# Patient Record
Sex: FEMALE | Race: WHITE | NOT HISPANIC OR LATINO | Employment: OTHER | URBAN - METROPOLITAN AREA
[De-identification: names, ages, dates, MRNs, and addresses within clinical notes are randomized per-mention and may not be internally consistent; named-entity substitution may affect disease eponyms.]

---

## 2017-05-24 ENCOUNTER — OFFICE VISIT (OUTPATIENT)
Dept: PSYCHIATRY | Facility: CLINIC | Age: 52
End: 2017-05-24
Payer: COMMERCIAL

## 2017-05-24 VITALS
WEIGHT: 126.31 LBS | DIASTOLIC BLOOD PRESSURE: 66 MMHG | HEART RATE: 66 BPM | HEIGHT: 66 IN | SYSTOLIC BLOOD PRESSURE: 100 MMHG | BODY MASS INDEX: 20.3 KG/M2

## 2017-05-24 DIAGNOSIS — F41.1 GAD (GENERALIZED ANXIETY DISORDER): ICD-10-CM

## 2017-05-24 DIAGNOSIS — F90.0 ADHD (ATTENTION DEFICIT HYPERACTIVITY DISORDER), INATTENTIVE TYPE: ICD-10-CM

## 2017-05-24 PROCEDURE — 90792 PSYCH DIAG EVAL W/MED SRVCS: CPT | Mod: S$GLB,,, | Performed by: PSYCHIATRY & NEUROLOGY

## 2017-05-24 PROCEDURE — 99999 PR PBB SHADOW E&M-EST. PATIENT-LVL III: CPT | Mod: PBBFAC,,, | Performed by: PSYCHIATRY & NEUROLOGY

## 2017-05-24 RX ORDER — CITALOPRAM 20 MG/1
20 TABLET, FILM COATED ORAL DAILY
Qty: 30 TABLET | Refills: 0 | Status: SHIPPED | OUTPATIENT
Start: 2017-05-24 | End: 2017-06-07 | Stop reason: SINTOL

## 2017-05-24 NOTE — PROGRESS NOTES
"ID: 51yo WF no prior psych hx w/i the [facility]    CC: adhd    HPI: presents late for appt. Chart reviewed. Opens the appt with reasonable, but detailed questions regarding diagnoses and their potential impact on insurance. Has a previous diagnosis of adhd, "I just really struggle keeping myself on task and have for quite sometime. early adulthood is when I really became more aware of it." has had previous trial of strattera and adderall. "I don't really want to medicate but I do want to maximize who I am and use who I am in a way that's productive. I'm creative and can be spontaneous which I like, but I also need to accomplish things in a reasonable amount of time."     Pt initiated psychiatry in early 20s, was diagnosed with "adhd, depression, compulsive stuff"- at that time was started on adderall and on antidepressant- helpful initially, "but I found that it was a little too much of a stimulant." reports that the ssri txmt affected initimacy which was already an ongoing issue in marriage so she d/c'd- cannot recall what she took.     Pt's oldest son  of an "accidental overdose"  at 25yrs old. Shortly following pt had inc'd anxiety, and a reemergence of binge/purge eating d/o which she struggled with through teen years. Went to a sort of rehab facility in [place] x 2wks. Does not believe any medication was given during that time "just a lot of therapy." during that time tried strattera and "it made me somewhat anxious and gave me some sort of heart palpatation, but I also had other things going on at the time with sleep problems."     Reports that she had "great sleep until my hysterectomy"  - since then she is "restless" through the night and has difficulty maintaining- currently taking ambien 5mg po qhs.     On Psychiatric ROS:    Endorses difficulty maintaining sleep since hysterectomy - taking ambien 5mg po qhs- also reports neck pain which affects sleep, denies anhedonia, denies feeling " "helpless/hopeless, "good" energy, poor concentration, stable appetite- no wgt change, denies dec PMA    Denies thoughts of SI/intent/plan.     Endorses feeling more easily overwhelmed because of her inability to accomplish/distractibility, +ruminative thinking, +feeling tense/"on edge"    Denies h/o panic attack  Denies h/o hypo/manic sxs.   Denies h/o psychosis.     Endorses h/o trauma "when my son ", but denies nightmares, re-experiencing, avoidance or hyperarousal.    H/o binge/purge behavior through teen years, none in years "it is a struggle not to want to but I have not had that issue since " - had a reemergence of sxs following her son's death and attended a "rehab" in [place]- no recurrence since.    Difficulty- sustaining attention in tasks or fun activities- cannot sit and watch television, following through on instructions and fail to finish work, organizing tasks and activities, engaging in leisure activities or doing fun things quietly "i can only get an audio book and listen while I'm walking", waiting your turn/impatient/interrupts or intrude on others  Avoid/dislike/reluctant to engage in work that requires sustained mental effort "that's the biggest issue with my  right now because I won't work at the desk", easily distracted, forgetful in daily activities, fidgets with hands/feet or squirms in seat, feels "on the go" or "driven by a motor", blurt out answers before questions have been completed  **Sxs confirmed by collateral- previous diagnosis and h/o effective txmt,  also confirms    PPHx: Denies h/o self injury  + inpt psych hospitalization- went to a type of rehab facility following her son's death- had a reemergence of eating disorder for which she was treated  Denies h/o suicide attempt     Current Psych Meds: ambien 5mg po qhs PRN insomnia  Past Psych Meds: topomax (for migraines), adderall (overly stimulated), strattera (overly stimulated), "maybe lexapro"    PMHx: " "chronic neck pain, migraines occipital neuralgia    SubstHx:   T- none  E- occ, rare  D- none  Caffeine- 1-2cups of coffee/d    FamPHx: son- addiction/"accidental overdose", mom- depression/ETOH abuse, 2 suicide attempts "when drinking", brother- schizophrenic, sister- bipolar/addiction "very dysfunctional it's a stressor for the family"    Dev/SocHx: born in [facility], moved to [place] at 7yo, moved back to [facility] at 18yo (when parents ) and she got pregnant "which was terrible because it was taboo there", had her son (who has now  of "accidental overdose" at 25yrs old after years of addiction-  in ), yrs later pt  current  and had 4 children. Pt's  adopted the first son "and loved him as his own." pt was raised by m/d- still living but . Pt is oldest of 5 children. Pt is close with one sister and one brother. (another brother is schizophrenic and in an detention and a sister is unstable with bipolar and addxn and lives with pt's father). Pt grad HS, some college. Stayed at home and home schooled the children. Now works with  in a food brokerage business. Close with remaining 4 children. Lives at home with  and 24 and 22yo sons.     Musculoskeletal:  Muscle strength/Tone: no dyskinesia/ no tremor  Gait/Station- non antalgic, no assistance needed    MSE: appears stated age, well groomed, appropriate dress, engages well with examiner. Good e/c. Speech reg rate and vol, nonpressured. Mood is "probably on the frustrated side of things." Affect euthymic with some mild anxiety noted. No physical evidence of emotion. Sensorium fully intact. Oriented to date/day/location, current events. Narrative memory intact. Intellectual function is avg based on vocab and basic fund of knowledge. Thought is c/l/gd. No tangentiality or circumstantiality. No FOI/CARLOS. Denies SI/HI. Denies A/VH. No evidence of delusions. Insight and Judgment intact.     Suicide Risk Assessment: "   Protective- age, gender, no prior attempts, no prior hospitalizations, no family h/o attempts, no ongoing substance abuse, no psychosis, , has children, denies SI/intent/plan, seeking treatment, access to treatment, future oriented, good primary support, no access to firearms    Risk- race, ongoing Axis I sxs    **Pt is at LOW imminent and long term risk of suicide given current risk factors.    Assessment:  51yo WF with genetic loading for anxiety and mood disorders through mother. Lifetime h/o anxiety spectrum disorders to include bulimia with binge/purge behavior through teen yrs which resurfaced following her 24yo son's overdose/death in 2010. Pt has not had continued issues with eating disorder, but continues to endorse sxs c/w with both JAMESON and ADHD-IT. Will try and manage anxiety first prior to starting stimulant txmt. Previous trials of stimulant txmt alone have led to overstimulation. Will start celexa today and have pt rtc in 2wk for eval of response.     Axis I: JAMESON, ADHD- IT, h/o bulimia-binge/purge type  Axis II: none at this time   Axis III: neck pain, migraines, occipital neuralgia  Axis IV: occupational, chronic mental health concerns  Axis V: GAF 60    Plan:   1. Start trial of celexa 10-20mg po qhs  2. Anticipate starting stimulant therapy at next appt  3. Discuss therapy referrals at following appt  4. rtc 2wks    -Spent 60min face to face with the pt; >50% time spent in counseling   -Supportive therapy and psychoeducation provided  -R/B/SE's of medications discussed with the pt who expresses understanding and chooses to take medications as prescribed.   -Pt instructed to call clinic, 911 or go to nearest emergency room if sxs worsen or pt is in   crisis. The pt expresses understanding.

## 2017-06-07 ENCOUNTER — OFFICE VISIT (OUTPATIENT)
Dept: PSYCHIATRY | Facility: CLINIC | Age: 52
End: 2017-06-07
Payer: COMMERCIAL

## 2017-06-07 VITALS
DIASTOLIC BLOOD PRESSURE: 64 MMHG | HEIGHT: 66 IN | HEART RATE: 68 BPM | SYSTOLIC BLOOD PRESSURE: 98 MMHG | WEIGHT: 126 LBS | BODY MASS INDEX: 20.25 KG/M2

## 2017-06-07 DIAGNOSIS — F90.0 ADHD (ATTENTION DEFICIT HYPERACTIVITY DISORDER), INATTENTIVE TYPE: Primary | ICD-10-CM

## 2017-06-07 DIAGNOSIS — Z72.821 POOR SLEEP HYGIENE: ICD-10-CM

## 2017-06-07 DIAGNOSIS — F41.1 GAD (GENERALIZED ANXIETY DISORDER): ICD-10-CM

## 2017-06-07 PROCEDURE — 99214 OFFICE O/P EST MOD 30 MIN: CPT | Mod: S$GLB,,, | Performed by: PSYCHIATRY & NEUROLOGY

## 2017-06-07 PROCEDURE — 99999 PR PBB SHADOW E&M-EST. PATIENT-LVL III: CPT | Mod: PBBFAC,,, | Performed by: PSYCHIATRY & NEUROLOGY

## 2017-06-07 RX ORDER — BACLOFEN 10 MG/1
10 TABLET ORAL
COMMUNITY
Start: 2017-05-05 | End: 2021-01-04

## 2017-06-07 RX ORDER — METHYLPHENIDATE HYDROCHLORIDE 18 MG/1
18 TABLET ORAL EVERY MORNING
Qty: 30 TABLET | Refills: 0 | Status: SHIPPED | OUTPATIENT
Start: 2017-06-07 | End: 2017-07-05 | Stop reason: SDUPTHER

## 2017-06-07 NOTE — PROGRESS NOTES
"ID: 51yo WF no prior psych hx w/i the [facility]    CC: adhd    Interim Hx: presents late for appt. Chart reviewed. Took the celexa x1wk then quit- feels it increased her migraines and led to inc'd daytime lethargy. "all those medicines tend to do that to me. I think the lexapro did that too."     Has done some "online research and most adults with adhd like vyvanse." discussion of med ed, classes of stimulants, previous use of adderall was overly stimulating. Will try methylphenidate class. Pt opposed to "ritalin"- will start trial of concerta 18mg po qam.     On Psychiatric ROS:    Endorses difficulty maintaining sleep since hysterectomy 2014- taking ambien 5mg po qhs- also reports neck pain which affects sleep, denies anhedonia, denies feeling helpless/hopeless, "good" energy, poor concentration, stable appetite- no wgt change, denies dec PMA    Denies thoughts of SI/intent/plan.     Endorses feeling more easily overwhelmed because of her inability to accomplish/distractibility, +ruminative thinking, +feeling tense/"on edge"    PPHx: Denies h/o self injury  + inpt psych hospitalization- went to a type of rehab facility following her son's death- had a reemergence of eating disorder for which she was treated  Denies h/o suicide attempt     Current Psych Meds: ambien 5mg po qhs PRN insomnia  Past Psych Meds: topomax (for migraines), adderall (overly stimulated), strattera (overly stimulated), "maybe lexapro", celexa (worsened h/a's; daytime lethargy)    PMHx: chronic neck pain, migraines occipital neuralgia    SubstHx:   T- none  E- occ, rare  D- none  Caffeine- 1-2cups of coffee/d    Musculoskeletal:  Muscle strength/Tone: no dyskinesia/ no tremor  Gait/Station- non antalgic, no assistance needed    MSE: appears stated age, well groomed, appropriate dress, engages well with examiner. Good e/c. Speech reg rate and vol, nonpressured. Mood is "good" Affect euthymic with some mild anxiety noted in verbosity. No " physical evidence of emotion. Sensorium fully intact. Oriented to date/day/location, current events. Narrative memory intact. Intellectual function is avg based on vocab and basic fund of knowledge. Thought is c/l/gd. No tangentiality or circumstantiality. No FOI/CARLOS. Denies SI/HI. Denies A/VH. No evidence of delusions. Insight and Judgment intact.     Suicide Risk Assessment:   Protective- age, gender, no prior attempts, no prior hospitalizations, no family h/o attempts, no ongoing substance abuse, no psychosis, , has children, denies SI/intent/plan, seeking treatment, access to treatment, future oriented, good primary support, no access to firearms    Risk- race, ongoing Axis I sxs    **Pt is at LOW imminent and long term risk of suicide given current risk factors.    Assessment:  51yo WF with genetic loading for anxiety and mood disorders through mother. Also with genetic loading for addiction. Lifetime h/o anxiety spectrum disorders to include bulimia with binge/purge behavior through teen yrs which resurfaced following her 24yo son's overdose/death in 2010. Pt has not had continued issues with eating disorder, but continues to endorse sxs c/w with both JAMESON and ADHD-IT. Will try and manage anxiety first prior to starting stimulant txmt. Previous trials of stimulant txmt alone have led to overstimulation. Started celexa which the pt only cont'd for a 1wk trial. Reported inc'd h/a's and daytime lethargy. Today only interested in trial of long acting stimulant- will start trial of concerta 18mg po qam. 4wk f/u.     Axis I: JAMESON, ADHD- IT, h/o bulimia-binge/purge type  Axis II: none at this time   Axis III: neck pain, migraines, occipital neuralgia  Axis IV: occupational, chronic mental health concerns  Axis V: GAF 60    Plan:   1. No longer on celexa-s/e's  2. Start trial of concerta 18mg po qam  3. Discussed therapy referrals  4. rtc 1mo    -Spent 30min face to face with the pt; >50% time spent in counseling    -Supportive therapy and psychoeducation provided  -R/B/SE's of medications discussed with the pt who expresses understanding and chooses to take medications as prescribed.   -Pt instructed to call clinic, 911 or go to nearest emergency room if sxs worsen or pt is in   crisis. The pt expresses understanding.

## 2017-07-05 ENCOUNTER — OFFICE VISIT (OUTPATIENT)
Dept: PSYCHIATRY | Facility: CLINIC | Age: 52
End: 2017-07-05
Payer: COMMERCIAL

## 2017-07-05 VITALS
WEIGHT: 124 LBS | HEART RATE: 64 BPM | DIASTOLIC BLOOD PRESSURE: 66 MMHG | BODY MASS INDEX: 19.93 KG/M2 | SYSTOLIC BLOOD PRESSURE: 101 MMHG | HEIGHT: 66 IN

## 2017-07-05 DIAGNOSIS — F90.0 ADHD (ATTENTION DEFICIT HYPERACTIVITY DISORDER), INATTENTIVE TYPE: Primary | ICD-10-CM

## 2017-07-05 DIAGNOSIS — Z72.821 POOR SLEEP HYGIENE: ICD-10-CM

## 2017-07-05 DIAGNOSIS — F41.1 GAD (GENERALIZED ANXIETY DISORDER): ICD-10-CM

## 2017-07-05 PROCEDURE — 99214 OFFICE O/P EST MOD 30 MIN: CPT | Mod: S$GLB,,, | Performed by: PSYCHIATRY & NEUROLOGY

## 2017-07-05 PROCEDURE — 99999 PR PBB SHADOW E&M-EST. PATIENT-LVL III: CPT | Mod: PBBFAC,,, | Performed by: PSYCHIATRY & NEUROLOGY

## 2017-07-05 RX ORDER — METHYLPHENIDATE HYDROCHLORIDE 27 MG/1
27 TABLET ORAL EVERY MORNING
Qty: 30 TABLET | Refills: 0 | Status: SHIPPED | OUTPATIENT
Start: 2017-07-05 | End: 2017-07-14 | Stop reason: SDUPTHER

## 2017-07-05 NOTE — PROGRESS NOTES
"ID: 51yo WF no prior psych hx w/i the [facility]- diag clarified as ADHD and JAMESON. Started celexa which the pt didn't tolerate. Last appt started concerta 18mg po qam.     CC: adhd    Interim Hx: presents late for appt. Chart reviewed. Now on concerta 18mg po qam- "I was very mild, but also very helpful." finds her anxiety is improved, sleep so improved she is now taking 1/2 tab of ambien 10mg (provided by other provider) and has been more productive both at home and in her work role.     Takes the med at approx 5:30am and gets benefit for her full work day. No difficulty initiating sleep.     Pt tried 2 tabs, 36mg, one day "and it made me a little on edge". Will try 27mg dose for optimal results with focus/attention.     On Psychiatric ROS:    Endorses difficulty maintaining sleep since hysterectomy 2014- taking ambien 5mg po qhs- also reports neck pain which affects sleep, denies anhedonia, denies feeling helpless/hopeless, "good" energy, improved concentration, stable appetite- no wgt change, denies dec PMA    Denies thoughts of SI/intent/plan.     LESS overwhelmed on stimulant, LESS ruminative thinking, feeling LESS tense/"on edge"    PPHx: Denies h/o self injury  + inpt psych hospitalization- went to a type of rehab facility following her son's death- had a reemergence of eating disorder for which she was treated  Denies h/o suicide attempt     Current Psych Meds: concerta 18mg po qam, ambien 5mg po qhs PRN insomnia (through alternate provider)   Past Psych Meds: topomax (for migraines), adderall (overly stimulated), strattera (overly stimulated), "maybe lexapro", celexa (worsened h/a's; daytime lethargy)    PMHx: chronic neck pain, migraines occipital neuralgia    SubstHx:   T- none  E- occ, rare  D- none  Caffeine- 1-2cups of coffee/d    Musculoskeletal:  Muscle strength/Tone: no dyskinesia/ no tremor  Gait/Station- non antalgic, no assistance needed    MSE: appears stated age, well groomed, appropriate " "dress, engages well with examiner. Good e/c. Speech reg rate and vol, nonpressured. Mood is "upbeat. i'm just really getting so much more accomplished" Affect euthymic- less anxiety noted. Sensorium fully intact. Oriented to date/day/location, current events. Narrative memory intact. Intellectual function is avg based on vocab and basic fund of knowledge. Thought is c/l/gd. No tangentiality or circumstantiality. No FOI/CARLOS. Denies SI/HI. Denies A/VH. No evidence of delusions. Insight and Judgment intact.     Suicide Risk Assessment:   Protective- age, gender, no prior attempts, no prior hospitalizations, no family h/o attempts, no ongoing substance abuse, no psychosis, , has children, denies SI/intent/plan, seeking treatment, access to treatment, future oriented, good primary support, no access to firearms    Risk- race, ongoing Axis I sxs    **Pt is at LOW imminent and long term risk of suicide given current risk factors.    Assessment:  51yo WF with genetic loading for anxiety and mood disorders through mother. Also with genetic loading for addiction. Lifetime h/o anxiety spectrum disorders to include bulimia with binge/purge behavior through teen yrs which resurfaced following her 26yo son's overdose/death in 2010. Pt has not had continued issues with eating disorder, but continues to endorse sxs c/w with both JAMESON and ADHD-IT. Will try and manage anxiety first prior to starting stimulant txmt. Previous trials of stimulant txmt alone have led to overstimulation. Started celexa which the pt only cont'd for a 1wk trial. Reported inc'd h/a's and daytime lethargy. Today only interested in trial of long acting stimulant- started trial of concerta 18mg po qam. Today on f/u the medicine has been helpful- not fully optimized, but a trial of 36mg led to mildly inc'd anxiety. Sleep, focus/attn and functional life all improved on stimulant. Will try inc to 27mg po qam.    Axis I: JAMESON, ADHD- IT, h/o bulimia-binge/purge " type  Axis II: none at this time   Axis III: neck pain, migraines, occipital neuralgia  Axis IV: occupational, chronic mental health concerns  Axis V: GAF 60    Plan:   1. No longer on celexa-s/e's  2. Inc concerta to 27mg po qam  3. Discussed therapy referrals  4. rtc 3mos    -Spent 30min face to face with the pt; >50% time spent in counseling   -Supportive therapy and psychoeducation provided  -R/B/SE's of medications discussed with the pt who expresses understanding and chooses to take medications as prescribed.   -Pt instructed to call clinic, 911 or go to nearest emergency room if sxs worsen or pt is in   crisis. The pt expresses understanding.

## 2017-07-14 ENCOUNTER — PATIENT MESSAGE (OUTPATIENT)
Dept: PSYCHIATRY | Facility: CLINIC | Age: 52
End: 2017-07-14

## 2017-07-14 DIAGNOSIS — F90.0 ADHD (ATTENTION DEFICIT HYPERACTIVITY DISORDER), INATTENTIVE TYPE: ICD-10-CM

## 2017-07-14 RX ORDER — METHYLPHENIDATE HYDROCHLORIDE 18 MG/1
18 TABLET ORAL EVERY MORNING
Qty: 30 TABLET | Refills: 0 | Status: SHIPPED | OUTPATIENT
Start: 2017-07-14 | End: 2017-08-15 | Stop reason: SDUPTHER

## 2017-07-21 ENCOUNTER — TELEPHONE (OUTPATIENT)
Dept: NEUROLOGY | Facility: CLINIC | Age: 52
End: 2017-07-21

## 2017-07-21 NOTE — TELEPHONE ENCOUNTER
Spoke with patient. Scheduled her to see Dr. Delaney for headaches, neck pain and back pain. Pt verbalized an understanding.

## 2017-07-21 NOTE — TELEPHONE ENCOUNTER
----- Message from Savannah Gonzalez sent at 7/21/2017  9:10 AM CDT -----  Contact: PT  PT wanted to know if Dr. Tate see's patients with lower lumbar dics issues.    PT callback: [phone]

## 2017-07-24 ENCOUNTER — OFFICE VISIT (OUTPATIENT)
Dept: PSYCHIATRY | Facility: CLINIC | Age: 52
End: 2017-07-24
Payer: COMMERCIAL

## 2017-07-24 DIAGNOSIS — F41.1 GAD (GENERALIZED ANXIETY DISORDER): ICD-10-CM

## 2017-07-24 DIAGNOSIS — F90.0 ADHD (ATTENTION DEFICIT HYPERACTIVITY DISORDER), INATTENTIVE TYPE: Primary | ICD-10-CM

## 2017-07-24 PROCEDURE — 99999 PR PBB SHADOW E&M-EST. PATIENT-LVL I: CPT | Mod: PBBFAC,,, | Performed by: SOCIAL WORKER

## 2017-07-24 PROCEDURE — 90791 PSYCH DIAGNOSTIC EVALUATION: CPT | Mod: S$GLB,,, | Performed by: SOCIAL WORKER

## 2017-07-31 ENCOUNTER — TELEPHONE (OUTPATIENT)
Dept: NEUROLOGY | Facility: CLINIC | Age: 52
End: 2017-07-31

## 2017-07-31 NOTE — TELEPHONE ENCOUNTER
----- Message from Kim Garrison sent at 7/31/2017 10:43 AM CDT -----  Patient has appointment next week but would like to be seen this week if possible due to father is having surgery next week/please call back at [phone] to reschedule or advise.

## 2017-07-31 NOTE — TELEPHONE ENCOUNTER
Spoke with patient. Informed her that as of right now Dr. Delaney does not have anything sooner. Patient verbalized an understanding and will keep her appointment.

## 2017-08-08 ENCOUNTER — OFFICE VISIT (OUTPATIENT)
Dept: NEUROLOGY | Facility: CLINIC | Age: 52
End: 2017-08-08
Payer: COMMERCIAL

## 2017-08-08 VITALS
RESPIRATION RATE: 18 BRPM | BODY MASS INDEX: 19.13 KG/M2 | HEIGHT: 66 IN | TEMPERATURE: 98 F | SYSTOLIC BLOOD PRESSURE: 126 MMHG | DIASTOLIC BLOOD PRESSURE: 84 MMHG | HEART RATE: 70 BPM | WEIGHT: 119.06 LBS

## 2017-08-08 DIAGNOSIS — G43.809 MIGRAINE, CERVICOGENIC: Primary | ICD-10-CM

## 2017-08-08 DIAGNOSIS — M79.18 CERVICAL MYOFASCIAL PAIN SYNDROME: ICD-10-CM

## 2017-08-08 DIAGNOSIS — M54.16 LUMBAR RADICULAR PAIN: ICD-10-CM

## 2017-08-08 PROCEDURE — 99204 OFFICE O/P NEW MOD 45 MIN: CPT | Mod: S$GLB,,, | Performed by: PSYCHIATRY & NEUROLOGY

## 2017-08-08 PROCEDURE — 99999 PR PBB SHADOW E&M-EST. PATIENT-LVL V: CPT | Mod: PBBFAC,,, | Performed by: PSYCHIATRY & NEUROLOGY

## 2017-08-08 PROCEDURE — 3008F BODY MASS INDEX DOCD: CPT | Mod: S$GLB,,, | Performed by: PSYCHIATRY & NEUROLOGY

## 2017-08-08 RX ORDER — IBUPROFEN AND FAMOTIDINE 800; 26.6 MG/1; MG/1
TABLET, COATED ORAL DAILY
Refills: 0 | COMMUNITY
Start: 2017-07-11 | End: 2017-11-28

## 2017-08-08 RX ORDER — TIZANIDINE 4 MG/1
4 TABLET ORAL NIGHTLY PRN
Qty: 30 TABLET | Refills: 3 | Status: SHIPPED | OUTPATIENT
Start: 2017-08-08 | End: 2017-08-18

## 2017-08-08 RX ORDER — DULOXETIN HYDROCHLORIDE 20 MG/1
20 CAPSULE, DELAYED RELEASE ORAL DAILY
Qty: 30 CAPSULE | Refills: 11 | Status: SHIPPED | OUTPATIENT
Start: 2017-08-08 | End: 2017-08-30

## 2017-08-08 NOTE — PROGRESS NOTES
Date of service: 8/8/2017  Referring provider: Dr. Ben Cadet    Subjective:      Chief complaint: Migraine and Leg Pain       Patient ID: Brenda C Stargardter is a 52 y.o. lady with hypothyroidism, gastroparesis, anxiety, ADHD, and migraines presenting for the evaluation of migraines and leg pain. She is a new patient to me. Sees Dr. Ben Cadet for pain management.     History of Present Illness    Headache / Pain History:  Age at onset and course over time: around 2013 after cave excursion in which she became ill, was exposed to bats, had severe migraine lasting months. She believes they originate from her neck pain, which is daily. Dr. Cadet has done CMBB injections (5-6x) in the past which have helped. Has not moved on to rhizotomy. Each CMBB lasts months to years. Last cervical imaging was in 2016.   Location: starts in neck, radiates forward to left side of head (mainly), top of head   Quality: pressure, tight band, throbbing   Severity: best 1/10, worst 1010, current 5/10   Duration: hours - days   Frequency: daily neck pain, headache frequency varies but usually >15 days per month   Alleviated by: sleep, darkness, massage, heat, ice, medication   Exacerbated by: looking up or looking down for extended period of time, light, noise, bending over, stress, food   Associated with: scalp sensitivity, photo/phono/osmophobia, loss of appetite, nasal congestion, problems with memory/concentration, neck tightness   Sleep habits: needs pain medication and ambien to get to sleep due to the pain   Caffeine intake: 1-2 per day     Other:  Last 2-3 months left leg/thigh hurting. Lost strength in that leg. Limping. No back pain. Wraps around knee stops mid-calf. Throbbing/ shooting. No lumbar MRI. Norco helps somewhat with that pain.     Current acute treatment:  -- duexis   -- head / ice   -- norco 7.5 -  1/2 in afternoon and 1/2 before bed (daily)  -- 1/2 ambien   -- baclofen   -- imitrex for severe      Current  prevention:  -- none     Previously tried/failed acute treatment:  -- imitrex - less effective then relpax  -- relpax - chest pain   -- prednisone   -- reglan - weakness   -- toradol   -- aleve  -- motrin  -- Bupap   -- Goody's  -- occipital nerve block  -- cervical trigger point  -- cervical epidural steroid injection   -- piriformis injection     Previously tried/failed preventative treatment:  -- gabapentin - blurring vision   -- Gralise     Review of patient's allergies indicates:   Allergen Reactions    Reglan [metoclopramide hcl] Other (See Comments)     Weakness could not hold an object in her hands.    Benadryl [diphenhydramine hcl] Other (See Comments)     Restless leg, aggitation    Strattera [atomoxetine] Palpitations    Phenergan [promethazine] Other (See Comments)     Akathisia      Prednisone Nausea And Vomiting    Relpax [eletriptan hbr] Other (See Comments)     Chest pain    Sulfa (sulfonamide antibiotics) Other (See Comments)     Muscle weakness     Current Outpatient Prescriptions   Medication Sig Dispense Refill    baclofen (LIORESAL) 10 MG tablet       CALCIUM CARBONATE/MAG HYDROX (MYLANTA ORAL) Take by mouth.      clotrimazole-betamethasone 1-0.05% (LOTRISONE) cream Apply topically once daily.       DUEXIS 800-26.6 mg Tab   0    hydrocodone-acetaminophen 7.5-325mg (NORCO) 7.5-325 mg per tablet Take 1 tablet by mouth nightly as needed.       levothyroxine (SYNTHROID) 25 MCG tablet TAKE ONE TABLET BY MOUTH EVERY MORNING WITH WATER 30 tablet 11    methylphenidate (CONCERTA) 18 MG CR tablet Take 1 tablet (18 mg total) by mouth every morning. 30 tablet 0    MG TRISILICATE/ALH/NAHCO3/AA (GAVISCON ORAL) Take 5 mLs by mouth as needed.       omeprazole (PRILOSEC) 40 MG capsule Take 40 mg by mouth once daily.      PROCTOSOL HC 2.5 % rectal cream Place 1 Dose rectally as needed.       zolpidem (AMBIEN) 10 mg Tab Take 1 tablet (10 mg total) by mouth nightly as needed. 30 tablet 5     albuterol 90 mcg/actuation inhaler Inhale 2 puffs into the lungs every 6 (six) hours as needed for Wheezing. (Patient taking differently: Inhale 2 puffs into the lungs every 6 (six) hours as needed for Wheezing. Bring to the hospital day of surgery.) 18 g 2    duloxetine (CYMBALTA) 20 MG capsule Take 1 capsule (20 mg total) by mouth once daily. 30 capsule 11    levocetirizine (XYZAL) 5 MG tablet Take 1 tablet (5 mg total) by mouth every evening. 30 tablet 11    sumatriptan (IMITREX) 100 MG tablet Take 1 tablet (100 mg total) by mouth every 2 (two) hours as needed for Migraine. up to 200 mg/day. Hold for BP > 150 mm systolic (Patient taking differently: Take 100 mg by mouth every 2 (two) hours as needed for Migraine. up to 200 mg/day. Hold for BP > 150 mm systolic) 9 tablet 2    tizanidine (ZANAFLEX) 4 MG tablet Take 1 tablet (4 mg total) by mouth nightly as needed (muscle spasm). 30 tablet 3     Current Facility-Administered Medications   Medication Dose Route Frequency Provider Last Rate Last Dose    lidocaine HCl 2% oral solution 1 mL  1 mL Subcutaneous 1 time in Clinic/HOD Triny Matos MD        triamcinolone acetonide injection 40 mg  40 mg Intramuscular 1 time in Clinic/HOD Triny Matos MD           Past Medical History  Past Medical History:   Diagnosis Date    Abdominal or pelvic swelling, mass, or lump, right lower quadrant     Anxiety     Arthritis     Asthma, mild persistent     Cervicalgia     COPD (chronic obstructive pulmonary disease)     Enlargement of lymph nodes     Eosinophilic esophagitis     Gastroparesis     Headache(784.0)     Helicobacter pylori (H. pylori)     History of positive PPD, untreated     History of psychiatric hospitalization     after son , hospitalized for eating disorder    Hx of psychiatric care     Hypothyroidism     Migraines     Occipital neuralgia     Other selective immunoglobulin deficiencies     Other syndromes affecting cervical  region     Psychiatric problem     Spondylosis of cervical spine     Therapy     Unspecified asthma     Unspecified disorder of thyroid     Unspecified viral meningitis        Past Surgical History  Past Surgical History:   Procedure Laterality Date    AXILLARY ABCESS IRRIGATION AND DEBRIDEMENT      CHOLECYSTECTOMY      EXCISIONAL HEMORRHOIDECTOMY  12/21/15    LYMPH NODE BIOPSY Right 2014    Right inguinal lymph node biopsy    TOTAL ABDOMINAL HYSTERECTOMY W/ BILATERAL SALPINGOOPHORECTOMY      VARICOSE VEIN SURGERY         Family History  Family History   Problem Relation Age of Onset    Stroke Mother     Hypertension Mother     Cerebral aneurysm Mother     Hypertension Father     Heart disease Father     Pneumonia Sister     No Known Problems Brother     No Known Problems Brother     No Known Problems Sister        Social History  Social History     Social History    Marital status:      Spouse name: Ren    Number of children: 5    Years of education: 13     Occupational History    Self employed      Social History Main Topics    Smoking status: Never Smoker    Smokeless tobacco: Never Used    Alcohol use Yes      Comment: Social    Drug use: No    Sexual activity: Not on file     Other Topics Concern    Not on file     Social History Narrative    No narrative on file        Review of Systems  Constitutional: no fever, no chills  Eyes: no change to vision, no redness, no tearing  Ears, nose, mouth, throat: no hearing loss, no tinnitus, no rhinorrhea, no difficulty swallowing   Cardiovascular: no palpitations  Respiratory: no shortness of breath  Gastrointestinal: no diarrhea, no constipation  Musculoskeletal: no joint pain  Skin: no rashes  Neurologic: + numbness, + weakness, change to speech, loss of coordination   Endocrine: no heat intolerance, + cold intolerance     Objective:        Vitals:    08/08/17 1431   BP: 126/84   Pulse: 70   Resp: 18   Temp: 98.3 °F (36.8 °C)      Body mass index is 19.21 kg/m².    Constitutional: appears in no acute distress, well-developed, well-nourished     Eyes: normal conjunctiva, PERRLA, optic discs are flat and sharp bilaterally     Ears, nose, mouth, throat: external appearance of ears and nose normal, hearing intact to finger rub     Cardiovascular: regular rate and rhythm, no murmurs appreciated, no carotid bruits     Respiratory: unlabored respirations, breath sounds normal bilaterally    Gastrointestinal: no abdominal masses, no tenderness, no visible hernia    Musculoskeletal: normal tone in all four extremities. No atrophy. No abnormal movements. Strength in all muscles groups of the upper and lower extremities is 5/5. Normal gait and station. Digits and nails normal.      Spine: cervical spine with limited extension and lateral rotation. Pain reproduced with extension. Multiple trigger points identified in the upper trapezius and upper cervical paraspinals. Facet loading present bilaterally. Lumbar spine ROM is normal. There is mild facet tenderness in the lower lumbar regions. + facet loading bilaterally. Minimal tenderness over the SI joints bilaterally. MUNIRA produces pain in the left SI joint and left outer hip. SLR equivocally positive on left.     Psychiatric: normal judgment and insight. Oriented to person, place, and time.     Neurologic:   Cortical functions: recent and remote memory intact, normal attention span and concentration, speech fluent, adequate fund of knowledge   Cranial nerves: visual fields full, PERRLA, EOMI, facial sensation intact in V1-V3, symmetric facial strength, hearing intact to finger rub, palate elevates symmetrically, shoulder shrug 5/5, tongue protrudes midline   Reflexes: 2+ in the upper and lower extremities, no Babinski, no Falcon  Sensation: intact to light touch and temperature throughout EXCEPT the L5 dermatome on the left   Coordination: normal finger to noise    Data Review:     No results found  for this or any previous visit.    Lab Results   Component Value Date     08/15/2016     09/18/2015    K 4.3 08/15/2016    K 4.1 09/18/2015    CL 98 08/15/2016     09/18/2015    CO2 33 (H) 08/15/2016    BUN 16 08/15/2016    CREATININE 0.92 08/15/2016    CREATININE 0.92 09/18/2015    GLU 81 08/15/2016    AST 37 (H) 08/15/2016    ALT 42 08/15/2016    ALBUMIN 4.0 08/15/2016    ALBUMIN 3.6 09/18/2015    PROT 6.7 08/15/2016    BILITOT 0.5 08/15/2016    CHOL 168 07/19/2016    HDL 68 07/19/2016    LDLCALC 78.8 07/19/2016    LDLCALC 77 09/18/2015    TRIG 106 07/19/2016       Lab Results   Component Value Date    WBC 5.94 08/10/2016    HGB 12.8 08/10/2016    HCT 39.2 08/10/2016    MCV 94 08/10/2016     08/10/2016       Lab Results   Component Value Date    TSH 2.210 09/18/2015       Assessment & Plan:       Problem List Items Addressed This Visit     Migraine, cervicogenic - Primary    Current Assessment & Plan     Agree that based on the pattern of her migraines and reproduction with neck position that headaches are primarily cervicogenic. Also meet criteria for migraine. Has >15 per month. This high frequency necessitates prevention. We discussed the use of antidepressants specifically TCAs, Cymbalta, and Effexor as the best choices due to triple action for migraine, neck pain, and lumbar radiculopathy. She is poorly tolerant of medication side effects so will be super cautious and use only the lowest doses in the beginning. We agreed on Cymbalta 20mg. Also discussed need to use alternative pain remedies as frequent opiods, even low dose, used for other pains, will contribute to the chronification of migraine. CMBBs greatly help her neck pain, but she reports she is still left with muscle spasm.          Lumbar radicular pain    Current Assessment & Plan     Acute on chronic problem, recently flaring. Knows she has DDD in the lumbar spine but has not been imaged in many years. Has L5 dermatomal  numbness, no weakness on exam, equivocal SLR on that side but also some localizing features of SI joint. Suspect its more radicula in nature over SI joint but if no major improvement with lumbar SHORTY ([facility] I encouraged her to do with Dr. Cadet) then may need SI joint injection. Will get lumbar MRI to assess for new disc pathology.          Relevant Orders    MRI Lumbar Spine Without Contrast    Cervical myofascial pain syndrome    Current Assessment & Plan     Start tizanidine low dose at night, hopefully can use this to replace some of the Norco and ambien. Return to clinic for cervical trigger point injections. Have discussed the need for PT to include myofascial release rather then strengthening. She already has order from Dr. Cadet.            Other Visit Diagnoses    None.             WORKUP:  -- MRI lumbar spine     PREVENTION (use daily regardless of headache / pain):  -- start Cymbalta 20mg in Am   -- physical therapy (cervical myofascial release)    ACUTE TREATMENT of headache / pain  -- start tizanidine half tablet to full tablet at night       Return in about 2 weeks (around 8/22/2017) for trigger point injection .     A total of 64 minutes were spent face to face with the patient with more than half involved in coordination of care or counseling.     Tammie Delaney MD

## 2017-08-08 NOTE — ASSESSMENT & PLAN NOTE
Acute on chronic problem, recently flaring. Knows she has DDD in the lumbar spine but has not been imaged in many years. Has L5 dermatomal numbness, no weakness on exam, equivocal SLR on that side but also some localizing features of SI joint. Suspect its more radicula in nature over SI joint but if no major improvement with lumbar SHORTY ([facility] I encouraged her to do with Dr. Cadet) then may need SI joint injection. Will get lumbar MRI to assess for new disc pathology.

## 2017-08-08 NOTE — LETTER
August 8, 2017      Ben Cadet MD  [id number] [facility] [id number]           [facility]  [address] [facility] [address] [place] [address]  Phone: [phone]  Fax: [phone]          Patient: Brenda C Stargardter   MR Number: [id number]   YOB: 1965   Date of Visit: 8/8/2017       Dear Dr. Ben Cadet:    Thank you for referring Brenda Stargardter to me for evaluation. Attached you will find relevant portions of my assessment and plan of care.    If you have questions, please do not hesitate to call me. I look forward to following Brenda Stargardter along with you.    Sincerely,    Tammie Delaney MD    Enclosure  CC:  No Recipients    If you would like to receive this communication electronically, please contact [email or web address] or [phone] to request more information on [employer] access.    For providers and/or their staff who would like to refer a patient to [facility], please contact us through our one-stop-shop provider referral line, [facility], at [phone].    If you feel you have received this communication in error or would no longer like to receive these types of communications, please e-mail [email or web address]

## 2017-08-08 NOTE — ASSESSMENT & PLAN NOTE
Start tizanidine low dose at night, hopefully can use this to replace some of the Norco and ambien. Return to clinic for cervical trigger point injections. Have discussed the need for PT to include myofascial release rather then strengthening. She already has order from Dr. Cadet.

## 2017-08-08 NOTE — ASSESSMENT & PLAN NOTE
Agree that based on the pattern of her migraines and reproduction with neck position that headaches are primarily cervicogenic. Also meet criteria for migraine. Has >15 per month. This high frequency necessitates prevention. We discussed the use of antidepressants specifically TCAs, Cymbalta, and Effexor as the best choices due to triple action for migraine, neck pain, and lumbar radiculopathy. She is poorly tolerant of medication side effects so will be super cautious and use only the lowest doses in the beginning. We agreed on Cymbalta 20mg. Also discussed need to use alternative pain remedies as frequent opiods, even low dose, used for other pains, will contribute to the chronification of migraine. CMBBs greatly help her neck pain, but she reports she is still left with muscle spasm.

## 2017-08-11 ENCOUNTER — TELEPHONE (OUTPATIENT)
Dept: NEUROLOGY | Facility: CLINIC | Age: 52
End: 2017-08-11

## 2017-08-11 ENCOUNTER — PATIENT MESSAGE (OUTPATIENT)
Dept: NEUROLOGY | Facility: CLINIC | Age: 52
End: 2017-08-11

## 2017-08-11 NOTE — TELEPHONE ENCOUNTER
Returned call. Informed her that I faxed the referral for her MRI to [facility] along with referral authorization number. Patient verbalized understanding.

## 2017-08-11 NOTE — TELEPHONE ENCOUNTER
----- Message from Jaime Montalvo sent at 8/11/2017 11:00 AM CDT -----  Contact: Patient  Patient states that she would have to cancel the MRI appointment scheduled for tomorrow, 08/12/2017.  She would like to talk to you about other options.  Please call her back at [phone].  Thank you

## 2017-08-14 ENCOUNTER — PATIENT MESSAGE (OUTPATIENT)
Dept: NEUROLOGY | Facility: CLINIC | Age: 52
End: 2017-08-14

## 2017-08-14 ENCOUNTER — TELEPHONE (OUTPATIENT)
Dept: NEUROLOGY | Facility: CLINIC | Age: 52
End: 2017-08-14

## 2017-08-14 NOTE — TELEPHONE ENCOUNTER
Called and spoke with patient informed her the correct information was sent over to [facility]. Patient verbalized understanding.

## 2017-08-14 NOTE — TELEPHONE ENCOUNTER
Called and spoke with patient. Informed her that I am working to get her MRI approved and that someone from our office would give her call as soon as it is approved. Patient verbalized understanding.

## 2017-08-14 NOTE — TELEPHONE ENCOUNTER
----- Message from Teri Gerardo sent at 8/11/2017  1:22 PM CDT -----  Contact: triston He called to advise that the authorization# that was on fax is not correct.please call back at [phone] to advise .need correct authorization. Thanks,

## 2017-08-14 NOTE — TELEPHONE ENCOUNTER
Returned call and spoke with Ying. Informed her that our Pre Auth department said the authorization code is still the same. Ying stated she needed documentation stating the Facility name had been changed and that it was still approved. Referral faxed to [facility] at [phone].

## 2017-08-15 ENCOUNTER — PATIENT MESSAGE (OUTPATIENT)
Dept: PSYCHIATRY | Facility: CLINIC | Age: 52
End: 2017-08-15

## 2017-08-15 DIAGNOSIS — F90.0 ADHD (ATTENTION DEFICIT HYPERACTIVITY DISORDER), INATTENTIVE TYPE: ICD-10-CM

## 2017-08-15 RX ORDER — METHYLPHENIDATE HYDROCHLORIDE 18 MG/1
18 TABLET ORAL EVERY MORNING
Qty: 30 TABLET | Refills: 0 | Status: SHIPPED | OUTPATIENT
Start: 2017-08-15 | End: 2017-09-14 | Stop reason: SDUPTHER

## 2017-08-16 ENCOUNTER — PATIENT MESSAGE (OUTPATIENT)
Dept: NEUROLOGY | Facility: CLINIC | Age: 52
End: 2017-08-16

## 2017-08-18 ENCOUNTER — OFFICE VISIT (OUTPATIENT)
Dept: PSYCHIATRY | Facility: CLINIC | Age: 52
End: 2017-08-18
Payer: COMMERCIAL

## 2017-08-18 DIAGNOSIS — F41.1 GAD (GENERALIZED ANXIETY DISORDER): ICD-10-CM

## 2017-08-18 DIAGNOSIS — F90.0 ADHD (ATTENTION DEFICIT HYPERACTIVITY DISORDER), INATTENTIVE TYPE: Primary | ICD-10-CM

## 2017-08-18 PROCEDURE — 99999 PR PBB SHADOW E&M-EST. PATIENT-LVL III: CPT | Mod: PBBFAC,,, | Performed by: SOCIAL WORKER

## 2017-08-18 PROCEDURE — 90834 PSYTX W PT 45 MINUTES: CPT | Mod: S$GLB,,, | Performed by: SOCIAL WORKER

## 2017-08-22 ENCOUNTER — PROCEDURE VISIT (OUTPATIENT)
Dept: NEUROLOGY | Facility: CLINIC | Age: 52
End: 2017-08-22
Payer: COMMERCIAL

## 2017-08-22 VITALS
DIASTOLIC BLOOD PRESSURE: 78 MMHG | BODY MASS INDEX: 19.04 KG/M2 | SYSTOLIC BLOOD PRESSURE: 114 MMHG | HEIGHT: 66 IN | WEIGHT: 118.5 LBS | HEART RATE: 69 BPM

## 2017-08-22 DIAGNOSIS — M79.18 CERVICAL MYOFASCIAL PAIN SYNDROME: Primary | ICD-10-CM

## 2017-08-22 DIAGNOSIS — M54.81 BILATERAL OCCIPITAL NEURALGIA: ICD-10-CM

## 2017-08-22 PROCEDURE — 64405 NJX AA&/STRD GR OCPL NRV: CPT | Mod: 50,S$GLB,, | Performed by: PSYCHIATRY & NEUROLOGY

## 2017-08-22 PROCEDURE — 20553 NJX 1/MLT TRIGGER POINTS 3/>: CPT | Mod: 59,S$GLB,, | Performed by: PSYCHIATRY & NEUROLOGY

## 2017-08-22 RX ORDER — BUPIVACAINE HYDROCHLORIDE 2.5 MG/ML
20 INJECTION, SOLUTION EPIDURAL; INFILTRATION; INTRACAUDAL ONCE
Status: COMPLETED | OUTPATIENT
Start: 2017-08-22 | End: 2017-08-22

## 2017-08-22 RX ORDER — ESTRADIOL 0.1 MG/D
FILM, EXTENDED RELEASE TRANSDERMAL
COMMUNITY
Start: 2017-08-21 | End: 2020-12-15

## 2017-08-22 RX ORDER — TRIAMCINOLONE ACETONIDE 40 MG/ML
40 INJECTION, SUSPENSION INTRA-ARTICULAR; INTRAMUSCULAR ONCE
Status: COMPLETED | OUTPATIENT
Start: 2017-08-22 | End: 2017-08-22

## 2017-08-22 RX ADMIN — TRIAMCINOLONE ACETONIDE 40 MG: 40 INJECTION, SUSPENSION INTRA-ARTICULAR; INTRAMUSCULAR at 12:08

## 2017-08-22 RX ADMIN — BUPIVACAINE HYDROCHLORIDE 50 MG: 2.5 INJECTION, SOLUTION EPIDURAL; INFILTRATION; INTRACAUDAL at 12:08

## 2017-08-22 NOTE — PROCEDURES
Procedures     PROCEDURE #1     Greater and Lesser Occipital Nerve Block, Bilateral     The patient was examined today and determined to have tenderness over bilateral DALILA and MAYELIN with pain referral pattern starting occipital and radiating forward. Will proceed with bilateral DALILA and MAYELIN blocks.     After risks and benefits were explained including bleeding, infection, worsening of the pain, damage to the area being injected, weakness, allergic reaction to medications, vascular injection, and nerve damage, signed consent was obtained. All questions were answered.     The area of the greater occipital nerve was identified as a point 1/3rds the distance  between the occipital protuberance and the ipsilateral mastoid process. The area of the lesser occipital nerve was identified as a point 2/3rds the distance between the occipital protuberance and the ipsilateral mastoid process.The identified areas were prepped three times with alcohol and the alcohol allowed to dry. Next, a 27 gauge 1 inch needle was placed in the anatomical area of the DALILA. Once reproduction of the pain was elicited and negative aspiration confirmed, I proceeded with the injection. This was repeated for the MAYELIN. The exact procedure was repeated on the contralateral side.     Adequacy of the block was confirmed by sensory examination demonstrating anesthesia in the territory of the DALILA and the MAYELIN.     Medication used: Marcaine 0.25% (25mg) and Triamcinolone 20mg     Total volume injected:  10cc    Estimated blood loss: none    The patient tolerated the procedure well and there were no complications.     -----------------------------------------------------------------------------------------------------------------    PROCEDURE #2     TRIGGER POINT INJECTION (CERVICAL)     Indication: cervicalgia     Anesthesia: none     After risks and benefits were explained including bleeding, infection, worsening of the pain, damage to the area being injected,  weakness, allergic reaction to medications, vascular injection, and nerve damage, signed consent was obtained. All questions were answered.     Trigger points were identified by palpation of tight muscle fibers with reproduction of patient's pain and referral pattern upon palpation. The identified areas were prepped three times with alcohol and the alcohol allowed to dry. Next, a 27 gauge 1 inch needle was placed in the anatomical area of the trigger point ot be injected. Once negative aspiration of air and heme was confirmed, I proceeded with the injection.     Medications used: bupivicaine 0.25% (25mg) and Triamcinolone 20mg     Total volume injected: 10cc     Trigger points injected:  -- right and left semispinalis capitus (lower)  -- right and left upper trapezius  -- right and left sternocleidomastoid     Estimated blood loss: none     The patient did tolerate the procedure well and there were no complications.    RTC in 2 months

## 2017-09-06 PROBLEM — K20.0 EOSINOPHILIC ESOPHAGITIS: Status: ACTIVE | Noted: 2017-09-06

## 2017-09-14 ENCOUNTER — PATIENT MESSAGE (OUTPATIENT)
Dept: PSYCHIATRY | Facility: CLINIC | Age: 52
End: 2017-09-14

## 2017-09-14 DIAGNOSIS — F90.0 ADHD (ATTENTION DEFICIT HYPERACTIVITY DISORDER), INATTENTIVE TYPE: ICD-10-CM

## 2017-09-14 RX ORDER — METHYLPHENIDATE HYDROCHLORIDE 18 MG/1
18 TABLET ORAL EVERY MORNING
Qty: 30 TABLET | Refills: 0 | Status: SHIPPED | OUTPATIENT
Start: 2017-09-14 | End: 2017-10-05 | Stop reason: SDUPTHER

## 2017-09-25 ENCOUNTER — OFFICE VISIT (OUTPATIENT)
Dept: PSYCHIATRY | Facility: CLINIC | Age: 52
End: 2017-09-25
Payer: COMMERCIAL

## 2017-09-25 DIAGNOSIS — F41.1 GAD (GENERALIZED ANXIETY DISORDER): Primary | ICD-10-CM

## 2017-09-25 DIAGNOSIS — F90.0 ADHD (ATTENTION DEFICIT HYPERACTIVITY DISORDER), INATTENTIVE TYPE: ICD-10-CM

## 2017-09-25 PROCEDURE — 90834 PSYTX W PT 45 MINUTES: CPT | Mod: S$GLB,,, | Performed by: SOCIAL WORKER

## 2017-10-05 ENCOUNTER — OFFICE VISIT (OUTPATIENT)
Dept: PSYCHIATRY | Facility: CLINIC | Age: 52
End: 2017-10-05
Payer: COMMERCIAL

## 2017-10-05 VITALS
SYSTOLIC BLOOD PRESSURE: 100 MMHG | HEART RATE: 68 BPM | HEIGHT: 66 IN | WEIGHT: 116.88 LBS | DIASTOLIC BLOOD PRESSURE: 67 MMHG | BODY MASS INDEX: 18.79 KG/M2

## 2017-10-05 DIAGNOSIS — F41.1 GAD (GENERALIZED ANXIETY DISORDER): Primary | ICD-10-CM

## 2017-10-05 DIAGNOSIS — F90.0 ADHD (ATTENTION DEFICIT HYPERACTIVITY DISORDER), INATTENTIVE TYPE: ICD-10-CM

## 2017-10-05 PROCEDURE — 99214 OFFICE O/P EST MOD 30 MIN: CPT | Mod: S$GLB,,, | Performed by: PSYCHIATRY & NEUROLOGY

## 2017-10-05 PROCEDURE — 99999 PR PBB SHADOW E&M-EST. PATIENT-LVL III: CPT | Mod: PBBFAC,,, | Performed by: PSYCHIATRY & NEUROLOGY

## 2017-10-05 RX ORDER — METHYLPHENIDATE HYDROCHLORIDE 18 MG/1
18 TABLET ORAL EVERY MORNING
Qty: 30 TABLET | Refills: 0 | Status: SHIPPED | OUTPATIENT
Start: 2017-10-12 | End: 2017-11-14 | Stop reason: SDUPTHER

## 2017-10-05 NOTE — PROGRESS NOTES
"ID: 53yo WF no prior psych hx w/i the [facility] system- diag clarified as ADHD and JAMESON. Started celexa which the pt didn't tolerate. Last appt started concerta 18mg po qam.     CC: adhd    Interim Hx: presents late for appt. Chart reviewed. "I've had some stomach issues but I think that's my food allergies kicking up."     Now on concerta 18mg po qam- we tried an inc to the 27mg which led to stomach irritation and over activation. "one of the big things this med has done has helped me to be more focused and organized but also less anxious about all sorts of things. I'm more decisive. I can process my choices quickly and it's eliminated indecision and the stress that comes with that. I've really appreciated that difference."    Takes the med at approx 5:30am and gets benefit for her full work day. No difficulty initiating sleep.     On Psychiatric ROS:    Endorses difficulty maintaining sleep- taking ambien 5mg po qhs- also reports neck pain which affects sleep, denies anhedonia, denies feeling helpless/hopeless, "good" energy, improved concentration, stable appetite- some wgt loss since 7/2017 due to allergies and elimination of various potential causes, denies dec PMA    Denies thoughts of SI/intent/plan.     LESS overwhelmed on stimulant, LESS ruminative thinking, feeling LESS tense/"on edge"    PPHx: Denies h/o self injury  + inpt psych hospitalization- went to a type of rehab facility following her son's death- had a reemergence of eating disorder for which she was treated  Denies h/o suicide attempt     Current Psych Meds: concerta 18mg po qam, ambien 5mg po qhs PRN insomnia (through alternate provider)   Past Psych Meds: topomax (for migraines), adderall (overly stimulated), strattera (overly stimulated), "maybe lexapro", celexa (worsened h/a's; daytime lethargy)    PMHx: chronic neck pain, migraines occipital neuralgia    SubstHx:   T- none  E- occ, rare  D- none  Caffeine- 1-2cups of " "coffee/d    Musculoskeletal:  Muscle strength/Tone: no dyskinesia/ no tremor  Gait/Station- non antalgic, no assistance needed    MSE: appears stated age, well groomed, appropriate dress, engages well with examiner. Good e/c. Speech reg rate and vol, nonpressured. Mood is "a little antsy about the storm" Affect euthymic- less anxiety noted. Sensorium fully intact. Oriented to date/day/location, current events. Narrative memory intact. Intellectual function is avg based on vocab and basic fund of knowledge. Thought is c/l/gd. No tangentiality or circumstantiality. No FOI/CARLOS. Denies SI/HI. Denies A/VH. No evidence of delusions. Insight and Judgment intact.     Suicide Risk Assessment:   Protective- age, gender, no prior attempts, no prior hospitalizations, no family h/o attempts, no ongoing substance abuse, no psychosis, , has children, denies SI/intent/plan, seeking treatment, access to treatment, future oriented, good primary support, no access to firearms    Risk- race, ongoing Axis I sxs    **Pt is at LOW imminent and long term risk of suicide given current risk factors.    Assessment:  53yo WF with genetic loading for anxiety and mood disorders through mother. Also with genetic loading for addiction. Lifetime h/o anxiety spectrum disorders to include bulimia with binge/purge behavior through teen yrs which resurfaced following her 24yo son's overdose/death in 2010. Pt has not had continued issues with eating disorder, but continues to endorse sxs c/w with both JAMESON and ADHD-IT. Will try and manage anxiety first prior to starting stimulant txmt. Previous trials of stimulant txmt alone have led to overstimulation. Started celexa which the pt only cont'd for a 1wk trial. Reported inc'd h/a's and daytime lethargy. Today only interested in trial of long acting stimulant- started trial of concerta 18mg po qam. Today on f/u the medicine has been helpful- not fully optimized, but a trial of 36mg led to mildly " inc'd anxiety. Sleep, focus/attn and functional life all improved on stimulant. Will try inc to 27mg po qam. Inc led to overactivation- now back on 18mg and getting good coverage, sleep intact, reporting dec'd anxiety on stim mgmt. rtc 2mos due to pt request for holiday appt.     Axis I: JAMESON, ADHD- IT, h/o bulimia-binge/purge type  Axis II: none at this time   Axis III: neck pain, migraines, occipital neuralgia  Axis IV: occupational, chronic mental health concerns  Axis V: GAF 60    Plan:   1. No longer on celexa-s/e's  2. Cont dec'd concerta dose of 18mg po qam  3. Discussed therapy referrals  4. rtc 2mos    -Spent 30min face to face with the pt; >50% time spent in counseling   -Supportive therapy and psychoeducation provided  -R/B/SE's of medications discussed with the pt who expresses understanding and chooses to take medications as prescribed.   -Pt instructed to call clinic, 911 or go to nearest emergency room if sxs worsen or pt is in   crisis. The pt expresses understanding.

## 2017-10-16 ENCOUNTER — OFFICE VISIT (OUTPATIENT)
Dept: PSYCHIATRY | Facility: CLINIC | Age: 52
End: 2017-10-16
Payer: COMMERCIAL

## 2017-10-16 DIAGNOSIS — F90.0 ADHD (ATTENTION DEFICIT HYPERACTIVITY DISORDER), INATTENTIVE TYPE: ICD-10-CM

## 2017-10-16 DIAGNOSIS — F41.1 GAD (GENERALIZED ANXIETY DISORDER): Primary | ICD-10-CM

## 2017-10-16 PROCEDURE — 99999 PR PBB SHADOW E&M-EST. PATIENT-LVL III: CPT | Mod: PBBFAC,,, | Performed by: SOCIAL WORKER

## 2017-10-16 PROCEDURE — 90834 PSYTX W PT 45 MINUTES: CPT | Mod: S$GLB,,, | Performed by: SOCIAL WORKER

## 2017-10-24 ENCOUNTER — OFFICE VISIT (OUTPATIENT)
Dept: NEUROLOGY | Facility: CLINIC | Age: 52
End: 2017-10-24
Payer: COMMERCIAL

## 2017-10-24 VITALS
DIASTOLIC BLOOD PRESSURE: 68 MMHG | HEIGHT: 66 IN | WEIGHT: 116 LBS | RESPIRATION RATE: 18 BRPM | HEART RATE: 58 BPM | BODY MASS INDEX: 18.64 KG/M2 | SYSTOLIC BLOOD PRESSURE: 113 MMHG

## 2017-10-24 DIAGNOSIS — F41.1 GAD (GENERALIZED ANXIETY DISORDER): ICD-10-CM

## 2017-10-24 DIAGNOSIS — J30.1 ALLERGIC RHINITIS DUE TO POLLEN, UNSPECIFIED CHRONICITY, UNSPECIFIED SEASONALITY: ICD-10-CM

## 2017-10-24 DIAGNOSIS — M79.18 MYOFASCIAL PAIN SYNDROME, CERVICAL: Primary | ICD-10-CM

## 2017-10-24 DIAGNOSIS — M54.16 LUMBAR RADICULAR PAIN: ICD-10-CM

## 2017-10-24 DIAGNOSIS — G44.86 CERVICOGENIC HEADACHE: ICD-10-CM

## 2017-10-24 DIAGNOSIS — R11.0 NAUSEA: ICD-10-CM

## 2017-10-24 DIAGNOSIS — G43.009 MIGRAINE WITHOUT AURA AND WITHOUT STATUS MIGRAINOSUS, NOT INTRACTABLE: ICD-10-CM

## 2017-10-24 PROCEDURE — 99214 OFFICE O/P EST MOD 30 MIN: CPT | Mod: 25,S$GLB,, | Performed by: PSYCHIATRY & NEUROLOGY

## 2017-10-24 PROCEDURE — 99999 PR PBB SHADOW E&M-EST. PATIENT-LVL III: CPT | Mod: PBBFAC,,, | Performed by: PSYCHIATRY & NEUROLOGY

## 2017-10-24 PROCEDURE — 20553 NJX 1/MLT TRIGGER POINTS 3/>: CPT | Mod: S$GLB,,, | Performed by: PSYCHIATRY & NEUROLOGY

## 2017-10-24 RX ORDER — LIDOCAINE HYDROCHLORIDE 10 MG/ML
5 INJECTION, SOLUTION EPIDURAL; INFILTRATION; INTRACAUDAL; PERINEURAL ONCE
Status: COMPLETED | OUTPATIENT
Start: 2017-10-24 | End: 2017-10-24

## 2017-10-24 RX ORDER — CYPROHEPTADINE HYDROCHLORIDE 4 MG/1
TABLET ORAL
Qty: 60 TABLET | Refills: 3 | Status: SHIPPED | OUTPATIENT
Start: 2017-10-24 | End: 2019-01-11 | Stop reason: ALTCHOICE

## 2017-10-24 RX ORDER — BUPIVACAINE HYDROCHLORIDE 2.5 MG/ML
20 INJECTION, SOLUTION EPIDURAL; INFILTRATION; INTRACAUDAL ONCE
Status: COMPLETED | OUTPATIENT
Start: 2017-10-24 | End: 2017-10-24

## 2017-10-24 RX ORDER — TRIAMCINOLONE ACETONIDE 40 MG/ML
40 INJECTION, SUSPENSION INTRA-ARTICULAR; INTRAMUSCULAR ONCE
Status: COMPLETED | OUTPATIENT
Start: 2017-10-24 | End: 2017-10-24

## 2017-10-24 RX ADMIN — LIDOCAINE HYDROCHLORIDE 50 MG: 10 INJECTION, SOLUTION EPIDURAL; INFILTRATION; INTRACAUDAL; PERINEURAL at 02:10

## 2017-10-24 RX ADMIN — BUPIVACAINE HYDROCHLORIDE 50 MG: 2.5 INJECTION, SOLUTION EPIDURAL; INFILTRATION; INTRACAUDAL at 02:10

## 2017-10-24 RX ADMIN — TRIAMCINOLONE ACETONIDE 40 MG: 40 INJECTION, SUSPENSION INTRA-ARTICULAR; INTRAMUSCULAR at 02:10

## 2017-10-24 NOTE — PROGRESS NOTES
TRIGGER POINT INJECTION (CERVICAL)     Indication: cervical myofascial pain     Anesthesia: none     After risks and benefits were explained including bleeding, infection, worsening of the pain, damage to the area being injected, weakness, allergic reaction to medications, vascular injection, and nerve damage, signed consent was obtained. All questions were answered.     Trigger points were identified by palpation of tight muscle fibers with reproduction of patient's pain and referral pattern upon palpation. The identified areas were prepped three times with alcohol and the alcohol allowed to dry. Next, a 27 gauge 1 inch needle was placed in the anatomical area of the trigger point ot be injected. Once negative aspiration of air and heme was confirmed, I proceeded with the injection.     Medications used: bupivicaine 0.25% (17.5mg), lidocaine 1% (20mg), and Triamcinolone 40mg     Total volume infused: 10cc    Trigger points injected:  -- right and left semispinalis capitus   -- right and left upper trapezius  -- right and left splenius capitus     Estimated blood loss: none     The patient did tolerate the procedure well and there were no complications.    RTC in 2 months

## 2017-10-24 NOTE — PROGRESS NOTES
Date of service: 10/24/2017  Referring provider: No ref. provider found    Subjective:      Chief complaint: Migraine       Patient ID: Brenda C Stargardter is a 52 y.o. lady with hypothyroidism, gastroparesis, anxiety, ADHD, and migraines presenting for the evaluation of migraines and leg pain. She is a new patient to me. Sees Dr. Ben Cadet for pain management.     History of Present Illness    INTERVAL HISTORY     I performed bilateral DALILA/Sofia block and cervical TPI on 8/22/17. Prior to that I had also recommended starting on duloxetine, physical therapy, and tizanidine.     Today she reports she is a little better in terms of her headache and neck pain. The injections did help. A few weeks ago had a terrible vertiginous migraine lasting a whole day, her first one, treated with zofran. Headache characteristics are unchanged frm below, but current severity is 3-4 with range up to 6-7. Her lower back and her left leg have been causing pain lately same as documented below. Her first lumbar SHORTY was done around mid August 2017.     She had some GI issues going on and was feeling better so did not go through the physical therapy. For the same reason did not try the cymbalta because she is not sure how this would affect her stomach.    She is seeing an allergist now.     Original Headache / Pain History - 8/8/17   Age at onset and course over time: around 2013 after cave excursion in which she became ill, was exposed to bats, had severe migraine lasting months. She believes they originate from her neck pain, which is daily. Dr. Cadet has done CMBB injections (5-6x) in the past which have helped. Has not moved on to rhizotomy. Each CMBB lasts months to years. Last cervical imaging was in 2016.   Location: starts in neck, radiates forward to left side of head (mainly), top of head   Quality: pressure, tight band, throbbing   Severity: best 1/10, worst 1010, current 5/10   Duration: hours - days   Frequency: daily neck pain,  headache frequency varies but usually >15 days per month   Alleviated by: sleep, darkness, massage, heat, ice, medication   Exacerbated by: looking up or looking down for extended period of time, light, noise, bending over, stress, food   Associated with: scalp sensitivity, photo/phono/osmophobia, loss of appetite, nasal congestion, problems with memory/concentration, neck tightness   Sleep habits: needs pain medication and ambien to get to sleep due to the pain   Caffeine intake: 1-2 per day     Other:  Last 2-3 months left leg/thigh hurting. Lost strength in that leg. Limping. No back pain. Wraps around knee stops mid-calf. Throbbing/ shooting. No lumbar MRI. Norco helps somewhat with that pain.     Current acute treatment:  -- duexis   -- head / ice   -- norco 7.5 -  1/2 in afternoon and 1/2 before bed (daily)  -- 1/2 ambien   -- baclofen   -- imitrex for severe      Current prevention:  -- tizanidine 2mg at night     Previously tried/failed acute treatment:  -- imitrex - less effective then relpax  -- relpax - chest pain   -- prednisone   -- reglan - weakness   -- toradol   -- aleve  -- motrin  -- Bupap   -- Goody's  -- occipital nerve block  -- cervical trigger point  -- cervical epidural steroid injection   -- piriformis injection     Previously tried/failed preventative treatment:  -- gabapentin - blurring vision   -- Gralise   -- bilateral DALILA/MAYELIN and cervical TPI 8/22/17    Review of patient's allergies indicates:   Allergen Reactions    Reglan [metoclopramide hcl] Other (See Comments)     Weakness could not hold an object in her hands.    Benadryl [diphenhydramine hcl] Other (See Comments)     Restless leg, aggitation    Strattera [atomoxetine] Palpitations    Phenergan [promethazine] Other (See Comments)     Akathisia      Prednisone Nausea And Vomiting    Relpax [eletriptan hbr] Other (See Comments)     Chest pain    Sulfa (sulfonamide antibiotics) Other (See Comments)     Muscle weakness      Current Outpatient Prescriptions   Medication Sig Dispense Refill    baclofen (LIORESAL) 10 MG tablet       CALCIUM CARBONATE/MAG HYDROX (MYLANTA ORAL) Take by mouth.      clotrimazole-betamethasone 1-0.05% (LOTRISONE) cream Apply topically as needed.       DUEXIS 800-26.6 mg Tab once daily.   0    esomeprazole (NEXIUM) 40 MG capsule Take 40 mg by mouth before breakfast.      fluticasone (FLOVENT HFA) 220 mcg/actuation inhaler Inhale 1 puff into the lungs 2 (two) times daily. Controller 12 g 0    hydrocodone-acetaminophen 7.5-325mg (NORCO) 7.5-325 mg per tablet Take 1 tablet by mouth nightly as needed.       levothyroxine (SYNTHROID) 50 MCG tablet Take 1 tablet (50 mcg total) by mouth before breakfast. 30 tablet 11    methylphenidate HCl (CONCERTA) 18 MG CR tablet Take 1 tablet (18 mg total) by mouth every morning. 30 tablet 0    MG TRISILICATE/ALH/NAHCO3/AA (GAVISCON ORAL) Take 5 mLs by mouth as needed.       MINIVELLE 0.1 mg/24 hr PTSW       PROCTOSOL HC 2.5 % rectal cream Place 1 Dose rectally as needed.       zolpidem (AMBIEN) 10 mg Tab Take 1 tablet (10 mg total) by mouth nightly as needed. 30 tablet 5    albuterol 90 mcg/actuation inhaler Inhale 2 puffs into the lungs every 6 (six) hours as needed for Wheezing. (Patient taking differently: Inhale 2 puffs into the lungs every 6 (six) hours as needed for Wheezing. Bring to the hospital day of surgery.) 18 g 2    cyproheptadine (PERIACTIN) 4 mg tablet 2 tablets by mouth nightly for headache 60 tablet 3    levocetirizine (XYZAL) 5 MG tablet Take 1 tablet (5 mg total) by mouth every evening. 30 tablet 11    sumatriptan (IMITREX) 100 MG tablet Take 1 tablet (100 mg total) by mouth every 2 (two) hours as needed for Migraine. up to 200 mg/day. Hold for BP > 150 mm systolic (Patient taking differently: Take 100 mg by mouth every 2 (two) hours as needed for Migraine. up to 200 mg/day. Hold for BP > 150 mm systolic) 9 tablet 2     Current  Facility-Administered Medications   Medication Dose Route Frequency Provider Last Rate Last Dose    bupivacaine (PF) 0.25% (2.5 mg/ml) injection 50 mg  20 mL Intramuscular Once Tammie Delaney MD        lidocaine (PF) 10 mg/ml (1%) injection 50 mg  5 mL Other Once Tammie Delaney MD        lidocaine HCl 2% oral solution 1 mL  1 mL Subcutaneous 1 time in Clinic/HOD Triny Matos MD        triamcinolone acetonide injection 40 mg  40 mg Intramuscular 1 time in Clinic/HOD Triny Matos MD        triamcinolone acetonide injection 40 mg  40 mg Intramuscular Once Tammie Delaney MD           Past Medical History  Past Medical History:   Diagnosis Date    Abdominal or pelvic swelling, mass, or lump, right lower quadrant     Anxiety     Arthritis     Asthma, mild persistent     Cervicalgia     COPD (chronic obstructive pulmonary disease)     Enlargement of lymph nodes     Eosinophilic esophagitis     Gastroparesis     Headache(784.0)     Helicobacter pylori (H. pylori)     History of positive PPD, untreated     History of psychiatric hospitalization     after son , hospitalized for eating disorder    Hx of psychiatric care     Hypothyroidism     Migraines     Occipital neuralgia     Other selective immunoglobulin deficiencies     Other syndromes affecting cervical region     Psychiatric problem     Spondylosis of cervical spine     Therapy     Unspecified asthma(493.90)     Unspecified disorder of thyroid     Unspecified viral meningitis        Past Surgical History  Past Surgical History:   Procedure Laterality Date    AXILLARY ABCESS IRRIGATION AND DEBRIDEMENT      CHOLECYSTECTOMY      EXCISIONAL HEMORRHOIDECTOMY  12/21/15    LYMPH NODE BIOPSY Right     Right inguinal lymph node biopsy    MOUTH SURGERY      TOTAL ABDOMINAL HYSTERECTOMY W/ BILATERAL SALPINGOOPHORECTOMY      VARICOSE VEIN SURGERY         Family History  Family History   Problem Relation Age of Onset    Stroke  Mother     Hypertension Mother     Cerebral aneurysm Mother     Hypertension Father     Heart disease Father     Pneumonia Sister     No Known Problems Brother     No Known Problems Brother     No Known Problems Sister        Social History  Social History     Social History    Marital status:      Spouse name: Ren    Number of children: 5    Years of education: 13     Occupational History    Self employed      Social History Main Topics    Smoking status: Never Smoker    Smokeless tobacco: Never Used    Alcohol use Yes      Comment: Social    Drug use: No    Sexual activity: Not on file     Other Topics Concern    Not on file     Social History Narrative    No narrative on file        Review of Systems  Constitutional: no fever, no chills + unintentional weight loss and change in appetite  Eyes: no change to vision, no redness, no tearing  Ears, nose, mouth, throat: no hearing loss, no tinnitus, no rhinorrhea, no difficulty swallowing   Cardiovascular: no palpitations  Respiratory: no shortness of breath  Gastrointestinal: no diarrhea, no constipation + nausea   Musculoskeletal: no joint pain  Skin: no rashes  Neurologic: + numbness, + weakness, change to speech, loss of coordination   Endocrine: no heat intolerance, + cold intolerance + fatigue     Objective:        Vitals:    10/24/17 1304   BP: 113/68   Pulse: (!) 58   Resp: 18     Body mass index is 18.72 kg/m².    General:  No acute distress. Very thin.   HEENT: Atraumatic, normocephalic.  Neck: Trachea midline  Cardiovascular: Vitals reviewed. Normal peripheral perfusion.   Pulmonary: No increased work of breathing.  Abdomen/GI: No guarding  Musculoskeletal: No obvious joint deformities, moves all extremities symmetrically and well.  Neurological exam:  Mental status: Awake and alert. Oriented to situation.  Speech fluent and appropriate. Recent and remote memory appear to be intact.  Fund of knowledge normal.  Cranial nerves:  Pupils equal round, extraocular movements intact, facial strength intact bilaterally, hearing grossly intact bilaterally.  Sensation: intact to light touch and temperature throughout EXCEPT the L5 dermatome on the left   Gait: Normal     CERVICAL SPINE:  INSPECTION: no scars   ROM: normal   MUSCLE SPASM: + upper cervical paraspinals, splenius capitus, upper trapezius   FACET LOADING: + facet loading on right.   SPURLING: neg    Data Review:     No results found for this or any previous visit.    Lab Results   Component Value Date     09/27/2017     09/18/2015    K 4.1 09/27/2017    K 4.1 09/18/2015     09/27/2017     09/18/2015    CO2 28 09/27/2017    BUN 18 09/27/2017    CREATININE 0.80 09/27/2017    CREATININE 0.92 09/18/2015    GLU 81 09/27/2017    AST 32 09/27/2017    ALT 39 09/27/2017    ALBUMIN 3.9 09/27/2017    ALBUMIN 3.6 09/18/2015    PROT 6.4 09/27/2017    BILITOT 0.6 09/27/2017    CHOL 149 09/27/2017    HDL 61 09/27/2017    LDLCALC 71.0 09/27/2017    LDLCALC 77 09/18/2015    TRIG 85 09/27/2017       Lab Results   Component Value Date    WBC 5.03 09/27/2017    HGB 12.5 09/27/2017    HCT 38.0 09/27/2017    MCV 97 09/27/2017     09/27/2017       Lab Results   Component Value Date    TSH 3.930 09/27/2017       Assessment & Plan:       Problem List Items Addressed This Visit        Neuro    Lumbar radicular pain       Psychiatric    JAMESON (generalized anxiety disorder)       GI    Nausea       Other    Allergic rhinitis due to pollen      Other Visit Diagnoses     Myofascial pain syndrome, cervical    -  Primary    Relevant Medications    bupivacaine (PF) 0.25% (2.5 mg/ml) injection 50 mg (Start on 10/24/2017  2:45 PM)    lidocaine (PF) 10 mg/ml (1%) injection 50 mg    triamcinolone acetonide injection 40 mg    cyproheptadine (PERIACTIN) 4 mg tablet    Cervicogenic headache        Relevant Medications    bupivacaine (PF) 0.25% (2.5 mg/ml) injection 50 mg (Start on 10/24/2017  2:45  PM)    lidocaine (PF) 10 mg/ml (1%) injection 50 mg    triamcinolone acetonide injection 40 mg    cyproheptadine (PERIACTIN) 4 mg tablet    Migraine without aura and without status migrainosus, not intractable        Relevant Medications    cyproheptadine (PERIACTIN) 4 mg tablet              Ms. Stargardter presents for follow up of headaches which phenotypically have migraine characteristics but have a strong cervicogenic / cervical myofacial component to them which is why we are treating with cervical TPI / muscle relaxer / recommended PT and antidepressant with norepinephrine reuptake quality. She continues to have nausea which she fears will be a limiting factor to duloxetine and I agree. She now is dealing with some new allergic issues. I think the ideal medication to start at this time, even though it is more helpful for migraine rather than myofascial/cervicogenic headache is periactin as it will help with migraine portion of headache, stimulate the appetite, and also reduce allergic symptoms. We discussed use of this medication in detail. She responded well to cervical TPI and is now at the tail end of the effect so I repeated these today especially since periactin will take some time to kick in. I think the periactin may be mildly helpful for her general anxiety as well.     For lumbar radicular pain due to lumbar DDD which initially improved but is now returning and is causing poor sleep hence worsening her headaches, I recommended she return to Dr. Cadet for consideration of another lumbar SHORTY as she may need a series to start with.     WORKUP:  -- none     PREVENTION (use daily regardless of headache / pain):  -- start cyproheptadine (peractin) 4mg at night x 1-2 weeks, if tolerating, then raise to 8mg at night   -- recommend to return to Dr. Cadet for consideration of repeat lumbar epidural injection for your left leg pain     ACUTE TREATMENT of headache / pain (limit to 10 days per month)   -- continue  tizanidine 2mg at night for muscle spasm   -- continue imitrex or excedrin - but need to reduce frequency     Return in about 2 months (around 12/24/2017).     Tammie Delaney MD

## 2017-10-27 ENCOUNTER — OFFICE VISIT (OUTPATIENT)
Dept: PSYCHIATRY | Facility: CLINIC | Age: 52
End: 2017-10-27
Payer: COMMERCIAL

## 2017-10-27 DIAGNOSIS — F90.0 ADHD (ATTENTION DEFICIT HYPERACTIVITY DISORDER), INATTENTIVE TYPE: ICD-10-CM

## 2017-10-27 DIAGNOSIS — F41.1 GAD (GENERALIZED ANXIETY DISORDER): Primary | ICD-10-CM

## 2017-10-27 PROCEDURE — 99999 PR PBB SHADOW E&M-EST. PATIENT-LVL III: CPT | Mod: PBBFAC,,, | Performed by: SOCIAL WORKER

## 2017-10-27 PROCEDURE — 90834 PSYTX W PT 45 MINUTES: CPT | Mod: S$GLB,,, | Performed by: SOCIAL WORKER

## 2017-10-30 NOTE — PROGRESS NOTES
"Psychiatry Initial Visit (PhD/LCSW)  Diagnostic Interview - CPT 10447    Date: 2017    Site: [facility]    Referral source: Dr. Yuridia Gerardo    Clinical status of patient: Outpatient    Brenda C Stargardter, a 52 y.o. female, for initial evaluation visit.  Met with patient.    Chief complaint/reason for encounter: attention deficit, anxiety, sleep, interpersonal and grief    History of present illness:   HPI by Dr. Gerardo on 17:  Dev/SocHx: born in [facility], moved to [place] at 5yo, moved back to [facility] at 16yo (when parents ) and she got pregnant "which was terrible because it was taboo there", had her son (who has now  of "accidental overdose" at 25yrs old after years of addiction-  in ), yrs later pt  current  and had 4 children. Pt's  adopted the first son "and loved him as his own." pt was raised by m/d- still living but . Pt is oldest of 5 children. Pt is close with one sister and one brother. (another brother is schizophrenic and in an skilled nursing and a sister is unstable with bipolar and addxn and lives with pt's father). Pt grad HS, some college. Stayed at home and home schooled the children. Now works with  in a food brokerage business. Close with remaining 4 children. Lives at home with  and 24 and 20yo.  Has a previous diagnosis of adhd, "I just really struggle keeping myself on task and have for quite sometime. early adulthood is when I really became more aware of it." has had previous trial of strattera and adderall. "I don't really want to medicate but I do want to maximize who I am and use who I am in a way that's productive. I'm creative and can be spontaneous which I like, but I also need to accomplish things in a reasonable amount of time." Pt initiated psychiatry in early 20s, was diagnosed with "adhd, depression, compulsive stuff"- at that time was started on adderall and on antidepressant- helpful initially, "but I found " "that it was a little too much of a stimulant." reports that the ssri txmt affected initimacy which was already an ongoing issue in marriage so she d/c'd- cannot recall what she took. Pt's oldest son  of an "accidental overdose"  at 25yrs old. Shortly following pt had inc'd anxiety, and a reemergence of binge/purge eating d/o which she struggled with through teen years. Went to a sort of rehab facility in [place] x 2wks. Does not believe any medication was given during that time "just a lot of therapy." during that time tried strattera and "it made me somewhat anxious and gave me some sort of heart palpatation, but I also had other things going on at the time with sleep problems." Reports that she had "great sleep until my hysterectomy"  - since then she is "restless" through the night and has difficulty maintaining- currently taking ambien 5mg po qhs. On Psychiatric ROS: Endorses difficulty maintaining sleep since hysterectomy - taking ambien 5mg po qhs- also reports neck pain which affects sleep, denies anhedonia, denies feeling helpless/hopeless,  "good" energy,poor concentration, stable appetite- no wgt change, denies dec PMA  Denies thoughts of SI/intent/plan. Endorses feeling more easily overwhelmed because of her inability to accomplish/distractibility, +ruminative thinking, +feeling tense/"on edge" Denies h/o panic attack Denies h/o hypo/manic sxs. Denies h/o psychosis. Endorses h/o trauma "when my son ", but denies nightmares, re-experiencing, avoidance or hyperarousal. H/o binge/purge behavior through teen years, none in years "it is a struggle not to want to but I have not had that issue since " - had a reemergence of sxs following her son's death and attended a "rehab" in [place]- no recurrence since.Difficulty- sustaining attention in tasks or fun activities- cannot sit and watch television, following through on instructions and fail to finish work, organizing tasks and activities, " "engaging in leisure activities or doing fun things quietly "i can only get an audio book and listen while I'm walking", waiting your turn/impatient/interrupts or intrude on others. Avoid/dislike/reluctant to engage in work that requires sustained mental effort "that's the biggest issue with my  right now because I won't work at the desk", easily distracted, forgetful in daily activities, fidgets with hands/feet or squirms in seat, feels "on the go" or "driven by a motor", blurt out answers before questions have been completed  **Sxs confirmed by collateral- previous diagnosis and h/o effective txmt,  also confirms.  PPHx: Denies h/o self injury.   inpt psych hospitalization- went to a type of rehab facility following her son's death- had a reemergence of eating disorder for which she was treated. Denies h/o suicide attempt    HPI by Patricia Thao [facility], on 17:  Patient presented for initial session with the  in a stable mood.  She provided social history information and began building rapport. Patient described her family.  She has been  to Santosh for 29 years.  He works from home and is self-employed.  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  Patient home schooled all 5 of her children and she questions whether this was the right thing to do, whether she really prepared her children for adulthood.  Her oldest son, Patrick,  of an accidental overdose seven years ago when he was 25 years old.  Her daughter, Patricia (28) works at [employer], where the patient is a member of the [employer].  Dandy and Subha are 24 year old twins.  They both have substance abuse issues.  Ozzy (22) still lives with his parents. Patient has no grandchildren.   Patient stated that the whole family is grieving the loss of Patrick.  He had significant abandonment issues due to his father's absence from his life.  Patient feels that she and her children are still grieving his death.  " Patient is requesting grief counseling and CBT for ADHD.  She also stated that she tends to be a compulsive  at times, so she would like help with that as well.  She would like to have sessions every two weeks.    Symptoms:   · Mood: insomnia, worthlessness/guilt and poor concentration  · Anxiety: excessive anxiety/worry  · Substance abuse: denied  · Cognitive functioning: denied  · Health behaviors: noncontributory    Psychiatric history: prior inpatient treatment, has participated in counseling/psychotherapy on an outpatient basis in the past and currently under psychiatric care    Medical history:   Past Medical History:   Diagnosis Date    Abdominal or pelvic swelling, mass, or lump, right lower quadrant     Anxiety     Arthritis     Asthma, mild persistent     Cervicalgia     COPD (chronic obstructive pulmonary disease)     Enlargement of lymph nodes     Eosinophilic esophagitis     Gastroparesis     Headache(784.0)     Helicobacter pylori (H. pylori)     History of positive PPD, untreated     History of psychiatric hospitalization     after son , hospitalized for eating disorder    Hx of psychiatric care     Hypothyroidism     Migraines     Occipital neuralgia     Other selective immunoglobulin deficiencies     Other syndromes affecting cervical region     Psychiatric problem     Spondylosis of cervical spine     Therapy     Unspecified asthma(493.90)     Unspecified disorder of thyroid     Unspecified viral meningitis        Family history of psychiatric illness:   Family History   Problem Relation Age of Onset    Stroke Mother     Hypertension Mother     Cerebral aneurysm Mother     Hypertension Father     Heart disease Father     Pneumonia Sister     No Known Problems Brother     No Known Problems Brother     No Known Problems Sister        Social history (marriage, employment, etc.):  Social History     Social History    Marital status:      Spouse name:  Ren    Number of children: 5    Years of education: 13     Occupational History    Self employed      Social History Main Topics    Smoking status: Never Smoker    Smokeless tobacco: Never Used    Alcohol use Yes      Comment: Social    Drug use: No    Sexual activity: Not on file     Other Topics Concern    Not on file     Social History Narrative    No narrative on file       Current medications and drug reactions (include OTC, herbal):   Current Outpatient Prescriptions   Medication Sig    albuterol 90 mcg/actuation inhaler Inhale 2 puffs into the lungs every 6 (six) hours as needed for Wheezing. (Patient taking differently: Inhale 2 puffs into the lungs every 6 (six) hours as needed for Wheezing. Bring to the hospital day of surgery.)    baclofen (LIORESAL) 10 MG tablet     CALCIUM CARBONATE/MAG HYDROX (MYLANTA ORAL) Take by mouth.    clotrimazole-betamethasone 1-0.05% (LOTRISONE) cream Apply topically as needed.     cyproheptadine (PERIACTIN) 4 mg tablet 2 tablets by mouth nightly for headache    DUEXIS 800-26.6 mg Tab once daily.     esomeprazole (NEXIUM) 40 MG capsule Take 40 mg by mouth before breakfast.    fluticasone (FLOVENT HFA) 220 mcg/actuation inhaler Inhale 1 puff into the lungs 2 (two) times daily. Controller    hydrocodone-acetaminophen 7.5-325mg (NORCO) 7.5-325 mg per tablet Take 1 tablet by mouth nightly as needed.     levocetirizine (XYZAL) 5 MG tablet Take 1 tablet (5 mg total) by mouth every evening.    levothyroxine (SYNTHROID) 50 MCG tablet Take 1 tablet (50 mcg total) by mouth before breakfast.    methylphenidate HCl (CONCERTA) 18 MG CR tablet Take 1 tablet (18 mg total) by mouth every morning.    MG TRISILICATE/ALH/NAHCO3/AA (GAVISCON ORAL) Take 5 mLs by mouth as needed.     MINIVELLE 0.1 mg/24 hr PTSW     PROCTOSOL HC 2.5 % rectal cream Place 1 Dose rectally as needed.     sumatriptan (IMITREX) 100 MG tablet Take 1 tablet (100 mg total) by mouth every 2 (two)  hours as needed for Migraine. up to 200 mg/day. Hold for BP > 150 mm systolic (Patient taking differently: Take 100 mg by mouth every 2 (two) hours as needed for Migraine. up to 200 mg/day. Hold for BP > 150 mm systolic)    zolpidem (AMBIEN) 10 mg Tab Take 1 tablet (10 mg total) by mouth nightly as needed.     Current Facility-Administered Medications   Medication    lidocaine HCl 2% oral solution 1 mL    triamcinolone acetonide injection 40 mg       Strengths and liabilities: Strength: Patient accepts guidance/feedback, Strength: Patient is expressive/articulate., Strength: Patient is intelligent., Strength: Patient is motivated for change., Strength: Patient is physically healthy., Strength: Patient has positive support network., Strength: Patient has reasonable judgment., Strength: Patient is stable.    Current Evaluation:     Mental Status Exam:  General Appearance:  age appropriate, normal weight, casually dressed, neatly groomed   Speech: normal tone, normal rate, normal pitch, normal volume      Level of Cooperation: cooperative      Thought Processes: normal and logical   Mood: steady      Thought Content: normal, no suicidality, no homicidality, delusions, or paranoia   Affect: congruent and appropriate   Orientation: Oriented x3   Memory: intact   Attention Span & Concentration: intact   Fund of General Knowledge: intact and appropriate to age and level of education   Judgment & Insight: good     Language  intact     Diagnostic Impression - Plan:       ICD-10-CM ICD-9-CM   1. ADHD (attention deficit hyperactivity disorder), inattentive type F90.0 314.00   2. JAMESON (generalized anxiety disorder) F41.1 300.02       Plan:individual psychotherapy and consult psychiatrist for medication evaluation    Return to Clinic: Return in about 4 weeks (around 8/18/2017).    Total session time: 45 minutes

## 2017-10-30 NOTE — PROGRESS NOTES
"Individual Psychotherapy (PhD/LCSW)    9/25/2017    Site:  [facility]         Therapeutic Intervention: Met with patient.  Outpatient - Insight oriented psychotherapy 45 min - CPT code 67913, Outpatient - Behavior modifying psychotherapy 45 min - CPT code 72654 and Outpatient - Supportive psychotherapy 45 min - CPT Code 99074    Chief complaint/reason for encounter: attention deficit, anxiety, sleep, interpersonal and grief     Interval history and content of current session: Patient presented for follow up session on time.  She appeared to be in a fairly good mood.  Patient described recent drama in her 's family since the last session.  Patient stated that it is traditionally difficult for her to keep boundaries with toxic relatives.  Since her son's death, when conflict arises, she distances herself and "turtles in".  She is learning not to take things personal as she knows that many times it is more about the other person than about her.  Discussed eliminating toxic relationships.  Patient and  will be celebrating their 30th anniversary in November.  She is hoping to travel somewhere for this special occasion.  Patient stated that she has started doing nature photography and she is enjoying this.   encouraged her to continue as this is a self-care activity which gets her exercise outdoors and time alone.  It also has minimal cost.  Patient was receptive.    Treatment plan:  · Target symptoms: distractability, anxiety , grief  · Why chosen therapy is appropriate versus another modality: relevant to diagnosis, patient responds to this modality, evidence based practice  · Outcome monitoring methods: self-report, observation  · Therapeutic intervention type: insight oriented psychotherapy, behavior modifying psychotherapy, supportive psychotherapy    Risk parameters:  Patient reports no suicidal ideation  Patient reports no homicidal ideation  Patient reports no self-injurious " behavior  Patient reports no violent behavior    Verbal deficits: None    Patient's response to intervention:  The patient's response to intervention is accepting.    Progress toward goals and other mental status changes:  The patient's progress toward goals is fair .    Diagnosis:     ICD-10-CM ICD-9-CM   1. JAMESON (generalized anxiety disorder) F41.1 300.02   2. ADHD (attention deficit hyperactivity disorder), inattentive type F90.0 314.00       Plan:  individual psychotherapy and consult psychiatrist for medication evaluation    Return to clinic: Return in about 3 weeks (around 10/16/2017).    Length of Service (minutes): 45

## 2017-10-30 NOTE — PROGRESS NOTES
Individual Psychotherapy (PhD/LCSW)    8/18/2017    Site:  [facility]         Therapeutic Intervention: Met with patient.  Outpatient - Insight oriented psychotherapy 45 min - CPT code 68751, Outpatient - Behavior modifying psychotherapy 45 min - CPT code 33278 and Outpatient - Supportive psychotherapy 45 min - CPT Code 46229    Chief complaint/reason for encounter: attention deficit, anxiety, sleep, interpersonal and grief     Interval history and content of current session: Patient presented for follow up session on time.  She appeared to be in a stressed mood.  Patient has had lots of family drama since the initial session.  Her father had triple bypass surgery, her daughter had an appendectomy, and her son Ozzy had difficulty getting back to this country after backpacking in [place].  We discussed communication and control issues between her and her .  We also discussed her sister's mental health and addiction problems.  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   offered insight and suggestions.  Patient was receptive.    Treatment plan:  · Target symptoms: distractability, anxiety , grief  · Why chosen therapy is appropriate versus another modality: relevant to diagnosis, patient responds to this modality, evidence based practice  · Outcome monitoring methods: self-report, observation  · Therapeutic intervention type: insight oriented psychotherapy, behavior modifying psychotherapy, supportive psychotherapy    Risk parameters:  Patient reports no suicidal ideation  Patient reports no homicidal ideation  Patient reports no self-injurious behavior  Patient reports no violent behavior    Verbal deficits: None    Patient's response to intervention:  The patient's response to intervention is accepting.    Progress toward goals and other mental status changes:  The patient's progress toward goals is fair .    Diagnosis:      ICD-10-CM ICD-9-CM   1. ADHD (attention deficit hyperactivity disorder), inattentive type F90.0 314.00   2. JAMESON (generalized anxiety disorder) F41.1 300.02       Plan:  individual psychotherapy and consult psychiatrist for medication evaluation    Return to clinic: Return in about 3 weeks (around 9/11/2017).    Length of Service (minutes): 45

## 2017-10-30 NOTE — PROGRESS NOTES
Individual Psychotherapy (PhD/LCSW)    10/27/2017    Site:  [facility]         Therapeutic Intervention: Met with patient.  Outpatient - Insight oriented psychotherapy 45 min - CPT code 85258, Outpatient - Behavior modifying psychotherapy 45 min - CPT code 73720 and Outpatient - Supportive psychotherapy 45 min - CPT Code 49596    Chief complaint/reason for encounter: attention deficit, anxiety, sleep, interpersonal and grief     Interval history and content of current session: Patient presented for follow up session on time.  She appeared to be overwhelmed and anxious.  She stated that her sister has been having increasing delusional breakdowns and she is at a loss as to what more they can do for her.  Patient's brother was also recently hospitalized due to schizophrenia.  Prema stated that it's been exhausting keeping up with her siblings and her enabling mother who cannot manage her finances at all.  Patient is struggling with finding balance and maintaining boundaries with her family.  We discussed what she can do to limit her exposure, center her thoughts before interacting with them, and engage other family members to help as well so that she is not shouldering all of the responsibility.  Patient was receptive.    Treatment plan:  · Target symptoms: distractability, anxiety , grief  · Why chosen therapy is appropriate versus another modality: relevant to diagnosis, patient responds to this modality, evidence based practice  · Outcome monitoring methods: self-report, observation  · Therapeutic intervention type: insight oriented psychotherapy, behavior modifying psychotherapy, supportive psychotherapy    Risk parameters:  Patient reports no suicidal ideation  Patient reports no homicidal ideation  Patient reports no self-injurious behavior  Patient reports no violent behavior    Verbal deficits: None    Patient's response to intervention:  The patient's response to intervention is accepting.    Progress  toward goals and other mental status changes:  The patient's progress toward goals is good.    Diagnosis:     ICD-10-CM ICD-9-CM   1. JAMESON (generalized anxiety disorder) F41.1 300.02   2. ADHD (attention deficit hyperactivity disorder), inattentive type F90.0 314.00       Plan:  individual psychotherapy and consult psychiatrist for medication evaluation    Return to clinic: Return in about 2 weeks (around 11/10/2017).    Length of Service (minutes): 45

## 2017-10-30 NOTE — PROGRESS NOTES
Individual Psychotherapy (PhD/LCSW)    10/16/2017    Site:  [facility] [place], operated [facility]         Therapeutic Intervention: Met with patient.  Outpatient - Insight oriented psychotherapy 45 min - CPT code 62031, Outpatient - Behavior modifying psychotherapy 45 min - CPT code 26056 and Outpatient - Supportive psychotherapy 45 min - CPT Code 65061    Chief complaint/reason for encounter: attention deficit, anxiety, sleep, interpersonal and grief     Interval history and content of current session: Patient presented for follow up session on time.  She appeared to be frustrated with herself.  Patient stated that she has been impulsively buying things like clothing, camping gear, and beauty products.  She would like to be more intentional with money, which has been a sore subject with her  for years.  We discussed the ties between materialism and self-esteem in her life.   provided several suggestions to curb her spending and focus her financial goals.  Patient was receptive.    Treatment plan:  · Target symptoms: distractability, anxiety , grief  · Why chosen therapy is appropriate versus another modality: relevant to diagnosis, patient responds to this modality, evidence based practice  · Outcome monitoring methods: self-report, observation  · Therapeutic intervention type: insight oriented psychotherapy, behavior modifying psychotherapy, supportive psychotherapy    Risk parameters:  Patient reports no suicidal ideation  Patient reports no homicidal ideation  Patient reports no self-injurious behavior  Patient reports no violent behavior    Verbal deficits: None    Patient's response to intervention:  The patient's response to intervention is accepting.    Progress toward goals and other mental status changes:  The patient's progress toward goals is good.    Diagnosis:     ICD-10-CM ICD-9-CM   1. JAMESON (generalized anxiety disorder) F41.1 300.02   2. ADHD (attention deficit hyperactivity disorder), inattentive  type F90.0 314.00       Plan:  individual psychotherapy and consult psychiatrist for medication evaluation    Return to clinic: Return in about 11 days (around 10/27/2017).    Length of Service (minutes): 45

## 2017-11-07 RX ORDER — TIZANIDINE 4 MG/1
4 TABLET ORAL EVERY 6 HOURS PRN
Qty: 90 TABLET | Refills: 1 | Status: SHIPPED | OUTPATIENT
Start: 2017-11-07 | End: 2017-12-06 | Stop reason: SDUPTHER

## 2017-11-14 ENCOUNTER — PATIENT MESSAGE (OUTPATIENT)
Dept: PSYCHIATRY | Facility: CLINIC | Age: 52
End: 2017-11-14

## 2017-11-14 DIAGNOSIS — F90.0 ADHD (ATTENTION DEFICIT HYPERACTIVITY DISORDER), INATTENTIVE TYPE: ICD-10-CM

## 2017-11-14 RX ORDER — METHYLPHENIDATE HYDROCHLORIDE 18 MG/1
18 TABLET ORAL EVERY MORNING
Qty: 30 TABLET | Refills: 0 | Status: SHIPPED | OUTPATIENT
Start: 2017-11-14 | End: 2017-11-15 | Stop reason: SDUPTHER

## 2017-11-15 DIAGNOSIS — F90.0 ADHD (ATTENTION DEFICIT HYPERACTIVITY DISORDER), INATTENTIVE TYPE: ICD-10-CM

## 2017-11-15 RX ORDER — METHYLPHENIDATE HYDROCHLORIDE 18 MG/1
18 TABLET ORAL EVERY MORNING
Qty: 30 TABLET | Refills: 0 | Status: SHIPPED | OUTPATIENT
Start: 2017-11-15 | End: 2017-12-15 | Stop reason: SDUPTHER

## 2017-11-15 NOTE — TELEPHONE ENCOUNTER
Please resend prescription Concerta to [facility]. There system is up running again. System was down yesterday state wide per pharmacy personnel.  Shandra

## 2017-11-27 ENCOUNTER — PATIENT MESSAGE (OUTPATIENT)
Dept: PSYCHIATRY | Facility: CLINIC | Age: 52
End: 2017-11-27

## 2017-11-27 ENCOUNTER — DOCUMENTATION ONLY (OUTPATIENT)
Dept: PSYCHIATRY | Facility: CLINIC | Age: 52
End: 2017-11-27

## 2017-11-30 ENCOUNTER — TELEPHONE (OUTPATIENT)
Dept: NEUROLOGY | Facility: CLINIC | Age: 52
End: 2017-11-30

## 2017-11-30 NOTE — TELEPHONE ENCOUNTER
----- Message from Aide Ramos sent at 11/30/2017  9:21 AM CST -----  Contact: self [phone]  She is requesting an appt as soon as possible to follow up on an ER visit for a migraine.  Please call her about fitting her in.  Thank you!

## 2017-11-30 NOTE — TELEPHONE ENCOUNTER
Called patient and let her know that as of right now Dr. Delaney did not have any appointments available for the month of December. I let her know that she had an appointment with her on 1/15/18 but I could reschedule her to 1/3/18 and add her to the wait list incase any patients rescheduled or canceled. Patient said that was fine and expressed understanding.

## 2017-12-06 RX ORDER — TIZANIDINE 4 MG/1
TABLET ORAL
Qty: 30 TABLET | Refills: 2 | Status: SHIPPED | OUTPATIENT
Start: 2017-12-06 | End: 2018-03-07 | Stop reason: SDUPTHER

## 2017-12-08 ENCOUNTER — OFFICE VISIT (OUTPATIENT)
Dept: PSYCHIATRY | Facility: CLINIC | Age: 52
End: 2017-12-08
Payer: COMMERCIAL

## 2017-12-08 DIAGNOSIS — F90.0 ADHD (ATTENTION DEFICIT HYPERACTIVITY DISORDER), INATTENTIVE TYPE: ICD-10-CM

## 2017-12-08 DIAGNOSIS — F41.1 GAD (GENERALIZED ANXIETY DISORDER): Primary | ICD-10-CM

## 2017-12-08 PROCEDURE — 99999 PR PBB SHADOW E&M-EST. PATIENT-LVL III: CPT | Mod: PBBFAC,,, | Performed by: SOCIAL WORKER

## 2017-12-08 PROCEDURE — 90834 PSYTX W PT 45 MINUTES: CPT | Mod: S$GLB,,, | Performed by: SOCIAL WORKER

## 2017-12-11 NOTE — PROGRESS NOTES
"Individual Psychotherapy (PhD/LCSW)    12/8/2017    Site:  [facility]         Therapeutic Intervention: Met with patient.  Outpatient - Insight oriented psychotherapy 45 min - CPT code 24442, Outpatient - Behavior modifying psychotherapy 45 min - CPT code 01782 and Outpatient - Supportive psychotherapy 45 min - CPT Code 40639    Chief complaint/reason for encounter: attention deficit, anxiety, sleep, interpersonal and grief     Interval history and content of current session: Patient presented for follow up session on time and in a stable mood.  She discussed her anniversary trip to [place] which was enjoyable.  She stated that she had been getting along with her  better since their trip, but usually during the holidays, they have problems.  She stated that her  "has a reasonable side and a raymundo side".  We discussed their conflicts and how she can respond to him differently.  Patient was receptive.  Patient discussed her concerns regarding her mother's financial problems and her guilt for not being able to help her mother without angering her .  She also discussed her lack of contact with her sister due to severe untreated mental illness.  Patient worries that the worst will happen and says that "it's like waiting for a timebomb to go off".       Treatment plan:  · Target symptoms: distractability, anxiety , grief  · Why chosen therapy is appropriate versus another modality: relevant to diagnosis, patient responds to this modality, evidence based practice  · Outcome monitoring methods: self-report, observation  · Therapeutic intervention type: insight oriented psychotherapy, behavior modifying psychotherapy, supportive psychotherapy    Risk parameters:  Patient reports no suicidal ideation  Patient reports no homicidal ideation  Patient reports no self-injurious behavior  Patient reports no violent behavior    Verbal deficits: None    Patient's response to intervention:  The patient's " response to intervention is accepting.    Progress toward goals and other mental status changes:  The patient's progress toward goals is good.    Diagnosis:     ICD-10-CM ICD-9-CM   1. JAMESON (generalized anxiety disorder) F41.1 300.02   2. ADHD (attention deficit hyperactivity disorder), inattentive type F90.0 314.00       Plan:  individual psychotherapy and consult psychiatrist for medication evaluation    Return to clinic: Return in about 1 month (around 1/8/2018).    Length of Service (minutes): 45

## 2017-12-13 PROBLEM — M43.02 CERVICAL SPONDYLOLYSIS: Status: ACTIVE | Noted: 2017-12-13

## 2017-12-15 ENCOUNTER — PATIENT MESSAGE (OUTPATIENT)
Dept: PSYCHIATRY | Facility: CLINIC | Age: 52
End: 2017-12-15

## 2017-12-15 DIAGNOSIS — F90.0 ADHD (ATTENTION DEFICIT HYPERACTIVITY DISORDER), INATTENTIVE TYPE: ICD-10-CM

## 2017-12-15 RX ORDER — METHYLPHENIDATE HYDROCHLORIDE 18 MG/1
18 TABLET ORAL EVERY MORNING
Qty: 30 TABLET | Refills: 0 | Status: SHIPPED | OUTPATIENT
Start: 2017-12-15 | End: 2018-01-15 | Stop reason: SDUPTHER

## 2017-12-18 ENCOUNTER — OFFICE VISIT (OUTPATIENT)
Dept: PSYCHIATRY | Facility: CLINIC | Age: 52
End: 2017-12-18
Payer: COMMERCIAL

## 2017-12-18 DIAGNOSIS — F41.1 GAD (GENERALIZED ANXIETY DISORDER): ICD-10-CM

## 2017-12-18 DIAGNOSIS — F90.0 ADHD (ATTENTION DEFICIT HYPERACTIVITY DISORDER), INATTENTIVE TYPE: Primary | ICD-10-CM

## 2017-12-18 PROCEDURE — 90834 PSYTX W PT 45 MINUTES: CPT | Mod: S$GLB,,, | Performed by: SOCIAL WORKER

## 2017-12-18 PROCEDURE — 99999 PR PBB SHADOW E&M-EST. PATIENT-LVL III: CPT | Mod: PBBFAC,,, | Performed by: SOCIAL WORKER

## 2017-12-18 NOTE — PROGRESS NOTES
Individual Psychotherapy (PhD/LCSW)    12/18/2017    Site:  [facility]         Therapeutic Intervention: Met with patient.  Outpatient - Insight oriented psychotherapy 45 min - CPT code 45808, Outpatient - Behavior modifying psychotherapy 45 min - CPT code 19945 and Outpatient - Supportive psychotherapy 45 min - CPT Code 52106    Chief complaint/reason for encounter: attention deficit, anxiety, sleep, interpersonal and grief     Interval history and content of current session: Patient presented for follow up session on time and in a stable mood.  She discussed her anxiety related to being around her sister-in-law, Ruby, during holiday gatherings.  Patient described Ruby's bully type behavior and toddler style screaming tantrums.  Patient described Ruby as passive aggressive and emotionally damaging.  Patient speculated that Ruby may be bipolar.  We discussed how she can best prepare herself and her children for a potential incident and how she can respond most positively.  Conflict resolution and anger management discussed.      Treatment plan:  · Target symptoms: distractability, anxiety , grief  · Why chosen therapy is appropriate versus another modality: relevant to diagnosis, patient responds to this modality, evidence based practice  · Outcome monitoring methods: self-report, observation  · Therapeutic intervention type: insight oriented psychotherapy, behavior modifying psychotherapy, supportive psychotherapy    Risk parameters:  Patient reports no suicidal ideation  Patient reports no homicidal ideation  Patient reports no self-injurious behavior  Patient reports no violent behavior    Verbal deficits: None    Patient's response to intervention:  The patient's response to intervention is accepting.    Progress toward goals and other mental status changes:  The patient's progress toward goals is good.    Diagnosis:     ICD-10-CM ICD-9-CM   1. ADHD (attention deficit hyperactivity disorder),  inattentive type F90.0 314.00   2. JAMESON (generalized anxiety disorder) F41.1 300.02       Plan:  individual psychotherapy and consult psychiatrist for medication evaluation    Return to clinic: Return in about 2 weeks (around 1/1/2018).    Length of Service (minutes): 45

## 2017-12-21 ENCOUNTER — OFFICE VISIT (OUTPATIENT)
Dept: PSYCHIATRY | Facility: CLINIC | Age: 52
End: 2017-12-21
Payer: COMMERCIAL

## 2017-12-21 VITALS
BODY MASS INDEX: 17.65 KG/M2 | WEIGHT: 109.81 LBS | HEART RATE: 65 BPM | DIASTOLIC BLOOD PRESSURE: 66 MMHG | SYSTOLIC BLOOD PRESSURE: 96 MMHG | HEIGHT: 66 IN

## 2017-12-21 DIAGNOSIS — F90.0 ADHD (ATTENTION DEFICIT HYPERACTIVITY DISORDER), INATTENTIVE TYPE: ICD-10-CM

## 2017-12-21 DIAGNOSIS — Z72.821 POOR SLEEP HYGIENE: ICD-10-CM

## 2017-12-21 DIAGNOSIS — F41.1 GAD (GENERALIZED ANXIETY DISORDER): Primary | ICD-10-CM

## 2017-12-21 PROCEDURE — 99999 PR PBB SHADOW E&M-EST. PATIENT-LVL III: CPT | Mod: PBBFAC,,, | Performed by: PSYCHIATRY & NEUROLOGY

## 2017-12-21 PROCEDURE — 99214 OFFICE O/P EST MOD 30 MIN: CPT | Mod: S$GLB,,, | Performed by: PSYCHIATRY & NEUROLOGY

## 2017-12-21 NOTE — PROGRESS NOTES
"ID: 51yo WF no prior psych hx w/i the [facility] system- diag clarified as ADHD and JAMESON. Started celexa which the pt didn't tolerate. Last appt started concerta 18mg po qam. Stable now on current stimulant dose.     CC: adhd    Interim Hx: presents late for appt. Chart reviewed.     "i'm fine. Just doing the daily grind. Just preparing for [place]." recently celebrated 30th anniversary in [place] hiking in a [employer], "it was wonderful, but I came back with a bad cold that triggered a terrible migraine and I went to the ER but I'm feeling better now." does acknowledge that she has lost some weight through that virus, "but I'm trying to get healthy again." - discussion of pt being underweight, BMI 17 today- need for "reserve weight" especially during holiday/cold-flu season- pt expresses understanding.     Anticipating some family drama b/c sister in law is difficult to deal with. Pt reading a book on negotiating written by an ex [employer] agent. "it's really interesting because she sort of hold people hostage with her tantrums. It's been a helpful book."     Pt doing well during this holiday season- had previously disclosed they are a difficult time for her following loss of son. "i think i'm navigating well and I'm really making an effort to be a positive person. It motivates me."     On Psychiatric ROS:    Endorses chronic difficulty maintaining sleep- taking ambien 5mg po qhs- cont'd neck pain and lower back which affects sleep, denies anhedonia, denies feeling helpless/hopeless, "good" energy, improved concentration, stable appetite- some wgt loss which she attributes to a recent "cold" but we discuss BMI "underweight" today- agrees to inc caloric intake, denies dec PMA    Denies thoughts of SI/intent/plan.     LESS overwhelmed on stimulant, LESS ruminative thinking, feeling LESS tense/"on edge"    PPHx: Denies h/o self injury  + inpt psych hospitalization- went to a type of rehab facility following her son's " "death- had a reemergence of eating disorder for which she was treated  Denies h/o suicide attempt     Current Psych Meds: concerta 18mg po qam, ambien 5mg po qhs PRN insomnia (through alternate provider)   Past Psych Meds: topomax (for migraines), adderall (overly stimulated), strattera (overly stimulated), "maybe lexapro", celexa (worsened h/a's; daytime lethargy)    PMHx: chronic neck pain, migraines occipital neuralgia    SubstHx:   T- none  E- occ, rare  D- none  Caffeine- 1-2cups of coffee/d    Musculoskeletal:  Muscle strength/Tone: no dyskinesia/ no tremor  Gait/Station- non antalgic, no assistance needed    MSE: appears stated age, well groomed, appropriate dress, appears to have lost weight, engages well with examiner. Good e/c. Speech reg rate and vol, nonpressured. Mood is "upbeat" Affect euthymic- less anxiety noted. Sensorium fully intact. Oriented to date/day/location, current events. Narrative memory intact. Intellectual function is avg based on vocab and basic fund of knowledge. Thought is c/l/gd. No tangentiality or circumstantiality. No FOI/CARLOS. Denies SI/HI. Denies A/VH. No evidence of delusions. Insight and Judgment intact.     Suicide Risk Assessment:   Protective- age, gender, no prior attempts, no prior hospitalizations, no family h/o attempts, no ongoing substance abuse, no psychosis, , has children, denies SI/intent/plan, seeking treatment, access to treatment, future oriented, good primary support, no access to firearms    Risk- race, ongoing Axis I sxs    **Pt is at LOW imminent and long term risk of suicide given current risk factors.    Assessment:  51yo WF with genetic loading for anxiety and mood disorders through mother. Also with genetic loading for addiction. Lifetime h/o anxiety spectrum disorders to include bulimia with binge/purge behavior through teen yrs which resurfaced following her 26yo son's overdose/death in 2010. Pt has not had continued issues with eating " disorder, but continues to endorse sxs c/w with both JAMESON and ADHD-IT. Will try and manage anxiety first prior to starting stimulant txmt. Previous trials of stimulant txmt alone have led to overstimulation. Started celexa which the pt only cont'd for a 1wk trial. Reported inc'd h/a's and daytime lethargy. Today only interested in trial of long acting stimulant- started trial of concerta 18mg po qam. Today on f/u the medicine has been helpful- not fully optimized, but a trial of 36mg led to mildly inc'd anxiety. Sleep, focus/attn and functional life all improved on stimulant. Tried inc to 27mg po qam. Inc led to overactivation- now back on 18mg and getting good coverage, sleep intact, reporting dec'd anxiety on stim mgmt. Concern today regarding weight loss- she attributes to recent cold but we discuss inc caloric intake and the need for weight gain. Pt expresses understanding.     Axis I: JAMESON, ADHD- IT, h/o bulimia-binge/purge type  Axis II: none at this time   Axis III: neck pain, migraines, occipital neuralgia, underweight  Axis IV: occupational, chronic mental health concerns  Axis V: GAF 60    Plan:   1. Cont Concerta 18mg po qam  3. Discussed therapy referrals  4. rtc 3mos    -Spent 30min face to face with the pt; >50% time spent in counseling   -Supportive therapy and psychoeducation provided  -R/B/SE's of medications discussed with the pt who expresses understanding and chooses to take medications as prescribed.   -Pt instructed to call clinic, 911 or go to nearest emergency room if sxs worsen or pt is in   crisis. The pt expresses understanding.

## 2018-01-03 ENCOUNTER — OFFICE VISIT (OUTPATIENT)
Dept: NEUROLOGY | Facility: CLINIC | Age: 53
End: 2018-01-03
Payer: COMMERCIAL

## 2018-01-03 VITALS
RESPIRATION RATE: 18 BRPM | DIASTOLIC BLOOD PRESSURE: 73 MMHG | HEART RATE: 74 BPM | BODY MASS INDEX: 18.16 KG/M2 | WEIGHT: 113 LBS | SYSTOLIC BLOOD PRESSURE: 120 MMHG | HEIGHT: 66 IN

## 2018-01-03 DIAGNOSIS — M54.81 BILATERAL OCCIPITAL NEURALGIA: ICD-10-CM

## 2018-01-03 DIAGNOSIS — G43.809 MIGRAINE, CERVICOGENIC: ICD-10-CM

## 2018-01-03 DIAGNOSIS — M79.18 CERVICAL MYOFASCIAL PAIN SYNDROME: ICD-10-CM

## 2018-01-03 PROCEDURE — 64405 NJX AA&/STRD GR OCPL NRV: CPT | Mod: 50,S$GLB,, | Performed by: PSYCHIATRY & NEUROLOGY

## 2018-01-03 PROCEDURE — 20553 NJX 1/MLT TRIGGER POINTS 3/>: CPT | Mod: 51,S$GLB,, | Performed by: PSYCHIATRY & NEUROLOGY

## 2018-01-03 PROCEDURE — 99999 PR PBB SHADOW E&M-EST. PATIENT-LVL V: CPT | Mod: PBBFAC,,, | Performed by: PSYCHIATRY & NEUROLOGY

## 2018-01-03 RX ORDER — TRIAMCINOLONE ACETONIDE 40 MG/ML
40 INJECTION, SUSPENSION INTRA-ARTICULAR; INTRAMUSCULAR ONCE
Status: COMPLETED | OUTPATIENT
Start: 2018-01-03 | End: 2018-01-03

## 2018-01-03 RX ORDER — LIDOCAINE HYDROCHLORIDE 10 MG/ML
5 INJECTION, SOLUTION EPIDURAL; INFILTRATION; INTRACAUDAL; PERINEURAL ONCE
Status: COMPLETED | OUTPATIENT
Start: 2018-01-03 | End: 2018-01-03

## 2018-01-03 RX ORDER — BUPIVACAINE HYDROCHLORIDE 2.5 MG/ML
20 INJECTION, SOLUTION EPIDURAL; INFILTRATION; INTRACAUDAL ONCE
Status: COMPLETED | OUTPATIENT
Start: 2018-01-03 | End: 2018-01-03

## 2018-01-03 RX ADMIN — BUPIVACAINE HYDROCHLORIDE 50 MG: 2.5 INJECTION, SOLUTION EPIDURAL; INFILTRATION; INTRACAUDAL at 06:01

## 2018-01-03 RX ADMIN — TRIAMCINOLONE ACETONIDE 40 MG: 40 INJECTION, SUSPENSION INTRA-ARTICULAR; INTRAMUSCULAR at 06:01

## 2018-01-03 RX ADMIN — LIDOCAINE HYDROCHLORIDE 50 MG: 10 INJECTION, SOLUTION EPIDURAL; INFILTRATION; INTRACAUDAL; PERINEURAL at 06:01

## 2018-01-03 NOTE — PATIENT INSTRUCTIONS
WORKUP:  -- none     PREVENTION (use daily regardless of headache / pain):  -- start using cyproheptadine half tablet (2mg) at night and if you can tolerate it, raise to 4mg at night  -- next line to consider is low dose cymbalta     ACUTE TREATMENT of headache / pain (limit to 10 days per month)   -- continue tizanidine 2mg at night for muscle spasm - may break in half too   -- continue imitrex or excedrin     We did cervical trigger point injections and occipital nerve blocks in clinic     Look through trigger point chapter

## 2018-01-03 NOTE — PROGRESS NOTES
Date of service: 1/3/2018  Referring provider: No ref. provider found    Subjective:      Chief complaint: Migraine       Patient ID: Brenda C Stargardter is a 52 y.o. lady with hypothyroidism, gastroparesis, anxiety, ADHD, and migraines presenting for the follow up of migraines and neck pain. Sees Dr. Ben Cadet for pain management.     History of Present Illness    INTERVAL HISTORY - 1/3/18     Seen 2 months ago at which point I initiated periactin and performed blocks/TPI.     Today she reports she is about to same to worse. Her pain is still cervical radiating to the posterior head. Her current pain is 3-4 with a range from 1 to 10.  She had a severe migraine with severe vomiting at the start of December and needed to go to the [facility] with a cold preceding it. She is only taking the periactin intermittently. Not using tizanidine daily. The TPI do work well.     INTERVAL HISTORY - 10/24/17     I performed bilateral DALILA/Sofia block and cervical TPI on 8/22/17. Prior to that I had also recommended starting on duloxetine, physical therapy, and tizanidine.     Today she reports she is a little better in terms of her headache and neck pain. The injections did help. A few weeks ago had a terrible vertiginous migraine lasting a whole day, her first one, treated with zofran. Headache characteristics are unchanged frm below, but current severity is 3-4 with range up to 6-7. Her lower back and her left leg have been causing pain lately same as documented below. Her first lumbar SHORTY was done around mid August 2017.     She had some GI issues going on and was feeling better so did not go through the physical therapy. For the same reason did not try the cymbalta because she is not sure how this would affect her stomach.    She is seeing an allergist now.     Original Headache / Pain History - 8/8/17   Age at onset and course over time: around 2013 after cave excursion in which she became ill, was exposed to bats, had severe migraine  lasting months. She believes they originate from her neck pain, which is daily. Dr. Cadet has done CMBB injections (5-6x) in the past which have helped. Has not moved on to rhizotomy. Each CMBB lasts months to years. Last cervical imaging was in 2016.   Location: starts in neck, radiates forward to left side of head (mainly), top of head   Quality: pressure, tight band, throbbing   Severity: best 1/10, worst 1010, current 5/10   Duration: hours - days   Frequency: daily neck pain, headache frequency varies but usually >15 days per month   Alleviated by: sleep, darkness, massage, heat, ice, medication   Exacerbated by: looking up or looking down for extended period of time, light, noise, bending over, stress, food   Associated with: scalp sensitivity, photo/phono/osmophobia, loss of appetite, nasal congestion, problems with memory/concentration, neck tightness   Sleep habits: needs pain medication and ambien to get to sleep due to the pain   Caffeine intake: 1-2 per day     Other:  Last 2-3 months left leg/thigh hurting. Lost strength in that leg. Limping. No back pain. Wraps around knee stops mid-calf. Throbbing/ shooting. No lumbar MRI. Norco helps somewhat with that pain.     Current acute treatment:  -- excedrin  -- aleve   -- norco 7.5 -  1/2 in afternoon and 1/2 before bed (daily)  -- 1/2 ambien   -- baclofen   -- imitrex for severe      Current prevention:  -- periactin 4mg nightly - intermittent   -- tizanidine 2mg at night -intermittent     Previously tried/failed acute treatment:  -- imitrex - less effective then relpax  -- relpax - chest pain   -- prednisone   -- reglan - weakness   -- toradol   -- aleve  -- motrin  -- Bupap   -- Goody's  -- occipital nerve block  -- cervical trigger point  -- cervical epidural steroid injection   -- piriformis injection     Previously tried/failed preventative treatment:  -- gabapentin - blurring vision   -- Gralise   -- bilateral DALILA/MAYELIN and cervical TPI 8/22/17    Review  of patient's allergies indicates:   Allergen Reactions    Reglan [metoclopramide hcl] Other (See Comments)     Weakness could not hold an object in her hands.    Benadryl [diphenhydramine hcl] Other (See Comments)     Restless leg, aggitation    Strattera [atomoxetine] Palpitations    Phenergan [promethazine] Other (See Comments)     Akathisia      Prednisone Nausea And Vomiting    Relpax [eletriptan hbr] Other (See Comments)     Chest pain    Sulfa (sulfonamide antibiotics) Other (See Comments)     Muscle weakness     Current Outpatient Prescriptions   Medication Sig Dispense Refill    albuterol 90 mcg/actuation inhaler Inhale 2 puffs into the lungs every 6 (six) hours as needed for Wheezing. 18 g 2    atenolol (TENORMIN) 25 MG tablet Take 0.5 tablets (12.5 mg total) by mouth daily as needed. 90 tablet 0    baclofen (LIORESAL) 10 MG tablet Take 10 mg by mouth as needed.       CALCIUM CARBONATE/MAG HYDROX (MYLANTA ORAL) Take by mouth.      clotrimazole-betamethasone 1-0.05% (LOTRISONE) cream Apply topically as needed.       cyproheptadine (PERIACTIN) 4 mg tablet 2 tablets by mouth nightly for headache 60 tablet 3    esomeprazole (NEXIUM) 40 MG capsule Take 40 mg by mouth before breakfast.      fluticasone (FLOVENT HFA) 220 mcg/actuation inhaler Inhale 1 puff into the lungs 2 (two) times daily. Controller 12 g 0    fluticasone-vilanterol (BREO) 100-25 mcg/dose diskus inhaler Inhale 1 puff into the lungs once daily. Controller 1 each 11    hydrocodone-acetaminophen 7.5-325mg (NORCO) 7.5-325 mg per tablet Take 1 tablet by mouth nightly as needed.       levothyroxine (SYNTHROID) 50 MCG tablet Take 1 tablet (50 mcg total) by mouth before breakfast. 30 tablet 11    methylphenidate HCl (CONCERTA) 18 MG CR tablet Take 1 tablet (18 mg total) by mouth every morning. 30 tablet 0    MG TRISILICATE/ALH/NAHCO3/AA (GAVISCON ORAL) Take 5 mLs by mouth as needed.       MINIVELLE 0.1 mg/24 hr PTSW        ondansetron (ZOFRAN-ODT) 8 MG TbDL Take 1 tablet (8 mg total) by mouth 3 (three) times daily as needed (NAUSEA OR VOMITING). 15 tablet 0    PROCTOSOL HC 2.5 % rectal cream Place 1 Dose rectally as needed.       tiZANidine (ZANAFLEX) 4 MG tablet TAKE 1 TABLET BY MOUTH NIGHTLY AS NEEDED FOR MUSCLE SPASMS 30 tablet 2    zolpidem (AMBIEN) 10 mg Tab TAKE 1 TABLET BY MOUTH NIGHTLY AS NEEDED 30 tablet 0    levocetirizine (XYZAL) 5 MG tablet Take 1 tablet (5 mg total) by mouth every evening. 30 tablet 11    sumatriptan (IMITREX) 100 MG tablet Take 1 tablet (100 mg total) by mouth every 2 (two) hours as needed for Migraine. up to 200 mg/day. Hold for BP > 150 mm systolic (Patient taking differently: Take 100 mg by mouth every 2 (two) hours as needed for Migraine. up to 200 mg/day. Hold for BP > 150 mm systolic) 9 tablet 2     Current Facility-Administered Medications   Medication Dose Route Frequency Provider Last Rate Last Dose    lidocaine HCl 2% oral solution 1 mL  1 mL Subcutaneous 1 time in Clinic/HOD Triny aMtos MD        triamcinolone acetonide injection 40 mg  40 mg Intramuscular 1 time in Clinic/HOD Triny Matos MD           Past Medical History  Past Medical History:   Diagnosis Date    Abdominal or pelvic swelling, mass, or lump, right lower quadrant     Anxiety     Arthritis     Asthma, mild persistent     Cervicalgia     COPD (chronic obstructive pulmonary disease)     Enlargement of lymph nodes     Eosinophilic esophagitis     Gastroparesis     Headache(784.0)     Heartburn     Helicobacter pylori (H. pylori)     History of positive PPD, untreated     History of psychiatric hospitalization     after son , hospitalized for eating disorder    Hx of psychiatric care     Hypothyroidism     Migraines     Occipital neuralgia     Other selective immunoglobulin deficiencies     Other syndromes affecting cervical region     Psychiatric problem     Spondylosis of cervical  spine     Therapy     Unspecified asthma(493.90)     Unspecified disorder of thyroid     Unspecified viral meningitis        Past Surgical History  Past Surgical History:   Procedure Laterality Date    AXILLARY ABCESS IRRIGATION AND DEBRIDEMENT      CHOLECYSTECTOMY      EXCISIONAL HEMORRHOIDECTOMY  12/21/15    LYMPH NODE BIOPSY Right 2014    Right inguinal lymph node biopsy    MOUTH SURGERY      TOTAL ABDOMINAL HYSTERECTOMY W/ BILATERAL SALPINGOOPHORECTOMY      VARICOSE VEIN SURGERY         Family History  Family History   Problem Relation Age of Onset    Stroke Mother     Hypertension Mother     Cerebral aneurysm Mother     Hypertension Father     Heart disease Father     Pneumonia Sister     No Known Problems Brother     No Known Problems Brother     No Known Problems Sister        Social History  Social History     Social History    Marital status:      Spouse name: Ren    Number of children: 5    Years of education: 13     Occupational History    Self employed      Social History Main Topics    Smoking status: Never Smoker    Smokeless tobacco: Never Used    Alcohol use Yes      Comment: Social    Drug use: No    Sexual activity: Yes     Partners: Male     Other Topics Concern    Not on file     Social History Narrative    No narrative on file        Review of Systems  Constitutional: no fever, no chills + unintentional weight loss and change in appetite  Eyes: no change to vision, no redness, no tearing + double vsion   Ears, nose, mouth, throat: no hearing loss, no tinnitus, no rhinorrhea, no difficulty swallowing   Cardiovascular: no palpitations  Respiratory: no shortness of breath + cough + wheezing  Gastrointestinal: no diarrhea, no constipation + nausea   Musculoskeletal: + muscle pain adn joint pain  Skin: no rashes  Neurologic: + numbness, + weakness, change to speech, loss of coordination   Endocrine: no heat intolerance, + cold intolerance + fatigue      Objective:        Vitals:    01/03/18 1623   BP: 120/73   Pulse: 74   Resp: 18     Body mass index is 18.24 kg/m².    General:  No acute distress. Very thin.   HEENT: Atraumatic, normocephalic.  Neck: Trachea midline  Cardiovascular: Vitals reviewed. Normal peripheral perfusion.   Pulmonary: No increased work of breathing.  Abdomen/GI: No guarding  Musculoskeletal: No obvious joint deformities, moves all extremities symmetrically and well.  Neurological exam:  Mental status: Awake and alert. Oriented to situation.  Speech fluent and appropriate. Recent and remote memory appear to be intact.  Fund of knowledge normal.  Cranial nerves: Pupils equal round, extraocular movements intact, facial strength intact bilaterally, hearing grossly intact bilaterally.  Sensation: intact to light touch and temperature throughout EXCEPT the L5 dermatome on the left   Gait: Normal     CERVICAL SPINE:  INSPECTION: no scars   ROM: normal   MUSCLE SPASM: + upper cervical paraspinals, splenius capitus, upper trapezius   DALILA tender: bilateral   SPURLING: neg    Data Review:     No results found for this or any previous visit.    Lab Results   Component Value Date     11/28/2017     09/18/2015    K 4.3 11/28/2017    K 4.1 09/18/2015     11/28/2017     09/18/2015    CO2 28 11/28/2017    BUN 15 11/28/2017    CREATININE 0.78 11/28/2017    CREATININE 0.92 09/18/2015     (H) 11/28/2017    AST 60 (H) 11/28/2017    ALT 57 (H) 11/28/2017    ALBUMIN 4.1 11/28/2017    ALBUMIN 3.6 09/18/2015    PROT 7.3 11/28/2017    BILITOT 0.4 11/28/2017    CHOL 149 09/27/2017    HDL 61 09/27/2017    LDLCALC 71.0 09/27/2017    LDLCALC 77 09/18/2015    TRIG 85 09/27/2017       Lab Results   Component Value Date    WBC 7.59 11/28/2017    HGB 13.3 11/28/2017    HCT 40.6 11/28/2017    MCV 96 11/28/2017     11/28/2017       Lab Results   Component Value Date    TSH 3.930 09/27/2017       Assessment & Plan:       Problem List  Items Addressed This Visit        Neuro    Migraine, cervicogenic    Overview     Headaches which phenotypically have migraine characteristics but have a strong cervicogenic / cervical myofacial component to them which is why we are treating with cervical TPI / muscle relaxer / and recommended antidepressant with norepinephrine reuptake quality.     Before had reservations about starting duloxetine due to nausea.     Periactin has not helped much but was not using daily. Advised to use lower dose on nightly basis and if this is failed I really so no options besides starting duloxetine unless we know she has trialed other agents and then can pursue Botox.             Orthopedic    Cervical myofascial pain syndrome    Overview     With good response to cervical TPI, previous injection don 2 months ago has now warn off, will repeat today as treatments kick in.          Bilateral occipital neuralgia    Overview     Secondary to cervical muscle spasm, with previous good response to blocks, repeat nerve blocks today                   WORKUP:  -- none     PREVENTION (use daily regardless of headache / pain):  -- start using cyproheptadine half tablet (2mg) at night and if you can tolerate it, raise to 4mg at night  -- next line to consider is low dose cymbalta     ACUTE TREATMENT of headache / pain (limit to 10 days per month)   -- continue tizanidine 2mg at night for muscle spasm   -- continue imitrex or excedrin     Return in about 10 weeks (around 3/14/2018).     Tammie Delaney MD    -----------------------------------------------------------------------------------    PROCEDURE #1     Greater Occipital Nerve Block, Bilateral     Diagnosis: occipital neuralgia     After risks and benefits were explained including bleeding, infection, worsening of the pain, damage to the area being injected, weakness, allergic reaction to medications, vascular injection, and nerve damage, signed consent was obtained. All questions were  answered.     The area of the greater occipital nerve was identified as a point 1/3rds the distance  between the occipital protuberance and the ipsilateral mastoid process. The identified areas were prepped three times with alcohol and the alcohol allowed to dry. Next, a 27 gauge 1 inch needle was placed in the anatomical area of the DALILA. Once reproduction of the pain was elicited and negative aspiration confirmed, I proceeded with the injection. The exact procedure was repeated on the contralateral side.     Adequacy of the block was confirmed by sensory examination demonstrating anesthesia in the territory of the DALILA and the MAYELIN.     Medication used: Marcaine 0.25% (9mg), lidocaine 1% (10mg) and Triamcinolone 10mg     Total volume injected:  5cc    Estimated blood loss: none    The patient tolerated the procedure well and there were no complications.     -----------------------------------------------------------------------------------------------------------------    PROCEDURE #2     TRIGGER POINT INJECTION (CERVICAL) , 3 muscles, bilateral     Indication: cervical myofascial pain     Anesthesia: none     After risks and benefits were explained including bleeding, infection, worsening of the pain, damage to the area being injected, weakness, allergic reaction to medications, vascular injection, and nerve damage, signed consent was obtained. All questions were answered.     Trigger points were identified by palpation of tight muscle fibers with reproduction of patient's pain and referral pattern upon palpation. The identified areas were prepped three times with alcohol and the alcohol allowed to dry. Next, a 27 gauge 1 inch needle was placed in the anatomical area of the trigger point ot be injected. Once negative aspiration of air and heme was confirmed, I proceeded with the injection.     Medications used: bupivicaine 0.25% (18mg), lidocaine 1% (20mg) and Triamcinolone 20mg     Total volume injected: 10cc      Trigger points injected:  -- right and left semispinalis capitus   -- right and left upper trapezius  -- right and left splenius capitus     Estimated blood loss: none     The patient did tolerate the procedure well and there were no complications.    RTC as scheduled

## 2018-01-15 ENCOUNTER — PATIENT MESSAGE (OUTPATIENT)
Dept: PSYCHIATRY | Facility: CLINIC | Age: 53
End: 2018-01-15

## 2018-01-15 DIAGNOSIS — F90.0 ADHD (ATTENTION DEFICIT HYPERACTIVITY DISORDER), INATTENTIVE TYPE: ICD-10-CM

## 2018-01-15 RX ORDER — METHYLPHENIDATE HYDROCHLORIDE 18 MG/1
18 TABLET ORAL EVERY MORNING
Qty: 30 TABLET | Refills: 0 | Status: SHIPPED | OUTPATIENT
Start: 2018-01-15 | End: 2018-02-19 | Stop reason: SDUPTHER

## 2018-02-19 ENCOUNTER — PATIENT MESSAGE (OUTPATIENT)
Dept: PSYCHIATRY | Facility: CLINIC | Age: 53
End: 2018-02-19

## 2018-02-19 DIAGNOSIS — F90.0 ADHD (ATTENTION DEFICIT HYPERACTIVITY DISORDER), INATTENTIVE TYPE: ICD-10-CM

## 2018-02-19 RX ORDER — METHYLPHENIDATE HYDROCHLORIDE 18 MG/1
18 TABLET ORAL EVERY MORNING
Qty: 30 TABLET | Refills: 0 | Status: SHIPPED | OUTPATIENT
Start: 2018-02-19 | End: 2018-02-20 | Stop reason: SDUPTHER

## 2018-02-20 ENCOUNTER — PATIENT MESSAGE (OUTPATIENT)
Dept: PSYCHIATRY | Facility: CLINIC | Age: 53
End: 2018-02-20

## 2018-02-20 DIAGNOSIS — F90.0 ADHD (ATTENTION DEFICIT HYPERACTIVITY DISORDER), INATTENTIVE TYPE: ICD-10-CM

## 2018-02-20 RX ORDER — METHYLPHENIDATE HYDROCHLORIDE 27 MG/1
27 TABLET ORAL EVERY MORNING
Qty: 30 TABLET | Refills: 0 | Status: SHIPPED | OUTPATIENT
Start: 2018-02-20 | End: 2018-03-21 | Stop reason: ALTCHOICE

## 2018-02-21 ENCOUNTER — TELEPHONE (OUTPATIENT)
Dept: NEUROLOGY | Facility: CLINIC | Age: 53
End: 2018-02-21

## 2018-02-21 ENCOUNTER — TELEPHONE (OUTPATIENT)
Dept: PSYCHIATRY | Facility: CLINIC | Age: 53
End: 2018-02-21

## 2018-02-21 NOTE — TELEPHONE ENCOUNTER
Pt wrote yesterday that she wanted to try 27mg dose. I sent in the new script for 27mg and let her know I did that and that she would need to clarify with the pharmacy that she wanted the 27mg dose NOT the 18mg dose.     Will have psych nurse for Ilana lazaro, call pharmacy and clarify.

## 2018-02-21 NOTE — TELEPHONE ENCOUNTER
Pharmacy [facility] called to confirm prescription for Concerta 27mg. Patient picked up Concerta 18 mg yesterday  Please clarify

## 2018-02-23 ENCOUNTER — OFFICE VISIT (OUTPATIENT)
Dept: NEUROLOGY | Facility: CLINIC | Age: 53
End: 2018-02-23
Payer: COMMERCIAL

## 2018-02-23 ENCOUNTER — TELEPHONE (OUTPATIENT)
Dept: NEUROLOGY | Facility: CLINIC | Age: 53
End: 2018-02-23

## 2018-02-23 VITALS
WEIGHT: 112 LBS | BODY MASS INDEX: 18 KG/M2 | SYSTOLIC BLOOD PRESSURE: 110 MMHG | HEART RATE: 62 BPM | RESPIRATION RATE: 16 BRPM | DIASTOLIC BLOOD PRESSURE: 76 MMHG | HEIGHT: 66 IN

## 2018-02-23 DIAGNOSIS — M79.18 CERVICAL MYOFASCIAL PAIN SYNDROME: Primary | ICD-10-CM

## 2018-02-23 DIAGNOSIS — M54.81 BILATERAL OCCIPITAL NEURALGIA: ICD-10-CM

## 2018-02-23 DIAGNOSIS — G43.809 MIGRAINE, CERVICOGENIC: ICD-10-CM

## 2018-02-23 DIAGNOSIS — T40.2X5A CONSTIPATION DUE TO OPIOID THERAPY: ICD-10-CM

## 2018-02-23 DIAGNOSIS — K59.03 CONSTIPATION DUE TO OPIOID THERAPY: ICD-10-CM

## 2018-02-23 PROCEDURE — 20553 NJX 1/MLT TRIGGER POINTS 3/>: CPT | Mod: S$GLB,,, | Performed by: PSYCHIATRY & NEUROLOGY

## 2018-02-23 PROCEDURE — 3008F BODY MASS INDEX DOCD: CPT | Mod: S$GLB,,, | Performed by: PSYCHIATRY & NEUROLOGY

## 2018-02-23 PROCEDURE — 99999 PR PBB SHADOW E&M-EST. PATIENT-LVL IV: CPT | Mod: PBBFAC,,, | Performed by: PSYCHIATRY & NEUROLOGY

## 2018-02-23 PROCEDURE — 64405 NJX AA&/STRD GR OCPL NRV: CPT | Mod: 50,59,S$GLB, | Performed by: PSYCHIATRY & NEUROLOGY

## 2018-02-23 RX ORDER — LIDOCAINE HYDROCHLORIDE 10 MG/ML
5 INJECTION, SOLUTION EPIDURAL; INFILTRATION; INTRACAUDAL; PERINEURAL ONCE
Status: COMPLETED | OUTPATIENT
Start: 2018-02-23 | End: 2018-02-23

## 2018-02-23 RX ORDER — BUPIVACAINE HYDROCHLORIDE 2.5 MG/ML
20 INJECTION, SOLUTION EPIDURAL; INFILTRATION; INTRACAUDAL ONCE
Status: COMPLETED | OUTPATIENT
Start: 2018-02-23 | End: 2018-02-23

## 2018-02-23 RX ORDER — TRIAMCINOLONE ACETONIDE 40 MG/ML
40 INJECTION, SUSPENSION INTRA-ARTICULAR; INTRAMUSCULAR ONCE
Status: COMPLETED | OUTPATIENT
Start: 2018-02-23 | End: 2018-02-23

## 2018-02-23 RX ADMIN — LIDOCAINE HYDROCHLORIDE 50 MG: 10 INJECTION, SOLUTION EPIDURAL; INFILTRATION; INTRACAUDAL; PERINEURAL at 04:02

## 2018-02-23 RX ADMIN — BUPIVACAINE HYDROCHLORIDE 50 MG: 2.5 INJECTION, SOLUTION EPIDURAL; INFILTRATION; INTRACAUDAL at 04:02

## 2018-02-23 RX ADMIN — TRIAMCINOLONE ACETONIDE 40 MG: 40 INJECTION, SUSPENSION INTRA-ARTICULAR; INTRAMUSCULAR at 04:02

## 2018-02-23 NOTE — TELEPHONE ENCOUNTER
Called pt to inform her that it was possible for her to come in earlier than 4pm. Pt verbalized understanding and stated she will come early.

## 2018-02-23 NOTE — PROGRESS NOTES
Date of service: 2/23/2018  Referring provider: No ref. provider found    Subjective:      Chief complaint: Migraine       Patient ID: Brenda C Stargardter is a 52 y.o. lady with hypothyroidism, gastroparesis, anxiety, ADHD, and migraines presenting for the follow up of migraines and neck pain. Sees Dr. Ben Cadet for pain management.     History of Present Illness    INTERVAL HISTORY - 2/23/18     Today she reports she was doing better on the periactin and tizanidine until one week ago when she did a new weight while doing lunges and things went downhill after that. Today she reports he is much worse. She has sharp and pain in the back of her head making her scalp sensitive. Her current pain score is 7 with a range of 0 to 10.     Dr. Cadet with do her cervical RFA in the near future.     She reports on the regimen of norco, periactin, and tizanidine she has become constipated refractory to dulcolax and miralax.     INTERVAL HISTORY - 1/3/18     Seen 2 months ago at which point I initiated periactin and performed blocks/TPI.     Today she reports she is about to same to worse. Her pain is still cervical radiating to the posterior head. Her current pain is 3-4 with a range from 1 to 10.  She had a severe migraine with severe vomiting at the start of December and needed to go to the [facility] with a cold preceding it. She is only taking the periactin intermittently. Not using tizanidine daily. The TPI do work well.     INTERVAL HISTORY - 10/24/17     I performed bilateral DALILA/Sofia block and cervical TPI on 8/22/17. Prior to that I had also recommended starting on duloxetine, physical therapy, and tizanidine.     Today she reports she is a little better in terms of her headache and neck pain. The injections did help. A few weeks ago had a terrible vertiginous migraine lasting a whole day, her first one, treated with zofran. Headache characteristics are unchanged frm below, but current severity is 3-4 with range up to 6-7. Her  lower back and her left leg have been causing pain lately same as documented below. Her first lumbar SHORTY was done around mid August 2017.     She had some GI issues going on and was feeling better so did not go through the physical therapy. For the same reason did not try the cymbalta because she is not sure how this would affect her stomach.    She is seeing an allergist now.     Original Headache / Pain History - 8/8/17   Age at onset and course over time: around 2013 after cave excursion in which she became ill, was exposed to bats, had severe migraine lasting months. She believes they originate from her neck pain, which is daily. Dr. Cadet has done CMBB injections (5-6x) in the past which have helped. Has not moved on to rhizotomy. Each CMBB lasts months to years. Last cervical imaging was in 2016.   Location: starts in neck, radiates forward to left side of head (mainly), top of head   Quality: pressure, tight band, throbbing   Severity: best 1/10, worst 1010, current 5/10   Duration: hours - days   Frequency: daily neck pain, headache frequency varies but usually >15 days per month   Alleviated by: sleep, darkness, massage, heat, ice, medication   Exacerbated by: looking up or looking down for extended period of time, light, noise, bending over, stress, food   Associated with: scalp sensitivity, photo/phono/osmophobia, loss of appetite, nasal congestion, problems with memory/concentration, neck tightness   Sleep habits: needs pain medication and ambien to get to sleep due to the pain   Caffeine intake: 1-2 per day     Other:  Last 2-3 months left leg/thigh hurting. Lost strength in that leg. Limping. No back pain. Wraps around knee stops mid-calf. Throbbing/ shooting. No lumbar MRI. Norco helps somewhat with that pain.     Current acute treatment:  -- excedrin  -- aleve   -- norco 7.5 -  1/2 in afternoon and 1/2 before bed (daily)  -- 1/2 ambien   -- baclofen   -- imitrex for severe      Current prevention:  --  periactin 4mg nightly - intermittent   -- tizanidine 2mg at night -intermittent     Previously tried/failed acute treatment:  -- imitrex - less effective then relpax  -- relpax - chest pain   -- prednisone   -- reglan - weakness   -- toradol   -- aleve  -- motrin  -- Bupap   -- Goody's  -- occipital nerve block  -- cervical trigger point  -- cervical epidural steroid injection   -- piriformis injection     Previously tried/failed preventative treatment:  -- gabapentin - blurring vision   -- Gralise   -- bilateral DALILA/MAYELIN and cervical TPI 8/22/17    Review of patient's allergies indicates:   Allergen Reactions    Reglan [metoclopramide hcl] Other (See Comments)     Weakness could not hold an object in her hands.    Benadryl [diphenhydramine hcl] Other (See Comments)     Restless leg, aggitation    Strattera [atomoxetine] Palpitations    Phenergan [promethazine] Other (See Comments)     Akathisia      Prednisone Nausea And Vomiting    Relpax [eletriptan hbr] Other (See Comments)     Chest pain    Sulfa (sulfonamide antibiotics) Other (See Comments)     Muscle weakness     Current Outpatient Prescriptions   Medication Sig Dispense Refill    atenolol (TENORMIN) 25 MG tablet Take 0.5 tablets (12.5 mg total) by mouth daily as needed. 90 tablet 0    baclofen (LIORESAL) 10 MG tablet Take 10 mg by mouth as needed.       CALCIUM CARBONATE/MAG HYDROX (MYLANTA ORAL) Take by mouth.      clotrimazole-betamethasone 1-0.05% (LOTRISONE) cream Apply topically as needed.       cyproheptadine (PERIACTIN) 4 mg tablet 2 tablets by mouth nightly for headache 60 tablet 3    esomeprazole (NEXIUM) 40 MG capsule Take 40 mg by mouth before breakfast.      fluticasone (FLOVENT HFA) 220 mcg/actuation inhaler Inhale 1 puff into the lungs 2 (two) times daily. Controller 12 g 0    fluticasone-vilanterol (BREO) 100-25 mcg/dose diskus inhaler Inhale 1 puff into the lungs once daily. Controller 1 each 11    hydrocodone-acetaminophen  7.5-325mg (NORCO) 7.5-325 mg per tablet Take 1 tablet by mouth nightly as needed.       levothyroxine (SYNTHROID) 50 MCG tablet Take 1 tablet (50 mcg total) by mouth before breakfast. 30 tablet 11    methylphenidate HCl (CONCERTA) 27 MG CR tablet Take 1 tablet (27 mg total) by mouth every morning. 30 tablet 0    MG TRISILICATE/ALH/NAHCO3/AA (GAVISCON ORAL) Take 5 mLs by mouth as needed.       MINIVELLE 0.1 mg/24 hr PTSW       ondansetron (ZOFRAN-ODT) 8 MG TbDL Take 1 tablet (8 mg total) by mouth 3 (three) times daily as needed (NAUSEA OR VOMITING). 15 tablet 0    PROCTOSOL HC 2.5 % rectal cream Place 1 Dose rectally as needed.       tiZANidine (ZANAFLEX) 4 MG tablet TAKE 1 TABLET BY MOUTH NIGHTLY AS NEEDED FOR MUSCLE SPASMS 30 tablet 2    VENTOLIN HFA 90 mcg/actuation inhaler INHALE 2 PUFFS INTO THE LUNGS EVERY 6 (SIX) HOURS AS NEEDED FOR WHEEZING. 18 g 11    zolpidem (AMBIEN) 10 mg Tab TAKE 1 TABLET BY MOUTH NIGHTLY AS NEEDED 30 tablet 5    levocetirizine (XYZAL) 5 MG tablet Take 1 tablet (5 mg total) by mouth every evening. 30 tablet 11    linaclotide (LINZESS) 145 mcg Cap capsule Take 1 capsule (145 mcg total) by mouth once daily. 30 capsule 11    sumatriptan (IMITREX) 100 MG tablet Take 1 tablet (100 mg total) by mouth every 2 (two) hours as needed for Migraine. up to 200 mg/day. Hold for BP > 150 mm systolic (Patient taking differently: Take 100 mg by mouth every 2 (two) hours as needed for Migraine. up to 200 mg/day. Hold for BP > 150 mm systolic) 9 tablet 2     Current Facility-Administered Medications   Medication Dose Route Frequency Provider Last Rate Last Dose    lidocaine HCl 2% oral solution 1 mL  1 mL Subcutaneous 1 time in Clinic/HOD Triny Matos MD        triamcinolone acetonide injection 40 mg  40 mg Intramuscular 1 time in Clinic/HOD Triny Matos MD           Past Medical History  Past Medical History:   Diagnosis Date    Abdominal or pelvic swelling, mass, or lump, right  lower quadrant     Anxiety     Arthritis     Asthma, mild persistent     Cervicalgia     COPD (chronic obstructive pulmonary disease)     Enlargement of lymph nodes     Eosinophilic esophagitis     Gastroparesis     Headache(784.0)     Heartburn     Helicobacter pylori (H. pylori)     History of positive PPD, untreated     History of psychiatric hospitalization     after son , hospitalized for eating disorder    Hx of psychiatric care     Hypothyroidism     Migraines     Occipital neuralgia     Other selective immunoglobulin deficiencies     Other syndromes affecting cervical region     Psychiatric problem     Spondylosis of cervical spine     Therapy     Unspecified asthma(493.90)     Unspecified disorder of thyroid     Unspecified viral meningitis        Past Surgical History  Past Surgical History:   Procedure Laterality Date    AXILLARY ABCESS IRRIGATION AND DEBRIDEMENT      CHOLECYSTECTOMY      EXCISIONAL HEMORRHOIDECTOMY  12/21/15    LYMPH NODE BIOPSY Right 2014    Right inguinal lymph node biopsy    MOUTH SURGERY      TOTAL ABDOMINAL HYSTERECTOMY W/ BILATERAL SALPINGOOPHORECTOMY      VARICOSE VEIN SURGERY         Family History  Family History   Problem Relation Age of Onset    Stroke Mother     Hypertension Mother     Cerebral aneurysm Mother     Hypertension Father     Heart disease Father     Pneumonia Sister     No Known Problems Brother     No Known Problems Brother     No Known Problems Sister        Social History  Social History     Social History    Marital status:      Spouse name: Ren    Number of children: 5    Years of education: 13     Occupational History    Self employed      Social History Main Topics    Smoking status: Never Smoker    Smokeless tobacco: Never Used    Alcohol use Yes      Comment: Social    Drug use: No    Sexual activity: Yes     Partners: Male     Other Topics Concern    Not on file     Social History Narrative     No narrative on file        Review of Systems  Constitutional: no fever, no chills   Eyes: no change to vision, no redness, no tearing + double vsion   Ears, nose, mouth, throat: no hearing loss, no tinnitus, no rhinorrhea, no difficulty swallowing   Cardiovascular: no palpitations  Respiratory: no shortness of breath, cough   Gastrointestinal: no diarrhea, no constipation + nausea   Musculoskeletal: + muscle pain adn joint pain  Skin: no rashes  Neurologic: no numbness, no weakness, change to speech, loss of coordination   Endocrine: no heat intolerance, no cold intolerance no fatigue     Objective:        Vitals:    02/23/18 1551   BP: 110/76   Pulse: 62   Resp: 16     Body mass index is 18.08 kg/m².    CERVICAL SPINE:  INSPECTION: no scars   ROM: normal   MUSCLE SPASM: + upper cervical paraspinals, splenius capitus, upper trapezius   DALILA tender: bilateral   SPURLING: neg    Data Review:     No results found for this or any previous visit.    Lab Results   Component Value Date     11/28/2017     09/18/2015    K 4.3 11/28/2017    K 4.1 09/18/2015     11/28/2017     09/18/2015    CO2 28 11/28/2017    BUN 15 11/28/2017    CREATININE 0.78 11/28/2017    CREATININE 0.92 09/18/2015     (H) 11/28/2017    AST 60 (H) 11/28/2017    ALT 57 (H) 11/28/2017    ALBUMIN 4.1 11/28/2017    ALBUMIN 3.6 09/18/2015    PROT 7.3 11/28/2017    BILITOT 0.4 11/28/2017    CHOL 149 09/27/2017    HDL 61 09/27/2017    LDLCALC 71.0 09/27/2017    LDLCALC 77 09/18/2015    TRIG 85 09/27/2017       Lab Results   Component Value Date    WBC 7.59 11/28/2017    HGB 13.3 11/28/2017    HCT 40.6 11/28/2017    MCV 96 11/28/2017     11/28/2017       Lab Results   Component Value Date    TSH 3.930 09/27/2017       Assessment & Plan:       Problem List Items Addressed This Visit        Neuro    Migraine, cervicogenic    Overview     Headaches which phenotypically have migraine characteristics but have a strong  cervicogenic / cervical myofacial component to them which is why we are treating with cervical TPI / muscle relaxer / and recommended antidepressant with norepinephrine reuptake quality.     Before had reservations about starting duloxetine due to nausea.     Periactin has seemed to help a great deal until this week when injections wore off and she injured herself while exercising. Continue current regimen.            GI    Constipation due to opioid therapy    Overview     Failed over the counter measures including dulcolax and miralax. Start linzess.          Relevant Medications    linaclotide (LINZESS) 145 mcg Cap capsule       Orthopedic    Cervical myofascial pain syndrome - Primary    Overview     With good response to cervical TPI, previous injection  6 weeks ago has now warn off, will repeat today due to intractable pain          Bilateral occipital neuralgia    Overview     Secondary to cervical muscle spasm, with previous good response to blocks, repeat nerve blocks today                   WORKUP:  -- none     PREVENTION (use daily regardless of headache / pain):  -- continue cyproheptadine 4mg at night  -- next line to consider is low dose cymbalta     ACUTE TREATMENT of headache / pain (limit to 10 days per month)   -- continue tizanidine 2mg at night for muscle spasm   -- continue imitrex or excedrin     Follow-up in about 6 weeks (around 4/6/2018) for cervical TPI .     Tammie Delaney MD    -----------------------------------------------------------------------------------    PROCEDURE #1     Greater and Lesser Occipital Nerve Block, Bilateral     After risks and benefits were explained including bleeding, infection, worsening of the pain, damage to the area being injected, weakness, allergic reaction to medications, vascular injection, and nerve damage, signed consent was obtained. All questions were answered.     The area of the greater occipital nerve was identified as a point 1/3rds the distance   between the occipital protuberance and the ipsilateral mastoid process. The area of the lesser occipital nerve was identified as a point 2/3rds the distance between the occipital protuberance and the ipsilateral mastoid process.The identified areas were prepped three times with alcohol and the alcohol allowed to dry. Next, a 27 gauge 1 inch needle was placed in the anatomical area of the DALILA. Once reproduction of the pain was elicited and negative aspiration confirmed, I proceeded with the injection. This was repeated for the MAYELIN. The exact procedure was repeated on the contralateral side.     Adequacy of the block was confirmed by sensory examination demonstrating anesthesia in the territory of the DALILA and the MAYELIN.     Medication used: Marcaine 0.25% (17.5 mg), Lidocaine 1% (20mg) and Triamcinolone 40mg     Total volume infused:  10cc    The patient did tolerate the procedure well and there were no complications.      -----------------------------------------------------------------------------------------------------------------    PROCEDURE #2     TRIGGER POINT INJECTION (CERVICAL) , 3 muscles, bilateral     Indication: cervical myofascial pain     Anesthesia: none     After risks and benefits were explained including bleeding, infection, worsening of the pain, damage to the area being injected, weakness, allergic reaction to medications, vascular injection, and nerve damage, signed consent was obtained. All questions were answered.     Trigger points were identified by palpation of tight muscle fibers with reproduction of patient's pain and referral pattern upon palpation. The identified areas were prepped three times with alcohol and the alcohol allowed to dry. Next, a 27 gauge 1 inch needle was placed in the anatomical area of the trigger point ot be injected. Once negative aspiration of air and heme was confirmed, I proceeded with the injection.     Medications used: bupivicaine 0.25% (18mg), lidocaine 1%  (20mg) and Triamcinolone 20mg     Total volume injected: 10cc     Trigger points injected:  -- right and left semispinalis capitus   -- right and left upper trapezius  -- right and left splenius capitus     Estimated blood loss: none     The patient did tolerate the procedure well and there were no complications.    RTC in 6 weeks

## 2018-03-07 RX ORDER — TIZANIDINE 4 MG/1
TABLET ORAL
Qty: 30 TABLET | Refills: 2 | Status: SHIPPED | OUTPATIENT
Start: 2018-03-07 | End: 2019-01-21 | Stop reason: SDUPTHER

## 2018-03-21 ENCOUNTER — OFFICE VISIT (OUTPATIENT)
Dept: PSYCHIATRY | Facility: CLINIC | Age: 53
End: 2018-03-21
Payer: COMMERCIAL

## 2018-03-21 VITALS
BODY MASS INDEX: 17.78 KG/M2 | DIASTOLIC BLOOD PRESSURE: 74 MMHG | WEIGHT: 110.63 LBS | HEART RATE: 75 BPM | SYSTOLIC BLOOD PRESSURE: 106 MMHG | HEIGHT: 66 IN

## 2018-03-21 DIAGNOSIS — F41.1 GAD (GENERALIZED ANXIETY DISORDER): ICD-10-CM

## 2018-03-21 DIAGNOSIS — F90.0 ADHD (ATTENTION DEFICIT HYPERACTIVITY DISORDER), INATTENTIVE TYPE: Primary | ICD-10-CM

## 2018-03-21 PROCEDURE — 90833 PSYTX W PT W E/M 30 MIN: CPT | Mod: S$GLB,,, | Performed by: PSYCHIATRY & NEUROLOGY

## 2018-03-21 PROCEDURE — 99214 OFFICE O/P EST MOD 30 MIN: CPT | Mod: S$GLB,,, | Performed by: PSYCHIATRY & NEUROLOGY

## 2018-03-21 PROCEDURE — 99999 PR PBB SHADOW E&M-EST. PATIENT-LVL III: CPT | Mod: PBBFAC,,, | Performed by: PSYCHIATRY & NEUROLOGY

## 2018-03-21 RX ORDER — METHYLPHENIDATE HYDROCHLORIDE 10 MG/1
10 TABLET ORAL 2 TIMES DAILY
Qty: 60 TABLET | Refills: 0 | Status: SHIPPED | OUTPATIENT
Start: 2018-03-21 | End: 2018-04-23 | Stop reason: SDUPTHER

## 2018-03-21 NOTE — PROGRESS NOTES
"ID: 51yo WF no prior psych hx w/i the [facility] system- diag clarified as ADHD and JAMESON. Started celexa which the pt didn't tolerate. Last appt started concerta 18mg po qam. Stable now on current stimulant dose.     CC: adhd    Interim Hx: presents late for appt. Chart reviewed.     Reports that there have been several recent stressors and of main concern is her mother who is in poor health "but is a very difficult person which makes helping her difficult."     Most of appt spent in therapy. We do discuss that concerta is now $250/mo through meeting of deductible but she cannot afford $250/mo in the interim. Will need to transition to ritalin 10mg bid- pt currently on concerta 27mg which is a mid dose- in between ritalin 10 bid and 15 bid. Will try 10mg po BID to avoid overstimulation.     On Psychiatric ROS:    Endorses chronic difficulty maintaining sleep- taking ambien 5mg po qhs- cont'd neck pain and lower back which affects sleep, denies anhedonia, denies feeling helpless/hopeless, "good" energy, stable concentration, stable appetite- some wgt loss which she attributes to a illness in December and then with cont'd stress since then has not had wgt gain, denies dec PMA    Denies thoughts of SI/intent/plan.     LESS overwhelmed on stimulant, LESS ruminative thinking, feeling LESS tense/"on edge"- "and I do think treating the adhd allows me to see the bigger picture of issues somehow."     PSYCHOTHERAPY ADD-ON   30 (16-37*) minutes    Time: 20 minutes  Participants: Met with patient    Therapeutic Intervention Type: insight oriented psychotherapy, behavior modifying psychotherapy, supportive psychotherapy  Why chosen therapy is appropriate versus another modality: relevant to diagnosis, patient responds to this modality, evidence based practice    Target symptoms: adjustment  Primary focus: boundary setting with family, adjusting to caregiver role of mother  Psychotherapeutic techniques: support, validation, " "cbt    Outcome monitoring methods: observation    Patient's response to intervention:  The patient's response to intervention is accepting.    Progress toward goals:  The patient's progress toward goals is good.    PPHx: Denies h/o self injury  + inpt psych hospitalization- went to a type of rehab facility following her son's death- had a reemergence of eating disorder for which she was treated  Denies h/o suicide attempt     Current Psych Meds: concerta 18mg po qam, ambien 5mg po qhs PRN insomnia (through alternate provider)   Past Psych Meds: topomax (for migraines), adderall (overly stimulated), strattera (overly stimulated), "maybe lexapro", celexa (worsened h/a's; daytime lethargy)    PMHx: chronic neck pain, migraines occipital neuralgia    SubstHx:   T- none  E- occ, rare  D- none  Caffeine- 1-2cups of coffee/d    Musculoskeletal:  Muscle strength/Tone: no dyskinesia/ no tremor  Gait/Station- non antalgic, no assistance needed    MSE: appears stated age, well groomed, appropriate dress, appears to have lost weight, engages well with examiner. Good e/c. Speech reg rate and vol, nonpressured. Mood is "i'm ok. There's just been a lot of stress lately" Affect congruent. Appears concerned. Sensorium fully intact. Oriented to date/day/location, current events. Narrative memory intact. Intellectual function is avg based on vocab and basic fund of knowledge. Thought is c/l/gd. No tangentiality or circumstantiality. No FOI/CARLOS. Denies SI/HI. Denies A/VH. No evidence of delusions. Insight and Judgment intact.     Suicide Risk Assessment:   Protective- age, gender, no prior attempts, no prior hospitalizations, no family h/o attempts, no ongoing substance abuse, no psychosis, , has children, denies SI/intent/plan, seeking treatment, access to treatment, future oriented, good primary support, no access to firearms    Risk- race, ongoing Axis I sxs    **Pt is at LOW imminent and long term risk of suicide given " current risk factors.    Assessment:  51yo WF with genetic loading for anxiety and mood disorders through mother. Also with genetic loading for addiction. Lifetime h/o anxiety spectrum disorders to include bulimia with binge/purge behavior through teen yrs which resurfaced following her 24yo son's overdose/death in 2010. Pt has not had continued issues with eating disorder, but continues to endorse sxs c/w with both JAMESON and ADHD-IT. Will try and manage anxiety first prior to starting stimulant txmt. Previous trials of stimulant txmt alone have led to overstimulation. Started celexa which the pt only cont'd for a 1wk trial. Reported inc'd h/a's and daytime lethargy. Today only interested in trial of long acting stimulant- started trial of concerta 18mg po qam. Today on f/u the medicine has been helpful- not fully optimized, but a trial of 36mg led to mildly inc'd anxiety. Sleep, focus/attn and functional life all improved on stimulant. Tried inc to 27mg po qam. Inc led to overactivation- back to 18mg and had good coverage, sleep intact, reporting dec'd anxiety on stim mgmt. Then inc'd to 27mg and she tolerated well on the 2nd trial. Concern today regarding weight loss- she attributes to illness in 12/2017 with cont'd stress following, but we discuss inc caloric intake and the need for weight gain. Pt expresses understanding.     Axis I: JAMESON, ADHD- IT, h/o bulimia-binge/purge type  Axis II: none at this time   Axis III: neck pain, migraines, occipital neuralgia, underweight  Axis IV: occupational, chronic mental health concerns  Axis V: GAF 60    Plan:   1. Change to Ritalin 10mg po BID (concerta cost prohibitive)  3. Cont therapy with Patricia in the office  4. rtc 3mos    -R/B/SE's of medications discussed with the pt who expresses understanding and chooses to take medications as prescribed.   -Pt instructed to call clinic, 911 or go to nearest emergency room if sxs worsen or pt is in   crisis. The pt expresses  understanding.

## 2018-03-28 ENCOUNTER — OFFICE VISIT (OUTPATIENT)
Dept: PSYCHIATRY | Facility: CLINIC | Age: 53
End: 2018-03-28
Payer: COMMERCIAL

## 2018-03-28 DIAGNOSIS — F90.0 ADHD (ATTENTION DEFICIT HYPERACTIVITY DISORDER), INATTENTIVE TYPE: ICD-10-CM

## 2018-03-28 DIAGNOSIS — F41.1 GAD (GENERALIZED ANXIETY DISORDER): Primary | ICD-10-CM

## 2018-03-28 PROCEDURE — 99999 PR PBB SHADOW E&M-EST. PATIENT-LVL III: CPT | Mod: PBBFAC,,, | Performed by: SOCIAL WORKER

## 2018-03-28 PROCEDURE — 90834 PSYTX W PT 45 MINUTES: CPT | Mod: S$GLB,,, | Performed by: SOCIAL WORKER

## 2018-03-30 NOTE — PROGRESS NOTES
"Individual Psychotherapy (PhD/LCSW)    3/28/2018    Site:  [facility]         Therapeutic Intervention: Met with patient.  Outpatient - Insight oriented psychotherapy 45 min - CPT code 53736, Outpatient - Behavior modifying psychotherapy 45 min - CPT code 82937 and Outpatient - Supportive psychotherapy 45 min - CPT Code 70617    Chief complaint/reason for encounter: attention deficit, anxiety, sleep, interpersonal and grief     Interval history and content of current session: Patient presented for follow up session on time and in a stressed mood.  Her last session was in December.  Patient stated that since then "things have spiraled downward with my mom".  Her mother was in the ER three times thus far in the year and her mother is in a "co-dependent triad" with her sister and brother.  Patient stated that her  discovered that his PSA was high and further tests are being run.  His father  of prostate cancer, so they are both stressed about this.  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  She stated that she is trying to draw more boundaries with her loved ones for self-preservation.  Patient advised to contact her mother's physician to inform him of her concerns and also request that an outpatient  referral be made so that her mother's needs can be assessed and she can be referred to additional services if needed.  Patient doubts that her mother would cooperate, but this is the most that she can do for her mother at this point.  Stress management discussed.      Treatment plan:  · Target symptoms: distractability, anxiety , grief  · Why chosen therapy is appropriate versus another modality: relevant to diagnosis, patient responds to this modality, evidence based practice  · Outcome monitoring methods: self-report, observation  · Therapeutic intervention type: insight oriented psychotherapy, behavior modifying psychotherapy, " supportive psychotherapy    Risk parameters:  Patient reports no suicidal ideation  Patient reports no homicidal ideation  Patient reports no self-injurious behavior  Patient reports no violent behavior    Verbal deficits: None    Patient's response to intervention:  The patient's response to intervention is accepting.    Progress toward goals and other mental status changes:  The patient's progress toward goals is fair.    Diagnosis:     ICD-10-CM ICD-9-CM   1. JAMESON (generalized anxiety disorder) F41.1 300.02   2. ADHD (attention deficit hyperactivity disorder), inattentive type F90.0 314.00       Plan:  individual psychotherapy and consult psychiatrist for medication evaluation    Return to clinic: Follow-up in about 2 weeks (around 4/11/2018).    Length of Service (minutes): 45

## 2018-04-16 ENCOUNTER — PROCEDURE VISIT (OUTPATIENT)
Dept: NEUROLOGY | Facility: CLINIC | Age: 53
End: 2018-04-16
Payer: COMMERCIAL

## 2018-04-16 VITALS
BODY MASS INDEX: 17.96 KG/M2 | SYSTOLIC BLOOD PRESSURE: 124 MMHG | DIASTOLIC BLOOD PRESSURE: 80 MMHG | RESPIRATION RATE: 18 BRPM | HEIGHT: 66 IN | WEIGHT: 111.75 LBS | HEART RATE: 58 BPM

## 2018-04-16 DIAGNOSIS — M79.18 CERVICAL MYOFASCIAL PAIN SYNDROME: ICD-10-CM

## 2018-04-16 DIAGNOSIS — M54.81 BILATERAL OCCIPITAL NEURALGIA: Primary | ICD-10-CM

## 2018-04-16 PROCEDURE — 64405 NJX AA&/STRD GR OCPL NRV: CPT | Mod: 50,59,S$GLB, | Performed by: PSYCHIATRY & NEUROLOGY

## 2018-04-16 PROCEDURE — 20553 NJX 1/MLT TRIGGER POINTS 3/>: CPT | Mod: S$GLB,,, | Performed by: PSYCHIATRY & NEUROLOGY

## 2018-04-16 RX ORDER — LIDOCAINE HYDROCHLORIDE 10 MG/ML
5 INJECTION, SOLUTION EPIDURAL; INFILTRATION; INTRACAUDAL; PERINEURAL ONCE
Status: COMPLETED | OUTPATIENT
Start: 2018-04-16 | End: 2018-04-16

## 2018-04-16 RX ORDER — BUPIVACAINE HYDROCHLORIDE 2.5 MG/ML
20 INJECTION, SOLUTION EPIDURAL; INFILTRATION; INTRACAUDAL ONCE
Status: COMPLETED | OUTPATIENT
Start: 2018-04-16 | End: 2018-04-16

## 2018-04-16 RX ADMIN — BUPIVACAINE HYDROCHLORIDE 50 MG: 2.5 INJECTION, SOLUTION EPIDURAL; INFILTRATION; INTRACAUDAL at 02:04

## 2018-04-16 RX ADMIN — LIDOCAINE HYDROCHLORIDE 50 MG: 10 INJECTION, SOLUTION EPIDURAL; INFILTRATION; INTRACAUDAL; PERINEURAL at 02:04

## 2018-04-16 NOTE — PROCEDURES
Procedures     PROCEDURE #1     Greater Occipital Nerve Block, Bilateral     Diagnosis: occipital neuralgia     After risks and benefits were explained including bleeding, infection, worsening of the pain, damage to the area being injected, weakness, allergic reaction to medications, vascular injection, and nerve damage, signed consent was obtained. All questions were answered.     The area of the greater occipital nerve was identified as a point 1/3rds the distance  between the occipital protuberance and the ipsilateral mastoid process. The identified areas were prepped three times with alcohol and the alcohol allowed to dry. Next, a 27 gauge 1 inch needle was placed in the anatomical area of the DALILA. Once reproduction of the pain was elicited and negative aspiration confirmed, I proceeded with the injection. The exact procedure was repeated on the contralateral side.     Adequacy of the block was confirmed by sensory examination demonstrating anesthesia in the territory of the DALILA and the MAYELIN.     Medication used: Marcaine 0.25%, lidocaine 1%     Total volume injected:  6cc    Estimated blood loss: none    The patient tolerated the procedure well and there were no complications.     -----------------------------------------------------------------------------------------------------------------    PROCEDURE #2     TRIGGER POINT INJECTION (CERVICAL), 2 muscles bilateral     Indication: cervical myofascial pain     Anesthesia: none     After risks and benefits were explained including bleeding, infection, worsening of the pain, damage to the area being injected, weakness, allergic reaction to medications, vascular injection, and nerve damage, signed consent was obtained. All questions were answered.     Trigger points were identified by palpation of tight muscle fibers with reproduction of patient's pain and referral pattern upon palpation. The identified areas were prepped three times with alcohol and the alcohol  allowed to dry. Next, a 27 gauge 1 inch needle was placed in the anatomical area of the trigger point ot be injected. Once negative aspiration of air and heme was confirmed, I proceeded with the injection.     Medications used: bupivicaine 0.25%, lidocaine 1%     Total volume injected: 10cc     Trigger points injected:  -- right and left splenius capitus - 2 points each side   -- right and left upper trapezius    Estimated blood loss: none     The patient did tolerate the procedure well and there were no complications.    RTC as scheduled

## 2018-04-23 ENCOUNTER — PATIENT MESSAGE (OUTPATIENT)
Dept: PSYCHIATRY | Facility: CLINIC | Age: 53
End: 2018-04-23

## 2018-04-23 DIAGNOSIS — F90.0 ADHD (ATTENTION DEFICIT HYPERACTIVITY DISORDER), INATTENTIVE TYPE: ICD-10-CM

## 2018-04-23 RX ORDER — METHYLPHENIDATE HYDROCHLORIDE 10 MG/1
10 TABLET ORAL 2 TIMES DAILY
Qty: 60 TABLET | Refills: 0 | Status: SHIPPED | OUTPATIENT
Start: 2018-04-23 | End: 2018-05-31 | Stop reason: SINTOL

## 2018-05-29 ENCOUNTER — TELEPHONE (OUTPATIENT)
Dept: PSYCHIATRY | Facility: CLINIC | Age: 53
End: 2018-05-29

## 2018-05-29 ENCOUNTER — PATIENT MESSAGE (OUTPATIENT)
Dept: PSYCHIATRY | Facility: CLINIC | Age: 53
End: 2018-05-29

## 2018-05-31 ENCOUNTER — PATIENT MESSAGE (OUTPATIENT)
Dept: PSYCHIATRY | Facility: CLINIC | Age: 53
End: 2018-05-31

## 2018-05-31 RX ORDER — METHYLPHENIDATE HYDROCHLORIDE 18 MG/1
18 TABLET ORAL EVERY MORNING
Qty: 30 TABLET | Refills: 0 | Status: SHIPPED | OUTPATIENT
Start: 2018-05-31 | End: 2018-07-09

## 2018-06-05 ENCOUNTER — TELEPHONE (OUTPATIENT)
Dept: NEUROLOGY | Facility: CLINIC | Age: 53
End: 2018-06-05

## 2018-06-05 ENCOUNTER — PROCEDURE VISIT (OUTPATIENT)
Dept: NEUROLOGY | Facility: CLINIC | Age: 53
End: 2018-06-05
Payer: COMMERCIAL

## 2018-06-05 VITALS
DIASTOLIC BLOOD PRESSURE: 76 MMHG | SYSTOLIC BLOOD PRESSURE: 110 MMHG | RESPIRATION RATE: 18 BRPM | HEIGHT: 66 IN | HEART RATE: 63 BPM | WEIGHT: 114.5 LBS | BODY MASS INDEX: 18.4 KG/M2

## 2018-06-05 DIAGNOSIS — M54.81 BILATERAL OCCIPITAL NEURALGIA: Primary | ICD-10-CM

## 2018-06-05 DIAGNOSIS — M79.18 CERVICAL MYOFASCIAL PAIN SYNDROME: ICD-10-CM

## 2018-06-05 PROCEDURE — 20553 NJX 1/MLT TRIGGER POINTS 3/>: CPT | Mod: S$GLB,,, | Performed by: PSYCHIATRY & NEUROLOGY

## 2018-06-05 PROCEDURE — 64405 NJX AA&/STRD GR OCPL NRV: CPT | Mod: 50,59,S$GLB, | Performed by: PSYCHIATRY & NEUROLOGY

## 2018-06-05 RX ORDER — LIDOCAINE HYDROCHLORIDE 10 MG/ML
5 INJECTION, SOLUTION EPIDURAL; INFILTRATION; INTRACAUDAL; PERINEURAL ONCE
Status: COMPLETED | OUTPATIENT
Start: 2018-06-05 | End: 2018-06-05

## 2018-06-05 RX ORDER — TRIAMCINOLONE ACETONIDE 40 MG/ML
40 INJECTION, SUSPENSION INTRA-ARTICULAR; INTRAMUSCULAR ONCE
Status: COMPLETED | OUTPATIENT
Start: 2018-06-05 | End: 2018-06-05

## 2018-06-05 RX ORDER — BUPIVACAINE HYDROCHLORIDE 2.5 MG/ML
20 INJECTION, SOLUTION EPIDURAL; INFILTRATION; INTRACAUDAL ONCE
Status: COMPLETED | OUTPATIENT
Start: 2018-06-05 | End: 2018-06-05

## 2018-06-05 RX ADMIN — BUPIVACAINE HYDROCHLORIDE 50 MG: 2.5 INJECTION, SOLUTION EPIDURAL; INFILTRATION; INTRACAUDAL at 08:06

## 2018-06-05 RX ADMIN — LIDOCAINE HYDROCHLORIDE 50 MG: 10 INJECTION, SOLUTION EPIDURAL; INFILTRATION; INTRACAUDAL; PERINEURAL at 08:06

## 2018-06-05 RX ADMIN — TRIAMCINOLONE ACETONIDE 40 MG: 40 INJECTION, SUSPENSION INTRA-ARTICULAR; INTRAMUSCULAR at 08:06

## 2018-06-05 NOTE — TELEPHONE ENCOUNTER
----- Message from Simone Cole sent at 6/5/2018  4:13 PM CDT -----  Contact: self   Placed call to pod, patient miss call from your office please call back at [phone] ([phone])

## 2018-06-05 NOTE — TELEPHONE ENCOUNTER
Called and spoke with patient. Patient said she is not feeling dizzy anymore however she is more sore and tense this time. Advise patient to put ice on the area tonight and call back tomorrow. Please advise.

## 2018-06-05 NOTE — PROCEDURES
Procedures    Last steroid block 2/23/18  Last non-steroid block 4/16/18     PROCEDURE #1     Greater Occipital Nerve Block, Bilateral     Diagnosis: occipital neuralgia     After risks and benefits were explained including bleeding, infection, worsening of the pain, damage to the area being injected, weakness, allergic reaction to medications, vascular injection, and nerve damage, signed consent was obtained. All questions were answered.     The area of the greater occipital nerve was identified as a point 1/3rds the distance  between the occipital protuberance and the ipsilateral mastoid process. The identified areas were prepped three times with alcohol and the alcohol allowed to dry. Next, a 27 gauge 1 inch needle was placed in the anatomical area of the DALILA. Once reproduction of the pain was elicited and negative aspiration confirmed, I proceeded with the injection. The exact procedure was repeated on the contralateral side.     Adequacy of the block was confirmed by sensory examination demonstrating anesthesia in the territory of the DALILA and the MAYELIN.     Medication used: Marcaine 0.25%, lidocaine 1% and Triamcinolone 20mg     Total volume injected:  6cc    Estimated blood loss: none    The patient tolerated the procedure well and there were no complications.     -----------------------------------------------------------------------------------------------------------------    PROCEDURE #2     TRIGGER POINT INJECTION (CERVICAL), 3 muscles bilateral     Indication: cervical myofascial pain     Anesthesia: none     After risks and benefits were explained including bleeding, infection, worsening of the pain, damage to the area being injected, weakness, allergic reaction to medications, vascular injection, and nerve damage, signed consent was obtained. All questions were answered.     Trigger points were identified by palpation of tight muscle fibers with reproduction of patient's pain and referral pattern upon  palpation. The identified areas were prepped three times with alcohol and the alcohol allowed to dry. Next, a 27 gauge 1 inch needle was placed in the anatomical area of the trigger point ot be injected. Once negative aspiration of air and heme was confirmed, I proceeded with the injection.     Medications used: bupivicaine 0.25% (18mg), lidocaine 1% (20mg) and Triamcinolone 20mg     Total volume injected: 12cc     Trigger points injected:  -- right and left semispinalis capitus   -- right and left upper trapezius  -- right and left splenius capitus     Estimated blood loss: none     The patient did tolerate the procedure well and there were no complications.    RTC in 2 months

## 2018-08-07 ENCOUNTER — PROCEDURE VISIT (OUTPATIENT)
Dept: NEUROLOGY | Facility: CLINIC | Age: 53
End: 2018-08-07
Payer: COMMERCIAL

## 2018-08-07 VITALS
HEIGHT: 66 IN | BODY MASS INDEX: 18.91 KG/M2 | DIASTOLIC BLOOD PRESSURE: 75 MMHG | SYSTOLIC BLOOD PRESSURE: 111 MMHG | HEART RATE: 72 BPM | WEIGHT: 117.63 LBS | RESPIRATION RATE: 17 BRPM

## 2018-08-07 DIAGNOSIS — M54.81 BILATERAL OCCIPITAL NEURALGIA: ICD-10-CM

## 2018-08-07 DIAGNOSIS — M79.18 CERVICAL MYOFASCIAL PAIN SYNDROME: Primary | ICD-10-CM

## 2018-08-07 PROCEDURE — 64405 NJX AA&/STRD GR OCPL NRV: CPT | Mod: 50,59,S$GLB, | Performed by: PSYCHIATRY & NEUROLOGY

## 2018-08-07 PROCEDURE — 20553 NJX 1/MLT TRIGGER POINTS 3/>: CPT | Mod: S$GLB,,, | Performed by: PSYCHIATRY & NEUROLOGY

## 2018-08-07 RX ORDER — TRIAMCINOLONE ACETONIDE 40 MG/ML
40 INJECTION, SUSPENSION INTRA-ARTICULAR; INTRAMUSCULAR ONCE
Status: COMPLETED | OUTPATIENT
Start: 2018-08-07 | End: 2018-08-07

## 2018-08-07 RX ORDER — BUPIVACAINE HYDROCHLORIDE 2.5 MG/ML
20 INJECTION, SOLUTION EPIDURAL; INFILTRATION; INTRACAUDAL ONCE
Status: COMPLETED | OUTPATIENT
Start: 2018-08-07 | End: 2018-08-07

## 2018-08-07 RX ORDER — LIDOCAINE HYDROCHLORIDE 10 MG/ML
5 INJECTION, SOLUTION EPIDURAL; INFILTRATION; INTRACAUDAL; PERINEURAL ONCE
Status: COMPLETED | OUTPATIENT
Start: 2018-08-07 | End: 2018-08-07

## 2018-08-07 RX ADMIN — BUPIVACAINE HYDROCHLORIDE 50 MG: 2.5 INJECTION, SOLUTION EPIDURAL; INFILTRATION; INTRACAUDAL at 02:08

## 2018-08-07 RX ADMIN — TRIAMCINOLONE ACETONIDE 40 MG: 40 INJECTION, SUSPENSION INTRA-ARTICULAR; INTRAMUSCULAR at 02:08

## 2018-08-07 RX ADMIN — LIDOCAINE HYDROCHLORIDE 50 MG: 10 INJECTION, SOLUTION EPIDURAL; INFILTRATION; INTRACAUDAL; PERINEURAL at 02:08

## 2018-08-07 NOTE — PROCEDURES
Procedures    Last steroid block 2/23/18  Last non-steroid block 6/5/18     PROCEDURE #1     Greater Occipital Nerve Block, Bilateral     Diagnosis: occipital neuralgia     After risks and benefits were explained including bleeding, infection, worsening of the pain, damage to the area being injected, weakness, allergic reaction to medications, vascular injection, and nerve damage, signed consent was obtained. All questions were answered.     The area of the greater occipital nerve was identified as a point 1/3rds the distance  between the occipital protuberance and the ipsilateral mastoid process. The identified areas were prepped three times with alcohol and the alcohol allowed to dry. Next, a 27 gauge 1 inch needle was placed in the anatomical area of the DALILA. Once reproduction of the pain was elicited and negative aspiration confirmed, I proceeded with the injection. The exact procedure was repeated on the contralateral side.     Adequacy of the block was confirmed by sensory examination demonstrating anesthesia in the territory of the DALILA .     Medication used: Marcaine 0.25%, lidocaine 1% and Triamcinolone 20mg     Total volume injected:  6cc    Estimated blood loss: none    The patient tolerated the procedure well and there were no complications.     -----------------------------------------------------------------------------------------------------------------    PROCEDURE #2     TRIGGER POINT INJECTION (CERVICAL), 3 muscles bilateral     Indication: cervical myofascial pain     Anesthesia: none     After risks and benefits were explained including bleeding, infection, worsening of the pain, damage to the area being injected, weakness, allergic reaction to medications, vascular injection, and nerve damage, signed consent was obtained. All questions were answered.     Trigger points were identified by palpation of tight muscle fibers with reproduction of patient's pain and referral pattern upon palpation. The  identified areas were prepped three times with alcohol and the alcohol allowed to dry. Next, a 27 gauge 1 inch needle was placed in the anatomical area of the trigger point ot be injected. Once negative aspiration of air and heme was confirmed, I proceeded with the injection.     Medications used: bupivicaine 0.25% (18mg), lidocaine 1% (20mg) and Triamcinolone 20mg     Total volume injected: 12cc     Trigger points injected:  -- right and left semispinalis capitus   -- right and left upper trapezius  -- right and left splenius capitus     Estimated blood loss: none     The patient did tolerate the procedure well and there were no complications.    RTC as needed

## 2018-10-27 PROBLEM — M25.50 ARTHRALGIA OF MULTIPLE JOINTS: Status: ACTIVE | Noted: 2018-10-27

## 2018-11-15 ENCOUNTER — TELEPHONE (OUTPATIENT)
Dept: PHARMACY | Facility: CLINIC | Age: 53
End: 2018-11-15

## 2019-01-04 ENCOUNTER — PATIENT MESSAGE (OUTPATIENT)
Dept: NEUROLOGY | Facility: CLINIC | Age: 54
End: 2019-01-04

## 2019-01-04 ENCOUNTER — TELEPHONE (OUTPATIENT)
Dept: NEUROLOGY | Facility: CLINIC | Age: 54
End: 2019-01-04

## 2019-01-10 ENCOUNTER — PATIENT MESSAGE (OUTPATIENT)
Dept: NEUROLOGY | Facility: CLINIC | Age: 54
End: 2019-01-10

## 2019-01-11 ENCOUNTER — OFFICE VISIT (OUTPATIENT)
Dept: NEUROLOGY | Facility: CLINIC | Age: 54
End: 2019-01-11
Payer: COMMERCIAL

## 2019-01-11 VITALS
HEART RATE: 64 BPM | WEIGHT: 117.94 LBS | HEIGHT: 66 IN | SYSTOLIC BLOOD PRESSURE: 120 MMHG | DIASTOLIC BLOOD PRESSURE: 85 MMHG | BODY MASS INDEX: 18.96 KG/M2 | RESPIRATION RATE: 16 BRPM

## 2019-01-11 DIAGNOSIS — M79.18 CERVICAL MYOFASCIAL PAIN SYNDROME: ICD-10-CM

## 2019-01-11 DIAGNOSIS — G43.809 MIGRAINE, CERVICOGENIC: ICD-10-CM

## 2019-01-11 DIAGNOSIS — M54.81 BILATERAL OCCIPITAL NEURALGIA: ICD-10-CM

## 2019-01-11 DIAGNOSIS — M79.644 THUMB PAIN, RIGHT: Primary | ICD-10-CM

## 2019-01-11 PROCEDURE — 20553 NJX 1/MLT TRIGGER POINTS 3/>: CPT | Mod: S$GLB,,, | Performed by: PSYCHIATRY & NEUROLOGY

## 2019-01-11 PROCEDURE — 20553 PR INJECT TRIGGER POINTS, > 3: ICD-10-PCS | Mod: S$GLB,,, | Performed by: PSYCHIATRY & NEUROLOGY

## 2019-01-11 PROCEDURE — 99214 PR OFFICE/OUTPT VISIT, EST, LEVL IV, 30-39 MIN: ICD-10-PCS | Mod: 25,S$GLB,, | Performed by: PSYCHIATRY & NEUROLOGY

## 2019-01-11 PROCEDURE — 3008F BODY MASS INDEX DOCD: CPT | Mod: CPTII,S$GLB,, | Performed by: PSYCHIATRY & NEUROLOGY

## 2019-01-11 PROCEDURE — 64405 NJX AA&/STRD GR OCPL NRV: CPT | Mod: 50,59,S$GLB, | Performed by: PSYCHIATRY & NEUROLOGY

## 2019-01-11 PROCEDURE — 64405 PR NERVE BLOCK INJ, ANES/STEROID, OCCIPITAL: ICD-10-PCS | Mod: 50,59,S$GLB, | Performed by: PSYCHIATRY & NEUROLOGY

## 2019-01-11 PROCEDURE — 99214 OFFICE O/P EST MOD 30 MIN: CPT | Mod: 25,S$GLB,, | Performed by: PSYCHIATRY & NEUROLOGY

## 2019-01-11 PROCEDURE — 3008F PR BODY MASS INDEX (BMI) DOCUMENTED: ICD-10-PCS | Mod: CPTII,S$GLB,, | Performed by: PSYCHIATRY & NEUROLOGY

## 2019-01-11 PROCEDURE — 99999 PR PBB SHADOW E&M-EST. PATIENT-LVL III: CPT | Mod: PBBFAC,,, | Performed by: PSYCHIATRY & NEUROLOGY

## 2019-01-11 PROCEDURE — 99999 PR PBB SHADOW E&M-EST. PATIENT-LVL III: ICD-10-PCS | Mod: PBBFAC,,, | Performed by: PSYCHIATRY & NEUROLOGY

## 2019-01-11 RX ORDER — LIDOCAINE HYDROCHLORIDE 10 MG/ML
8 INJECTION, SOLUTION EPIDURAL; INFILTRATION; INTRACAUDAL; PERINEURAL ONCE
Status: COMPLETED | OUTPATIENT
Start: 2019-01-11 | End: 2019-01-11

## 2019-01-11 RX ORDER — BUPIVACAINE HYDROCHLORIDE 2.5 MG/ML
12 INJECTION, SOLUTION EPIDURAL; INFILTRATION; INTRACAUDAL ONCE
Status: COMPLETED | OUTPATIENT
Start: 2019-01-11 | End: 2019-01-11

## 2019-01-11 RX ORDER — TRIAMCINOLONE ACETONIDE 40 MG/ML
40 INJECTION, SUSPENSION INTRA-ARTICULAR; INTRAMUSCULAR ONCE
Status: COMPLETED | OUTPATIENT
Start: 2019-01-11 | End: 2019-01-11

## 2019-01-11 RX ADMIN — LIDOCAINE HYDROCHLORIDE 80 MG: 10 INJECTION, SOLUTION EPIDURAL; INFILTRATION; INTRACAUDAL; PERINEURAL at 02:01

## 2019-01-11 RX ADMIN — TRIAMCINOLONE ACETONIDE 40 MG: 40 INJECTION, SUSPENSION INTRA-ARTICULAR; INTRAMUSCULAR at 02:01

## 2019-01-11 RX ADMIN — BUPIVACAINE HYDROCHLORIDE 30 MG: 2.5 INJECTION, SOLUTION EPIDURAL; INFILTRATION; INTRACAUDAL at 02:01

## 2019-01-11 NOTE — PATIENT INSTRUCTIONS
WORKUP:  -- none     PREVENTION (use daily regardless of headache / pain):  -- agree with botox for your headaches     ACUTE TREATMENT of headache / pain (limit to 10 days per month)   -- continue tizanidine 2mg at night for muscle spasm   -- continue imitrex or excedrin     Try wrist braces at night for several weeks; if not helping then start using during the day too; if that doesn't help we can inject the wrist

## 2019-01-11 NOTE — PROCEDURES
Procedures     Last steroid block 8/7/18  Last non-steroid block 6/5/18     PROCEDURE #1     Greater Occipital Nerve Block, Bilateral     Diagnosis: occipital neuralgia     After risks and benefits were explained including bleeding, infection, worsening of the pain, damage to the area being injected, weakness, allergic reaction to medications, vascular injection, and nerve damage, signed consent was obtained. All questions were answered.     The area of the greater occipital nerve was identified as a point 1/3rds the distance  between the occipital protuberance and the ipsilateral mastoid process. The identified areas were prepped three times with alcohol and the alcohol allowed to dry. Next, a 27 gauge 1 inch needle was placed in the anatomical area of the DALILA. Once reproduction of the pain was elicited and negative aspiration confirmed, I proceeded with the injection. The exact procedure was repeated on the contralateral side.     Adequacy of the block was confirmed by sensory examination demonstrating anesthesia in the territory of the DALILA .     Medication used: Marcaine 0.25% (7.5mg), lidocaine 1% (20mg) and Triamcinolone 20mg     Total volume injected:  5cc    Estimated blood loss: none    The patient tolerated the procedure well and there were no complications.     -----------------------------------------------------------------------------------------------------------------    PROCEDURE #2     TRIGGER POINT INJECTION (CERVICAL), 3 muscles bilateral     Indication: cervical myofascial pain     Anesthesia: none     After risks and benefits were explained including bleeding, infection, worsening of the pain, damage to the area being injected, weakness, allergic reaction to medications, vascular injection, and nerve damage, signed consent was obtained. All questions were answered.     Trigger points were identified by palpation of tight muscle fibers with reproduction of patient's pain and referral pattern upon  palpation. The identified areas were prepped three times with alcohol and the alcohol allowed to dry. Next, a 27 gauge 1 inch needle was placed in the anatomical area of the trigger point ot be injected. Once negative aspiration of air and heme was confirmed, I proceeded with the injection.     Medications used: bupivicaine 0.25% (22.5mg), lidocaine 1% (60mg) and Triamcinolone 20mg     Total volume injected: 15cc     Trigger points injected:  -- right and left semispinalis capitus   -- right and left upper SCM  -- right and left upper trapezius  -- right and left splenius capitus     Estimated blood loss: none     The patient did tolerate the procedure well and there were no complications.    RTC as needed

## 2019-01-11 NOTE — PROGRESS NOTES
Date of service: 1/11/2019  Referring provider: No ref. provider found    Subjective:      Chief complaint: Migraine and Neck Pain       Patient ID: Brenda C Stargardter is a 53 y.o. lady with hypothyroidism, gastroparesis, anxiety, ADHD, and migraines presenting for the follow up of migraines and neck pain. Sees Dr. Ben Cadet for pain management.     History of Present Illness    INTERVAL HISTORY - 1/11/19     Her last TPI was 8/7/18.  She reports this wore off in approximately November.  In the interim Dr. Lopez has started Botox for chronic migraine management and she has found this helpful.  Her neck pain has returned to its baseline and she would like her trigger point injections repeated.    Pain is in the neck, occipital region, and the sides of head.  She is having more difficulty turning her head.  Her current pain score is four with a range of 1-9.    She remains on nightly tizanidine which is helpful.  She takes hydrocodone p.r.n. as well as Zofran.    She is having a new problem which is pain in the right thumb with burning traveling from the thumb upper forearm on the medial aspect.  She reports this started when she was massaging her daughter and vigorously using her wrist.  She says that now every time she uses her wrist she has the same symptoms.  It has been several weeks.    INTERVAL HISTORY - 2/23/18     Today she reports she was doing better on the periactin and tizanidine until one week ago when she did a new weight while doing lunges and things went downhill after that. Today she reports he is much worse. She has sharp and pain in the back of her head making her scalp sensitive. Her current pain score is 7 with a range of 0 to 10.     Dr. Cadet with do her cervical RFA in the near future.     She reports on the regimen of norco, periactin, and tizanidine she has become constipated refractory to dulcolax and miralax.     INTERVAL HISTORY - 1/3/18     Seen 2 months ago at which point I initiated  periactin and performed blocks/TPI.     Today she reports she is about to same to worse. Her pain is still cervical radiating to the posterior head. Her current pain is 3-4 with a range from 1 to 10.  She had a severe migraine with severe vomiting at the start of December and needed to go to the [facility] with a cold preceding it. She is only taking the periactin intermittently. Not using tizanidine daily. The TPI do work well.     INTERVAL HISTORY - 10/24/17     I performed bilateral DALILA/Sofia block and cervical TPI on 8/22/17. Prior to that I had also recommended starting on duloxetine, physical therapy, and tizanidine.     Today she reports she is a little better in terms of her headache and neck pain. The injections did help. A few weeks ago had a terrible vertiginous migraine lasting a whole day, her first one, treated with zofran. Headache characteristics are unchanged frm below, but current severity is 3-4 with range up to 6-7. Her lower back and her left leg have been causing pain lately same as documented below. Her first lumbar SHORTY was done around mid August 2017.     She had some GI issues going on and was feeling better so did not go through the physical therapy. For the same reason did not try the cymbalta because she is not sure how this would affect her stomach.    She is seeing an allergist now.     Original Headache / Pain History - 8/8/17   Age at onset and course over time: around 2013 after cave excursion in which she became ill, was exposed to bats, had severe migraine lasting months. She believes they originate from her neck pain, which is daily. Dr. Cadet has done CMBB injections (5-6x) in the past which have helped. Has not moved on to rhizotomy. Each CMBB lasts months to years. Last cervical imaging was in 2016.   Location: starts in neck, radiates forward to left side of head (mainly), top of head   Quality: pressure, tight band, throbbing   Severity: best 1/10, worst 1010, current 5/10   Duration:  hours - days   Frequency: daily neck pain, headache frequency varies but usually >15 days per month   Alleviated by: sleep, darkness, massage, heat, ice, medication   Exacerbated by: looking up or looking down for extended period of time, light, noise, bending over, stress, food   Associated with: scalp sensitivity, photo/phono/osmophobia, loss of appetite, nasal congestion, problems with memory/concentration, neck tightness   Sleep habits: needs pain medication and ambien to get to sleep due to the pain   Caffeine intake: 1-2 per day     Other:  Last 2-3 months left leg/thigh hurting. Lost strength in that leg. Limping. No back pain. Wraps around knee stops mid-calf. Throbbing/ shooting. No lumbar MRI. Norco helps somewhat with that pain.     Current acute treatment:  -- excedrin  -- aleve   -- norco 7.5 -  1/2 in afternoon and 1/2 before bed (daily)  -- 1/2 ambien   -- baclofen   -- imitrex for severe      Current prevention:  -- tizanidine 2mg at night  -- botox for chronic migraine - Dr Cadet    Previously tried/failed acute treatment:  -- imitrex - less effective then relpax  -- relpax - chest pain   -- prednisone   -- reglan - weakness   -- toradol   -- aleve  -- motrin  -- Bupap   -- Goody's  -- occipital nerve block  -- cervical trigger point  -- cervical epidural steroid injection   -- piriformis injection     Previously tried/failed preventative treatment:  -- periactin 4mg nightly - intermittent   -- gabapentin - blurring vision   -- Gralise   -- bilateral DALILA/MAYELIN and cervical TPI 8/22/17    Review of patient's allergies indicates:   Allergen Reactions    Reglan [metoclopramide hcl] Other (See Comments)     Weakness could not hold an object in her hands.    Benadryl [diphenhydramine hcl] Other (See Comments)     Restless leg, aggitation    Strattera [atomoxetine] Palpitations    Phenergan [promethazine] Other (See Comments)     Akathisia      Prednisone Nausea And Vomiting    Relpax [eletriptan hbr]  Other (See Comments)     Chest pain    Sulfa (sulfonamide antibiotics) Other (See Comments)     Muscle weakness     Current Outpatient Medications   Medication Sig Dispense Refill    CALCIUM CARBONATE/MAG HYDROX (MYLANTA ORAL) Take by mouth.      clotrimazole-betamethasone 1-0.05% (LOTRISONE) cream Apply topically as needed.       esomeprazole (NEXIUM) 40 MG capsule Take 40 mg by mouth before breakfast.      FEXOFENADINE HCL (ALLEGRA ORAL) Take by mouth.      fluticasone-vilanterol (BREO) 100-25 mcg/dose diskus inhaler Inhale 1 puff into the lungs once daily. Controller 1 each 11    hydrocodone-acetaminophen 7.5-325mg (NORCO) 7.5-325 mg per tablet Take 1 tablet by mouth nightly as needed.       levothyroxine (SYNTHROID) 50 MCG tablet Take 1 tablet (50 mcg total) by mouth every morning. 30 tablet 14    MG TRISILICATE/ALH/NAHCO3/AA (GAVISCON ORAL) Take 5 mLs by mouth as needed.       MINIVELLE 0.1 mg/24 hr PTSW       onabotulinumtoxina (BOTOX) 200 unit SolR Inject 155 Units into the muscle into the head/neck area every 90 days 1 each 3    ondansetron (ZOFRAN-ODT) 8 MG TbDL TAKE 1 TABLET BY MOUTH THREE TIMES DAILY AS NEEDED FOR NAUSEA OR VOMITING 15 tablet 0    sumatriptan (IMITREX) 100 MG tablet Take 1 tablet (100 mg total) by mouth every 2 (two) hours as needed for Migraine. up to 200 mg/day. Hold for BP > 150 mm systolic (Patient taking differently: Take 100 mg by mouth every 2 (two) hours as needed for Migraine. up to 200 mg/day. Hold for BP > 150 mm systolic) 9 tablet 2    tiZANidine (ZANAFLEX) 4 MG tablet TAKE 1 TABLET BY MOUTH NIGHTLY AS NEEDED FOR MUSCLE SPASMS 30 tablet 2    zolpidem (AMBIEN) 5 MG Tab Take 1 tablet (5 mg total) by mouth nightly as needed. 30 tablet 5    atenolol (TENORMIN) 25 MG tablet Take 0.5 tablets (12.5 mg total) by mouth daily as needed. 90 tablet 0    baclofen (LIORESAL) 10 MG tablet Take 10 mg by mouth as needed.       doxycycline (VIBRA-TABS) 100 MG tablet Take 1  tablet (100 mg total) by mouth 2 (two) times daily. 20 tablet 0    fluticasone (FLOVENT HFA) 220 mcg/actuation inhaler Inhale 1 puff into the lungs 2 (two) times daily. Controller 12 g 0    linaclotide (LINZESS) 145 mcg Cap capsule Take 1 capsule (145 mcg total) by mouth once daily. 30 capsule 11    mupirocin (BACTROBAN) 2 % ointment Apply to affected area 3 times daily 22 g 0    PROCTOSOL HC 2.5 % rectal cream Place 1 Dose rectally as needed.       VENTOLIN HFA 90 mcg/actuation inhaler INHALE 2 PUFFS INTO THE LUNGS EVERY 6 (SIX) HOURS AS NEEDED FOR WHEEZING. 18 g 11     Current Facility-Administered Medications   Medication Dose Route Frequency Provider Last Rate Last Dose    lidocaine HCl 2% oral solution 1 mL  1 mL Subcutaneous 1 time in Clinic/HOD Triny Matos MD        triamcinolone acetonide injection 40 mg  40 mg Intramuscular 1 time in Clinic/HOD Triny Matos MD           Past Medical History  Past Medical History:   Diagnosis Date    Abdominal or pelvic swelling, mass, or lump, right lower quadrant     Anxiety     Arthritis     Asthma, mild persistent     Cervicalgia     COPD (chronic obstructive pulmonary disease)     Enlargement of lymph nodes     Eosinophilic esophagitis     Gastroparesis     Headache(784.0)     Heartburn     Helicobacter pylori (H. pylori)     History of positive PPD, untreated     History of psychiatric hospitalization     after son , hospitalized for eating disorder    Hx of psychiatric care     Hypothyroidism     Migraines     Occipital neuralgia     Other selective immunoglobulin deficiencies     Other syndromes affecting cervical region     Psychiatric problem     Spondylosis of cervical spine     Therapy     Unspecified asthma(493.90)     Unspecified disorder of thyroid     Unspecified viral meningitis        Past Surgical History  Past Surgical History:   Procedure Laterality Date    AXILLARY ABCESS IRRIGATION AND DEBRIDEMENT       CHOLECYSTECTOMY      DILATION-ESOPHAGUS N/A 9/6/2017    [facility]    ESOPHAGOGASTRODUODENOSCOPY (EGD) N/A 9/6/2017    [facility]    EXCISIONAL HEMORRHOIDECTOMY  12/21/15    HEMORRHOIDECTOMY N/A 12/21/2015    [facility]    HYSTERECTOMY      INJECTION-FACET / Cervical Bilateral 12/13/2017    [facility]    LYMPH NODE BIOPSY Right 2014    Right inguinal lymph node biopsy    MOUTH SURGERY      TOTAL ABDOMINAL HYSTERECTOMY W/ BILATERAL SALPINGOOPHORECTOMY      VARICOSE VEIN SURGERY         Family History  Family History   Problem Relation Age of Onset    Stroke Mother     Hypertension Mother     Cerebral aneurysm Mother     Hypertension Father     Heart disease Father     Leukemia Father     Pneumonia Sister     No Known Problems Brother     No Known Problems Brother     No Known Problems Sister     Breast cancer Paternal Grandmother         70's       Social History  Social History     Socioeconomic History    Marital status:      Spouse name: Ren    Number of children: 5    Years of education: 13    Highest education level: Not on file   Social Needs    Financial resource strain: Not on file    Food insecurity - worry: Not on file    Food insecurity - inability: Not on file    Transportation needs - medical: Not on file    Transportation needs - non-medical: Not on file   Occupational History    Occupation: Self employed   Tobacco Use    Smoking status: Never Smoker    Smokeless tobacco: Never Used   Substance and Sexual Activity    Alcohol use: Yes     Comment: Social    Drug use: No    Sexual activity: Yes     Partners: Male   Other Topics Concern    Patient feels they ought to cut down on drinking/drug use Not Asked    Patient annoyed by others criticizing their drinking/drug use Not Asked    Patient has felt bad or guilty  about drinking/drug use Not Asked    Patient has had a drink/used drugs as an eye opener in the AM Not Asked   Social History Narrative    Not on file        Review of Systems    14-point review of systems as follows:   No check kelli indicates NEGATIVE response   Constitutional: [] weight loss, [] change to appetite   Eyes: [] change in vision, [] double vision   Ears, nose, mouth, throat: [] frequent nose bleeds, [] ringing in the ears   Respiratory: [] cough, [] wheezing   Cardiovascular: [] chest pain, [] palpitations   Gastrointestinal: [] jaundice, [] nausea/vomiting   Genitourinary: [x] incontinence, [] burning with urination   Hematologic/lymphatic: [] easy bruising/bleeding, [] night sweats   Neurological: [] numbness, [] weakness   Endocrine: [] fatigue, [x] heat/cold intolerance   Allergy/Immunologic: [] fevers, [] chills   Musculoskeletal: [] muscle pain, [] joint pain   Psychiatric: [] thoughts of harming self/others, [] depression   Integumentary: [] rashes, [] sores that do not heal       Objective:        Vitals:    01/11/19 1309   BP: 120/85   Pulse: 64   Resp: 16     Body mass index is 19.04 kg/m².    CERVICAL SPINE:  INSPECTION: no scars   ROM: normal   MUSCLE SPASM: + upper cervical paraspinals, splenius capitus, upper trapezius   DALILA tender: bilateral   SPURLING: neg    Data Review:     No results found for this or any previous visit.    Lab Results   Component Value Date     10/30/2018     09/18/2015    K 4.0 10/30/2018    K 4.1 09/18/2015    CL 96 (L) 10/30/2018     09/18/2015    CO2 32 10/30/2018    BUN 16 10/30/2018    CREATININE 0.96 10/30/2018    CREATININE 0.92 09/18/2015    GLU 82 10/30/2018    AST 21 10/30/2018    ALT 13 10/30/2018    ALBUMIN 4.4 10/30/2018    ALBUMIN 3.6 09/18/2015    PROT 6.8 10/30/2018    BILITOT 0.7 10/30/2018    CHOL 209 (H) 10/30/2018    HDL 90 10/30/2018    LDLCALC 97 10/30/2018    LDLCALC 77 09/18/2015    TRIG 128 10/30/2018       Lab Results    Component Value Date    WBC 5.3 10/30/2018    HGB 13.8 10/30/2018    HCT 41.1 10/30/2018    MCV 92.6 10/30/2018     10/30/2018       Lab Results   Component Value Date    TSH 1.37 10/30/2018       Assessment & Plan:       Problem List Items Addressed This Visit        Neuro    Migraine, cervicogenic    Overview     Headaches which phenotypically have migraine characteristics but have a strong cervicogenic / cervical myofacial component to them which is why we are treating with cervical TPI / muscle relaxer / and recommended antidepressant with norepinephrine reuptake quality.     Failed duloxetine and Periactin.  Now receiving Botox with another provider.            Orthopedic    Cervical myofascial pain syndrome    Overview     With good response to intermittent cervical TPI, poor tolerance of neuromodulators.     Previous injection over 12 weeks ago has now warn off, will repeat today due to intractable pain          Relevant Medications    bupivacaine (PF) 0.25% (2.5 mg/ml) injection 30 mg (Completed) (Start on 1/11/2019  3:15 PM)    triamcinolone acetonide injection 40 mg (Completed) (Start on 1/11/2019  2:15 PM)    lidocaine (PF) 10 mg/ml (1%) injection 80 mg (Completed) (Start on 1/11/2019  2:15 PM)    Bilateral occipital neuralgia    Overview     Secondary to cervical muscle spasm, with previous good response to blocks    Pain back to baseline, repeat nerve blocks today         Relevant Medications    bupivacaine (PF) 0.25% (2.5 mg/ml) injection 30 mg (Completed) (Start on 1/11/2019  3:15 PM)    triamcinolone acetonide injection 40 mg (Completed) (Start on 1/11/2019  2:15 PM)    lidocaine (PF) 10 mg/ml (1%) injection 80 mg (Completed) (Start on 1/11/2019  2:15 PM)    Thumb pain, right - Primary    Overview     Right thumb pain when using the hand with burning paresthesias traveling up the forearm.  Base of the thumb is painful to touch.  Phalen's maneuver reproduces symptoms.  Probably a combination  of both tenosynovitis and carpal tunnel to a mild degree.  Try conservative measures with wrist brace failed try injections.                   WORKUP:  -- none     PREVENTION (use daily regardless of headache / pain):  -- agree with botox for your headaches     ACUTE TREATMENT of headache / pain (limit to 10 days per month)   -- continue tizanidine 2mg at night for muscle spasm   -- continue imitrex or excedrin     Try wrist braces at night for several weeks; if not helping then start using during the day too; if that doesn't help we can inject the wrist     Follow-up in about 3 months (around 4/11/2019).     Tammie Delaney MD

## 2019-01-21 DIAGNOSIS — M79.18 MYOFASCIAL PAIN SYNDROME: Primary | ICD-10-CM

## 2019-01-21 DIAGNOSIS — M79.18 CERVICAL MYOFASCIAL PAIN SYNDROME: ICD-10-CM

## 2019-01-21 RX ORDER — TIZANIDINE 4 MG/1
4 TABLET ORAL NIGHTLY PRN
Qty: 30 TABLET | Refills: 11 | Status: SHIPPED | OUTPATIENT
Start: 2019-01-21 | End: 2020-01-13

## 2019-03-06 ENCOUNTER — PATIENT MESSAGE (OUTPATIENT)
Dept: NEUROLOGY | Facility: CLINIC | Age: 54
End: 2019-03-06

## 2019-03-11 ENCOUNTER — PATIENT MESSAGE (OUTPATIENT)
Dept: NEUROLOGY | Facility: CLINIC | Age: 54
End: 2019-03-11

## 2019-03-12 ENCOUNTER — PROCEDURE VISIT (OUTPATIENT)
Dept: NEUROLOGY | Facility: CLINIC | Age: 54
End: 2019-03-12
Payer: COMMERCIAL

## 2019-03-12 VITALS
RESPIRATION RATE: 16 BRPM | HEART RATE: 73 BPM | DIASTOLIC BLOOD PRESSURE: 74 MMHG | SYSTOLIC BLOOD PRESSURE: 108 MMHG | WEIGHT: 117.94 LBS | HEIGHT: 66 IN | BODY MASS INDEX: 18.96 KG/M2

## 2019-03-12 DIAGNOSIS — M79.18 CERVICAL MYOFASCIAL PAIN SYNDROME: Primary | ICD-10-CM

## 2019-03-12 DIAGNOSIS — M54.81 BILATERAL OCCIPITAL NEURALGIA: ICD-10-CM

## 2019-03-12 PROCEDURE — 20553 NJX 1/MLT TRIGGER POINTS 3/>: CPT | Mod: S$GLB,,, | Performed by: PSYCHIATRY & NEUROLOGY

## 2019-03-12 PROCEDURE — 64405 NJX AA&/STRD GR OCPL NRV: CPT | Mod: 50,59,S$GLB, | Performed by: PSYCHIATRY & NEUROLOGY

## 2019-03-12 PROCEDURE — 20553 PR INJECT TRIGGER POINTS, > 3: ICD-10-PCS | Mod: S$GLB,,, | Performed by: PSYCHIATRY & NEUROLOGY

## 2019-03-12 PROCEDURE — 64405 PR NERVE BLOCK INJ, ANES/STEROID, OCCIPITAL: ICD-10-PCS | Mod: 50,59,S$GLB, | Performed by: PSYCHIATRY & NEUROLOGY

## 2019-03-12 RX ORDER — LIDOCAINE HYDROCHLORIDE 10 MG/ML
8 INJECTION, SOLUTION EPIDURAL; INFILTRATION; INTRACAUDAL; PERINEURAL ONCE
Status: COMPLETED | OUTPATIENT
Start: 2019-03-12 | End: 2019-03-12

## 2019-03-12 RX ORDER — BUPIVACAINE HYDROCHLORIDE 2.5 MG/ML
12 INJECTION, SOLUTION EPIDURAL; INFILTRATION; INTRACAUDAL ONCE
Status: COMPLETED | OUTPATIENT
Start: 2019-03-12 | End: 2019-03-12

## 2019-03-12 RX ADMIN — LIDOCAINE HYDROCHLORIDE 80 MG: 10 INJECTION, SOLUTION EPIDURAL; INFILTRATION; INTRACAUDAL; PERINEURAL at 09:03

## 2019-03-12 RX ADMIN — BUPIVACAINE HYDROCHLORIDE 30 MG: 2.5 INJECTION, SOLUTION EPIDURAL; INFILTRATION; INTRACAUDAL at 09:03

## 2019-03-12 NOTE — PROCEDURES
PROCEDURE #1     Greater Occipital Nerve Block, Bilateral     Diagnosis: occipital neuralgia     After risks and benefits were explained including bleeding, infection, worsening of the pain, damage to the area being injected, weakness, allergic reaction to medications, vascular injection, and nerve damage, signed consent was obtained. All questions were answered.     The area of the greater occipital nerve was identified as a point 1/3rds the distance  between the occipital protuberance and the ipsilateral mastoid process. The identified areas were prepped three times with alcohol and the alcohol allowed to dry. Next, a 27 gauge 1 inch needle was placed in the anatomical area of the DALILA. Once reproduction of the pain was elicited and negative aspiration confirmed, I proceeded with the injection. The exact procedure was repeated on the contralateral side.     Adequacy of the block was confirmed by sensory examination demonstrating anesthesia in the territory of the DALILA     Medication used: Marcaine 0.25% (60%), lidocaine 1% (40%)     Total volume injected:  10cc    Estimated blood loss: none    The patient tolerated the procedure well and there were no complications.     -----------------------------------------------------------------------------------------------------------------    PROCEDURE #2     TRIGGER POINT INJECTION (CERVICAL), 3 muscles bilateral     Indication: cervical myofascial pain     Anesthesia: none     After risks and benefits were explained including bleeding, infection, worsening of the pain, damage to the area being injected, weakness, allergic reaction to medications, vascular injection, and nerve damage, signed consent was obtained. All questions were answered.     Trigger points were identified by palpation of tight muscle fibers with reproduction of patient's pain and referral pattern upon palpation. The identified areas were prepped three times with alcohol and the alcohol allowed to dry.  Next, a 27 gauge 1 inch needle was placed in the anatomical area of the trigger point ot be injected. Once negative aspiration of air and heme was confirmed, I proceeded with the injection.     Medications used: bupivicaine 0.25% (60%), lidocaine 1% (40%)     Total volume injected: 10cc     Trigger points injected:  -- right and left semispinalis capitus   -- right and left upper trapezius - 2 points   -- right and left splenius capitus - 2 points     Estimated blood loss: none     The patient did tolerate the procedure well and there were no complications.    -------------------------------------    Total doses:  Bupivicaine: 30mg  Lidocaine: 80mg    RTC as scheduled

## 2019-03-15 ENCOUNTER — CLINICAL SUPPORT (OUTPATIENT)
Dept: REHABILITATION | Facility: HOSPITAL | Age: 54
End: 2019-03-15
Attending: PSYCHIATRY & NEUROLOGY
Payer: COMMERCIAL

## 2019-03-15 ENCOUNTER — PATIENT MESSAGE (OUTPATIENT)
Dept: NEUROLOGY | Facility: CLINIC | Age: 54
End: 2019-03-15

## 2019-03-15 DIAGNOSIS — R29.898 DECREASED RANGE OF MOTION OF NECK: ICD-10-CM

## 2019-03-15 DIAGNOSIS — R29.3 POSTURE IMBALANCE: ICD-10-CM

## 2019-03-15 DIAGNOSIS — M79.18 CERVICAL MYOFASCIAL PAIN SYNDROME: ICD-10-CM

## 2019-03-15 DIAGNOSIS — G43.009 MIGRAINE WITHOUT AURA: ICD-10-CM

## 2019-03-15 DIAGNOSIS — R53.1 DECREASED STRENGTH: ICD-10-CM

## 2019-03-15 PROCEDURE — 97162 PT EVAL MOD COMPLEX 30 MIN: CPT | Mod: PN

## 2019-03-15 PROCEDURE — 97110 THERAPEUTIC EXERCISES: CPT | Mod: PN

## 2019-03-15 RX ORDER — SUMATRIPTAN SUCCINATE 100 MG/1
100 TABLET ORAL
Qty: 9 TABLET | Refills: 11 | Status: SHIPPED | OUTPATIENT
Start: 2019-03-15 | End: 2019-12-15 | Stop reason: SDUPTHER

## 2019-03-15 NOTE — PATIENT INSTRUCTIONS
Pectoralis Stretch: Supine (Towel Roll)        Lie with rolled towel under spine, letting shoulders relax toward floor. PALMS UP  Lie __3_ minutes. Do _3__ times per day.     https://[employer].us/355     Copyright © [employer]. All rights reserved.   Pectoralis Stretch With Scapula Adduction: Supine (Towel Roll)        Lie with rolled towel under spine vertically. Gently squeeze shoulder blades together.  Do _10__ times, 2 sets. __3_ times per day.     https://[employer].us/357     Copyright © [employer]. All rights reserved.

## 2019-03-29 ENCOUNTER — CLINICAL SUPPORT (OUTPATIENT)
Dept: REHABILITATION | Facility: HOSPITAL | Age: 54
End: 2019-03-29
Attending: PSYCHIATRY & NEUROLOGY
Payer: COMMERCIAL

## 2019-03-29 DIAGNOSIS — R53.1 DECREASED STRENGTH: ICD-10-CM

## 2019-03-29 DIAGNOSIS — R29.898 DECREASED RANGE OF MOTION OF NECK: ICD-10-CM

## 2019-03-29 DIAGNOSIS — R29.3 POSTURE IMBALANCE: ICD-10-CM

## 2019-03-29 PROCEDURE — 97140 MANUAL THERAPY 1/> REGIONS: CPT | Mod: PN

## 2019-03-29 NOTE — PLAN OF CARE
[facility] Out[facility] Physical Therapy Initial Evaluation    [facility]  [facility] Number: [id number]    Prema is a 53 y.o. female evaluated on 3/15/2019.     Diagnosis: decreased strength of neck, decreased range of motion neck, posture imbalance  Physician: Tammie Delaney MD  Treatment Orders: PT Eval and Treat    Past Medical History:   Diagnosis Date    Abdominal or pelvic swelling, mass, or lump, right lower quadrant     Anxiety     Arthritis     Asthma, mild persistent     Cervicalgia     COPD (chronic obstructive pulmonary disease)     Enlargement of lymph nodes     Eosinophilic esophagitis     Gastroparesis     Headache(784.0)     Heartburn     Helicobacter pylori (H. pylori)     History of positive PPD, untreated     History of psychiatric hospitalization     after son , hospitalized for eating disorder    Hx of psychiatric care     Hypothyroidism     Migraines     Occipital neuralgia     Other selective immunoglobulin deficiencies     Other syndromes affecting cervical region     Psychiatric problem     Spondylosis of cervical spine     Therapy     Unspecified asthma(493.90)     Unspecified disorder of thyroid     Unspecified viral meningitis      Review of patient's allergies indicates:   Allergen Reactions    Reglan [metoclopramide hcl] Other (See Comments)     Weakness could not hold an object in her hands.   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 Lives with , single story home.   Cultural,  Spiritual, Developmental and Educational concerns:none  Abuse/Neglect, Nutritional concerns: none    Subjective  Pt reports to clinic with chronic neck pain (began in 2012). She reports pain gradually worsened. She does not feel there was any specific incident but does report a trip in 2010 in which she was in a cave and bats flew out all around them. After this she was hospitalized and had neck pain and stiffness along with stomach issues. She reports recently getting botox and nerve blocks with Dr. Delaney which helped until approx 3-4 weeks ago in which she had flare up of neck spasms, migrains and tenderness to back of head. Sleep is an issue and she has more sensitivity to her head. Blocks helped but still having pain with sleeping. Feels there is a vice on her head and pain to occipital region. Regularly walks, every now and then lunges. Before symptoms increased was doing more exercises including yoga and stretching. Pt reports PT in the past with relief at [facility].     Increases symptoms: sleeping, looking up, turning head during conversations, UE strengthening, prolonged looking down, cleaning overhead, out door cleaning including using blower.   Decreases Symptoms: biofreeze, ice/heat PRN, stretching in limited range    Diagnostic Tests: MRI - in media section of chart from 2017    Pain Scale: Prema rates pain to be 6 on a scale of 1-10. 10/10 at worst, 2/10 at best.     Patient Goals: relaxing muscle spasms, learning to strengthening neck without increased symptoms.     Objective  Observation: Pt is 53 year old WD, WN female who is alert and oriented x 3.     Posture: FHRH, decreased muscle mass along scapula in supraspinatus and infraspinatus. Scapular winging B. R scapula lower than L    Cervical  ROM Increased pain?   Flexion 35 No shooting pain   Extension 37 No shooting pain   Side Bending Right 15 Very tight with shooting up to head   Side Bending Left 20 Very tight with shooting up to head    Rotation Right 47 Very tight shooting up to head   Rotation Left 29 Very tight shooting up to head     Cervical Strength Increased Pain?   Flexion 4/5 M. Spasms after testing.    Extension 4/5 M. Spasms after testing   Side Bending Left 4/5 M. Spasms after testing   Side Bending Right 4+/5 M. Spasms after testing     Cervical Special Tests +/-   Cervical Compression Pain with touch, no radicular symptoms   1st rib spring test + bilaterally     MMT Rhomboid: R 3/5; L 4/5     UE screen  AROM WNL into flex, abd, IR, ER    MMT shoulders  Flexion R 4+/5; L 4/5  abd R 4+/5; L 4/5  ER R 5/5; L 4+/5  IR R 5/5; L 4/5    Joint Mobility: hypomobile R down glides at C3-5  Palpation: TTP along B upper trap, scalenes, mid traps, levators, pec minor and major, suboccipitals, cervical paraspinals and extensors  Sensation: intact to light touch    Treatment:  Time In: 8:50  Time Out: 9:00    PT Evaluation Completed? Yes  Discussed Plan of Care with patient: Yes    Prema received instruction in and demonstrated therapeutic exercises for 10 minutes of stretching and strengthening including:  Supine pec stretch over towel roll x 3 minutes  Supine scapular retraction over towel roll 2 x 10     Written Home Exercises Provided: see above  Prema demo good understanding of the education provided. Patient demo good return demo of skill of exercises.    History  Co-morbidities and personal factors that may impact the plan of care Examination  Body Structures and Functions, activity limitations and participation restrictions that may impact the plan of care    Clinical Presentation   Co-morbidities:   COPD/asthma, depression and difficulty sleeping, anxiety        Personal Factors:   no deficits Body Regions:   head  neck  trunk    Body Systems:    ROM  strength            Participation Restrictions:   none     Activity limitations:   Learning and applying knowledge  no deficits    General Tasks and Commands  no  deficits    Communication  no deficits    Mobility  lifting and carrying objects  turning head    Self care  no deficits    Domestic Life  shopping  cooking  doing house work (cleaning house, washing dishes, laundry)    Interactions/Relationships  no deficits    Life Areas  no deficits    Community and Social Life  community life  recreation and leisure         evolving clinical presentation with changing clinical characteristics                      moderate   high  high Decision Making/ Complexity Score:  moderate     CMS Impairment/Limitation/Restriction for FOTO Neck Survey  Status Limitation G-Code CMS Severity Modifier  Intake 39% 61% Current Status CL - At least 60 percent but less than 80 percent  Predicted 51% 49% Goal Status+ CK - At least 40 percent but less than 60 percent    Assessment  This is a 53 y.o. female referred to outpatient physical therapy and presents with a medical diagnosis of No diagnosis found. and demonstrates limitations as described in the problem list. Pt will benefit from physical therapy services in order to maximize pain free and/or functional use of cervical spine and reduce headaches. The following goals were discussed with the patient and patient is in agreement with them as to be addressed in the treatment plan.     Problem List: pain, decreased ROM, decreased flexibility and decreased strength.    Short Term Goals:  3 weeks  1. Pt will present with increased rhomboid strength by one half grade for increased stability to cervical spine for decreased pain.   2. Pt will participate in cervical strength tests without report of m. Spasms after testing.   3. Pt will present with increased cervical AROM into rotation to the left by 5 degrees for increased ease with turning head during a conversation.   4. Pt will report decreased pain to cervical spine for paint at worst of 8/10 at end of busy day.    Long Term Goals: 6 weeks  1. Pt will present with increased rhomboid strength by  one full grade for increased stability to cervical spine for decreased pain.   2. Pt will present with in cervical strength one half grade for increased stability and decreased pain to cervical spine and head.   3. Pt will present with increased cervical AROM into rotation to the left by 10 degrees for increased ease with turning head during a conversation.   4. Pt will report decreased pain to cervical spine for paint at worst of 6/10 at end of busy day.  5. Pt will be independent with HEP and self management of symptoms.     Plan  Pt will be treated by physical therapy 2 times a week for 6 weeks for designated treatment time frame to address therapeutic exercises in order to achieve established goals. Prema may at times be seen by a PTA as part of the Rehab Team.     Margarita Deleon, MARIOT      I certify the need for these services furnished under this plan of treatment and while under my care.         ___________________________________  Physician/Referring Practitioner        _________________  Date of Signature

## 2019-04-02 ENCOUNTER — CLINICAL SUPPORT (OUTPATIENT)
Dept: REHABILITATION | Facility: HOSPITAL | Age: 54
End: 2019-04-02
Attending: PSYCHIATRY & NEUROLOGY
Payer: COMMERCIAL

## 2019-04-02 DIAGNOSIS — R29.898 DECREASED RANGE OF MOTION OF NECK: ICD-10-CM

## 2019-04-02 DIAGNOSIS — R53.1 DECREASED STRENGTH: ICD-10-CM

## 2019-04-02 DIAGNOSIS — R29.3 POSTURE IMBALANCE: ICD-10-CM

## 2019-04-02 PROCEDURE — 97140 MANUAL THERAPY 1/> REGIONS: CPT | Mod: PN

## 2019-04-02 PROCEDURE — 97014 ELECTRIC STIMULATION THERAPY: CPT | Mod: PN

## 2019-04-02 NOTE — PROGRESS NOTES
[facility] Out[facility] Physical Therapy Daily Note    Name: Brenda C Stargardter  Clinic Number: [id number]  Date of Treatment: 4/2/2019   Diagnosis:   Encounter Diagnoses   Name Primary?    Decreased range of motion of neck     Decreased strength     Posture imbalance        Visit number: 3 (30 authorized)  Start date: 3/15/19  POC End date: 4/26/19  Authorization end date: 3/11/2020    Time in: 11:00  Time Out: 12:10  Total Treatment Time: 50    Precautions: standard    Subjective:    Prema reports soreness after dry needling previous visit which resolved in a day and a half. She continues to perform HEP and notices continued improved awareness of posture. Patient reports their pain to be 4/10 on a 0-10 scale with 0 being no pain and 10 being the worst pain imaginable.    Objective    Prema received the following manual therapy techniques: Myofacial release and IASTM were applied to the: B levator scapula, mid trap attachment on medial border of scapular, infraspinatus muscle belly and B upper trap for 15 minutes.     Pt received the following manual therapy techniques x 25 min. applied to B levator scap to include: dry needling with trigger point/manual therapy techniques to the B levator scapu with needles at two points along levator scap insertion point on superior angle of scapula. Patient gave written consent to undergo dry needling and this is in the media section in pts chart. All needles were removed and changes in signs and symptoms were assessed. Dry needling was performed to decrease inflammation, increase circulation, decrease pain and restore homeostasis.      The patient received the following supervised modalities after being cleared for contradictions: IFC Electrical Stimulation:  Prema received IFC Electrical Stimulation for pain control applied to the cervical spine and upper back. Pt received stimulation at 100 % scan at a frequency of 80-150Mhz for 10 minutes with MH to  cervical spine and upper back. Prema tolderated treatment well without any adverse effects.      Written Home Exercises Provided: none this visit. Continue with current HEP in pain free range   Pt demo good understanding of the education provided. Prema demonstrated good return demonstration of activities.     Assessment:   Pt presents with improving soft tissue mobility to levator scap on R with noted decrease in visual tightness at insertion on superior angle of scapula. She responded well to minimal insertion points with dry needling. Decreased time with IASTM to approx 7.5 minutes each periscapular region due to pt noted increased pain after last visit and increased tightness after manual techniques.     Pt will continue to benefit from skilled PT intervention. Medical Necessity is demonstrated by:  Pain limits function of effected part for some activities, Unable to participate fully in daily activities, Requires skilled supervision to complete and progress HEP and Weakness.    Patient is making good progress towards established goals.    New/Revised goals: none  Short Term Goals:  3 weeks  1. Pt will present with increased rhomboid strength by one half grade for increased stability to cervical spine for decreased pain.   2. Pt will participate in cervical strength tests without report of m. Spasms after testing.   3. Pt will present with increased cervical AROM into rotation to the left by 5 degrees for increased ease with turning head during a conversation.   4. Pt will report decreased pain to cervical spine for paint at worst of 8/10 at end of busy day.    Long Term Goals: 6 weeks  1. Pt will present with increased rhomboid strength by one full grade for increased stability to cervical spine for decreased pain.   2. Pt will present with in cervical strength one half grade for increased stability and decreased pain to cervical spine and head.   3. Pt will present with increased cervical AROM into rotation  to the left by 10 degrees for increased ease with turning head during a conversation.   4. Pt will report decreased pain to cervical spine for paint at worst of 6/10 at end of busy day.  5. Pt will be independent with HEP and self management of symptoms.    Plan:  Continue with established Plan of Care towards PT goals.

## 2019-04-05 ENCOUNTER — CLINICAL SUPPORT (OUTPATIENT)
Dept: REHABILITATION | Facility: HOSPITAL | Age: 54
End: 2019-04-05
Attending: PSYCHIATRY & NEUROLOGY
Payer: COMMERCIAL

## 2019-04-05 DIAGNOSIS — R29.3 POSTURE IMBALANCE: ICD-10-CM

## 2019-04-05 DIAGNOSIS — R53.1 DECREASED STRENGTH: ICD-10-CM

## 2019-04-05 DIAGNOSIS — R29.898 DECREASED RANGE OF MOTION OF NECK: ICD-10-CM

## 2019-04-05 PROCEDURE — 97014 ELECTRIC STIMULATION THERAPY: CPT | Mod: PN

## 2019-04-05 PROCEDURE — 97140 MANUAL THERAPY 1/> REGIONS: CPT | Mod: PN

## 2019-04-05 NOTE — PROGRESS NOTES
[facility] Out[facility] Physical Therapy Daily Note    Name: Brenda C Stargardter  Clinic Number: [id number]  Date of Treatment: 4/5/2019   Diagnosis:   Encounter Diagnoses   Name Primary?    Decreased range of motion of neck     Decreased strength     Posture imbalance        Visit number: 4 (30 authorized)  Start date: 3/15/19  POC End date: 4/26/19  Authorization end date: 3/11/2020    Time in: 1:05  Time Out: 2:10  Total Treatment Time: 60    Precautions: standard    Subjective:    Prema reports she was soreness and sharp pain to L levator insertion which subsided after a day or two. She feels better today. She is performing exercises without reports of increased symptoms. Patient reports their pain to be 4/10 on a 0-10 scale with 0 being no pain and 10 being the worst pain imaginable.    Objective    Prema received the following manual therapy techniques: Myofacial release and IASTM were applied to the: B levator scapula, mid trap attachment on medial border of scapular, infraspinatus muscle belly and B upper trap for 15 minutes.     Pt received the following manual therapy techniques x 25 min. applied to B levator scap to include: dry needling with trigger point/manual therapy techniques to the B levator scapu with needles at two points along levator scap insertion point on superior angle of scapula. Patient gave written consent to undergo dry needling and this is in the media section in pts chart. All needles were removed and changes in signs and symptoms were assessed. Dry needling was performed to decrease inflammation, increase circulation, decrease pain and restore homeostasis.      The patient received the following supervised modalities after being cleared for contradictions: IFC Electrical Stimulation:  Prema received IFC Electrical Stimulation for pain control applied to the cervical spine and upper back. Pt received stimulation at 100 % scan at a frequency of 80-150Mhz for 10  minutes with MH to cervical spine and upper back. Prema tolderated treatment well without any adverse effects.      Written Home Exercises Provided: none this visit. Continue with current HEP.   Pt demo good understanding of the education provided. Prema demonstrated good return demonstration of activities.     Assessment:   Pt presents with improving soft tissue mobility along medial border of scapula. She responded well to dry needling without increased tightness to cervical spine.     Pt will continue to benefit from skilled PT intervention. Medical Necessity is demonstrated by:  Pain limits function of effected part for some activities, Unable to participate fully in daily activities, Requires skilled supervision to complete and progress HEP and Weakness.    Patient is making good progress towards established goals.    New/Revised goals: none  Short Term Goals:  3 weeks  1. Pt will present with increased rhomboid strength by one half grade for increased stability to cervical spine for decreased pain.   2. Pt will participate in cervical strength tests without report of m. Spasms after testing.   3. Pt will present with increased cervical AROM into rotation to the left by 5 degrees for increased ease with turning head during a conversation.   4. Pt will report decreased pain to cervical spine for paint at worst of 8/10 at end of busy day.    Long Term Goals: 6 weeks  1. Pt will present with increased rhomboid strength by one full grade for increased stability to cervical spine for decreased pain.   2. Pt will present with in cervical strength one half grade for increased stability and decreased pain to cervical spine and head.   3. Pt will present with increased cervical AROM into rotation to the left by 10 degrees for increased ease with turning head during a conversation.   4. Pt will report decreased pain to cervical spine for paint at worst of 6/10 at end of busy day.  5. Pt will be independent with HEP and  self management of symptoms.    Plan:  Continue with established Plan of Care towards PT goals.

## 2019-04-09 ENCOUNTER — CLINICAL SUPPORT (OUTPATIENT)
Dept: REHABILITATION | Facility: HOSPITAL | Age: 54
End: 2019-04-09
Attending: PSYCHIATRY & NEUROLOGY
Payer: COMMERCIAL

## 2019-04-09 DIAGNOSIS — R29.898 DECREASED RANGE OF MOTION OF NECK: ICD-10-CM

## 2019-04-09 DIAGNOSIS — R53.1 DECREASED STRENGTH: ICD-10-CM

## 2019-04-09 DIAGNOSIS — R29.3 POSTURE IMBALANCE: ICD-10-CM

## 2019-04-09 PROCEDURE — 97110 THERAPEUTIC EXERCISES: CPT | Mod: PN

## 2019-04-09 PROCEDURE — 97140 MANUAL THERAPY 1/> REGIONS: CPT | Mod: PN

## 2019-04-09 PROCEDURE — 97014 ELECTRIC STIMULATION THERAPY: CPT | Mod: PN

## 2019-04-09 NOTE — PROGRESS NOTES
[facility] Physical Therapy Daily Note    Name: Brenda C Stargardter  Clinic Number: [id number]  Date of Treatment: 4/9/2019   Diagnosis:   Encounter Diagnoses   Name Primary?    Decreased range of motion of neck     Decreased strength     Posture imbalance      Visit number: 4 (30 authorized)  Start date: 3/15/19  POC End date: 4/26/19  Authorization end date: 3/11/2020    Time in: 8:03  Time Out: 9:10  Total Treatment Time: 55    Precautions: standard    Subjective:    Prema reports she is feeling pretty good today. She is more aware of her posture. She reports her lower back is hurting more today, possibly due to increased walking on property at her home which is unlevel. Patient reports their pain to be 3/10 on a 0-10 scale with 0 being no pain and 10 being the worst pain imaginable.    Objective  Pt presents with L AR of innominate    Pt received therapeutic exercises for stretching and strengthening x 10 minutes to include:  push pull x 3   manual hip flexor MET     Next visit:   Doorway pec stretch  B UE ER  SCM stretch    Prema received the following manual therapy techniques: Myofacial release and IASTM were applied to the: B levator scapula, mid trap attachment on medial border of scapula, infraspinatus muscle belly and B upper trap for 10 minutes.     Pt received the following manual therapy techniques x 25 min. applied to B levator scap to include: dry needling with trigger point/manual therapy techniques to the B levator scap with needles at two points along levator scap insertion point on superior angle of scapula and upper trap B. Patient gave written consent to undergo dry needling and this is in the media section in pts chart. All needles were removed and changes in signs and symptoms were assessed. Dry needling was performed to increase inflammation to promote natural healing, increase circulation, decrease pain and restore homeostasis.      The patient received the  following supervised modalities after being cleared for contradictions: IFC Electrical Stimulation:  Prema received IFC Electrical Stimulation for pain control applied to the cervical spine and upper back. Pt received stimulation at 100 % scan at a frequency of 80-150Mhz for 10 minutes with MH to cervical spine and upper back. Prema tolderated treatment well without any adverse effects.      Written Home Exercises Provided: none this visit. Continue with current HEP.   Pt demo good understanding of the education provided. Prema demonstrated good return demonstration of activities.     Assessment:   Pt presents with L AR of innominate which improved with MET. She presents with decreasing tightness to B levator insertions with manual techniques. Anticipate progressing exercises next visit per pt tolerance.     Pt will continue to benefit from skilled PT intervention. Medical Necessity is demonstrated by:  Pain limits function of effected part for some activities, Unable to participate fully in daily activities, Requires skilled supervision to complete and progress HEP and Weakness.    Patient is making good progress towards established goals.    New/Revised goals: none  Short Term Goals:  3 weeks  1. Pt will present with increased rhomboid strength by one half grade for increased stability to cervical spine for decreased pain.   2. Pt will participate in cervical strength tests without report of m. Spasms after testing.   3. Pt will present with increased cervical AROM into rotation to the left by 5 degrees for increased ease with turning head during a conversation.   4. Pt will report decreased pain to cervical spine for paint at worst of 8/10 at end of busy day.    Long Term Goals: 6 weeks  1. Pt will present with increased rhomboid strength by one full grade for increased stability to cervical spine for decreased pain.   2. Pt will present with in cervical strength one half grade for increased stability and  decreased pain to cervical spine and head.   3. Pt will present with increased cervical AROM into rotation to the left by 10 degrees for increased ease with turning head during a conversation.   4. Pt will report decreased pain to cervical spine for paint at worst of 6/10 at end of busy day.  5. Pt will be independent with HEP and self management of symptoms.    Plan:  Continue with established Plan of Care towards PT goals.

## 2019-04-16 ENCOUNTER — CLINICAL SUPPORT (OUTPATIENT)
Dept: REHABILITATION | Facility: HOSPITAL | Age: 54
End: 2019-04-16
Attending: PSYCHIATRY & NEUROLOGY
Payer: COMMERCIAL

## 2019-04-16 DIAGNOSIS — R29.898 DECREASED RANGE OF MOTION OF NECK: ICD-10-CM

## 2019-04-16 DIAGNOSIS — R53.1 DECREASED STRENGTH: ICD-10-CM

## 2019-04-16 DIAGNOSIS — R29.3 POSTURE IMBALANCE: ICD-10-CM

## 2019-04-16 PROCEDURE — 97140 MANUAL THERAPY 1/> REGIONS: CPT | Mod: PN

## 2019-04-16 PROCEDURE — 97110 THERAPEUTIC EXERCISES: CPT | Mod: PN

## 2019-04-16 PROCEDURE — 97014 ELECTRIC STIMULATION THERAPY: CPT | Mod: PN

## 2019-04-16 NOTE — PROGRESS NOTES
[facility] Outpatient Physical Therapy Daily Note    Name: Brenda C Stargardter  Clinic Number: [id number]  Date of Treatment: 4/16/2019   Diagnosis:   Encounter Diagnoses   Name Primary?    Decreased range of motion of neck     Decreased strength     Posture imbalance      Visit number: 6 (30 authorized)  Start date: 3/15/19  POC End date: 4/26/19  Authorization end date: 3/11/2020    Time in: 10:10  Time Out:11:35  Total Treatment Time: 60    Precautions: standard    Subjective:    Prema reports she had a headache twice last week but able to stretch some and it felt ok. She continues with increased awareness of posture but she feels she is hunching a little more. Patient reports their pain to be 3/10 on a 0-10 scale with 0 being no pain and 10 being the worst pain imaginable.    Objective  Pt presents with L AR of innominate which corrected with self push pull and MET by PT.     Pt received therapeutic exercises for stretching and strengthening x 20 minutes to include:  push pull x 3   manual hip flexion MET   Doorway pec stretch 3 x 30 seconds  B UE ER 2 x 10  SCM stretch 3 x 30 seconds  Levator sctretch 3 x 20 seconds    Prema received the following manual therapy techniques: Myofacial release and IASTM were applied to the: B levator scapula, mid trap attachment on medial border of scapula, infraspinatus muscle belly and B upper trap for 10 minutes.     Pt received the following manual therapy techniques x 25 min. applied to B levator scap to include: dry needling with trigger point/manual therapy techniques to the B levator scap with needles at two points along levator scap insertion point on superior angle of scapula and upper trap B. Patient gave written consent to undergo dry needling and this is in the media section in pts chart. All needles were removed and changes in signs and symptoms were assessed. Dry needling was performed to increase inflammation to promote natural healing,  increase circulation, decrease pain and restore homeostasis.      The patient received the following supervised modalities after being cleared for contradictions: IFC Electrical Stimulation:  Prema received IFC Electrical Stimulation for pain control applied to the cervical spine and upper back. Pt received stimulation at 100 % scan at a frequency of 80-150Mhz for 10 minutes with MH to cervical spine and upper back. Prema tolderated treatment well without any adverse effects.      Written Home Exercises Provided: none this visit. Continue with current HEP.   Pt demo good understanding of the education provided. Prema demonstrated good return demonstration of activities.     Assessment:   Pt continues with decreasing levator tightness. She presents with significant L AR of innominate which corrected with self push pull and MET by PT.      Pt will continue to benefit from skilled PT intervention. Medical Necessity is demonstrated by:  Pain limits function of effected part for some activities, Unable to participate fully in daily activities, Requires skilled supervision to complete and progress HEP and Weakness.    Patient is making good progress towards established goals.    New/Revised goals: none  Short Term Goals:  3 weeks  1. Pt will present with increased rhomboid strength by one half grade for increased stability to cervical spine for decreased pain.   2. Pt will participate in cervical strength tests without report of m. Spasms after testing.   3. Pt will present with increased cervical AROM into rotation to the left by 5 degrees for increased ease with turning head during a conversation.   4. Pt will report decreased pain to cervical spine for paint at worst of 8/10 at end of busy day.    Long Term Goals: 6 weeks  1. Pt will present with increased rhomboid strength by one full grade for increased stability to cervical spine for decreased pain.   2. Pt will present with in cervical strength one half grade  for increased stability and decreased pain to cervical spine and head.   3. Pt will present with increased cervical AROM into rotation to the left by 10 degrees for increased ease with turning head during a conversation.   4. Pt will report decreased pain to cervical spine for paint at worst of 6/10 at end of busy day.  5. Pt will be independent with HEP and self management of symptoms.    Plan:  Continue with established Plan of Care towards PT goals.

## 2019-04-23 ENCOUNTER — CLINICAL SUPPORT (OUTPATIENT)
Dept: REHABILITATION | Facility: HOSPITAL | Age: 54
End: 2019-04-23
Attending: PSYCHIATRY & NEUROLOGY
Payer: COMMERCIAL

## 2019-04-23 DIAGNOSIS — R29.898 DECREASED RANGE OF MOTION OF NECK: ICD-10-CM

## 2019-04-23 DIAGNOSIS — R53.1 DECREASED STRENGTH: ICD-10-CM

## 2019-04-23 DIAGNOSIS — R29.3 POSTURE IMBALANCE: ICD-10-CM

## 2019-04-23 PROCEDURE — 97140 MANUAL THERAPY 1/> REGIONS: CPT | Mod: PN

## 2019-04-23 PROCEDURE — 97110 THERAPEUTIC EXERCISES: CPT | Mod: PN

## 2019-04-23 NOTE — PROGRESS NOTES
[facility] Outpatient Physical Therapy Daily Note    Name: Brenda C Stargardter  Clinic Number: [id number]  Date of Treatment: 4/23/2019   Diagnosis:   Encounter Diagnoses   Name Primary?    Decreased range of motion of neck     Decreased strength     Posture imbalance      Visit number: 7 (30 authorized)  Start date: 3/15/19  POC End date: 4/26/19  Authorization end date: 3/11/2020    Time in: 10:10  Time Out:11:10  Total Treatment Time: 45    Precautions: standard    Subjective:    Prema reports 60-70% improvement in symptoms since she began therapy. She notices decreased stiffness to cervical spine on a daily basis. Tightness here and there on L side but overall she has more ROM. Pt continued to report lumbar pain which she feels is worse when her neck is bothering her more. Possibly increased headache after last two visits due to upper trap dry needling. Pt requested to avoid upper trap dry needling. Patient reports their pain to be 2/10 on a 0-10 scale with 0 being no pain and 10 being the worst pain imaginable.    Objective  Pt presents with L AR of innominate which corrected with self push pull and MET by PT.     Pt received therapeutic exercises for stretching and strengthening x 15 minutes to include:  push pull x 3   manual hip flexion MET x 3  Prone quad stretch  Push pull x 3 seconds  Bridging x 30  PPT x 30    Prema received the following manual therapy techniques: Myofacial release and IASTM were applied to the: B levator scapula, mid trap attachment on medial border of scapula, infraspinatus muscle belly and B upper trap for 10 minutes.     Pt received the following manual therapy techniques x 20 min. applied to B levator scap to include: dry needling with trigger point/manual therapy techniques to the B levator scap with needles at two points along levator scap insertion point on superior angle of scapula and upper trap B. Patient gave written consent to undergo dry needling and  this is in the media section in pts chart. All needles were removed and changes in signs and symptoms were assessed. Dry needling was performed to increase inflammation to promote natural healing, increase circulation, decrease pain and restore homeostasis.      MH x 10 minutes post treatment to cervical spine.     Written Home Exercises Provided: none this visit. Continue with current HEP.   Pt demo good understanding of the education provided. Prema demonstrated good return demonstration of activities.     Assessment:   Pt presents with decreased tightness to B upper traps after correction of innominate. She presents with overall decrease in tightness of B levator.     Pt will continue to benefit from skilled PT intervention. Medical Necessity is demonstrated by:  Pain limits function of effected part for some activities, Unable to participate fully in daily activities, Requires skilled supervision to complete and progress HEP and Weakness.    Patient is making good progress towards established goals.    New/Revised goals: none  Short Term Goals:  3 weeks  1. Pt will present with increased rhomboid strength by one half grade for increased stability to cervical spine for decreased pain.   2. Pt will participate in cervical strength tests without report of m. Spasms after testing.   3. Pt will present with increased cervical AROM into rotation to the left by 5 degrees for increased ease with turning head during a conversation.   4. Pt will report decreased pain to cervical spine for paint at worst of 8/10 at end of busy day.    Long Term Goals: 6 weeks  1. Pt will present with increased rhomboid strength by one full grade for increased stability to cervical spine for decreased pain.   2. Pt will present with in cervical strength one half grade for increased stability and decreased pain to cervical spine and head.   3. Pt will present with increased cervical AROM into rotation to the left by 10 degrees for increased  ease with turning head during a conversation.   4. Pt will report decreased pain to cervical spine for paint at worst of 6/10 at end of busy day.  5. Pt will be independent with HEP and self management of symptoms.    Plan:  Continue with established Plan of Care towards PT goals.

## 2019-04-30 ENCOUNTER — CLINICAL SUPPORT (OUTPATIENT)
Dept: REHABILITATION | Facility: HOSPITAL | Age: 54
End: 2019-04-30
Attending: PSYCHIATRY & NEUROLOGY
Payer: COMMERCIAL

## 2019-04-30 DIAGNOSIS — R29.3 POSTURE IMBALANCE: ICD-10-CM

## 2019-04-30 DIAGNOSIS — R29.898 DECREASED RANGE OF MOTION OF NECK: ICD-10-CM

## 2019-04-30 DIAGNOSIS — R53.1 DECREASED STRENGTH: ICD-10-CM

## 2019-04-30 PROCEDURE — 97140 MANUAL THERAPY 1/> REGIONS: CPT | Mod: PN

## 2019-04-30 PROCEDURE — 97014 ELECTRIC STIMULATION THERAPY: CPT | Mod: PN

## 2019-04-30 PROCEDURE — 97110 THERAPEUTIC EXERCISES: CPT | Mod: PN

## 2019-04-30 NOTE — PROGRESS NOTES
[facility] Outpatient Physical Therapy Daily Note    Name: Brenda C Stargardter  Clinic Number: [id number]  Date of Treatment: 4/30/2019   Diagnosis:   Encounter Diagnoses   Name Primary?    Decreased range of motion of neck     Decreased strength     Posture imbalance      Visit number: 8 (30 authorized)  Start date: 3/15/19  POC End date: 4/26/19  Authorization end date: 3/11/2020    Time in: 4:03  Time Out:5:10  Total Treatment Time: 45    Precautions: standard    Subjective:    Prema  Continues to report improvement in symptoms since she began therapy. She reports kayaking twice over the weekend without reports of residual increase in spasm or tightness to cervical spine. Patient reports their pain to be 2/10 on a 0-10 scale with 0 being no pain and 10 being the worst pain imaginable. Pain at end of busy day 3-4/10.    Objective    Cervical  ROM Increased pain?   Flexion 55 pulling   Extension 44 guarded   Side Bending Right 19 tight   Side Bending Left 22 tight   Rotation Right 54 tight   Rotation Left 50 Tight and pain      Cervical Strength Increased Pain?   Flexion 4+/5 M. Spasms after testing.    Extension 5/5 M. Spasms after testing   Side Bending Left 4+/5 M. Spasms after testing   Side Bending Right 5/5 M. Spasms after testing      Cervical Special Tests +/-   Cervical Compression -   1st rib spring test -      MMT Rhomboid: R 4/5; L 4+/5      UE screen  AROM WNL into flex, abd, IR, ER     MMT shoulders  Flexion R 4+/5; L 4+/5  abd R 4+/5; L 4+/5  ER R 5/5; L 5/5  IR R 5/5; L 5/5     Joint Mobility: normalized joint mobility to cervical spine  Palpation: Decreased TTP along B upper trap and cervical extensors  Sensation: intact to light touch    Prema received the following manual therapy techniques: Myofacial release and IASTM were applied to the: B levator scapula, mid trap attachment on medial border of scapula, infraspinatus muscle belly and B upper trap for 5 minutes.      Pt received the following manual therapy techniques x 20 min. applied to B levator scap to include: dry needling with trigger point/manual therapy techniques to the B levator scap with needles at two points along levator scap insertion point on superior angle of scapula and upper trap B. Patient gave written consent to undergo dry needling and this is in the media section in pts chart. All needles were removed and changes in signs and symptoms were assessed. Dry needling was performed to increase inflammation to promote natural healing, increase circulation, decrease pain and restore homeostasis.      The patient received the following supervised modalities after being cleared for contradictions: IFC Electrical Stimulation:  Prema received IFC Electrical Stimulation for pain control applied to the cervical spine and upper back. Pt received stimulation at 100 % scan at a frequency of 80-150Mhz for 10 minutes with MH to cervical spine and upper back. Prema tolderated treatment well without any adverse effects.     Written Home Exercises Provided: none this visit. Continue with current HEP.   Pt demo good understanding of the education provided. Prema demonstrated good return demonstration of activities.     Assessment:   Pt presents with significant improvement of cervical rotation to L by approx 20 degrees. She presents with more normalized cervical joint mobility. Decreased tightness to R upper trap and levator insertion onto superior angle of scapula. She is appropriate for DC to HEP today and agreeable. Pt understands to monitor amount of activity and to use heat and stretch if knowing she will be doing more activity.     Patient has made good progress towards established goals.    New/Revised goals: none  Short Term Goals:  3 weeks  1. Pt will present with increased rhomboid strength by one half grade for increased stability to cervical spine for decreased pain. - met  2. Pt will participate in cervical  strength tests without report of m. Spasms after testing. - met   3. Pt will present with increased cervical AROM into rotation to the left by 5 degrees for increased ease with turning head during a conversation. - met  4. Pt will report decreased pain to cervical spine for paint at worst of 8/10 at end of busy day. - met    Long Term Goals: 6 weeks  1. Pt will present with increased rhomboid strength by one full grade for increased stability to cervical spine for decreased pain. - continue with strengthening at home.   2. Pt will present with in cervical strength one half grade for increased stability and decreased pain to cervical spine and head. - met  3. Pt will present with increased cervical AROM into rotation to the left by 10 degrees for increased ease with turning head during a conversation. - met  4. Pt will report decreased pain to cervical spine for paint at worst of 6/10 at end of busy day. - met  5. Pt will be independent with HEP and self management of symptoms. - met    Plan:  DC to HEP.

## 2019-05-03 ENCOUNTER — PATIENT MESSAGE (OUTPATIENT)
Dept: NEUROLOGY | Facility: CLINIC | Age: 54
End: 2019-05-03

## 2019-05-06 ENCOUNTER — PATIENT MESSAGE (OUTPATIENT)
Dept: NEUROLOGY | Facility: CLINIC | Age: 54
End: 2019-05-06

## 2019-05-06 ENCOUNTER — OFFICE VISIT (OUTPATIENT)
Dept: NEUROLOGY | Facility: CLINIC | Age: 54
End: 2019-05-06
Payer: COMMERCIAL

## 2019-05-06 VITALS
HEIGHT: 66 IN | SYSTOLIC BLOOD PRESSURE: 113 MMHG | DIASTOLIC BLOOD PRESSURE: 79 MMHG | HEART RATE: 67 BPM | WEIGHT: 119.5 LBS | RESPIRATION RATE: 16 BRPM | BODY MASS INDEX: 19.2 KG/M2

## 2019-05-06 DIAGNOSIS — M54.81 BILATERAL OCCIPITAL NEURALGIA: Primary | ICD-10-CM

## 2019-05-06 DIAGNOSIS — M79.18 CERVICAL MYOFASCIAL PAIN SYNDROME: ICD-10-CM

## 2019-05-06 PROCEDURE — 64405 PR NERVE BLOCK INJ, ANES/STEROID, OCCIPITAL: ICD-10-PCS | Mod: 50,59,S$GLB, | Performed by: PSYCHIATRY & NEUROLOGY

## 2019-05-06 PROCEDURE — 20553 PR INJECT TRIGGER POINTS, > 3: ICD-10-PCS | Mod: S$GLB,,, | Performed by: PSYCHIATRY & NEUROLOGY

## 2019-05-06 PROCEDURE — 64405 NJX AA&/STRD GR OCPL NRV: CPT | Mod: 50,59,S$GLB, | Performed by: PSYCHIATRY & NEUROLOGY

## 2019-05-06 PROCEDURE — 3008F BODY MASS INDEX DOCD: CPT | Mod: CPTII,S$GLB,, | Performed by: PSYCHIATRY & NEUROLOGY

## 2019-05-06 PROCEDURE — 3008F PR BODY MASS INDEX (BMI) DOCUMENTED: ICD-10-PCS | Mod: CPTII,S$GLB,, | Performed by: PSYCHIATRY & NEUROLOGY

## 2019-05-06 PROCEDURE — 99999 PR PBB SHADOW E&M-EST. PATIENT-LVL III: CPT | Mod: PBBFAC,,, | Performed by: PSYCHIATRY & NEUROLOGY

## 2019-05-06 PROCEDURE — 20553 NJX 1/MLT TRIGGER POINTS 3/>: CPT | Mod: S$GLB,,, | Performed by: PSYCHIATRY & NEUROLOGY

## 2019-05-06 PROCEDURE — 99999 PR PBB SHADOW E&M-EST. PATIENT-LVL III: ICD-10-PCS | Mod: PBBFAC,,, | Performed by: PSYCHIATRY & NEUROLOGY

## 2019-05-06 RX ORDER — LIDOCAINE HYDROCHLORIDE 10 MG/ML
8 INJECTION, SOLUTION EPIDURAL; INFILTRATION; INTRACAUDAL; PERINEURAL ONCE
Status: COMPLETED | OUTPATIENT
Start: 2019-05-06 | End: 2019-05-06

## 2019-05-06 RX ORDER — TRIAMCINOLONE ACETONIDE 40 MG/ML
40 INJECTION, SUSPENSION INTRA-ARTICULAR; INTRAMUSCULAR ONCE
Status: COMPLETED | OUTPATIENT
Start: 2019-05-06 | End: 2019-05-06

## 2019-05-06 RX ORDER — BUPIVACAINE HYDROCHLORIDE 2.5 MG/ML
12 INJECTION, SOLUTION EPIDURAL; INFILTRATION; INTRACAUDAL ONCE
Status: COMPLETED | OUTPATIENT
Start: 2019-05-06 | End: 2019-05-06

## 2019-05-06 RX ADMIN — BUPIVACAINE HYDROCHLORIDE 30 MG: 2.5 INJECTION, SOLUTION EPIDURAL; INFILTRATION; INTRACAUDAL at 01:05

## 2019-05-06 RX ADMIN — LIDOCAINE HYDROCHLORIDE 80 MG: 10 INJECTION, SOLUTION EPIDURAL; INFILTRATION; INTRACAUDAL; PERINEURAL at 01:05

## 2019-05-06 RX ADMIN — TRIAMCINOLONE ACETONIDE 40 MG: 40 INJECTION, SUSPENSION INTRA-ARTICULAR; INTRAMUSCULAR at 01:05

## 2019-05-06 NOTE — PROCEDURES
PROCEDURE #1     Greater Occipital Nerve Block, Bilateral     Diagnosis: occipital neuralgia     After risks and benefits were explained including bleeding, infection, worsening of the pain, damage to the area being injected, weakness, allergic reaction to medications, vascular injection, and nerve damage, signed consent was obtained. All questions were answered.     The area of the greater occipital nerve was identified as a point 1/3rds the distance  between the occipital protuberance and the ipsilateral mastoid process. The identified areas were prepped three times with alcohol and the alcohol allowed to dry. Next, a 27 gauge 1 inch needle was placed in the anatomical area of the DALILA. Once reproduction of the pain was elicited and negative aspiration confirmed, I proceeded with the injection. The exact procedure was repeated on the contralateral side.     Adequacy of the block was confirmed by sensory examination demonstrating anesthesia in the territory of the DALILA     Medication used: Marcaine 0.25% (60%), lidocaine 1% (40%), kenalog 20mg     Total volume injected:  10cc    Estimated blood loss: none    The patient tolerated the procedure well and there were no complications.     -----------------------------------------------------------------------------------------------------------------    PROCEDURE #2     TRIGGER POINT INJECTION (CERVICAL), 3 muscles bilateral     Indication: cervical myofascial pain     Anesthesia: none     After risks and benefits were explained including bleeding, infection, worsening of the pain, damage to the area being injected, weakness, allergic reaction to medications, vascular injection, and nerve damage, signed consent was obtained. All questions were answered.     Trigger points were identified by palpation of tight muscle fibers with reproduction of patient's pain and referral pattern upon palpation. The identified areas were prepped three times with alcohol and the alcohol  allowed to dry. Next, a 27 gauge 1 inch needle was placed in the anatomical area of the trigger point ot be injected. Once negative aspiration of air and heme was confirmed, I proceeded with the injection.     Medications used: bupivicaine 0.25% (60%), lidocaine 1% (40%), kenalog 20mg     Total volume injected: 10cc     Trigger points injected:  -- right and left semispinalis capitus   -- right and left upper trapezius - 2 points   -- right and left splenius capitus - 2 points     Estimated blood loss: none     The patient did tolerate the procedure well and there were no complications.    -------------------------------------    Total doses:  Bupivicaine: 30mg  Lidocaine: 80mg  Kenalog; 40mg     RTC as scheduled        Yes

## 2019-05-06 NOTE — PROGRESS NOTES
Date of service: 5/6/2019  Referring provider: No ref. provider found    Subjective:      Chief complaint: Migraine and Neck Pain       Patient ID: Brenda C Stargardter is a 53 y.o. lady with hypothyroidism, gastroparesis, anxiety, ADHD, and migraines presenting for the follow up of migraines and neck pain. Sees Dr. Ben Cadet for pain management.     History of Present Illness    INTERVAL HISTORY - 5/6/19     Patient had zoster vaccine done 5 days ago in the left arm which triggered her neck go into spasm especially on the left side. Previous to this she had been doing very well especially with the cervical myofascial release that she had completed in [place].  She has been unable to manage with oral therapies at home.  Her current pain score is eight.      INTERVAL HISTORY - 1/11/19     Her last TPI was 8/7/18.  She reports this wore off in approximately November.  In the interim Dr. Lopez has started Botox for chronic migraine management and she has found this helpful.  Her neck pain has returned to its baseline and she would like her trigger point injections repeated.    Pain is in the neck, occipital region, and the sides of head.  She is having more difficulty turning her head.  Her current pain score is four with a range of 1-9.    She remains on nightly tizanidine which is helpful.  She takes hydrocodone p.r.n. as well as Zofran.    She is having a new problem which is pain in the right thumb with burning traveling from the thumb upper forearm on the medial aspect.  She reports this started when she was massaging her daughter and vigorously using her wrist.  She says that now every time she uses her wrist she has the same symptoms.  It has been several weeks.    INTERVAL HISTORY - 2/23/18     Today she reports she was doing better on the periactin and tizanidine until one week ago when she did a new weight while doing lunges and things went downhill after that. Today she reports he is much worse. She has  sharp and pain in the back of her head making her scalp sensitive. Her current pain score is 7 with a range of 0 to 10.     Dr. Cadet with do her cervical RFA in the near future.     She reports on the regimen of norco, periactin, and tizanidine she has become constipated refractory to dulcolax and miralax.     INTERVAL HISTORY - 1/3/18     Seen 2 months ago at which point I initiated periactin and performed blocks/TPI.     Today she reports she is about to same to worse. Her pain is still cervical radiating to the posterior head. Her current pain is 3-4 with a range from 1 to 10.  She had a severe migraine with severe vomiting at the start of December and needed to go to the [facility] with a cold preceding it. She is only taking the periactin intermittently. Not using tizanidine daily. The TPI do work well.     INTERVAL HISTORY - 10/24/17     I performed bilateral DALILA/Sofia block and cervical TPI on 8/22/17. Prior to that I had also recommended starting on duloxetine, physical therapy, and tizanidine.     Today she reports she is a little better in terms of her headache and neck pain. The injections did help. A few weeks ago had a terrible vertiginous migraine lasting a whole day, her first one, treated with zofran. Headache characteristics are unchanged frm below, but current severity is 3-4 with range up to 6-7. Her lower back and her left leg have been causing pain lately same as documented below. Her first lumbar SHORTY was done around mid August 2017.     She had some GI issues going on and was feeling better so did not go through the physical therapy. For the same reason did not try the cymbalta because she is not sure how this would affect her stomach.    She is seeing an allergist now.     Original Headache / Pain History - 8/8/17   Age at onset and course over time: around 2013 after cave excursion in which she became ill, was exposed to bats, had severe migraine lasting months. She believes they originate from her neck  pain, which is daily. Dr. Cadet has done CMBB injections (5-6x) in the past which have helped. Has not moved on to rhizotomy. Each CMBB lasts months to years. Last cervical imaging was in 2016.   Location: starts in neck, radiates forward to left side of head (mainly), top of head   Quality: pressure, tight band, throbbing   Severity: best 1/10, worst 1010, current 5/10   Duration: hours - days   Frequency: daily neck pain, headache frequency varies but usually >15 days per month   Alleviated by: sleep, darkness, massage, heat, ice, medication   Exacerbated by: looking up or looking down for extended period of time, light, noise, bending over, stress, food   Associated with: scalp sensitivity, photo/phono/osmophobia, loss of appetite, nasal congestion, problems with memory/concentration, neck tightness   Sleep habits: needs pain medication and ambien to get to sleep due to the pain   Caffeine intake: 1-2 per day     Other:  Last 2-3 months left leg/thigh hurting. Lost strength in that leg. Limping. No back pain. Wraps around knee stops mid-calf. Throbbing/ shooting. No lumbar MRI. Norco helps somewhat with that pain.     Current acute treatment:  -- excedrin  -- aleve   -- norco 7.5 -  1/2 in afternoon and 1/2 before bed (daily)  -- 1/2 ambien   -- baclofen   -- imitrex for severe      Current prevention:  -- tizanidine 2mg at night  -- botox for chronic migraine - Dr Cadet    Previously tried/failed acute treatment:  -- imitrex - less effective then relpax  -- relpax - chest pain   -- prednisone   -- reglan - weakness   -- toradol   -- aleve  -- motrin  -- Bupap   -- Goody's  -- occipital nerve block  -- cervical trigger point  -- cervical epidural steroid injection   -- piriformis injection     Previously tried/failed preventative treatment:  -- periactin 4mg nightly - intermittent   -- gabapentin - blurring vision   -- Gralise   -- bilateral DALILA/MAYELIN and cervical TPI 8/22/17    Review of patient's allergies indicates:    Allergen Reactions    Reglan [metoclopramide hcl] Other (See Comments)     Weakness could not hold an object in her hands.    Benadryl [diphenhydramine hcl] Other (See Comments)     Restless leg, aggitation    Strattera [atomoxetine] Palpitations    Phenergan [promethazine] Other (See Comments)     Akathisia      Prednisone Nausea And Vomiting    Relpax [eletriptan hbr] Other (See Comments)     Chest pain    Sulfa (sulfonamide antibiotics) Other (See Comments)     Muscle weakness     Current Outpatient Medications   Medication Sig Dispense Refill    baclofen (LIORESAL) 10 MG tablet Take 10 mg by mouth as needed.       CALCIUM CARBONATE/MAG HYDROX (MYLANTA ORAL) Take by mouth.      clotrimazole-betamethasone 1-0.05% (LOTRISONE) cream Apply topically as needed.       esomeprazole (NEXIUM) 40 MG capsule Take 40 mg by mouth before breakfast.      FEXOFENADINE HCL (ALLEGRA ORAL) Take by mouth.      fluticasone (FLOVENT HFA) 220 mcg/actuation inhaler Inhale 1 puff into the lungs 2 (two) times daily. Controller 12 g 0    fluticasone-vilanterol (BREO) 100-25 mcg/dose diskus inhaler Inhale 1 puff into the lungs once daily. Controller 1 each 11    hydrocodone-acetaminophen 7.5-325mg (NORCO) 7.5-325 mg per tablet Take 1 tablet by mouth nightly as needed.       levothyroxine (SYNTHROID) 50 MCG tablet Take 1 tablet (50 mcg total) by mouth every morning. 30 tablet 14    linaclotide (LINZESS) 145 mcg Cap capsule Take 1 capsule (145 mcg total) by mouth once daily. 30 capsule 11    MG TRISILICATE/ALH/NAHCO3/AA (GAVISCON ORAL) Take 5 mLs by mouth as needed.       MINIVELLE 0.1 mg/24 hr PTSW       mupirocin (BACTROBAN) 2 % ointment Apply to affected area 3 times daily 22 g 0    nystatin (MYCOSTATIN) ointment Apply topically 2 (two) times daily. 15 g 0    onabotulinumtoxina (BOTOX) 200 unit SolR Inject 155 Units into the muscle into the head/neck area every 90 days 1 each 3    ondansetron (ZOFRAN-ODT) 8 MG TbDL  TAKE 1 TABLET BY MOUTH THREE TIMES DAILY AS NEEDED FOR NAUSEA OR VOMITING 15 tablet 0    PROCTOSOL HC 2.5 % rectal cream Place 1 Dose rectally as needed.       sumatriptan (IMITREX) 100 MG tablet Take 1 tablet (100 mg total) by mouth every 2 (two) hours as needed for Migraine. up to 200 mg/day. Hold for BP > 150 mm systolic 9 tablet 11    tiZANidine (ZANAFLEX) 4 MG tablet Take 1 tablet (4 mg total) by mouth nightly as needed. 30 tablet 11    VENTOLIN HFA 90 mcg/actuation inhaler INHALE 2 PUFFS INTO THE LUNGS EVERY 6 (SIX) HOURS AS NEEDED FOR WHEEZING. 18 g 11    zolpidem (AMBIEN) 5 MG Tab TAKE 1 TABLET (5 MG TOTAL) BY MOUTH NIGHTLY AS NEEDED. 30 tablet 5     Current Facility-Administered Medications   Medication Dose Route Frequency Provider Last Rate Last Dose    lidocaine HCl 2% oral solution 1 mL  1 mL Subcutaneous 1 time in Clinic/HOD Triny Matos MD        triamcinolone acetonide injection 40 mg  40 mg Intramuscular 1 time in Clinic/HOD Triny Matos MD           Past Medical History  Past Medical History:   Diagnosis Date    Abdominal or pelvic swelling, mass, or lump, right lower quadrant     Anxiety     Arthritis     Asthma, mild persistent     Cervicalgia     COPD (chronic obstructive pulmonary disease)     Enlargement of lymph nodes     Eosinophilic esophagitis     Gastroparesis     Headache(784.0)     Heartburn     Helicobacter pylori (H. pylori)     History of positive PPD, untreated     History of psychiatric hospitalization     after son , hospitalized for eating disorder    Hx of psychiatric care     Hypothyroidism     Migraines     Occipital neuralgia     Other selective immunoglobulin deficiencies     Other syndromes affecting cervical region     Psychiatric problem     Spondylosis of cervical spine     Therapy     Unspecified asthma(493.90)     Unspecified disorder of thyroid     Unspecified viral meningitis        Past Surgical History  Past Surgical  History:   Procedure Laterality Date    AXILLARY ABCESS IRRIGATION AND DEBRIDEMENT      CHOLECYSTECTOMY      DILATION-ESOPHAGUS N/A 9/6/2017    [facility]formed [facility]    ESOPHAGOGASTRODUODENOSCOPY (EGD) N/A 9/6/2017    [facility]    EXCISIONAL HEMORRHOIDECTOMY  12/21/15    HEMORRHOIDECTOMY N/A 12/21/2015    [facility]formed by [facility] at [facility] OR    HYSTERECTOMY  2015    INJECTION-FACET / Cervical Bilateral 12/13/2017    Performed [facility] Ben [facility]et [facility] at [facility]    LYMPH NODE BIOPSY Right 2014    Right inguinal lymph node biopsy    MOUTH SURGERY      TOTAL ABDOMINAL HYSTERECTOMY W/ BILATERAL SALPINGOOPHORECTOMY  2015    VARICOSE VEIN SURGERY         Family History  Family History   Problem Relation Age of Onset    Stroke Mother     Hypertension Mother     Cerebral aneurysm Mother     Hypertension Father     Heart disease Father     Leukemia Father     Pneumonia Sister     No Known Problems Brother     No Known Problems Brother     No Known Problems Sister     Breast cancer Paternal Grandmother         70's       Social History  Social History     Socioeconomic History    Marital status:      Spouse name: Ren    Number of children: 5    Years of education: 13    Highest education level: Not on file   Occupational History    Occupation: Self employed   Social Needs    Financial resource strain: Not on file    Food insecurity:     Worry: Not on file     Inability: Not on file    Transportation needs:     Medical: Not on file     Non-medical: Not on file   Tobacco Use    Smoking status: Never Smoker    Smokeless tobacco: Never Used   Substance and Sexual Activity    Alcohol use: Yes     Comment: Social    Drug use: No    Sexual activity: Yes     Partners: Male   Lifestyle    Physical activity:     Days per week: Not on file     Minutes per session: Not on file    Stress: Not on file   Relationships     Social connections:     Talks on phone: Not on file     Gets together: Not on file     Attends [employer] service: Not on file     Active member of club or organization: Not on file     Attends meetings of clubs or organizations: Not on file     Relationship status: Not on file   Other Topics Concern    Patient feels they ought to cut down on drinking/drug use Not Asked    Patient annoyed by others criticizing their drinking/drug use Not Asked    Patient has felt bad or guilty about drinking/drug use Not Asked    Patient has had a drink/used drugs as an eye opener in the AM Not Asked   Social History Narrative    Not on file        Review of Systems    14-point review of systems as follows:   No check kelli indicates NEGATIVE response   Constitutional: [] weight loss, [] change to appetite   Eyes: [] change in vision, [] double vision   Ears, nose, mouth, throat: [] frequent nose bleeds, [] ringing in the ears   Respiratory: [] cough, [] wheezing   Cardiovascular: [] chest pain, [] palpitations   Gastrointestinal: [] jaundice, [] nausea/vomiting   Genitourinary: [] incontinence, [] burning with urination   Hematologic/lymphatic: [] easy bruising/bleeding, [] night sweats   Neurological: [] numbness, [] weakness   Endocrine: [] fatigue, [] heat/cold intolerance   Allergy/Immunologic: [] fevers, [] chills   Musculoskeletal: [x] muscle pain, [] joint pain   Psychiatric: [] thoughts of harming self/others, [] depression   Integumentary: [] rashes, [] sores that do not heal       Objective:        Vitals:    05/06/19 1203   BP: 113/79   Pulse: 67   Resp: 16     Body mass index is 19.29 kg/m².    CERVICAL SPINE:  INSPECTION: no scars   ROM: normal   MUSCLE SPASM: + upper cervical paraspinals, splenius capitus, upper trapezius left worse than right  DALILA tender: bilateral   SPURLING: neg    Data Review:     No results found for this or any previous visit.    Lab Results   Component Value Date     10/30/2018    NA  138 09/18/2015    K 4.0 10/30/2018    K 4.1 09/18/2015    CL 96 (L) 10/30/2018     09/18/2015    CO2 32 10/30/2018    BUN 16 10/30/2018    CREATININE 0.96 10/30/2018    CREATININE 0.92 09/18/2015    GLU 82 10/30/2018    AST 21 10/30/2018    ALT 13 10/30/2018    ALBUMIN 4.4 10/30/2018    ALBUMIN 3.6 09/18/2015    PROT 6.8 10/30/2018    BILITOT 0.7 10/30/2018    CHOL 209 (H) 10/30/2018    HDL 90 10/30/2018    LDLCALC 97 10/30/2018    LDLCALC 77 09/18/2015    TRIG 128 10/30/2018       Lab Results   Component Value Date    WBC 5.3 10/30/2018    HGB 13.8 10/30/2018    HCT 41.1 10/30/2018    MCV 92.6 10/30/2018     10/30/2018       Lab Results   Component Value Date    TSH 1.37 10/30/2018       Assessment & Plan:       Problem List Items Addressed This Visit        Orthopedic    Cervical myofascial pain syndrome    Overview     With good response to intermittent cervical TPI, poor tolerance of neuromodulators.     Previous injection 8 weeks ago without steroid, did well until she received the zoster vaccine which triggered muscle spasm in her arm and then that radiated into her neck, will repeat today due to intractable pain but she cannot manage at home         Relevant Medications    bupivacaine (PF) 0.25% (2.5 mg/ml) injection 30 mg (Completed) (Start on 5/6/2019  2:15 PM)    triamcinolone acetonide injection 40 mg (Completed) (Start on 5/6/2019  2:15 PM)    lidocaine (PF) 10 mg/ml (1%) injection 80 mg (Completed) (Start on 5/6/2019  2:15 PM)    Bilateral occipital neuralgia - Primary    Overview     Secondary to cervical muscle spasm, with previous good response to blocks    Pain back to baseline, repeat nerve blocks today         Relevant Medications    bupivacaine (PF) 0.25% (2.5 mg/ml) injection 30 mg (Completed) (Start on 5/6/2019  2:15 PM)    triamcinolone acetonide injection 40 mg (Completed) (Start on 5/6/2019  2:15 PM)    lidocaine (PF) 10 mg/ml (1%) injection 80 mg (Completed) (Start on 5/6/2019   2:15 PM)                No follow-ups on file.     Tammie Delaney MD

## 2019-08-12 ENCOUNTER — PATIENT MESSAGE (OUTPATIENT)
Dept: NEUROLOGY | Facility: CLINIC | Age: 54
End: 2019-08-12

## 2019-08-14 ENCOUNTER — PATIENT MESSAGE (OUTPATIENT)
Dept: NEUROLOGY | Facility: CLINIC | Age: 54
End: 2019-08-14

## 2019-08-19 ENCOUNTER — PROCEDURE VISIT (OUTPATIENT)
Dept: NEUROLOGY | Facility: CLINIC | Age: 54
End: 2019-08-19
Payer: COMMERCIAL

## 2019-08-19 DIAGNOSIS — M54.81 BILATERAL OCCIPITAL NEURALGIA: ICD-10-CM

## 2019-08-19 DIAGNOSIS — M79.18 CERVICAL MYOFASCIAL PAIN SYNDROME: Primary | ICD-10-CM

## 2019-08-19 PROCEDURE — 64405 NJX AA&/STRD GR OCPL NRV: CPT | Mod: 50,59,S$GLB, | Performed by: PSYCHIATRY & NEUROLOGY

## 2019-08-19 PROCEDURE — 64450 NJX AA&/STRD OTHER PN/BRANCH: CPT | Mod: 50,59,S$GLB, | Performed by: PSYCHIATRY & NEUROLOGY

## 2019-08-19 PROCEDURE — 20553 NJX 1/MLT TRIGGER POINTS 3/>: CPT | Mod: S$GLB,,, | Performed by: PSYCHIATRY & NEUROLOGY

## 2019-08-19 PROCEDURE — 20553 PR INJECT TRIGGER POINTS, > 3: ICD-10-PCS | Mod: S$GLB,,, | Performed by: PSYCHIATRY & NEUROLOGY

## 2019-08-19 PROCEDURE — 64405 PR NERVE BLOCK INJ, ANES/STEROID, OCCIPITAL: ICD-10-PCS | Mod: 50,59,S$GLB, | Performed by: PSYCHIATRY & NEUROLOGY

## 2019-08-19 PROCEDURE — 64450 PR NERVE BLOCK INJ, ANES/STEROID, OTHER PERIPHERAL: ICD-10-PCS | Mod: 50,59,S$GLB, | Performed by: PSYCHIATRY & NEUROLOGY

## 2019-08-19 RX ORDER — LIDOCAINE HYDROCHLORIDE 10 MG/ML
8 INJECTION, SOLUTION EPIDURAL; INFILTRATION; INTRACAUDAL; PERINEURAL ONCE
Status: COMPLETED | OUTPATIENT
Start: 2019-08-19 | End: 2019-08-19

## 2019-08-19 RX ORDER — TRIAMCINOLONE ACETONIDE 40 MG/ML
40 INJECTION, SUSPENSION INTRA-ARTICULAR; INTRAMUSCULAR ONCE
Status: COMPLETED | OUTPATIENT
Start: 2019-08-19 | End: 2019-08-19

## 2019-08-19 RX ORDER — BUPIVACAINE HYDROCHLORIDE 2.5 MG/ML
12 INJECTION, SOLUTION EPIDURAL; INFILTRATION; INTRACAUDAL ONCE
Status: COMPLETED | OUTPATIENT
Start: 2019-08-19 | End: 2019-08-19

## 2019-08-19 RX ADMIN — BUPIVACAINE HYDROCHLORIDE 30 MG: 2.5 INJECTION, SOLUTION EPIDURAL; INFILTRATION; INTRACAUDAL at 03:08

## 2019-08-19 RX ADMIN — TRIAMCINOLONE ACETONIDE 40 MG: 40 INJECTION, SUSPENSION INTRA-ARTICULAR; INTRAMUSCULAR at 03:08

## 2019-08-19 RX ADMIN — LIDOCAINE HYDROCHLORIDE 80 MG: 10 INJECTION, SOLUTION EPIDURAL; INFILTRATION; INTRACAUDAL; PERINEURAL at 03:08

## 2019-08-19 NOTE — PROCEDURES
ProceduresPROCEDURE #1     Greater and Lesser Occipital Nerve Block, Bilateral     After risks and benefits were explained including bleeding, infection, worsening of the pain, damage to the area being injected, weakness, allergic reaction to medications, vascular injection, and nerve damage, signed consent was obtained. All questions were answered.     The area of the greater occipital nerve was identified as a point 1/3rds the distance  between the occipital protuberance and the ipsilateral mastoid process. The area of the lesser occipital nerve (if applicable) was identified as a point 2/3rds the distance between the occipital protuberance and the ipsilateral mastoid process.The identified areas were prepped three times with alcohol and the alcohol allowed to dry. Next, a 27 gauge 1 inch needle was placed in the anatomical area of the DALILA. Once reproduction of the pain was elicited and negative aspiration confirmed, I proceeded with the injection.     This was repeated for the MAYELIN.     The exact procedure was repeated on the contralateral side.     Adequacy of the block was confirmed by sensory examination demonstrating anesthesia in the territory of the respective nerve(s).     Medication used: Marcaine 0.25% (60%), Lidocaine 1% (40%) and Triamcinolone 20mg     Total volume infused:  10cc      -----------------------------------------------------------------------------------------------------------------    PROCEDURE #2     TRIGGER POINT INJECTION (CERVICAL), 3 muscles bilateral     Indication: cervical myofascial pain     Anesthesia: none     After risks and benefits were explained including bleeding, infection, worsening of the pain, damage to the area being injected, weakness, allergic reaction to medications, vascular injection, and nerve damage, signed consent was obtained. All questions were answered.     Trigger points were identified by palpation of tight muscle fibers with reproduction of patient's  pain and referral pattern upon palpation. The identified areas were prepped three times with alcohol and the alcohol allowed to dry. Next, a 27 gauge 1 inch needle was placed in the anatomical area of the trigger point ot be injected. Once negative aspiration of air and heme was confirmed, I proceeded with the injection.     Medications used: bupivicaine 0.25% (60%), lidocaine 1% (40%), kenalog 20mg     Total volume injected: 10cc     Trigger points injected:  -- right and left semispinalis capitus   -- right and left upper trapezius - 2 points   -- right and left splenius capitus - 2 points     Estimated blood loss: none     The patient did tolerate the procedure well and there were no complications.    -------------------------------------    Total doses:  Bupivicaine: 30mg  Lidocaine: 80mg  Kenalog; 40mg     RTC as scheduled

## 2019-09-16 ENCOUNTER — OFFICE VISIT (OUTPATIENT)
Dept: PSYCHIATRY | Facility: CLINIC | Age: 54
End: 2019-09-16
Payer: COMMERCIAL

## 2019-09-16 DIAGNOSIS — F41.1 GAD (GENERALIZED ANXIETY DISORDER): Primary | ICD-10-CM

## 2019-09-16 PROCEDURE — 90791 PR PSYCHIATRIC DIAGNOSTIC EVALUATION: ICD-10-PCS | Mod: S$GLB,,, | Performed by: SOCIAL WORKER

## 2019-09-16 PROCEDURE — 90791 PSYCH DIAGNOSTIC EVALUATION: CPT | Mod: S$GLB,,, | Performed by: SOCIAL WORKER

## 2019-09-16 PROCEDURE — 99999 PR PBB SHADOW E&M-EST. PATIENT-LVL II: CPT | Mod: PBBFAC,,, | Performed by: SOCIAL WORKER

## 2019-09-16 PROCEDURE — 99999 PR PBB SHADOW E&M-EST. PATIENT-LVL II: ICD-10-PCS | Mod: PBBFAC,,, | Performed by: SOCIAL WORKER

## 2019-09-26 ENCOUNTER — OFFICE VISIT (OUTPATIENT)
Dept: PSYCHIATRY | Facility: CLINIC | Age: 54
End: 2019-09-26
Payer: COMMERCIAL

## 2019-09-26 DIAGNOSIS — F41.1 GAD (GENERALIZED ANXIETY DISORDER): Primary | ICD-10-CM

## 2019-09-26 PROCEDURE — 99999 PR PBB SHADOW E&M-EST. PATIENT-LVL III: CPT | Mod: PBBFAC,,, | Performed by: SOCIAL WORKER

## 2019-09-26 PROCEDURE — 90834 PR PSYCHOTHERAPY W/PATIENT, 45 MIN: ICD-10-PCS | Mod: S$GLB,,, | Performed by: SOCIAL WORKER

## 2019-09-26 PROCEDURE — 99999 PR PBB SHADOW E&M-EST. PATIENT-LVL III: ICD-10-PCS | Mod: PBBFAC,,, | Performed by: SOCIAL WORKER

## 2019-09-26 PROCEDURE — 90834 PSYTX W PT 45 MINUTES: CPT | Mod: S$GLB,,, | Performed by: SOCIAL WORKER

## 2019-09-26 NOTE — PROGRESS NOTES
"Individual Psychotherapy (PhD/LCSW)    2019    Site:  [facility]         Therapeutic Intervention: Met with patient.  Outpatient - Insight oriented psychotherapy 45 min - CPT code 92767, Outpatient - Behavior modifying psychotherapy 45 min - CPT code 32390 and Outpatient - Supportive psychotherapy 45 min - CPT Code 81792    Chief complaint/reason for encounter:  anxiety, interpersonal and eating disorder     Interval history and content of current session: Patient presented for follow up session on time and in a fair mood.  Patient stated that she has started figuring out mother's pattern of dysfunctional behavior.  Patient described her teenage years when "both parents went unhinged" and her eating disorder began, in addition to other family losses and stressors that began before the patient was born.  She discussed the stress that she was under while pregnant with her oldest son and how this pre-zen exposure may have emotionally effected him.  She had a dream last night that was very impactful and helped her to reframe her thoughts about his life and tragic death.   assisted in helping her to process her emotions about the dream.  Patient was given chapter 1 in the DBT workbook to do as homework and she was accepting of this.        Treatment plan:  · Target symptoms: distractability, anxiety , grief  · Why chosen therapy is appropriate versus another modality: relevant to diagnosis, patient responds to this modality, evidence based practice  · Outcome monitoring methods: self-report, observation  · Therapeutic intervention type: insight oriented psychotherapy, behavior modifying psychotherapy, supportive psychotherapy    Risk parameters:  Patient reports no suicidal ideation  Patient reports no homicidal ideation  Patient reports no self-injurious behavior  Patient reports no violent behavior    Verbal deficits: None    Patient's response to intervention:  The patient's response to " intervention is accepting.    Progress toward goals and other mental status changes:  The patient's progress toward goals is fair.    Diagnosis:     ICD-10-CM ICD-9-CM   1. JAMESON (generalized anxiety disorder) F41.1 300.02       Plan:  individual psychotherapy and consult psychiatrist for medication evaluation    Return to clinic: Follow up in about 1 week (around 10/3/2019).    Length of Service (minutes): 45

## 2019-09-30 NOTE — PROGRESS NOTES
"Psychiatry Initial Visit (PhD/LCSW)  Diagnostic Interview - CPT 12160    Date: 9/16/2019    Site: [facility]    Referral source: self    Clinical status of patient: Outpatient    Brenda C Stargardter, a 54 y.o. female, for initial evaluation visit.  Met with patient.    Chief complaint/reason for encounter: anxiety, behavior and interpersonal    HPI:  Patient presented for today's session on time and in a fair mood.  Her last session was on 3/28/18.  Patient stated that she recently noticed having an increased problem with her eating disorder again due to family stressors.  Patient stated that within the last month she is beginning to gain control of her eating behavior again.  Patient described various stressors in regards to her mother and siblings, efforts that she has made to minimize stress to herself.  Patient reported that her relationship with her  and his family has improved.  At this time patient is requesting weekly sessions to help her "sort through my family stress and cope more effectively with my eating disorder".    Review of Systems   Constitutional: Positive for appetite change and unexpected weight change.   Gastrointestinal:        Eating disorder (binging & purging)   Psychiatric/Behavioral: The patient is nervous/anxious.        Symptoms:   · Mood: weight loss, worthlessness/guilt and poor concentration  · Anxiety: excessive anxiety/worry, irritability and eating disorder  · Substance abuse: denied  · Cognitive functioning: denied  · Health behaviors: noncontributory    Psychiatric history: prior inpatient treatment and has participated in counseling/psychotherapy on an outpatient basis in the past    Medical history:   Past Medical History:   Diagnosis Date    Abdominal or pelvic swelling, mass, or lump, right lower quadrant     Anxiety     Arthritis     Asthma, mild persistent     Cervicalgia     COPD (chronic obstructive pulmonary disease)     Enlargement of lymph nodes  "    Eosinophilic esophagitis     Gastroparesis     Headache(784.0)     Heartburn     Helicobacter pylori (H. pylori)     History of positive PPD, untreated     History of psychiatric hospitalization     after son , hospitalized for eating disorder    Hx of psychiatric care     Hypothyroidism     Migraines     Occipital neuralgia     Other selective immunoglobulin deficiencies     Other syndromes affecting cervical region     Psychiatric problem     Spondylosis of cervical spine     Therapy     Unspecified asthma(493.90)     Unspecified disorder of thyroid     Unspecified viral meningitis        Family history of psychiatric illness:   Family History   Problem Relation Age of Onset    Stroke Mother     Hypertension Mother     Cerebral aneurysm Mother     Hypertension Father     Heart disease Father     Leukemia Father     Pneumonia Sister     No Known Problems Brother     No Known Problems Brother     No Known Problems Sister     Breast cancer Paternal Grandmother         70's       Social history (marriage, employment, etc.):  Social History     Socioeconomic History    Marital status:      Spouse name: Ren    Number of children: 5    Years of education: 13    Highest education level: Not on file   Occupational History    Occupation: Self employed   Social Needs    Financial resource strain: Not on file    Food insecurity:     Worry: Not on file     Inability: Not on file    Transportation needs:     Medical: Not on file     Non-medical: Not on file   Tobacco Use    Smoking status: Never Smoker    Smokeless tobacco: Never Used   Substance and Sexual Activity    Alcohol use: Yes     Comment: Social    Drug use: No    Sexual activity: Yes     Partners: Male     Birth control/protection: See Surgical Hx, None   Lifestyle    Physical activity:     Days per week: Not on file     Minutes per session: Not on file    Stress: Not on file   Relationships    Social  connections:     Talks on phone: Not on file     Gets together: Not on file     Attends [employer] service: Not on file     Active member of club or organization: Not on file     Attends meetings of clubs or organizations: Not on file     Relationship status: Not on file   Other Topics Concern    Patient feels they ought to cut down on drinking/drug use Not Asked    Patient annoyed by others criticizing their drinking/drug use Not Asked    Patient has felt bad or guilty about drinking/drug use Not Asked    Patient has had a drink/used drugs as an eye opener in the AM Not Asked   Social History Narrative    Not on file       Current medications and drug reactions (include OTC, herbal):   Current Outpatient Medications   Medication Sig    amoxicillin-clavulanate 875-125mg (AUGMENTIN) 875-125 mg per tablet Take 1 tablet by mouth 2 (two) times daily.    baclofen (LIORESAL) 10 MG tablet Take 10 mg by mouth as needed.     CALCIUM CARBONATE/MAG HYDROX (MYLANTA ORAL) Take by mouth.    ciprofloxacin HCl (CIPRO) 500 MG tablet Take 1 tablet (500 mg total) by mouth 2 (two) times daily. To not take muscle relaxants while on this med.    clotrimazole-betamethasone 1-0.05% (LOTRISONE) cream Apply topically as needed.     esomeprazole (NEXIUM) 40 MG capsule Take 40 mg by mouth before breakfast.    FEXOFENADINE HCL (ALLEGRA ORAL) Take by mouth.    fluticasone (FLOVENT HFA) 220 mcg/actuation inhaler Inhale 1 puff into the lungs 2 (two) times daily. Controller    fluticasone-vilanterol (BREO) 100-25 mcg/dose diskus inhaler Inhale 1 puff into the lungs once daily. Controller    hydrocodone-acetaminophen 7.5-325mg (NORCO) 7.5-325 mg per tablet Take 1 tablet by mouth nightly as needed.     levothyroxine (SYNTHROID) 50 MCG tablet Take 1 tablet (50 mcg total) by mouth every morning.    linaclotide (LINZESS) 145 mcg Cap capsule Take 1 capsule (145 mcg total) by mouth once daily.    MG TRISILICATE/ALH/NAHCO3/AA (GAVISCON  ORAL) Take 5 mLs by mouth as needed.     MINIVELLE 0.1 mg/24 hr PTSW     mupirocin (BACTROBAN) 2 % ointment Apply to affected area 3 times daily    nystatin (MYCOSTATIN) 100,000 unit/mL suspension Take 6 mLs (600,000 Units total) by mouth 3 (three) times daily. Swish and swallow.    onabotulinumtoxina (BOTOX) 200 unit SolR Inject 155 Units into the muscle into the head/neck area every 90 days    ondansetron (ZOFRAN-ODT) 8 MG TbDL TAKE 1 TABLET BY MOUTH THREE TIMES DAILY AS NEEDED FOR NAUSEA OR VOMITING    PROCTOSOL HC 2.5 % rectal cream Place 1 Dose rectally as needed.     sumatriptan (IMITREX) 100 MG tablet Take 1 tablet (100 mg total) by mouth every 2 (two) hours as needed for Migraine. up to 200 mg/day. Hold for BP > 150 mm systolic    tiZANidine (ZANAFLEX) 4 MG tablet Take 1 tablet (4 mg total) by mouth nightly as needed.    VENTOLIN HFA 90 mcg/actuation inhaler INHALE 2 PUFFS INTO THE LUNGS EVERY 6 (SIX) HOURS AS NEEDED FOR WHEEZING.    zolpidem (AMBIEN) 5 MG Tab TAKE 1 TABLET (5 MG TOTAL) BY MOUTH NIGHTLY AS NEEDED.     Current Facility-Administered Medications   Medication    lidocaine HCl 2% oral solution 1 mL    triamcinolone acetonide injection 40 mg       Strengths and liabilities: Strength: Patient accepts guidance/feedback, Strength: Patient is expressive/articulate., Strength: Patient is intelligent., Strength: Patient is motivated for change., Strength: Patient is physically healthy., Strength: Patient has positive support network., Strength: Patient has reasonable judgment., Strength: Patient is stable.    Current Evaluation:     Mental Status Exam:  General Appearance:  age appropriate, casually dressed, thin   Speech: normal tone, normal rate, normal pitch, normal volume      Level of Cooperation: cooperative      Thought Processes: normal and logical   Mood: anxious      Thought Content: normal, no suicidality, no homicidality, delusions, or paranoia   Affect: congruent and appropriate    Orientation: Oriented x3   Memory: intact   Attention Span & Concentration: intact   Fund of General Knowledge: intact and appropriate to age and level of education   Judgment & Insight: good     Language  intact     Diagnostic Impression - Plan:       ICD-10-CM ICD-9-CM   1. JAMESON (generalized anxiety disorder) F41.1 300.02       Plan:individual psychotherapy, Pt to go to ED or call 911 if symptoms worsen or if she has thoughts of harming self and/or others. Pt verbalized understanding.     Return to Clinic: Follow up in about 1 week (around 9/23/2019).    Total session time: 60 minutes

## 2019-10-11 ENCOUNTER — OFFICE VISIT (OUTPATIENT)
Dept: PSYCHIATRY | Facility: CLINIC | Age: 54
End: 2019-10-11
Payer: COMMERCIAL

## 2019-10-11 DIAGNOSIS — F41.1 GAD (GENERALIZED ANXIETY DISORDER): Primary | ICD-10-CM

## 2019-10-11 PROCEDURE — 90834 PR PSYCHOTHERAPY W/PATIENT, 45 MIN: ICD-10-PCS | Mod: S$GLB,,, | Performed by: SOCIAL WORKER

## 2019-10-11 PROCEDURE — 99999 PR PBB SHADOW E&M-EST. PATIENT-LVL III: CPT | Mod: PBBFAC,,, | Performed by: SOCIAL WORKER

## 2019-10-11 PROCEDURE — 90834 PSYTX W PT 45 MINUTES: CPT | Mod: S$GLB,,, | Performed by: SOCIAL WORKER

## 2019-10-11 PROCEDURE — 99999 PR PBB SHADOW E&M-EST. PATIENT-LVL III: ICD-10-PCS | Mod: PBBFAC,,, | Performed by: SOCIAL WORKER

## 2019-10-11 NOTE — PROGRESS NOTES
Individual Psychotherapy (PhD/LCSW)    10/11/2019    Site:  [facility]         Therapeutic Intervention: Met with patient.  Outpatient - Insight oriented psychotherapy 45 min - CPT code 71037, Outpatient - Behavior modifying psychotherapy 45 min - CPT code 70824 and Outpatient - Supportive psychotherapy 45 min - CPT Code 58927    Chief complaint/reason for encounter:  anxiety, interpersonal and eating disorder     Interval history and content of current session: Patient presented for follow up session on time and in a fair mood.  Patient stated that she and her  have been out of town on vacation to the beach.  It was nice, but her eating disorder was triggered by being out of her routine.  They cooked and tried to avoid eating out.  She has been feeling more settled since they returned home.  Her sister called her while she was away to let her know that her mother was out of money.  We discussed her mother's naive generosity to others which has cost her the entire profit from the sale of her home.  Patient spoke to her mother who sounds as if she is making some positive changes with her diet and exercise.  Patient is anxious about her mother meeting her own needs.   suggested that she contact the [place] [employer] to see what is available for her mother and encourage her mother to use this resource as well.  Patient was given chapter 2 in the DBT workbook to do as homework and she was accepting of this.  She stated that she is almost finished with Chapter 1.  Supportive counseling provided.        Treatment plan:  · Target symptoms: distractability, anxiety , grief  · Why chosen therapy is appropriate versus another modality: relevant to diagnosis, patient responds to this modality, evidence based practice  · Outcome monitoring methods: self-report, observation  · Therapeutic intervention type: insight oriented psychotherapy, behavior modifying psychotherapy, supportive  psychotherapy    Risk parameters:  Patient reports no suicidal ideation  Patient reports no homicidal ideation  Patient reports no self-injurious behavior  Patient reports no violent behavior    Verbal deficits: None    Patient's response to intervention:  The patient's response to intervention is accepting.    Progress toward goals and other mental status changes:  The patient's progress toward goals is fair.    Diagnosis:     ICD-10-CM ICD-9-CM   1. JAMESON (generalized anxiety disorder) F41.1 300.02       Plan:  individual psychotherapy and consult psychiatrist for medication evaluation    Return to clinic: Follow up in about 1 week (around 10/18/2019).    Length of Service (minutes): 45

## 2019-10-17 ENCOUNTER — OFFICE VISIT (OUTPATIENT)
Dept: PSYCHIATRY | Facility: CLINIC | Age: 54
End: 2019-10-17
Payer: COMMERCIAL

## 2019-10-17 DIAGNOSIS — F41.1 GAD (GENERALIZED ANXIETY DISORDER): Primary | ICD-10-CM

## 2019-10-17 PROCEDURE — 90834 PSYTX W PT 45 MINUTES: CPT | Mod: S$GLB,,, | Performed by: SOCIAL WORKER

## 2019-10-17 PROCEDURE — 90834 PR PSYCHOTHERAPY W/PATIENT, 45 MIN: ICD-10-PCS | Mod: S$GLB,,, | Performed by: SOCIAL WORKER

## 2019-10-17 NOTE — PROGRESS NOTES
Individual Psychotherapy (PhD/LCSW)    10/17/2019    Site:  [facility], [place], operated by [facility]         Therapeutic Intervention: Met with patient.  Outpatient - Insight oriented psychotherapy 45 min - CPT code 42218, Outpatient - Behavior modifying psychotherapy 45 min - CPT code 72425 and Outpatient - Supportive psychotherapy 45 min - CPT Code 46227    Chief complaint/reason for encounter:  anxiety, interpersonal and eating disorder     Interval history and content of current session: Patient presented for follow up session on time and in a fair mood.  Patient stated that she and her  were out of town several days last week for business.  She began struggling with binging again but the last few days have been better.  We discussed her triggers while away.  Her 's anxiety contributes greatly to her own anxiety and eating disorder.  His travel anxiety causes her to feel trapped in the car.  She discussed several vacations that they took to [facility] which were disasters due to his anxiety and attitude.  She and her  spend most of the day together because they work out of their home.  When they travel, they are even closer together and finding time to herself is difficult.  Patient appears to be an empath who has great difficulty distinguishing her own feelings from the feelings of other people.  Patient greatly identified with this and provided several examples of how she was effected by total strangers.  We discussed how this contributes to her avoidance and feelings of overwhelm during family gatherings.   encouraged her to read about establishing emotional boundaries for spiritually empathic people.  Patient was open to this.        Treatment plan:  · Target symptoms: distractability, anxiety , grief  · Why chosen therapy is appropriate versus another modality: relevant to diagnosis, patient responds to this modality, evidence based practice  · Outcome monitoring methods: self-report,  observation  · Therapeutic intervention type: insight oriented psychotherapy, behavior modifying psychotherapy, supportive psychotherapy    Risk parameters:  Patient reports no suicidal ideation  Patient reports no homicidal ideation  Patient reports no self-injurious behavior  Patient reports no violent behavior    Verbal deficits: None    Patient's response to intervention:  The patient's response to intervention is accepting.    Progress toward goals and other mental status changes:  The patient's progress toward goals is fair.    Diagnosis:     ICD-10-CM ICD-9-CM   1. JAMESON (generalized anxiety disorder) F41.1 300.02       Plan:  individual psychotherapy and consult psychiatrist for medication evaluation    Return to clinic: Follow up in about 1 week (around 10/24/2019).    Length of Service (minutes): 45

## 2019-10-24 ENCOUNTER — OFFICE VISIT (OUTPATIENT)
Dept: PSYCHIATRY | Facility: CLINIC | Age: 54
End: 2019-10-24
Payer: COMMERCIAL

## 2019-10-24 DIAGNOSIS — F90.0 ADHD (ATTENTION DEFICIT HYPERACTIVITY DISORDER), INATTENTIVE TYPE: ICD-10-CM

## 2019-10-24 DIAGNOSIS — F41.1 GAD (GENERALIZED ANXIETY DISORDER): Primary | ICD-10-CM

## 2019-10-24 PROCEDURE — 99999 PR PBB SHADOW E&M-EST. PATIENT-LVL III: CPT | Mod: PBBFAC,,, | Performed by: SOCIAL WORKER

## 2019-10-24 PROCEDURE — 90834 PR PSYCHOTHERAPY W/PATIENT, 45 MIN: ICD-10-PCS | Mod: S$GLB,,, | Performed by: SOCIAL WORKER

## 2019-10-24 PROCEDURE — 99999 PR PBB SHADOW E&M-EST. PATIENT-LVL III: ICD-10-PCS | Mod: PBBFAC,,, | Performed by: SOCIAL WORKER

## 2019-10-24 PROCEDURE — 90834 PSYTX W PT 45 MINUTES: CPT | Mod: S$GLB,,, | Performed by: SOCIAL WORKER

## 2019-10-24 NOTE — PROGRESS NOTES
"Individual Psychotherapy (PhD/LCSW)    10/24/2019    Site:  [facility]         Therapeutic Intervention: Met with patient.  Outpatient - Insight oriented psychotherapy 45 min - CPT code 17238, Outpatient - Behavior modifying psychotherapy 45 min - CPT code 46592 and Outpatient - Supportive psychotherapy 45 min - CPT Code 22853    Chief complaint/reason for encounter:  anxiety, interpersonal and eating disorder     Interval history and content of current session: Patient presented for follow up session on time and in a fair mood.  Patient stated that she had a difficult time with her  and her mother this weekend.  They were both emotionally needy and left her drained.  Patient discussed how her 's critical blaming attitude toward her and his "performance improvement plans" to improve her behavior cause her binging on carbs to get worse rather than getting better.  He thinks that he is being supportive and encouraging, but that is not at all how it is received.  We discussed her observations of his moodiness and his rigid attitudes about how other people are supposed to behave around him.  Usually the "rules" that he sets for others are completely opposite of what he does towards others.  She suspects that their youngest son has high functioning autism and her  displays similar behavior as well.  Her  has also dropped his belief in [employer] so this is also a divide in their relationship.  We discussed the upcoming birthday party for her daughter this weekend and how she anticipates her 's pouty self-absorbed reaction that he typically displays during a family gathering.  Supportive counseling provided.     Treatment plan:  · Target symptoms: distractability, anxiety , grief  · Why chosen therapy is appropriate versus another modality: relevant to diagnosis, patient responds to this modality, evidence based practice  · Outcome monitoring methods: self-report, " observation  · Therapeutic intervention type: insight oriented psychotherapy, behavior modifying psychotherapy, supportive psychotherapy    Risk parameters:  Patient reports no suicidal ideation  Patient reports no homicidal ideation  Patient reports no self-injurious behavior  Patient reports no violent behavior    Verbal deficits: None    Patient's response to intervention:  The patient's response to intervention is accepting.    Progress toward goals and other mental status changes:  The patient's progress toward goals is fair.    Diagnosis:     ICD-10-CM ICD-9-CM   1. JAMESON (generalized anxiety disorder) F41.1 300.02   2. ADHD (attention deficit hyperactivity disorder), inattentive type F90.0 314.00       Plan:  individual psychotherapy and consult psychiatrist for medication evaluation    Return to clinic: Follow up in about 1 week (around 10/31/2019).    Length of Service (minutes): 45

## 2019-10-31 ENCOUNTER — OFFICE VISIT (OUTPATIENT)
Dept: PSYCHIATRY | Facility: CLINIC | Age: 54
End: 2019-10-31
Payer: COMMERCIAL

## 2019-10-31 DIAGNOSIS — F41.1 GAD (GENERALIZED ANXIETY DISORDER): Primary | ICD-10-CM

## 2019-10-31 DIAGNOSIS — F90.0 ADHD (ATTENTION DEFICIT HYPERACTIVITY DISORDER), INATTENTIVE TYPE: ICD-10-CM

## 2019-10-31 PROCEDURE — 99999 PR PBB SHADOW E&M-EST. PATIENT-LVL III: CPT | Mod: PBBFAC,,, | Performed by: SOCIAL WORKER

## 2019-10-31 PROCEDURE — 99999 PR PBB SHADOW E&M-EST. PATIENT-LVL III: ICD-10-PCS | Mod: PBBFAC,,, | Performed by: SOCIAL WORKER

## 2019-10-31 PROCEDURE — 90834 PR PSYCHOTHERAPY W/PATIENT, 45 MIN: ICD-10-PCS | Mod: S$GLB,,, | Performed by: SOCIAL WORKER

## 2019-10-31 PROCEDURE — 90834 PSYTX W PT 45 MINUTES: CPT | Mod: S$GLB,,, | Performed by: SOCIAL WORKER

## 2019-10-31 NOTE — PROGRESS NOTES
"Individual Psychotherapy (PhD/LCSW)    10/31/2019    Site:  [facility]         Therapeutic Intervention: Met with patient.  Outpatient - Insight oriented psychotherapy 45 min - CPT code 05030, Outpatient - Behavior modifying psychotherapy 45 min - CPT code 93911 and Outpatient - Supportive psychotherapy 45 min - CPT Code 55099    Chief complaint/reason for encounter:  anxiety, interpersonal and eating disorder     Interval history and content of current session: Patient presented for follow up session on time and in a fair mood.  Patient stated that she had a meaningful discussion with her  and he began to consider how his behavior effects her.  He was initially defensive, but then opened up and engaged.  She stated that from his viewpoint their marriage was much like the movie "Mrs. Doubtfire" with her being the character played by Rai Whitten.  She also tried to consider how her ADHD and non-structured attitude was difficult for her .  We discussed her need to spend time apart from him pursuing her own interests and friendships to give herself time to work on her own goals and "recharge".  Her  is conflicted about this.  He supports her doing things on her own and encourages it, but then acts jealous and pouty when she does.  It is tough to balance his opposite reactions.  Boundaries and self-care discussed.       Treatment plan:  · Target symptoms: distractability, anxiety , grief  · Why chosen therapy is appropriate versus another modality: relevant to diagnosis, patient responds to this modality, evidence based practice  · Outcome monitoring methods: self-report, observation  · Therapeutic intervention type: insight oriented psychotherapy, behavior modifying psychotherapy, supportive psychotherapy    Risk parameters:  Patient reports no suicidal ideation  Patient reports no homicidal ideation  Patient reports no self-injurious behavior  Patient reports no violent " behavior    Verbal deficits: None    Patient's response to intervention:  The patient's response to intervention is accepting.    Progress toward goals and other mental status changes:  The patient's progress toward goals is fair.    Diagnosis:     ICD-10-CM ICD-9-CM   1. JAMESON (generalized anxiety disorder) F41.1 300.02   2. ADHD (attention deficit hyperactivity disorder), inattentive type F90.0 314.00       Plan:  individual psychotherapy and consult psychiatrist for medication evaluation    Return to clinic: Follow up in about 1 week (around 11/7/2019).    Length of Service (minutes): 45

## 2019-11-14 ENCOUNTER — OFFICE VISIT (OUTPATIENT)
Dept: PSYCHIATRY | Facility: CLINIC | Age: 54
End: 2019-11-14
Payer: COMMERCIAL

## 2019-11-14 DIAGNOSIS — F90.0 ADHD (ATTENTION DEFICIT HYPERACTIVITY DISORDER), INATTENTIVE TYPE: ICD-10-CM

## 2019-11-14 DIAGNOSIS — F41.1 GAD (GENERALIZED ANXIETY DISORDER): Primary | ICD-10-CM

## 2019-11-14 PROCEDURE — 99999 PR PBB SHADOW E&M-EST. PATIENT-LVL III: CPT | Mod: PBBFAC,,, | Performed by: SOCIAL WORKER

## 2019-11-14 PROCEDURE — 90834 PR PSYCHOTHERAPY W/PATIENT, 45 MIN: ICD-10-PCS | Mod: S$GLB,,, | Performed by: SOCIAL WORKER

## 2019-11-14 PROCEDURE — 90834 PSYTX W PT 45 MINUTES: CPT | Mod: S$GLB,,, | Performed by: SOCIAL WORKER

## 2019-11-14 PROCEDURE — 99999 PR PBB SHADOW E&M-EST. PATIENT-LVL III: ICD-10-PCS | Mod: PBBFAC,,, | Performed by: SOCIAL WORKER

## 2019-11-14 NOTE — PROGRESS NOTES
Individual Psychotherapy (PhD/LCSW)    11/14/2019    Site:  [facility]         Therapeutic Intervention: Met with patient.  Outpatient - Insight oriented psychotherapy 45 min - CPT code 71067, Outpatient - Behavior modifying psychotherapy 45 min - CPT code 16800 and Outpatient - Supportive psychotherapy 45 min - CPT Code 55094    Chief complaint/reason for encounter:  anxiety, interpersonal and eating disorder     Interval history and content of current session: Patient presented for follow up session on time and in a fair mood.  Patient stated that she had a rough two weeks with her , but the last couple days have been better.  Patient discussed communication difficulties with her  and completely opposite personalities, which create conflict.  She discussed disagreements that they have had about clutter, holiday decorating, and family gatherings.  Discussed strategy to move forward with less friction and perhaps compromise.  Patient was open to suggestions.  She discussed holiday gathering options and the guilt that she feels about not having as much contact with her parents recently in order to avoid toxic emotional exposure, especially with her sister. Boundaries discussed.       Treatment plan:  · Target symptoms: distractability, anxiety , grief  · Why chosen therapy is appropriate versus another modality: relevant to diagnosis, patient responds to this modality, evidence based practice  · Outcome monitoring methods: self-report, observation  · Therapeutic intervention type: insight oriented psychotherapy, behavior modifying psychotherapy, supportive psychotherapy    Risk parameters:  Patient reports no suicidal ideation  Patient reports no homicidal ideation  Patient reports no self-injurious behavior  Patient reports no violent behavior    Verbal deficits: None    Patient's response to intervention:  The patient's response to intervention is accepting.    Progress toward goals and other  mental status changes:  The patient's progress toward goals is fair.    Diagnosis:     ICD-10-CM ICD-9-CM   1. JAMESON (generalized anxiety disorder) F41.1 300.02   2. ADHD (attention deficit hyperactivity disorder), inattentive type F90.0 314.00       Plan:  individual psychotherapy and consult psychiatrist for medication evaluation    Return to clinic: Follow up in about 6 days (around 11/20/2019).    Length of Service (minutes): 45

## 2019-11-19 ENCOUNTER — PROCEDURE VISIT (OUTPATIENT)
Dept: NEUROLOGY | Facility: CLINIC | Age: 54
End: 2019-11-19
Payer: COMMERCIAL

## 2019-11-19 VITALS
RESPIRATION RATE: 17 BRPM | BODY MASS INDEX: 19.91 KG/M2 | SYSTOLIC BLOOD PRESSURE: 110 MMHG | HEART RATE: 55 BPM | WEIGHT: 123.88 LBS | DIASTOLIC BLOOD PRESSURE: 74 MMHG | HEIGHT: 66 IN

## 2019-11-19 DIAGNOSIS — M54.81 BILATERAL OCCIPITAL NEURALGIA: ICD-10-CM

## 2019-11-19 DIAGNOSIS — M79.18 CERVICAL MYOFASCIAL PAIN SYNDROME: Primary | ICD-10-CM

## 2019-11-19 PROCEDURE — 64405 PR NERVE BLOCK INJ, ANES/STEROID, OCCIPITAL: ICD-10-PCS | Mod: 50,S$GLB,, | Performed by: PSYCHIATRY & NEUROLOGY

## 2019-11-19 PROCEDURE — 20552 NJX 1/MLT TRIGGER POINT 1/2: CPT | Mod: 51,S$GLB,, | Performed by: PSYCHIATRY & NEUROLOGY

## 2019-11-19 PROCEDURE — 64405 NJX AA&/STRD GR OCPL NRV: CPT | Mod: 50,S$GLB,, | Performed by: PSYCHIATRY & NEUROLOGY

## 2019-11-19 PROCEDURE — 20552 PR INJECT TRIGGER POINT, 1 OR 2: ICD-10-PCS | Mod: 51,S$GLB,, | Performed by: PSYCHIATRY & NEUROLOGY

## 2019-11-19 RX ORDER — BUPIVACAINE HYDROCHLORIDE 2.5 MG/ML
12 INJECTION, SOLUTION EPIDURAL; INFILTRATION; INTRACAUDAL ONCE
Status: COMPLETED | OUTPATIENT
Start: 2019-11-19 | End: 2019-11-19

## 2019-11-19 RX ORDER — TRIAMCINOLONE ACETONIDE 40 MG/ML
40 INJECTION, SUSPENSION INTRA-ARTICULAR; INTRAMUSCULAR ONCE
Status: COMPLETED | OUTPATIENT
Start: 2019-11-19 | End: 2019-11-19

## 2019-11-19 RX ORDER — LIDOCAINE HYDROCHLORIDE 10 MG/ML
8 INJECTION, SOLUTION EPIDURAL; INFILTRATION; INTRACAUDAL; PERINEURAL ONCE
Status: COMPLETED | OUTPATIENT
Start: 2019-11-19 | End: 2019-11-19

## 2019-11-19 RX ADMIN — BUPIVACAINE HYDROCHLORIDE 30 MG: 2.5 INJECTION, SOLUTION EPIDURAL; INFILTRATION; INTRACAUDAL at 02:11

## 2019-11-19 RX ADMIN — LIDOCAINE HYDROCHLORIDE 80 MG: 10 INJECTION, SOLUTION EPIDURAL; INFILTRATION; INTRACAUDAL; PERINEURAL at 02:11

## 2019-11-19 RX ADMIN — TRIAMCINOLONE ACETONIDE 40 MG: 40 INJECTION, SUSPENSION INTRA-ARTICULAR; INTRAMUSCULAR at 02:11

## 2019-11-19 NOTE — PROCEDURES
Procedures    NEXT injection no steroids     PROCEDURE #1     Greater and Lesser Occipital Nerve Block, Bilateral     After risks and benefits were explained including bleeding, infection, worsening of the pain, damage to the area being injected, weakness, allergic reaction to medications, vascular injection, and nerve damage, signed consent was obtained. All questions were answered.     The area of the greater occipital nerve was identified as a point 1/3rds the distance  between the occipital protuberance and the ipsilateral mastoid process. The area of the lesser occipital nerve (if applicable) was identified as a point 2/3rds the distance between the occipital protuberance and the ipsilateral mastoid process.The identified areas were prepped three times with alcohol and the alcohol allowed to dry. Next, a 27 gauge 1 inch needle was placed in the anatomical area of the DALILA. Once reproduction of the pain was elicited and negative aspiration confirmed, I proceeded with the injection.     This was repeated for the MAYELIN.     The exact procedure was repeated on the contralateral side.     Adequacy of the block was confirmed by sensory examination demonstrating anesthesia in the territory of the respective nerve(s).     Medication used: Marcaine 0.25% (60%), Lidocaine 1% (40%) and Triamcinolone 20mg     Total volume infused:  10cc      -----------------------------------------------------------------------------------------------------------------    PROCEDURE #2     TRIGGER POINT INJECTION (CERVICAL), 2 muscles bilateral     Indication: cervical myofascial pain     Anesthesia: none     After risks and benefits were explained including bleeding, infection, worsening of the pain, damage to the area being injected, weakness, allergic reaction to medications, vascular injection, and nerve damage, signed consent was obtained. All questions were answered.     Trigger points were identified by palpation of tight muscle fibers  with reproduction of patient's pain and referral pattern upon palpation. The identified areas were prepped three times with alcohol and the alcohol allowed to dry. Next, a 27 gauge 1 inch needle was placed in the anatomical area of the trigger point ot be injected. Once negative aspiration of air and heme was confirmed, I proceeded with the injection.     Medications used: bupivicaine 0.25% (60%), lidocaine 1% (40%), kenalog 20mg     Total volume injected: 10cc     Trigger points injected:  -- right and left semispinalis capitus   -- right and left upper trapezius  -- left splenius capitus - 2 points     Estimated blood loss: none     The patient did tolerate the procedure well and there were no complications.    -------------------------------------    Total doses:  Bupivicaine: 30mg  Lidocaine: 80mg  Kenalog; 40mg     RTC as scheduled

## 2019-11-20 ENCOUNTER — OFFICE VISIT (OUTPATIENT)
Dept: PSYCHIATRY | Facility: CLINIC | Age: 54
End: 2019-11-20
Payer: COMMERCIAL

## 2019-11-20 DIAGNOSIS — F90.0 ADHD (ATTENTION DEFICIT HYPERACTIVITY DISORDER), INATTENTIVE TYPE: ICD-10-CM

## 2019-11-20 DIAGNOSIS — F41.1 GAD (GENERALIZED ANXIETY DISORDER): Primary | ICD-10-CM

## 2019-11-20 PROCEDURE — 99999 PR PBB SHADOW E&M-EST. PATIENT-LVL III: ICD-10-PCS | Mod: PBBFAC,,, | Performed by: SOCIAL WORKER

## 2019-11-20 PROCEDURE — 90834 PR PSYCHOTHERAPY W/PATIENT, 45 MIN: ICD-10-PCS | Mod: S$GLB,,, | Performed by: SOCIAL WORKER

## 2019-11-20 PROCEDURE — 99999 PR PBB SHADOW E&M-EST. PATIENT-LVL III: CPT | Mod: PBBFAC,,, | Performed by: SOCIAL WORKER

## 2019-11-20 PROCEDURE — 90834 PSYTX W PT 45 MINUTES: CPT | Mod: S$GLB,,, | Performed by: SOCIAL WORKER

## 2019-11-20 NOTE — PROGRESS NOTES
Individual Psychotherapy (PhD/LCSW)    11/20/2019    Site:  [facility]         Therapeutic Intervention: Met with patient.  Outpatient - Insight oriented psychotherapy 45 min - CPT code 51149, Outpatient - Behavior modifying psychotherapy 45 min - CPT code 59379 and Outpatient - Supportive psychotherapy 45 min - CPT Code 54633    Chief complaint/reason for encounter:  anxiety, interpersonal and eating disorder     Interval history and content of current session: Patient presented for follow up session on time and in a fair mood.  Patient stated that she had a rough week due to migraine headaches with visual aura.  Her  is supportive and protective when she is ill.  Patient discussed feelings of overwhelm in several areas of her life.  We discussed her 's recent insight regarding his behavior, her efforts to organize her leftover paperwork from [employer], and her mother's progress.  Patient stated that she feels like she goes from one crisis to another.  We discussed organization goals and ways to cope with her 's shifting moods.      Treatment plan:  · Target symptoms: distractability, anxiety , grief  · Why chosen therapy is appropriate versus another modality: relevant to diagnosis, patient responds to this modality, evidence based practice  · Outcome monitoring methods: self-report, observation  · Therapeutic intervention type: insight oriented psychotherapy, behavior modifying psychotherapy, supportive psychotherapy    Risk parameters:  Patient reports no suicidal ideation  Patient reports no homicidal ideation  Patient reports no self-injurious behavior  Patient reports no violent behavior    Verbal deficits: None    Patient's response to intervention:  The patient's response to intervention is accepting.    Progress toward goals and other mental status changes:  The patient's progress toward goals is fair.    Diagnosis:     ICD-10-CM ICD-9-CM   1. JAMESON (generalized anxiety  disorder) F41.1 300.02   2. ADHD (attention deficit hyperactivity disorder), inattentive type F90.0 314.00       Plan:  individual psychotherapy and consult psychiatrist for medication evaluation    Return to clinic: Follow up in about 15 days (around 12/5/2019).    Length of Service (minutes): 45

## 2019-12-05 ENCOUNTER — OFFICE VISIT (OUTPATIENT)
Dept: PSYCHIATRY | Facility: CLINIC | Age: 54
End: 2019-12-05
Payer: COMMERCIAL

## 2019-12-05 DIAGNOSIS — F41.1 GAD (GENERALIZED ANXIETY DISORDER): Primary | ICD-10-CM

## 2019-12-05 DIAGNOSIS — F90.0 ADHD (ATTENTION DEFICIT HYPERACTIVITY DISORDER), INATTENTIVE TYPE: ICD-10-CM

## 2019-12-05 PROCEDURE — 90834 PR PSYCHOTHERAPY W/PATIENT, 45 MIN: ICD-10-PCS | Mod: S$GLB,,, | Performed by: SOCIAL WORKER

## 2019-12-05 PROCEDURE — 90834 PSYTX W PT 45 MINUTES: CPT | Mod: S$GLB,,, | Performed by: SOCIAL WORKER

## 2019-12-05 PROCEDURE — 99999 PR PBB SHADOW E&M-EST. PATIENT-LVL III: CPT | Mod: PBBFAC,,, | Performed by: SOCIAL WORKER

## 2019-12-05 PROCEDURE — 99999 PR PBB SHADOW E&M-EST. PATIENT-LVL III: ICD-10-PCS | Mod: PBBFAC,,, | Performed by: SOCIAL WORKER

## 2019-12-05 NOTE — PROGRESS NOTES
"Individual Psychotherapy (PhD/LCSW)    12/5/2019    Site:  [facility]         Therapeutic Intervention: Met with patient.  Outpatient - Insight oriented psychotherapy 45 min - CPT code 02211, Outpatient - Behavior modifying psychotherapy 45 min - CPT code 60198 and Outpatient - Supportive psychotherapy 45 min - CPT Code 13025    Chief complaint/reason for encounter:  anxiety, interpersonal and eating disorder     Interval history and content of current session: Patient presented for follow up session on time and in a fair mood.  Patient stated that she had a nice Thanksgiving at her home and her family members all came over.  Predictably her  began to pout and slouch in self pity due to all the "chaos" of having their adult children at home.  She discussed his feelings with him after several days and it seems that he may be gaining some insight into his behavior and unrealistic expectations.  Patient has made progress with her decluttering having sorted through 3 boxes.  We discussed her upcoming [employer] event and anxiety regarding her father's angiogram on Friday.  Patient is catastrophizing and worrying about taking care of her relatives if her father dies.  Anxiety management discussed, as well as other health initiatives that she has begun.    Treatment plan:  · Target symptoms: distractability, anxiety , grief  · Why chosen therapy is appropriate versus another modality: relevant to diagnosis, patient responds to this modality, evidence based practice  · Outcome monitoring methods: self-report, observation  · Therapeutic intervention type: insight oriented psychotherapy, behavior modifying psychotherapy, supportive psychotherapy    Risk parameters:  Patient reports no suicidal ideation  Patient reports no homicidal ideation  Patient reports no self-injurious behavior  Patient reports no violent behavior    Verbal deficits: None    Patient's response to intervention:  The patient's response to " intervention is accepting.    Progress toward goals and other mental status changes:  The patient's progress toward goals is fair.    Diagnosis:     ICD-10-CM ICD-9-CM   1. JAMESON (generalized anxiety disorder) F41.1 300.02   2. ADHD (attention deficit hyperactivity disorder), inattentive type F90.0 314.00       Plan:  individual psychotherapy and consult psychiatrist for medication evaluation    Return to clinic: Follow up in about 2 weeks (around 12/19/2019).    Length of Service (minutes): 45

## 2019-12-11 PROBLEM — K21.00 GASTROESOPHAGEAL REFLUX DISEASE WITH ESOPHAGITIS: Status: ACTIVE | Noted: 2019-12-11

## 2019-12-15 DIAGNOSIS — G43.009 MIGRAINE WITHOUT AURA: ICD-10-CM

## 2019-12-16 RX ORDER — SUMATRIPTAN SUCCINATE 100 MG/1
TABLET ORAL
Qty: 9 TABLET | Refills: 11 | Status: SHIPPED | OUTPATIENT
Start: 2019-12-16 | End: 2020-08-11 | Stop reason: SDUPTHER

## 2020-01-10 ENCOUNTER — TELEPHONE (OUTPATIENT)
Dept: NEUROLOGY | Facility: CLINIC | Age: 55
End: 2020-01-10

## 2020-01-10 NOTE — TELEPHONE ENCOUNTER
Returned call and spoke with patient. Procedure scheduled with Dr. Delaney on 01/13/2020 at 2 pm. Patient verbalized understanding.

## 2020-01-10 NOTE — TELEPHONE ENCOUNTER
----- Message from Bella Cruz sent at 1/10/2020 10:43 AM CST -----  Contact: self [phone]  Patient is requesting a call back from the nurse in regards to getting injections due to migraines and inquiring about botox.    Please call the patient upon request at phone number [phone].

## 2020-01-11 DIAGNOSIS — M79.18 CERVICAL MYOFASCIAL PAIN SYNDROME: ICD-10-CM

## 2020-01-13 ENCOUNTER — PROCEDURE VISIT (OUTPATIENT)
Dept: NEUROLOGY | Facility: CLINIC | Age: 55
End: 2020-01-13
Payer: COMMERCIAL

## 2020-01-13 VITALS
HEART RATE: 75 BPM | BODY MASS INDEX: 19.54 KG/M2 | DIASTOLIC BLOOD PRESSURE: 73 MMHG | SYSTOLIC BLOOD PRESSURE: 107 MMHG | WEIGHT: 121.56 LBS | HEIGHT: 66 IN | RESPIRATION RATE: 18 BRPM

## 2020-01-13 DIAGNOSIS — M54.81 BILATERAL OCCIPITAL NEURALGIA: ICD-10-CM

## 2020-01-13 DIAGNOSIS — G43.719 INTRACTABLE CHRONIC MIGRAINE WITHOUT AURA AND WITHOUT STATUS MIGRAINOSUS: Primary | ICD-10-CM

## 2020-01-13 DIAGNOSIS — M79.18 CERVICAL MYOFASCIAL PAIN SYNDROME: ICD-10-CM

## 2020-01-13 PROCEDURE — 20553 PR INJECT TRIGGER POINTS, > 3: ICD-10-PCS | Mod: S$GLB,,, | Performed by: PSYCHIATRY & NEUROLOGY

## 2020-01-13 PROCEDURE — 99213 OFFICE O/P EST LOW 20 MIN: CPT | Mod: 25,S$GLB,, | Performed by: PSYCHIATRY & NEUROLOGY

## 2020-01-13 PROCEDURE — 64405 PR NERVE BLOCK INJ, ANES/STEROID, OCCIPITAL: ICD-10-PCS | Mod: 50,59,S$GLB, | Performed by: PSYCHIATRY & NEUROLOGY

## 2020-01-13 PROCEDURE — 64405 NJX AA&/STRD GR OCPL NRV: CPT | Mod: 50,59,S$GLB, | Performed by: PSYCHIATRY & NEUROLOGY

## 2020-01-13 PROCEDURE — 99213 PR OFFICE/OUTPT VISIT, EST, LEVL III, 20-29 MIN: ICD-10-PCS | Mod: 25,S$GLB,, | Performed by: PSYCHIATRY & NEUROLOGY

## 2020-01-13 PROCEDURE — 20553 NJX 1/MLT TRIGGER POINTS 3/>: CPT | Mod: S$GLB,,, | Performed by: PSYCHIATRY & NEUROLOGY

## 2020-01-13 RX ORDER — BUPIVACAINE HYDROCHLORIDE 2.5 MG/ML
12 INJECTION, SOLUTION EPIDURAL; INFILTRATION; INTRACAUDAL ONCE
Status: COMPLETED | OUTPATIENT
Start: 2020-01-13 | End: 2020-01-13

## 2020-01-13 RX ORDER — LIDOCAINE HYDROCHLORIDE 10 MG/ML
8 INJECTION, SOLUTION EPIDURAL; INFILTRATION; INTRACAUDAL; PERINEURAL ONCE
Status: COMPLETED | OUTPATIENT
Start: 2020-01-13 | End: 2020-01-13

## 2020-01-13 RX ORDER — TIZANIDINE 4 MG/1
4 TABLET ORAL NIGHTLY PRN
Qty: 90 TABLET | Refills: 3 | Status: SHIPPED | OUTPATIENT
Start: 2020-01-13 | End: 2021-01-11 | Stop reason: SDUPTHER

## 2020-01-13 RX ADMIN — BUPIVACAINE HYDROCHLORIDE 30 MG: 2.5 INJECTION, SOLUTION EPIDURAL; INFILTRATION; INTRACAUDAL at 05:01

## 2020-01-13 RX ADMIN — LIDOCAINE HYDROCHLORIDE 80 MG: 10 INJECTION, SOLUTION EPIDURAL; INFILTRATION; INTRACAUDAL; PERINEURAL at 05:01

## 2020-01-13 NOTE — PROGRESS NOTES
Date of service: 1/13/2020  Referring provider: No ref. provider found    Subjective:      Chief complaint: No chief complaint on file.       Patient ID: Brenda C Stargardter is a 54 y.o. lady with hypothyroidism, gastroparesis, anxiety, ADHD, and migraines presenting for the follow up of migraines and neck pain. Sees Dr. Ben Cadet for pain management.     History of Present Illness    INTERVAL HISTORY - 01/13/2020    I have been seeing patient regularly for occipital nerve blocks and trigger points with or without steroids.  The most recent nerve block and cervical TPI was done on 11/19/2019 with steroids and she overall responds well to these.  She has been receiving Botox with Dr. Cadet's clinic for chronic migraine.  The recent ones have been 8 weeks apart and recently her neck pain has been worse than normal. We had discussed transitioning Botox to my clinic several times to keep with the same provider each time.  We had also discussed several times doing RFA of C2 to have more permanent occipital coverage.        INTERVAL HISTORY - 5/6/19     Patient had zoster vaccine done 5 days ago in the left arm which triggered her neck go into spasm especially on the left side. Previous to this she had been doing very well especially with the cervical myofascial release that she had completed in [place].  She has been unable to manage with oral therapies at home.  Her current pain score is eight.      INTERVAL HISTORY - 1/11/19     Her last TPI was 8/7/18.  She reports this wore off in approximately November.  In the interim Dr. Lopez has started Botox for chronic migraine management and she has found this helpful.  Her neck pain has returned to its baseline and she would like her trigger point injections repeated.    Pain is in the neck, occipital region, and the sides of head.  She is having more difficulty turning her head.  Her current pain score is four with a range of 1-9.    She remains on nightly tizanidine  which is helpful.  She takes hydrocodone p.r.n. as well as Zofran.    She is having a new problem which is pain in the right thumb with burning traveling from the thumb upper forearm on the medial aspect.  She reports this started when she was massaging her daughter and vigorously using her wrist.  She says that now every time she uses her wrist she has the same symptoms.  It has been several weeks.    INTERVAL HISTORY - 2/23/18     Today she reports she was doing better on the periactin and tizanidine until one week ago when she did a new weight while doing lunges and things went downhill after that. Today she reports he is much worse. She has sharp and pain in the back of her head making her scalp sensitive. Her current pain score is 7 with a range of 0 to 10.     Dr. Cadet with do her cervical RFA in the near future.     She reports on the regimen of norco, periactin, and tizanidine she has become constipated refractory to dulcolax and miralax.     INTERVAL HISTORY - 1/3/18     Seen 2 months ago at which point I initiated periactin and performed blocks/TPI.     Today she reports she is about to same to worse. Her pain is still cervical radiating to the posterior head. Her current pain is 3-4 with a range from 1 to 10.  She had a severe migraine with severe vomiting at the start of December and needed to go to the [facility] with a cold preceding it. She is only taking the periactin intermittently. Not using tizanidine daily. The TPI do work well.     INTERVAL HISTORY - 10/24/17     I performed bilateral DALILA/Sofia block and cervical TPI on 8/22/17. Prior to that I had also recommended starting on duloxetine, physical therapy, and tizanidine.     Today she reports she is a little better in terms of her headache and neck pain. The injections did help. A few weeks ago had a terrible vertiginous migraine lasting a whole day, her first one, treated with zofran. Headache characteristics are unchanged frm below, but current severity  is 3-4 with range up to 6-7. Her lower back and her left leg have been causing pain lately same as documented below. Her first lumbar SHORTY was done around mid August 2017.     She had some GI issues going on and was feeling better so did not go through the physical therapy. For the same reason did not try the cymbalta because she is not sure how this would affect her stomach.    She is seeing an allergist now.     Original Headache / Pain History - 8/8/17   Age at onset and course over time: around 2013 after cave excursion in which she became ill, was exposed to bats, had severe migraine lasting months. She believes they originate from her neck pain, which is daily. Dr. Cadet has done CMBB injections (5-6x) in the past which have helped. Has not moved on to rhizotomy. Each CMBB lasts months to years. Last cervical imaging was in 2016.   Location: starts in neck, radiates forward to left side of head (mainly), top of head   Quality: pressure, tight band, throbbing   Severity: best 1/10, worst 1010, current 5/10   Duration: hours - days   Frequency: daily neck pain, headache frequency varies but usually >15 days per month   Alleviated by: sleep, darkness, massage, heat, ice, medication   Exacerbated by: looking up or looking down for extended period of time, light, noise, bending over, stress, food   Associated with: scalp sensitivity, photo/phono/osmophobia, loss of appetite, nasal congestion, problems with memory/concentration, neck tightness   Sleep habits: needs pain medication and ambien to get to sleep due to the pain   Caffeine intake: 1-2 per day     Other:  Last 2-3 months left leg/thigh hurting. Lost strength in that leg. Limping. No back pain. Wraps around knee stops mid-calf. Throbbing/ shooting. No lumbar MRI. Norco helps somewhat with that pain.     Current acute treatment:  -- excedrin  -- aleve   -- norco 7.5 -  1/2 in afternoon and 1/2 before bed (daily)  -- 1/2 ambien   -- baclofen   -- imitrex for  severe      Current prevention:  -- tizanidine 2mg at night  -- botox for chronic migraine - Dr Cadet    Previously tried/failed acute treatment:  -- imitrex - less effective then relpax  -- relpax - chest pain   -- prednisone   -- reglan - weakness   -- toradol   -- aleve  -- motrin  -- Bupap   -- Goody's  -- occipital nerve block  -- cervical trigger point  -- cervical epidural steroid injection   -- piriformis injection     Previously tried/failed preventative treatment:  -- periactin 4mg nightly - intermittent   -- gabapentin - blurring vision   -- Gralise   -- bilateral DALILA/MAYELIN and cervical TPI 8/22/17    Review of patient's allergies indicates:   Allergen Reactions    Reglan [metoclopramide hcl] Other (See Comments)     Weakness could not hold an object in her hands.    Benadryl [diphenhydramine hcl] Other (See Comments)     Restless leg, aggitation    Strattera [atomoxetine] Palpitations    Phenergan [promethazine] Other (See Comments)     Akathisia      Prednisone Nausea And Vomiting    Relpax [eletriptan hbr] Other (See Comments)     Chest pain    Sulfa (sulfonamide antibiotics) Other (See Comments)     Muscle weakness     Current Outpatient Medications   Medication Sig Dispense Refill    CALCIUM CARBONATE/MAG HYDROX (MYLANTA ORAL) Take by mouth.      COLLAGEN MISC by Misc.(Non-Drug; Combo Route) route.      FEXOFENADINE HCL (ALLEGRA ORAL) Take by mouth.      hydrocodone-acetaminophen 7.5-325mg (NORCO) 7.5-325 mg per tablet Take 1 tablet by mouth nightly as needed.       levothyroxine (SYNTHROID) 50 MCG tablet Take 1 tablet (50 mcg total) by mouth every morning. 30 tablet 14    MG TRISILICATE/ALH/NAHCO3/AA (GAVISCON ORAL) Take 5 mLs by mouth as needed.       MINIVELLE 0.1 mg/24 hr PTSW       multivitamin capsule Take 1 capsule by mouth once daily.      mupirocin (BACTROBAN) 2 % ointment Apply to affected area 3 times daily 22 g 0    ondansetron (ZOFRAN-ODT) 8 MG TbDL TAKE 1 TABLET BY MOUTH  THREE TIMES DAILY AS NEEDED FOR NAUSEA OR VOMITING 15 tablet 0    potassium 99 mg Tab Take 250 mg by mouth once.       PROCTOSOL HC 2.5 % rectal cream Place 1 Dose rectally as needed.       sumatriptan (IMITREX) 100 MG tablet TAKE 1 BY MOUTH EVERY 2 HRS. AS NEEDED FOR MIGRAINE. UP  MG/DAY. HOLD FOR BP > 150 MM SYSTOLIC 9 tablet 11    tiZANidine (ZANAFLEX) 4 MG tablet TAKE 1 TABLET (4 MG TOTAL) BY MOUTH NIGHTLY AS NEEDED. 90 tablet 3    TURMERIC ORAL Take by mouth.      zolpidem (AMBIEN) 5 MG Tab TAKE 1 TABLET (5 MG TOTAL) BY MOUTH NIGHTLY AS NEEDED. 30 tablet 5    baclofen (LIORESAL) 10 MG tablet Take 10 mg by mouth as needed.       clotrimazole-betamethasone 1-0.05% (LOTRISONE) cream Apply topically as needed.       esomeprazole (NEXIUM) 40 MG capsule Take 40 mg by mouth before breakfast.      fluticasone (FLOVENT HFA) 220 mcg/actuation inhaler Inhale 1 puff into the lungs 2 (two) times daily. Controller (Patient not taking: Reported on 1/13/2020) 12 g 0    fluticasone-vilanterol (BREO) 100-25 mcg/dose diskus inhaler Inhale 1 puff into the lungs once daily. Controller (Patient not taking: Reported on 1/13/2020) 1 each 11    linaclotide (LINZESS) 145 mcg Cap capsule Take 1 capsule (145 mcg total) by mouth once daily. (Patient not taking: Reported on 1/13/2020) 30 capsule 11    nystatin (MYCOSTATIN) 100,000 unit/mL suspension Take 6 mLs (600,000 Units total) by mouth 3 (three) times daily. Swish and swallow. (Patient not taking: Reported on 1/13/2020) 120 mL 0    onabotulinumtoxina (BOTOX) 200 unit SolR Inject 155 Units into the muscle into the head/neck area every 90 days 1 each 3    VENTOLIN HFA 90 mcg/actuation inhaler INHALE 2 PUFFS INTO THE LUNGS EVERY 6 (SIX) HOURS AS NEEDED FOR WHEEZING. (Patient not taking: Reported on 1/13/2020) 18 g 11     Current Facility-Administered Medications   Medication Dose Route Frequency Provider Last Rate Last Dose    triamcinolone acetonide injection 40 mg   40 mg Intramuscular 1 time in Clinic/HOD Triny Matos MD           Past Medical History  Past Medical History:   Diagnosis Date    Abdominal or pelvic swelling, mass, or lump, right lower quadrant     Anxiety     Arthritis     Asthma, mild persistent     Cervicalgia     COPD (chronic obstructive pulmonary disease)     Enlargement of lymph nodes     Eosinophilic esophagitis     Gastroparesis     Headache(784.0)     Heartburn     Helicobacter pylori (H. pylori)     History of positive PPD, untreated     History of psychiatric hospitalization     after son , hospitalized for eating disorder    Hx of psychiatric care     Hypothyroidism     Migraines     Occipital neuralgia     Other selective immunoglobulin deficiencies     Other syndromes affecting cervical region     Psychiatric problem     Spondylosis of cervical spine     Therapy     Unspecified asthma(493.90)     Unspecified disorder of thyroid     Unspecified viral meningitis        Past Surgical History  Past Surgical History:   Procedure Laterality Date    AXILLARY ABCESS IRRIGATION AND DEBRIDEMENT      CHOLECYSTECTOMY      EXCISIONAL HEMORRHOIDECTOMY  12/21/15    HYSTERECTOMY  2015    LYMPH NODE BIOPSY Right 2014    Right inguinal lymph node biopsy    MOUTH SURGERY      TOTAL ABDOMINAL HYSTERECTOMY W/ BILATERAL SALPINGOOPHORECTOMY  2015    VARICOSE VEIN SURGERY         Family History  Family History   Problem Relation Age of Onset    Stroke Mother     Hypertension Mother     Cerebral aneurysm Mother     Hypertension Father     Heart disease Father     Leukemia Father     Pneumonia Sister     No Known Problems Brother     No Known Problems Brother     No Known Problems Sister     Breast cancer Paternal Grandmother         70's       Social History  Social History     Socioeconomic History    Marital status:      Spouse name: Ren    Number of children: 5    Years of education: 13    Highest  education level: Not on file   Occupational History    Occupation: Self employed   Social Needs    Financial resource strain: Not on file    Food insecurity:     Worry: Not on file     Inability: Not on file    Transportation needs:     Medical: Not on file     Non-medical: Not on file   Tobacco Use    Smoking status: Never Smoker    Smokeless tobacco: Never Used   Substance and Sexual Activity    Alcohol use: Yes     Comment: Social    Drug use: Never    Sexual activity: Yes     Partners: Male     Birth control/protection: See Surgical Hx, None   Lifestyle    Physical activity:     Days per week: Not on file     Minutes per session: Not on file    Stress: Not at all   Relationships    Social connections:     Talks on phone: Not on file     Gets together: Not on file     Attends [employer] service: Not on file     Active member of club or organization: Not on file     Attends meetings of clubs or organizations: Not on file     Relationship status: Not on file   Other Topics Concern    Patient feels they ought to cut down on drinking/drug use Not Asked    Patient annoyed by others criticizing their drinking/drug use Not Asked    Patient has felt bad or guilty about drinking/drug use Not Asked    Patient has had a drink/used drugs as an eye opener in the AM Not Asked   Social History Narrative    Not on file        Review of Systems    14-point review of systems as follows:   No check kelli indicates NEGATIVE response   Constitutional: [] weight loss, [] change to appetite   Eyes: [] change in vision, [] double vision   Ears, nose, mouth, throat: [] frequent nose bleeds, [] ringing in the ears   Respiratory: [] cough, [] wheezing   Cardiovascular: [] chest pain, [] palpitations   Gastrointestinal: [] jaundice, [] nausea/vomiting   Genitourinary: [] incontinence, [] burning with urination   Hematologic/lymphatic: [] easy bruising/bleeding, [] night sweats   Neurological: [] numbness, [] weakness    Endocrine: [] fatigue, [] heat/cold intolerance   Allergy/Immunologic: [] fevers, [] chills   Musculoskeletal: [x] muscle pain, [] joint pain   Psychiatric: [] thoughts of harming self/others, [] depression   Integumentary: [] rashes, [] sores that do not heal       Objective:        Vitals:    01/13/20 1408   BP: 107/73   Pulse: 75   Resp: 18     Body mass index is 19.62 kg/m².    CERVICAL SPINE:  INSPECTION: no scars   ROM: normal   MUSCLE SPASM: + upper cervical paraspinals, splenius capitus, upper trapezius   DALILA and MAYELIN tender: bilateral   SPURLING: neg    Data Review:     No results found for this or any previous visit.    Lab Results   Component Value Date     10/25/2019     09/18/2015    K 4.6 10/25/2019    K 4.1 09/18/2015     10/25/2019     09/18/2015    CO2 32 10/25/2019    BUN 24 10/25/2019    CREATININE 0.89 10/25/2019    CREATININE 0.92 09/18/2015    GLU 74 10/25/2019    AST 27 10/25/2019    ALT 25 10/25/2019    ALBUMIN 4.5 10/25/2019    ALBUMIN 3.6 09/18/2015    PROT 7.0 10/25/2019    BILITOT 0.6 10/25/2019    CHOL 190 10/25/2019    HDL 77 10/25/2019    LDLCALC 92 10/25/2019    LDLCALC 77 09/18/2015    TRIG 116 10/25/2019       Lab Results   Component Value Date    WBC 5.9 10/25/2019    HGB 14.0 10/25/2019    HCT 43.9 10/25/2019    MCV 96.1 10/25/2019     10/25/2019       Lab Results   Component Value Date    TSH 2.46 10/25/2019       Assessment & Plan:       Problem List Items Addressed This Visit        Neuro    Intractable chronic migraine without aura and without status migrainosus - Primary    Overview     Headaches which phenotypically have migraine characteristics but have a strong cervicogenic / cervical myofacial component to them which is why we are treating with cervical TPI / muscle relaxer / and recommended antidepressant with norepinephrine reuptake quality.     Failed duloxetine and Periactin.  Now receiving Botox with another provider - appropriate  choice however would refrain from procedures <10 weeks apart due to the possibility of antibody formation as well as increasing weakness to the neck which would manifest as worsening neck pain    The patient has chronic migraines (G43.719) and suffers from headaches more than 15 days a month lasting more than 4 hours a day with no relief of symptoms despite trying multiple medications including but not limited to periactin,  anti-epileptics (gabapentin), beta blocker (atenelol), and antidepressants (duloxetine). Botox treatment was approved for chronic migraines in October 2010. The patient will be an ideal candidate for Botox. We are planning for 3 treatments 3 months apart and aiming for at least 50% improvement in the symptoms. If we see no improvement after 3 treatments, we will discontinue the injections.           Relevant Orders    Prior Authorization Order       Orthopedic    Cervical myofascial pain syndrome    Overview     With good response to intermittent cervical TPI, poor tolerance of neuromodulators.            Relevant Medications    lidocaine (PF) 10 mg/ml (1%) injection 80 mg (Completed) (Start on 1/13/2020  5:45 PM)    bupivacaine (PF) 0.25% (2.5 mg/ml) injection 30 mg (Completed) (Start on 1/13/2020  6:45 PM)    Bilateral occipital neuralgia    Overview     Secondary to cervical muscle spasm, with previous good response to blocks    Pain back to baseline, repeat nerve blocks today         Relevant Medications    lidocaine (PF) 10 mg/ml (1%) injection 80 mg (Completed) (Start on 1/13/2020  5:45 PM)    bupivacaine (PF) 0.25% (2.5 mg/ml) injection 30 mg (Completed) (Start on 1/13/2020  6:45 PM)              TESTING:  -- none    REFERRALS:  -- none    PREVENTION OF HEADACHE:  -- seek authorization to transition botox for chronic migraine to my clinic (Feb 10th 2020 will be approx 12 weeks from last injection)  -- lesser and greater occipital nerve blocks and cervical trigger point injections without  steroids done today to manage cervical myofascial pain occipital neuralgia    AS NEEDED TREATMENT OF HEADACHE (use total no more than 10 days per month):  -- no changes      Follow up in about 4 weeks (around 2/10/2020) for next botox.     Tammie Delaney MD

## 2020-01-13 NOTE — PATIENT INSTRUCTIONS
TESTING:  -- none    REFERRALS:  -- none    PREVENTION OF HEADACHE:  -- seek authorization to transition botox for chronic migraine to my clinic (Feb 10th 2020 will be approx 12 weeks from last injection)  -- lesser and greater occipital nerve blocks and cervical trigger point injections without steroids done today to manage cervical myofascial pain occipital neuralgia    AS NEEDED TREATMENT OF HEADACHE (use total no more than 10 days per month):  -- no changes

## 2020-01-13 NOTE — PROCEDURES
Procedures      PROCEDURE #1     Greater and Lesser Occipital Nerve Block, Bilateral     After risks and benefits were explained including bleeding, infection, worsening of the pain, damage to the area being injected, weakness, allergic reaction to medications, vascular injection, and nerve damage, signed consent was obtained. All questions were answered.     The area of the greater occipital nerve was identified as a point 1/3rds the distance  between the occipital protuberance and the ipsilateral mastoid process. The area of the lesser occipital nerve (if applicable) was identified as a point 2/3rds the distance between the occipital protuberance and the ipsilateral mastoid process.The identified areas were prepped three times with alcohol and the alcohol allowed to dry. Next, a 27 gauge 1 inch needle was placed in the anatomical area of the DALILA. Once reproduction of the pain was elicited and negative aspiration confirmed, I proceeded with the injection.     This was repeated for the MAYELIN.     The exact procedure was repeated on the contralateral side.     Adequacy of the block was confirmed by sensory examination demonstrating anesthesia in the territory of the respective nerve(s).     Medication used: Marcaine 0.25% (60%), Lidocaine 1% (40%)     Total volume infused:  10cc      -----------------------------------------------------------------------------------------------------------------    PROCEDURE #2     TRIGGER POINT INJECTION (CERVICAL), 2 muscles bilateral     Indication: cervical myofascial pain     Anesthesia: none     After risks and benefits were explained including bleeding, infection, worsening of the pain, damage to the area being injected, weakness, allergic reaction to medications, vascular injection, and nerve damage, signed consent was obtained. All questions were answered.     Trigger points were identified by palpation of tight muscle fibers with reproduction of patient's pain and referral  pattern upon palpation. The identified areas were prepped three times with alcohol and the alcohol allowed to dry. Next, a 27 gauge 1 inch needle was placed in the anatomical area of the trigger point ot be injected. Once negative aspiration of air and heme was confirmed, I proceeded with the injection.     Medications used: bupivicaine 0.25% (60%), lidocaine 1% (40%)    Total volume injected: 10cc     Trigger points injected:  -- right and left semispinalis capitus   -- right and left upper trapezius  -- left splenius capitus - 2 points     Estimated blood loss: none     The patient did tolerate the procedure well and there were no complications.    -------------------------------------    Total doses:  Bupivicaine: 30mg  Lidocaine: 80mg  Kenalog; 40mg     RTC as scheduled

## 2020-01-17 ENCOUNTER — PATIENT MESSAGE (OUTPATIENT)
Dept: NEUROLOGY | Facility: CLINIC | Age: 55
End: 2020-01-17

## 2020-01-24 ENCOUNTER — PATIENT MESSAGE (OUTPATIENT)
Dept: NEUROLOGY | Facility: CLINIC | Age: 55
End: 2020-01-24

## 2020-02-05 ENCOUNTER — PATIENT MESSAGE (OUTPATIENT)
Dept: NEUROLOGY | Facility: CLINIC | Age: 55
End: 2020-02-05

## 2020-02-07 ENCOUNTER — TELEPHONE (OUTPATIENT)
Dept: NEUROLOGY | Facility: CLINIC | Age: 55
End: 2020-02-07

## 2020-02-07 NOTE — TELEPHONE ENCOUNTER
----- Message from Corbin Nguyen sent at 2/7/2020  3:58 PM CST -----  Contact: pt  Type:  Patient Returning Call    Who Called:  pt  Who Left Message for Patient:  Queenie TOVAR (by portal)  Does the patient know what this is regarding?:  yes  Best Call Back Number:  [phone]  Additional Information:

## 2020-02-07 NOTE — TELEPHONE ENCOUNTER
----- Message from Valencia Gregg sent at 2/7/2020 10:33 AM CST -----  Contact: Saige with [employer]  Type: Needs Medical Advice    Who Called:  Saige with [employer]  Symptoms (please be specific):    How long has patient had these symptoms:    Pharmacy name and phone #:    Best Call Back Number: [phone]  Additional Information: Saige states the patient called regarding a authorization for her Botox. They are not showing a request for another authorization. Last one on their record is 02/13/20 code . Please submit for another year if needed. [employer] authorization phone number is [phone] if you want to call it in. Thanks!

## 2020-02-07 NOTE — TELEPHONE ENCOUNTER
Returned call and spoke with patient. Questions and concerns addressed. Patient verbalized understanding.

## 2020-02-19 ENCOUNTER — PATIENT MESSAGE (OUTPATIENT)
Dept: NEUROLOGY | Facility: CLINIC | Age: 55
End: 2020-02-19

## 2020-02-19 ENCOUNTER — TELEPHONE (OUTPATIENT)
Dept: NEUROLOGY | Facility: CLINIC | Age: 55
End: 2020-02-19

## 2020-02-19 NOTE — TELEPHONE ENCOUNTER
"Leandro.. The last note in the referral  "Called [facility] at [phone] spoke to Marge KENDALL she states that pt has an authorization on file under [facility] that expires 2/27/2020.  I informed Leandro Pereyra LPN via instant message.  We cannot initiate a new request at this point with an existing auth on file.  Thanks Krystle"

## 2020-02-28 ENCOUNTER — TELEPHONE (OUTPATIENT)
Dept: NEUROLOGY | Facility: CLINIC | Age: 55
End: 2020-02-28

## 2020-02-28 ENCOUNTER — PATIENT MESSAGE (OUTPATIENT)
Dept: NEUROLOGY | Facility: CLINIC | Age: 55
End: 2020-02-28

## 2020-02-28 NOTE — TELEPHONE ENCOUNTER
----- Message from Ying Beebe sent at 2/28/2020  2:12 PM CST -----  Contact: Prema piña w/ [employer]ekha durbin/ [employer] [place]  Type: Needs Medical Advice    Who Called:  Rekha/Prema  Best Call Back Number: [phone] or call pt @ [phone]  Additional Information: Pls call Rekha durbin/ [facility] regarding pt's approval for the botox injection. Approval # [id number] from 02/19/20-12/31/20

## 2020-03-02 NOTE — TELEPHONE ENCOUNTER
Called and spoke with patient. Appointment scheduled for tomorrow at 3 pm. Patient verbalized understanding.

## 2020-03-03 ENCOUNTER — PROCEDURE VISIT (OUTPATIENT)
Dept: NEUROLOGY | Facility: CLINIC | Age: 55
End: 2020-03-03
Payer: COMMERCIAL

## 2020-03-03 VITALS
RESPIRATION RATE: 16 BRPM | BODY MASS INDEX: 19.74 KG/M2 | HEIGHT: 66 IN | SYSTOLIC BLOOD PRESSURE: 117 MMHG | DIASTOLIC BLOOD PRESSURE: 70 MMHG | HEART RATE: 59 BPM | WEIGHT: 122.81 LBS

## 2020-03-03 DIAGNOSIS — M79.18 CERVICAL MYOFASCIAL PAIN SYNDROME: ICD-10-CM

## 2020-03-03 DIAGNOSIS — G43.719 INTRACTABLE CHRONIC MIGRAINE WITHOUT AURA AND WITHOUT STATUS MIGRAINOSUS: Primary | ICD-10-CM

## 2020-03-03 PROCEDURE — 64615 CHEMODENERV MUSC MIGRAINE: CPT | Mod: S$GLB,,, | Performed by: PSYCHIATRY & NEUROLOGY

## 2020-03-03 PROCEDURE — 64615 PR CHEMODENERVATION OF MUSCLE FOR CHRONIC MIGRAINE: ICD-10-PCS | Mod: S$GLB,,, | Performed by: PSYCHIATRY & NEUROLOGY

## 2020-03-03 RX ORDER — PHENAZOPYRIDINE HYDROCHLORIDE 200 MG/1
TABLET, FILM COATED ORAL
COMMUNITY
End: 2020-11-16

## 2020-03-03 NOTE — PATIENT INSTRUCTIONS
"Biofeedback    Biofeedback is a non-pharmacologic (non-medication), research-proven approach to treating headaches (both migraines and tension-type) and body pain as well as multiple other conditions including insomnia, anxiety, fibromyalgia, and irritable bowel syndrome. The technique involves learning how to gain control over various bodily functions that are not under our regular, conscious control, for example heart rate, skin temperature, muscle tension, and brain waves. Being able to control these functions - like raising your own skin temperature - is actually a natural pain reliever! Typically, people need to see a specialist who trains you to perform this technique, and then you practice it daily. The techniques you learn are with you forever, are free, cause no side effects, and can be done anytime, anyplace so Biofeedback is an excellent investment in your health.     Finding a provider to teach Biofeedback can be challenging. This is usually done by licensed psychologists or physical / occupational therapists so your doctor may refer you to one of these specialists.     If there are no specialists in your area, or it is not feasible for you to attend learning lessons, Biofeedback can actually be self-taught.The easiest way to do this is to practice with "thermal" Biofeedback, or biofeedback in which you learn to raise your skin temperature. The only equipment required for purchase is a finger thermometer, also called a digit thermometer, or a "stress thermometer" which fits over one finger. There are many options available online, they just need to simply measure your finger temperature, no need to be fancy! Once you have the equipment there are numerous how-to videos and web sites online how to perform the technique. I suggest starting with a video called "temperature training biofeedback" by Brandon Doty on Youtube as well as looking into the other audio-based and print resources available on the " corresponding web site called www.dstress.com.

## 2020-03-03 NOTE — PROCEDURES
Procedures     PROCEDURE PERFORMED: Botulinum toxin injection (43291)    CLINICAL INDICATION: G43.719        Problem List Items Addressed This Visit        Neuro    Intractable chronic migraine without aura and without status migrainosus - Primary    Overview     Headaches which phenotypically have migraine characteristics but have a strong cervicogenic / cervical myofacial component to them which is why we are treating with cervical TPI / muscle relaxer / and recommended antidepressant with norepinephrine reuptake quality.     Failed duloxetine and Periactin.  Now receiving Botox with another provider - appropriate choice however would refrain from procedures <10 weeks apart due to the possibility of antibody formation as well as increasing weakness to the neck which would manifest as worsening neck pain    The patient has chronic migraines (G43.719) and suffers from headaches more than 15 days a month lasting more than 4 hours a day with no relief of symptoms despite trying multiple medications including but not limited to periactin,  anti-epileptics (gabapentin), beta blocker (atenelol), and antidepressants (duloxetine). Botox treatment was approved for chronic migraines in October 2010. The patient will be an ideal candidate for Botox. We are planning for 3 treatments 3 months apart and aiming for at least 50% improvement in the symptoms. If we see no improvement after 3 treatments, we will discontinue the injections.                     A time out was conducted just before the start of the procedure to verify the correct patient and procedure, procedure location, and all relevant critical information.     Conventional methods of treatment such as multiple medications, both on and   off label have been tried including:    The patient has been unresponsive and refractory.The patient meets criteria for chronic headaches according to the ICHD-II, the patient has more than 15 headaches a month which last for more  than 4 hours a day.    Botox session number: 1  Last session was 12 weeks ago and resulted in improvement of: NA    I am aiming for at least 50%  improvement in the patient's symptoms. Frequency of treatment is every 3 months unless no response to the treatments, at which time we will discontinue the injections.     DESCRIPTION OF PROCEDURE: After obtaining informed consent and under   aseptic technique, a total of 155 units of botulinum toxin type A were   injected in the following muscles:     -- Procerus 5 units  --  5 units bilaterally  -- Frontalis 20 units  -- Temporalis 20 units bilaterally  -- Occipitalis 15 units bilaterally  -- Upper cervical paraspinals 10 units bilaterally  -- Trapezius 15 units bilaterally.     The patient tolerated the procedure well. There were no complications. The patient was given a prescription for repeat treatment in 12 weeks     Unavoidable waste 45 units    Tammie Delaney MD

## 2020-03-06 ENCOUNTER — TELEPHONE (OUTPATIENT)
Dept: PAIN MEDICINE | Facility: OTHER | Age: 55
End: 2020-03-06

## 2020-03-06 NOTE — TELEPHONE ENCOUNTER
Spoke with pt regarding FRP - she is interested but has a lot going on right now with tax season. She asked we reach back out in May.

## 2020-04-02 ENCOUNTER — PATIENT MESSAGE (OUTPATIENT)
Dept: NEUROLOGY | Facility: CLINIC | Age: 55
End: 2020-04-02

## 2020-04-02 RX ORDER — ONDANSETRON 8 MG/1
8 TABLET, ORALLY DISINTEGRATING ORAL EVERY 6 HOURS PRN
Qty: 30 TABLET | Refills: 11 | Status: SHIPPED | OUTPATIENT
Start: 2020-04-02

## 2020-04-25 PROBLEM — J45.30 ASTHMA, MILD PERSISTENT: Chronic | Status: ACTIVE | Noted: 2020-04-25

## 2020-04-29 PROBLEM — G43.909 MIGRAINES: Chronic | Status: ACTIVE | Noted: 2020-04-29

## 2020-04-29 PROBLEM — D80.9 OTHER SELECTIVE IMMUNOGLOBULIN DEFICIENCIES: Status: ACTIVE | Noted: 2020-04-29

## 2020-05-01 ENCOUNTER — PATIENT MESSAGE (OUTPATIENT)
Dept: NEUROLOGY | Facility: CLINIC | Age: 55
End: 2020-05-01

## 2020-05-01 DIAGNOSIS — G43.719 INTRACTABLE CHRONIC MIGRAINE WITHOUT AURA AND WITHOUT STATUS MIGRAINOSUS: Primary | ICD-10-CM

## 2020-06-04 ENCOUNTER — TELEPHONE (OUTPATIENT)
Dept: NEUROLOGY | Facility: CLINIC | Age: 55
End: 2020-06-04

## 2020-06-04 NOTE — TELEPHONE ENCOUNTER
Spoke with patient regarding a sooner appt for her botox injection. Verified that it has been 12 weeks from last injection which was on 3/3/2020. Notified that appt was changed. Date/Time/Location confirmed. Verbalized understanding.

## 2020-06-05 ENCOUNTER — PROCEDURE VISIT (OUTPATIENT)
Dept: NEUROLOGY | Facility: CLINIC | Age: 55
End: 2020-06-05
Payer: COMMERCIAL

## 2020-06-05 VITALS
HEART RATE: 73 BPM | HEIGHT: 66 IN | SYSTOLIC BLOOD PRESSURE: 109 MMHG | WEIGHT: 122 LBS | DIASTOLIC BLOOD PRESSURE: 80 MMHG | RESPIRATION RATE: 16 BRPM | BODY MASS INDEX: 19.61 KG/M2

## 2020-06-05 DIAGNOSIS — G43.719 INTRACTABLE CHRONIC MIGRAINE WITHOUT AURA AND WITHOUT STATUS MIGRAINOSUS: ICD-10-CM

## 2020-06-05 DIAGNOSIS — K14.6 BURNING MOUTH SYNDROME: Primary | ICD-10-CM

## 2020-06-05 PROCEDURE — 99212 OFFICE O/P EST SF 10 MIN: CPT | Mod: 25,S$GLB,, | Performed by: PSYCHIATRY & NEUROLOGY

## 2020-06-05 PROCEDURE — 99212 PR OFFICE/OUTPT VISIT, EST, LEVL II, 10-19 MIN: ICD-10-PCS | Mod: 25,S$GLB,, | Performed by: PSYCHIATRY & NEUROLOGY

## 2020-06-05 PROCEDURE — 64615 CHEMODENERV MUSC MIGRAINE: CPT | Mod: S$GLB,,, | Performed by: PSYCHIATRY & NEUROLOGY

## 2020-06-05 PROCEDURE — 64615 PR CHEMODENERVATION OF MUSCLE FOR CHRONIC MIGRAINE: ICD-10-PCS | Mod: S$GLB,,, | Performed by: PSYCHIATRY & NEUROLOGY

## 2020-06-05 RX ORDER — TESTOSTERONE GEL, 1% 10 MG/G
GEL TRANSDERMAL
COMMUNITY
Start: 2020-03-12

## 2020-06-05 NOTE — PROCEDURES
Procedures       PROCEDURE PERFORMED: Botulinum toxin injection (15954)    CLINICAL INDICATION: G43.719        Problem List Items Addressed This Visit        Neuro    Intractable chronic migraine without aura and without status migrainosus - Primary    Overview     Headaches which phenotypically have migraine characteristics but have a strong cervicogenic / cervical myofacial component to them which is why we are treating with cervical TPI / muscle relaxer / and recommended antidepressant with norepinephrine reuptake quality.     Failed duloxetine and Periactin.  Now receiving Botox with another provider - appropriate choice however would refrain from procedures <10 weeks apart due to the possibility of antibody formation as well as increasing weakness to the neck which would manifest as worsening neck pain    The patient has chronic migraines (G43.719) and suffers from headaches more than 15 days a month lasting more than 4 hours a day with no relief of symptoms despite trying multiple medications including but not limited to periactin,  anti-epileptics (gabapentin), beta blocker (atenelol), and antidepressants (duloxetine). Botox treatment was approved for chronic migraines in October 2010. The patient will be an ideal candidate for Botox. We are planning for 3 treatments 3 months apart and aiming for at least 50% improvement in the symptoms. If we see no improvement after 3 treatments, we will discontinue the injections.              ENT    Burning mouth syndrome    Overview     We also discussed burning mouth syndrome - patient has clearly tied it to her migraines, she only feels it when her migraines occur. She also feels the same sensation in her hands. Meaning, they are overly sensitive to temperature. We discussed that there is no physical correlation between migraine and burning mouth syndrome that I know of, however, I have seen in the past BMS spontaneously improve when migraines improved. I also  explained that it is normal for any pain-related condition to flare up when migraines are flaring. 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 I explained that her symptoms - widespread pain and unrefreshing sleep, suggested fibromyalgia which is a diagnosis I had considered in the past. In the past I had proposed use of TCA or SNRI to patient to treat both conditions - widespread pain and migraines - but she had declined. I had suggested turmeric in the past but she felt worse on it.     Plan:  -- recommend to begin over the counter alpha lipoic acid supplement for nerve health  -- fibromyalgia screening paperwork given today for patient to do at home  -- contact information for Dr. Shamar Francis given for ketamine infusions - explained that they have seen good results particularly in women who had co-existant migraine, fibromyalgia, mood disorder (anxiety)                   A time out was conducted just before the start of the procedure to verify the correct patient and procedure, procedure location, and all relevant critical information.     Conventional methods of treatment such as multiple medications, both on and   off label have been tried including:    The patient has been unresponsive and refractory.The patient meets criteria for chronic headaches according to the ICHD-II, the patient has more than 15 headaches a month which last for more than 4 hours a day.    Botox session number: 2  Last session was 12 weeks ago and resulted in improvement of: >50% improvement in headache frequency     I am aiming for at least 50%  improvement in the patient's symptoms. Frequency of treatment is every 3 months unless no response to the treatments, at which time we will discontinue the injections.     DESCRIPTION OF PROCEDURE: After obtaining informed consent and under   aseptic technique, a total of 137.5 units of botulinum toxin type A were   injected in the  following muscles:     -- Procerus 2.5 units  --  2.5 units bilaterally  -- Frontalis 10 units  -- Temporalis 20 units bilaterally  -- Occipitalis 15 units bilaterally  -- Upper cervical paraspinals 10 units bilaterally  -- Trapezius 15 units bilaterally.     The patient tolerated the procedure well. There were no complications. The patient was given a prescription for repeat treatment in 12 weeks     Unavoidable waste 62.5 units    Tammie Delnaey MD

## 2020-07-14 ENCOUNTER — TELEPHONE (OUTPATIENT)
Dept: PAIN MEDICINE | Facility: OTHER | Age: 55
End: 2020-07-14

## 2020-08-11 DIAGNOSIS — G43.009 MIGRAINE WITHOUT AURA: ICD-10-CM

## 2020-08-11 RX ORDER — SUMATRIPTAN SUCCINATE 100 MG/1
TABLET ORAL
Qty: 9 TABLET | Refills: 11 | Status: SHIPPED | OUTPATIENT
Start: 2020-08-11 | End: 2021-05-14

## 2020-08-28 ENCOUNTER — PROCEDURE VISIT (OUTPATIENT)
Dept: NEUROLOGY | Facility: CLINIC | Age: 55
End: 2020-08-28
Payer: COMMERCIAL

## 2020-08-28 VITALS
DIASTOLIC BLOOD PRESSURE: 81 MMHG | TEMPERATURE: 98 F | SYSTOLIC BLOOD PRESSURE: 114 MMHG | BODY MASS INDEX: 19.93 KG/M2 | HEART RATE: 61 BPM | WEIGHT: 123.44 LBS

## 2020-08-28 DIAGNOSIS — G43.719 INTRACTABLE CHRONIC MIGRAINE WITHOUT AURA AND WITHOUT STATUS MIGRAINOSUS: Primary | ICD-10-CM

## 2020-08-28 PROCEDURE — 64615 PR CHEMODENERVATION OF MUSCLE FOR CHRONIC MIGRAINE: ICD-10-PCS | Mod: S$GLB,,, | Performed by: PSYCHIATRY & NEUROLOGY

## 2020-08-28 PROCEDURE — 64615 CHEMODENERV MUSC MIGRAINE: CPT | Mod: S$GLB,,, | Performed by: PSYCHIATRY & NEUROLOGY

## 2020-08-28 NOTE — PROCEDURES
Procedures       PROCEDURE PERFORMED: Botulinum toxin injection ([id number])    CLINICAL INDICATION: G43.719        Problem List Items Addressed This Visit        Neuro    Intractable chronic migraine without aura and without status migrainosus - Primary    Overview     Headaches which phenotypically have migraine characteristics but have a strong cervicogenic / cervical myofacial component to them which is why we are treating with cervical TPI / muscle relaxer / and recommended antidepressant with norepinephrine reuptake quality.     Failed duloxetine and Periactin.  Now receiving Botox with another provider - appropriate choice however would refrain from procedures <10 weeks apart due to the possibility of antibody formation as well as increasing weakness to the neck which would manifest as worsening neck pain    The patient has chronic migraines (G43.719) and suffers from headaches more than 15 days a month lasting more than 4 hours a day with no relief of symptoms despite trying multiple medications including but not limited to periactin,  anti-epileptics (gabapentin), beta blocker (atenelol), and antidepressants (duloxetine). Botox treatment was approved for chronic migraines in October 2010. The patient will be an ideal candidate for Botox. We are planning for 3 treatments 3 months apart and aiming for at least 50% improvement in the symptoms. If we see no improvement after 3 treatments, we will discontinue the injections.                     A time out was conducted just before the start of the procedure to verify the correct patient and procedure, procedure location, and all relevant critical information.     Conventional methods of treatment such as multiple medications, both on and   off label have been tried including:    The patient has been unresponsive and refractory.The patient meets criteria for chronic headaches according to the ICHD-II, the patient has more than 15 headaches a month which last for more  than 4 hours a day.    Botox session number: 3  Last session was 12 weeks ago and resulted in improvement of: >50% improvement in headache frequency     I am aiming for at least 50%  improvement in the patient's symptoms. Frequency of treatment is every 3 months unless no response to the treatments, at which time we will discontinue the injections.     DESCRIPTION OF PROCEDURE: After obtaining informed consent and under   aseptic technique, a total of 137.5 units of botulinum toxin type A were   injected in the following muscles:     -- Procerus 2.5 units  --  2.5 units bilaterally  -- Frontalis 10 units  -- Temporalis 20 units bilaterally  -- Occipitalis 15 units bilaterally  -- Upper cervical paraspinals 10 units bilaterally  -- Trapezius 15 units bilaterally.     The patient tolerated the procedure well. There were no complications. The patient was given a prescription for repeat treatment in 12 weeks     Unavoidable waste 62.5 units    Tammie Delaney MD

## 2020-09-15 ENCOUNTER — TELEPHONE (OUTPATIENT)
Dept: NEUROLOGY | Facility: CLINIC | Age: 55
End: 2020-09-15

## 2020-09-16 ENCOUNTER — TELEPHONE (OUTPATIENT)
Dept: NEUROLOGY | Facility: CLINIC | Age: 55
End: 2020-09-16

## 2020-09-16 NOTE — TELEPHONE ENCOUNTER
----- Message from Teressa Zhong sent at 9/16/2020  1:22 PM CDT -----  Contact: pt at [phone]  Type: Needs Medical Advice  Who Called:  pt  Best Call Back Number: [phone]  Additional Information: Patient is calling to reschedule her appt for today.An emergency appt with her parents came up around same time.Please call back and advise.

## 2020-09-16 NOTE — TELEPHONE ENCOUNTER
Spoke with Ms Stargardter about the cancellation of procedure today.  Pt is taking care of a sick family member and will need to reschedule at a later time.

## 2020-09-17 ENCOUNTER — TELEPHONE (OUTPATIENT)
Dept: NEUROLOGY | Facility: CLINIC | Age: 55
End: 2020-09-17

## 2020-09-17 DIAGNOSIS — R41.3 MEMORY LOSS: Primary | ICD-10-CM

## 2020-09-17 NOTE — TELEPHONE ENCOUNTER
----- Message from Margarita Fernandez sent at 9/17/2020  4:34 PM CDT -----  Contact: pt  Type: Needs Medical Advice    Who Called:  PT  Best Call Back Number: [phone]  Additional Information: Requesting a call back regarding seeing a doctor for memery lost  Please Advise ---Thank you

## 2020-09-18 NOTE — TELEPHONE ENCOUNTER
Referral placed. I don't see where this was dicussed with Dr. Delaney? She may benefit from seeing me for a follow up and we can do a MOCA.

## 2020-09-18 NOTE — TELEPHONE ENCOUNTER
Spoke with Ms Stargardter about referral for memory.  She stated she was inquiring for her Mother in law.  Advised her to have one of her Mother in Law physicians referral to Dr Senia Jansen or Dr Corbin Rosado here at [facility]

## 2020-11-17 ENCOUNTER — PROCEDURE VISIT (OUTPATIENT)
Dept: NEUROLOGY | Facility: CLINIC | Age: 55
End: 2020-11-17
Payer: COMMERCIAL

## 2020-11-17 VITALS
DIASTOLIC BLOOD PRESSURE: 69 MMHG | HEART RATE: 78 BPM | TEMPERATURE: 98 F | SYSTOLIC BLOOD PRESSURE: 99 MMHG | BODY MASS INDEX: 20.18 KG/M2 | WEIGHT: 125 LBS | RESPIRATION RATE: 20 BRPM

## 2020-11-17 DIAGNOSIS — G43.719 INTRACTABLE CHRONIC MIGRAINE WITHOUT AURA AND WITHOUT STATUS MIGRAINOSUS: Primary | ICD-10-CM

## 2020-11-17 PROCEDURE — 64615 PR CHEMODENERVATION OF MUSCLE FOR CHRONIC MIGRAINE: ICD-10-PCS | Mod: S$GLB,,, | Performed by: PSYCHIATRY & NEUROLOGY

## 2020-11-17 PROCEDURE — 64615 CHEMODENERV MUSC MIGRAINE: CPT | Mod: S$GLB,,, | Performed by: PSYCHIATRY & NEUROLOGY

## 2020-11-17 NOTE — PROCEDURES
Procedures       PROCEDURE PERFORMED: Botulinum toxin injection (07569)    CLINICAL INDICATION: G43.719        Problem List Items Addressed This Visit     None              A time out was conducted just before the start of the procedure to verify the correct patient and procedure, procedure location, and all relevant critical information.     Conventional methods of treatment such as multiple medications, both on and   off label have been tried including:    The patient has been unresponsive and refractory.The patient meets criteria for chronic headaches according to the ICHD-II, the patient has more than 15 headaches a month which last for more than 4 hours a day.    Botox session number: 3  Last session was 12 weeks ago and resulted in improvement of: >50% improvement in headache frequency     I am aiming for at least 50%  improvement in the patient's symptoms. Frequency of treatment is every 3 months unless no response to the treatments, at which time we will discontinue the injections.     DESCRIPTION OF PROCEDURE: After obtaining informed consent and under   aseptic technique, a total of 137.5 units of botulinum toxin type A were   injected in the following muscles:     -- Procerus 2.5 units  --  2.5 units bilaterally  -- Frontalis 10 units  -- Temporalis 20 units bilaterally  -- Occipitalis 15 units bilaterally  -- Upper cervical paraspinals 10 units bilaterally  -- Trapezius 15 units bilaterally.     The patient tolerated the procedure well. There were no complications. The patient was given a prescription for repeat treatment in 12 weeks     Unavoidable waste 62.5 units      August Smiley M.D  Medical Director, Headache and Facial Pain  [facility]

## 2020-12-11 ENCOUNTER — PATIENT MESSAGE (OUTPATIENT)
Dept: NEUROLOGY | Facility: CLINIC | Age: 55
End: 2020-12-11

## 2020-12-14 ENCOUNTER — TELEPHONE (OUTPATIENT)
Dept: NEUROLOGY | Facility: CLINIC | Age: 55
End: 2020-12-14

## 2020-12-14 NOTE — TELEPHONE ENCOUNTER
Spoke with pt having her describe her symptoms since she hit her head. She stated she has a feeling of bruising when she touches that side of her head, she continues to have nausea, and feels that it triggered a migraine, that she has not had for sometime now.  Reviewed information with Shantelle Bourgeois NP scheduled for 12/15/20 @ 10:30

## 2020-12-15 ENCOUNTER — NURSE TRIAGE (OUTPATIENT)
Dept: ADMINISTRATIVE | Facility: CLINIC | Age: 55
End: 2020-12-15

## 2020-12-15 ENCOUNTER — TELEPHONE (OUTPATIENT)
Dept: NEUROLOGY | Facility: CLINIC | Age: 55
End: 2020-12-15

## 2020-12-15 ENCOUNTER — PATIENT MESSAGE (OUTPATIENT)
Dept: NEUROLOGY | Facility: CLINIC | Age: 55
End: 2020-12-15

## 2020-12-15 ENCOUNTER — OFFICE VISIT (OUTPATIENT)
Dept: NEUROLOGY | Facility: CLINIC | Age: 55
End: 2020-12-15
Payer: COMMERCIAL

## 2020-12-15 VITALS — WEIGHT: 124.75 LBS | BODY MASS INDEX: 20.14 KG/M2

## 2020-12-15 DIAGNOSIS — G44.309 POST-TRAUMATIC HEADACHE, NOT INTRACTABLE, UNSPECIFIED CHRONICITY PATTERN: ICD-10-CM

## 2020-12-15 DIAGNOSIS — M54.81 BILATERAL OCCIPITAL NEURALGIA: Primary | ICD-10-CM

## 2020-12-15 PROCEDURE — 64405 PR NERVE BLOCK INJ, ANES/STEROID, OCCIPITAL: ICD-10-PCS | Mod: 51,50,S$GLB, | Performed by: NURSE PRACTITIONER

## 2020-12-15 PROCEDURE — 64450 NJX AA&/STRD OTHER PN/BRANCH: CPT | Mod: 50,S$GLB,, | Performed by: NURSE PRACTITIONER

## 2020-12-15 PROCEDURE — 64405 NJX AA&/STRD GR OCPL NRV: CPT | Mod: 51,50,S$GLB, | Performed by: NURSE PRACTITIONER

## 2020-12-15 PROCEDURE — 99214 PR OFFICE/OUTPT VISIT, EST, LEVL IV, 30-39 MIN: ICD-10-PCS | Mod: 25,S$GLB,, | Performed by: NURSE PRACTITIONER

## 2020-12-15 PROCEDURE — 1125F PR PAIN SEVERITY QUANTIFIED, PAIN PRESENT: ICD-10-PCS | Mod: S$GLB,,, | Performed by: NURSE PRACTITIONER

## 2020-12-15 PROCEDURE — 1125F AMNT PAIN NOTED PAIN PRSNT: CPT | Mod: S$GLB,,, | Performed by: NURSE PRACTITIONER

## 2020-12-15 PROCEDURE — 3008F PR BODY MASS INDEX (BMI) DOCUMENTED: ICD-10-PCS | Mod: CPTII,S$GLB,, | Performed by: NURSE PRACTITIONER

## 2020-12-15 PROCEDURE — 99999 PR PBB SHADOW E&M-EST. PATIENT-LVL IV: CPT | Mod: PBBFAC,,, | Performed by: NURSE PRACTITIONER

## 2020-12-15 PROCEDURE — 3008F BODY MASS INDEX DOCD: CPT | Mod: CPTII,S$GLB,, | Performed by: NURSE PRACTITIONER

## 2020-12-15 PROCEDURE — 64450 PR NERVE BLOCK INJ, ANES/STEROID, OTHER PERIPHERAL: ICD-10-PCS | Mod: 50,S$GLB,, | Performed by: NURSE PRACTITIONER

## 2020-12-15 PROCEDURE — 99999 PR PBB SHADOW E&M-EST. PATIENT-LVL IV: ICD-10-PCS | Mod: PBBFAC,,, | Performed by: NURSE PRACTITIONER

## 2020-12-15 PROCEDURE — 99214 OFFICE O/P EST MOD 30 MIN: CPT | Mod: 25,S$GLB,, | Performed by: NURSE PRACTITIONER

## 2020-12-15 RX ORDER — TRIAMCINOLONE ACETONIDE 40 MG/ML
40 INJECTION, SUSPENSION INTRA-ARTICULAR; INTRAMUSCULAR
Status: COMPLETED | OUTPATIENT
Start: 2020-12-15 | End: 2020-12-15

## 2020-12-15 RX ORDER — UBIDECARENONE 75 MG
500 CAPSULE ORAL DAILY
COMMUNITY
End: 2022-11-29

## 2020-12-15 RX ORDER — LIDOCAINE HYDROCHLORIDE 10 MG/ML
10 INJECTION, SOLUTION EPIDURAL; INFILTRATION; INTRACAUDAL; PERINEURAL
Status: COMPLETED | OUTPATIENT
Start: 2020-12-15 | End: 2020-12-15

## 2020-12-15 RX ORDER — BUPIVACAINE HYDROCHLORIDE 2.5 MG/ML
10 INJECTION, SOLUTION EPIDURAL; INFILTRATION; INTRACAUDAL
Status: COMPLETED | OUTPATIENT
Start: 2020-12-15 | End: 2020-12-15

## 2020-12-15 RX ADMIN — LIDOCAINE HYDROCHLORIDE 10 MG: 10 INJECTION, SOLUTION EPIDURAL; INFILTRATION; INTRACAUDAL; PERINEURAL at 11:12

## 2020-12-15 RX ADMIN — TRIAMCINOLONE ACETONIDE 40 MG: 40 INJECTION, SUSPENSION INTRA-ARTICULAR; INTRAMUSCULAR at 11:12

## 2020-12-15 RX ADMIN — BUPIVACAINE HYDROCHLORIDE 25 MG: 2.5 INJECTION, SOLUTION EPIDURAL; INFILTRATION; INTRACAUDAL at 11:12

## 2020-12-15 NOTE — TELEPHONE ENCOUNTER
[facility] Tyo Jacome with [place] specialty. For Insurance would like the order changed to Diagnostic imaging.    Order for CT scan needed for facility change. MS Bourgeois order to diagnositis Imaging.    Diagnostic imaging. in [place]  Secure chat with Ms Bourgeoisashish her nurse send fax.      Reason for Disposition   Information only question and nurse able to answer    Protocols used: NO PROTOCOL AVAILABLE - INFORMATION ONLY-[place]

## 2020-12-15 NOTE — PROGRESS NOTES
"Date of service: 12/15/2020  Referring provider: No ref. provider found    Subjective:      Chief complaint: Headache       Patient ID: Brenda C Stargardter is a 55 y.o. lady with hypothyroidism, gastroparesis, anxiety, ADHD, and migraines presenting for the follow up of migraines and neck pain. Sees Dr. Ben Cadet for pain management.     History of Present Illness    INTERVAL HISTORY 12/15/2020    Patient's last office visit was with Dr. Delaney in 2019. She has been on Botox for migraines since 3/3/2020. Third session was 11/17/2020.    She hit her head 12/10/2020. She "headbutted" the corner of a wooden shelf. She stumbled but did not fall. Initially lots of pain and "almost immediatly triggering a headache". She applied ice. She used aleve and sumatriptan. The next morning, she woke up with "almost like a hangover" type feeling. She reports she is much worse. She has sharp pain in the left side of the head with sensation of constant pressure. Accompanied by nausea and quizziness at times. No vomiting. She has a hard time concentrating and reports a slower response time. After she hit her head, she reports left eye vision was blurry. Vision seems to be back to normal. Current headache is left top of the head and by left temple. She also has some pressure begind the left eye. Current pain 5 with range 4-9.     INTERVAL HISTORY - 5/6/19     Patient had zoster vaccine done 5 days ago in the left arm which triggered her neck go into spasm especially on the left side. Previous to this she had been doing very well especially with the cervical myofascial release that she had completed in [place].  She has been unable to manage with oral therapies at home.  Her current pain score is eight.      INTERVAL HISTORY - 1/11/19     Her last TPI was 8/7/18.  She reports this wore off in approximately November.  In the interim Dr. Lopez has started Botox for chronic migraine management and she has found this helpful.  Her neck " pain has returned to its baseline and she would like her trigger point injections repeated.    Pain is in the neck, occipital region, and the sides of head.  She is having more difficulty turning her head.  Her current pain score is four with a range of 1-9.    She remains on nightly tizanidine which is helpful.  She takes hydrocodone p.r.n. as well as Zofran.    She is having a new problem which is pain in the right thumb with burning traveling from the thumb upper forearm on the medial aspect.  She reports this started when she was massaging her daughter and vigorously using her wrist.  She says that now every time she uses her wrist she has the same symptoms.  It has been several weeks.    INTERVAL HISTORY - 2/23/18     Today she reports she was doing better on the periactin and tizanidine until one week ago when she did a new weight while doing lunges and things went downhill after that. Today she reports he is much worse. She has sharp and pain in the back of her head making her scalp sensitive. Her current pain score is 7 with a range of 0 to 10.     Dr. Cadet with do her cervical RFA in the near future.     She reports on the regimen of norco, periactin, and tizanidine she has become constipated refractory to dulcolax and miralax.     INTERVAL HISTORY - 1/3/18     Seen 2 months ago at which point I initiated periactin and performed blocks/TPI.     Today she reports she is about to same to worse. Her pain is still cervical radiating to the posterior head. Her current pain is 3-4 with a range from 1 to 10.  She had a severe migraine with severe vomiting at the start of December and needed to go to the [facility] with a cold preceding it. She is only taking the periactin intermittently. Not using tizanidine daily. The TPI do work well.     INTERVAL HISTORY - 10/24/17     I performed bilateral DALILA/Sofia block and cervical TPI on 8/22/17. Prior to that I had also recommended starting on duloxetine, physical therapy, and  tizanidine.     Today she reports she is a little better in terms of her headache and neck pain. The injections did help. A few weeks ago had a terrible vertiginous migraine lasting a whole day, her first one, treated with zofran. Headache characteristics are unchanged frm below, but current severity is 3-4 with range up to 6-7. Her lower back and her left leg have been causing pain lately same as documented below. Her first lumbar SHORTY was done around mid August 2017.     She had some GI issues going on and was feeling better so did not go through the physical therapy. For the same reason did not try the cymbalta because she is not sure how this would affect her stomach.    She is seeing an allergist now.     Original Headache / Pain History - 8/8/17   Age at onset and course over time: around 2013 after cave excursion in which she became ill, was exposed to bats, had severe migraine lasting months. She believes they originate from her neck pain, which is daily. Dr. Caedt has done CMBB injections (5-6x) in the past which have helped. Has not moved on to rhizotomy. Each CMBB lasts months to years. Last cervical imaging was in 2016.   Location: starts in neck, radiates forward to left side of head (mainly), top of head   Quality: pressure, tight band, throbbing   Severity: best 1/10, worst 1010, current 5/10   Duration: hours - days   Frequency: daily neck pain, headache frequency varies but usually >15 days per month   Alleviated by: sleep, darkness, massage, heat, ice, medication   Exacerbated by: looking up or looking down for extended period of time, light, noise, bending over, stress, food   Associated with: scalp sensitivity, photo/phono/osmophobia, loss of appetite, nasal congestion, problems with memory/concentration, neck tightness   Sleep habits: needs pain medication and ambien to get to sleep due to the pain   Caffeine intake: 1-2 per day     Other:  Last 2-3 months left leg/thigh hurting. Lost strength in  that leg. Limping. No back pain. Wraps around knee stops mid-calf. Throbbing/ shooting. No lumbar MRI. Norco helps somewhat with that pain.     Current acute treatment:  -- excedrin  -- aleve   -- norco 7.5 -  1/2 in afternoon and 1/2 before bed (daily)  -- 1/2 ambien   -- baclofen   -- imitrex for severe      Current prevention:  -- tizanidine 2mg at night  -- botox for chronic migraine - Dr Cadet    Previously tried/failed acute treatment:  -- imitrex - less effective then relpax  -- relpax - chest pain   -- prednisone   -- reglan - weakness   -- toradol   -- aleve  -- motrin  -- Bupap   -- Goody's  -- occipital nerve block  -- cervical trigger point  -- cervical epidural steroid injection   -- piriformis injection     Previously tried/failed preventative treatment:  -- periactin 4mg nightly - intermittent   -- gabapentin - blurring vision   -- Gralise   -- bilateral DALILA/MAYELIN and cervical TPI 8/22/17    Review of patient's allergies indicates:   Allergen Reactions    Reglan [metoclopramide hcl] Other (See Comments)     Weakness could not hold an object in her hands.    Benadryl [diphenhydramine hcl] Other (See Comments)     Restless leg, aggitation    Strattera [atomoxetine] Palpitations    Phenergan [promethazine] Other (See Comments)     Akathisia      Prednisone Nausea And Vomiting    Relpax [eletriptan hbr] Other (See Comments)     Chest pain    Sulfa (sulfonamide antibiotics) Other (See Comments)     Muscle weakness     Current Outpatient Medications   Medication Sig Dispense Refill    albuterol sulfate (PROAIR RESPICLICK) 90 mcg/actuation inhaler Inhale 1-2 puffs into the lungs every 4 (four) hours as needed for Wheezing. Rescue 1 each 1    clotrimazole-betamethasone 1-0.05% (LOTRISONE) cream Apply topically as needed.       COLLAGEN MISC by Misc.(Non-Drug; Combo Route) route.      cyanocobalamin 500 MCG tablet Take 500 mcg by mouth once daily.      estradioL (VIVELLE-DOT) 0.075 mg/24 hr 1 patch  twice a week.      FEXOFENADINE HCL (ALLEGRA ORAL) Take by mouth.      fluticasone-vilanterol (BREO) 100-25 mcg/dose diskus inhaler Inhale 1 puff into the lungs once daily. Controller 1 each 11    hydrocodone-acetaminophen 7.5-325mg (NORCO) 7.5-325 mg per tablet Take 1 tablet by mouth nightly as needed.       levothyroxine (SYNTHROID) 50 MCG tablet TAKE 1 TABLET BY MOUTH EVERY DAY IN THE MORNING 90 tablet 3    MG TRISILICATE/ALH/NAHCO3/AA (GAVISCON ORAL) Take 5 mLs by mouth as needed.       onabotulinumtoxina (BOTOX) 200 unit SolR Inject 155 Units into the muscle into the head/neck area every 90 days 1 each 3    ondansetron (ZOFRAN-ODT) 8 MG TbDL Take 1 tablet (8 mg total) by mouth every 6 (six) hours as needed (nausea). 30 tablet 11    PROCTOSOL HC 2.5 % rectal cream Place 1 Dose rectally as needed.       sumatriptan (IMITREX) 100 MG tablet TAKE 1 BY MOUTH EVERY 2 HRS. AS NEEDED FOR MIGRAINE. UP  MG/DAY. HOLD FOR BP > 150 MM SYSTOLIC 9 tablet 11    testosterone (ANDROGEL) 1 % (50 mg/5 gram) GlPk APPLY 0.5 ML TO INNER THIGH EVERY DAY AS DIRECTED      tiZANidine (ZANAFLEX) 4 MG tablet TAKE 1 TABLET (4 MG TOTAL) BY MOUTH NIGHTLY AS NEEDED. 90 tablet 3    zolpidem (AMBIEN) 5 MG Tab Take 1 tablet (5 mg total) by mouth nightly as needed (sleeplessness). 90 tablet 1    baclofen (LIORESAL) 10 MG tablet Take 10 mg by mouth as needed.       fluticasone (FLOVENT HFA) 220 mcg/actuation inhaler Inhale 1 puff into the lungs 2 (two) times daily. Controller 12 g 0     Current Facility-Administered Medications   Medication Dose Route Frequency Provider Last Rate Last Dose    onabotulinumtoxina injection 200 Units  200 Units Intramuscular Q90 Days Tammie Delaney MD   200 Units at 08/28/20 1515    onabotulinumtoxina injection 200 Units  200 Units Intramuscular Q90 Days Henna Smiley MD   200 Units at 11/17/20 1014    triamcinolone acetonide injection 40 mg  40 mg Intramuscular 1 time in Clinic/[facility] Triny  JORDAN Matos MD           Past Medical History  Past Medical History:   Diagnosis Date    Abdominal or pelvic swelling, mass, or lump, right lower quadrant     Anxiety     Arthritis     Asthma, mild persistent     Cervicalgia     COPD (chronic obstructive pulmonary disease)     Enlargement of lymph nodes     Eosinophilic esophagitis     Gastroparesis     Headache(784.0)     Heartburn     Helicobacter pylori (H. pylori)     History of positive PPD, untreated     History of psychiatric hospitalization     after son , hospitalized for eating disorder    Hx of psychiatric care     Hypothyroidism     Migraines     Occipital neuralgia     Other selective immunoglobulin deficiencies     Other syndromes affecting cervical region     Psychiatric problem     Spondylosis of cervical spine     Therapy     Unspecified asthma(493.90)     Unspecified disorder of thyroid     Unspecified viral meningitis        Past Surgical History  Past Surgical History:   Procedure Laterality Date    AXILLARY ABCESS IRRIGATION AND DEBRIDEMENT      CHOLECYSTECTOMY      EXCISIONAL HEMORRHOIDECTOMY  12/21/15    HYSTERECTOMY  2015    LYMPH NODE BIOPSY Right 2014    Right inguinal lymph node biopsy    MOUTH SURGERY      TOTAL ABDOMINAL HYSTERECTOMY W/ BILATERAL SALPINGOOPHORECTOMY  2015    VARICOSE VEIN SURGERY         Family History  Family History   Problem Relation Age of Onset    Stroke Mother     Hypertension Mother     Cerebral aneurysm Mother     Hypertension Father     Heart disease Father     Leukemia Father     Pneumonia Sister     No Known Problems Brother     No Known Problems Brother     No Known Problems Sister     Breast cancer Paternal Grandmother         70's       Social History  Social History     Socioeconomic History    Marital status:      Spouse name: Ren    Number of children: 5    Years of education: 13    Highest education level: Not on file   Occupational History     Occupation: Self employed   Social Needs    Financial resource strain: Not on file    Food insecurity     Worry: Not on file     Inability: Not on file    Transportation needs     Medical: Not on file     Non-medical: Not on file   Tobacco Use    Smoking status: Never Smoker    Smokeless tobacco: Never Used   Substance and Sexual Activity    Alcohol use: Yes     Frequency: 2-4 times a month     Drinks per session: 1 or 2     Binge frequency: Never     Comment: Social    Drug use: Never    Sexual activity: Yes     Partners: Male     Birth control/protection: See Surgical Hx, None   Lifestyle    Physical activity     Days per week: 5 days     Minutes per session: 40 min    Stress: To some extent   Relationships    Social connections     Talks on phone: More than three times a week     Gets together: Once a week     Attends Mandaeism service: Not on file     Active member of club or organization: Yes     Attends meetings of clubs or organizations: More than 4 times per year     Relationship status:    Other Topics Concern    Patient feels they ought to cut down on drinking/drug use Not Asked    Patient annoyed by others criticizing their drinking/drug use Not Asked    Patient has felt bad or guilty about drinking/drug use Not Asked    Patient has had a drink/used drugs as an eye opener in the AM Not Asked   Social History Narrative    Not on file        Review of Systems    14-point review of systems as follows:   No check kelli indicates NEGATIVE response   Constitutional: [] weight loss, [] change to appetite   Eyes: [] change in vision, [] double vision   Ears, nose, mouth, throat: [] frequent nose bleeds, [] ringing in the ears   Respiratory: [] cough, [] wheezing   Cardiovascular: [] chest pain, [] palpitations   Gastrointestinal: [] jaundice, [] nausea/vomiting   Genitourinary: [] incontinence, [] burning with urination   Hematologic/lymphatic: [] easy bruising/bleeding, [] night sweats    Neurological: [] numbness, [] weakness   Endocrine: [] fatigue, [] heat/cold intolerance   Allergy/Immunologic: [] fevers, [] chills   Musculoskeletal: [] muscle pain, [] joint pain   Psychiatric: [] thoughts of harming self/others, [] depression   Integumentary: [] rashes, [] sores that do not heal       Objective:        There were no vitals filed for this visit.  Body mass index is 20.14 kg/m².    Physical Exam    12/15/2020  Neuro exam:    Mental status:  Awake, attentive, Alert, oriented to self, place, year and month     Cranial Nerves:  Smell was not formally evaluated  Cranial Nerves II - XII: intact  Pursuits were smooth, normal saccades, no nystagmus OU  Motor - facial movement was symmetrical and normal     Palate moved well and was symmetrical with normal palatal and oral sensation  Tongue movements were full     Coordination:     Rapid alternating movements and rapid finger tapping - normal bilaterally  Finger to nose - normal and symmetric bilaterally   Heel to shin test - normal and symmetric bilaterally   Arm roll - smooth and symmetric   No intentional or positional tremor     CERVICAL SPINE:  INSPECTION: no scars   ROM:   MUSCLE SPASM:   DALILA tender: bilateral   SPURLING: neg    Data Review:     No results found for this or any previous visit.    Lab Results   Component Value Date     11/09/2020     09/18/2015    K 3.8 11/09/2020    K 4.1 09/18/2015    CL 94 (L) 11/09/2020     09/18/2015    CO2 31 11/09/2020    BUN 16 11/09/2020    CREATININE 0.94 11/09/2020    CREATININE 0.92 09/18/2015    GLU 76 11/09/2020    AST 21 11/09/2020    ALT 14 11/09/2020    ALBUMIN 4.5 11/09/2020    ALBUMIN 3.6 09/18/2015    PROT 7.1 11/09/2020    BILITOT 0.7 11/09/2020    CHOL 219 (H) 11/09/2020    HDL 93 11/09/2020    LDLCALC 103 (H) 11/09/2020    LDLCALC 77 09/18/2015    TRIG 135 11/09/2020       Lab Results   Component Value Date    WBC 5.9 11/09/2020    HGB 13.5 11/09/2020    HCT 41.6 11/09/2020  "   MCV 94.3 11/09/2020     11/09/2020       Lab Results   Component Value Date    TSH 1.37 11/09/2020       Assessment & Plan:       Problem List Items Addressed This Visit        Orthopedic    Bilateral occipital neuralgia - Primary    Overview     Secondary to cervical muscle spasm, with previous good response to blocks    Pain back to baseline, repeat nerve blocks today         Relevant Medications    bupivacaine (PF) 0.25% (2.5 mg/ml) injection 25 mg (Completed)    lidocaine (PF) 10 mg/ml (1%) injection 10 mg (Completed)    triamcinolone acetonide injection 40 mg (Completed)      Other Visit Diagnoses     Post-traumatic headache, not intractable, unspecified chronicity pattern        Relevant Orders    CT Head Without Contrast              ADDENDUM  Results from [facility] faxed and indicate "normal CT of the brain". Phoned patient and discussed with her.       Follow up in 7 weeks (on 2/2/2021) for botox.     30 minutes spent on this visit, with greater than 50% of the time spent on discussion of diagnosis and treatment/coordination of care as documented above.    Shantelle Bourgeois NP    "

## 2020-12-15 NOTE — TELEPHONE ENCOUNTER
This is JAlfonzo Power MD office and we didn't order any CT scan   Shantelle cardona ordered   Please contact her

## 2020-12-15 NOTE — TELEPHONE ENCOUNTER
----- Message from Ashley Hernández sent at 12/15/2020  2:01 PM CST -----  Contact: patient  Type: Needs Medical Advice  Who Called:  patient  Symptoms (please be specific):  na  How long has patient had these symptoms:  woody  Pharmacy name and phone #:  woody  Best Call Back Number: [phone]  Additional Information: Patient states she would like CT orders faxed to  [phone] to [facility] [place] Phone [phone].  Please call to advise.  Patient has apt. At 3:30 today.Thanks!

## 2020-12-15 NOTE — PROCEDURES
Procedures      Greater and Lesser Occipital Nerve Block, Bilateral     After risks and benefits were explained including bleeding, infection, worsening of the pain, damage to the area being injected, weakness, allergic reaction to medications, vascular injection, and nerve damage, signed consent was obtained. All questions were answered.     The area of the greater occipital nerve was identified as a point 1/3rds the distance  between the occipital protuberance and the ipsilateral mastoid process. The area of the lesser occipital nerve (if applicable) was identified as a point 2/3rds the distance between the occipital protuberance and the ipsilateral mastoid process.The identified areas were prepped three times with alcohol and the alcohol allowed to dry. Next, a 27 gauge 1 inch needle was placed in the anatomical area of the DALILA. Once reproduction of the pain was elicited and negative aspiration confirmed, I proceeded with the injection.     This was repeated for the MAYELIN.     The exact procedure was repeated on the contralateral side.     Adequacy of the block was confirmed by sensory examination demonstrating anesthesia in the territory of the respective nerve(s).     Medication used: Marcaine 0.25% (60%), Lidocaine 1% (40%)     Total volume infused:  10cc      WILIAM Garcia

## 2021-01-04 ENCOUNTER — OFFICE VISIT (OUTPATIENT)
Dept: NEUROLOGY | Facility: CLINIC | Age: 56
End: 2021-01-04
Payer: COMMERCIAL

## 2021-01-04 VITALS
WEIGHT: 122.81 LBS | HEIGHT: 66 IN | DIASTOLIC BLOOD PRESSURE: 67 MMHG | HEART RATE: 67 BPM | BODY MASS INDEX: 19.74 KG/M2 | RESPIRATION RATE: 17 BRPM | TEMPERATURE: 97 F | SYSTOLIC BLOOD PRESSURE: 113 MMHG

## 2021-01-04 DIAGNOSIS — M54.81 BILATERAL OCCIPITAL NEURALGIA: ICD-10-CM

## 2021-01-04 DIAGNOSIS — G43.719 INTRACTABLE CHRONIC MIGRAINE WITHOUT AURA AND WITHOUT STATUS MIGRAINOSUS: Primary | ICD-10-CM

## 2021-01-04 PROCEDURE — 99999 PR PBB SHADOW E&M-EST. PATIENT-LVL V: ICD-10-PCS | Mod: PBBFAC,,, | Performed by: NURSE PRACTITIONER

## 2021-01-04 PROCEDURE — 1125F AMNT PAIN NOTED PAIN PRSNT: CPT | Mod: S$GLB,,, | Performed by: NURSE PRACTITIONER

## 2021-01-04 PROCEDURE — 99214 OFFICE O/P EST MOD 30 MIN: CPT | Mod: S$GLB,,, | Performed by: NURSE PRACTITIONER

## 2021-01-04 PROCEDURE — 3008F BODY MASS INDEX DOCD: CPT | Mod: CPTII,S$GLB,, | Performed by: NURSE PRACTITIONER

## 2021-01-04 PROCEDURE — 3008F PR BODY MASS INDEX (BMI) DOCUMENTED: ICD-10-PCS | Mod: CPTII,S$GLB,, | Performed by: NURSE PRACTITIONER

## 2021-01-04 PROCEDURE — 1125F PR PAIN SEVERITY QUANTIFIED, PAIN PRESENT: ICD-10-PCS | Mod: S$GLB,,, | Performed by: NURSE PRACTITIONER

## 2021-01-04 PROCEDURE — 99214 PR OFFICE/OUTPT VISIT, EST, LEVL IV, 30-39 MIN: ICD-10-PCS | Mod: S$GLB,,, | Performed by: NURSE PRACTITIONER

## 2021-01-04 PROCEDURE — 99999 PR PBB SHADOW E&M-EST. PATIENT-LVL V: CPT | Mod: PBBFAC,,, | Performed by: NURSE PRACTITIONER

## 2021-01-04 RX ORDER — RIMEGEPANT SULFATE 75 MG/75MG
75 TABLET, ORALLY DISINTEGRATING ORAL DAILY PRN
Qty: 8 TABLET | Refills: 2 | Status: SHIPPED | OUTPATIENT
Start: 2021-01-04 | End: 2021-03-30 | Stop reason: SDUPTHER

## 2021-01-04 RX ORDER — GALCANEZUMAB 120 MG/ML
120 INJECTION, SOLUTION SUBCUTANEOUS
Qty: 1 ML | Refills: 11 | Status: SHIPPED | OUTPATIENT
Start: 2021-01-04 | End: 2022-02-02 | Stop reason: SDUPTHER

## 2021-01-11 DIAGNOSIS — M79.18 CERVICAL MYOFASCIAL PAIN SYNDROME: ICD-10-CM

## 2021-01-11 RX ORDER — TIZANIDINE 4 MG/1
4 TABLET ORAL NIGHTLY PRN
Qty: 90 TABLET | Refills: 3 | Status: SHIPPED | OUTPATIENT
Start: 2021-01-11 | End: 2022-01-20

## 2021-01-14 ENCOUNTER — TELEPHONE (OUTPATIENT)
Dept: PHARMACY | Facility: CLINIC | Age: 56
End: 2021-01-14

## 2021-02-02 ENCOUNTER — PROCEDURE VISIT (OUTPATIENT)
Dept: NEUROLOGY | Facility: CLINIC | Age: 56
End: 2021-02-02
Payer: COMMERCIAL

## 2021-02-02 VITALS
HEIGHT: 66 IN | RESPIRATION RATE: 17 BRPM | HEART RATE: 56 BPM | BODY MASS INDEX: 19.7 KG/M2 | TEMPERATURE: 99 F | DIASTOLIC BLOOD PRESSURE: 73 MMHG | WEIGHT: 122.56 LBS | SYSTOLIC BLOOD PRESSURE: 111 MMHG

## 2021-02-02 DIAGNOSIS — G43.719 INTRACTABLE CHRONIC MIGRAINE WITHOUT AURA AND WITHOUT STATUS MIGRAINOSUS: Primary | ICD-10-CM

## 2021-02-02 PROCEDURE — 64615 CHEMODENERV MUSC MIGRAINE: CPT | Mod: S$GLB,,, | Performed by: PSYCHIATRY & NEUROLOGY

## 2021-02-02 PROCEDURE — 64615 PR CHEMODENERVATION OF MUSCLE FOR CHRONIC MIGRAINE: ICD-10-PCS | Mod: S$GLB,,, | Performed by: PSYCHIATRY & NEUROLOGY

## 2021-02-09 ENCOUNTER — TELEPHONE (OUTPATIENT)
Dept: PHARMACY | Facility: CLINIC | Age: 56
End: 2021-02-09

## 2021-03-23 ENCOUNTER — IMMUNIZATION (OUTPATIENT)
Dept: FAMILY MEDICINE | Facility: CLINIC | Age: 56
End: 2021-03-23
Payer: COMMERCIAL

## 2021-03-23 DIAGNOSIS — Z23 NEED FOR VACCINATION: Primary | ICD-10-CM

## 2021-03-23 PROCEDURE — 0001A COVID-19, MRNA, LNP-S, PF, 30 MCG/0.3 ML DOSE VACCINE: CPT | Mod: CV19,,, | Performed by: FAMILY MEDICINE

## 2021-03-23 PROCEDURE — 91300 COVID-19, MRNA, LNP-S, PF, 30 MCG/0.3 ML DOSE VACCINE: CPT | Mod: ,,, | Performed by: FAMILY MEDICINE

## 2021-03-23 PROCEDURE — 91300 COVID-19, MRNA, LNP-S, PF, 30 MCG/0.3 ML DOSE VACCINE: ICD-10-PCS | Mod: ,,, | Performed by: FAMILY MEDICINE

## 2021-03-23 PROCEDURE — 0001A COVID-19, MRNA, LNP-S, PF, 30 MCG/0.3 ML DOSE VACCINE: ICD-10-PCS | Mod: CV19,,, | Performed by: FAMILY MEDICINE

## 2021-03-30 DIAGNOSIS — G43.719 INTRACTABLE CHRONIC MIGRAINE WITHOUT AURA AND WITHOUT STATUS MIGRAINOSUS: ICD-10-CM

## 2021-03-30 RX ORDER — RIMEGEPANT SULFATE 75 MG/75MG
75 TABLET, ORALLY DISINTEGRATING ORAL DAILY PRN
Qty: 8 TABLET | Refills: 5 | Status: SHIPPED | OUTPATIENT
Start: 2021-03-30 | End: 2021-08-24 | Stop reason: SDUPTHER

## 2021-04-06 NOTE — PROGRESS NOTES
[facility] Outpatient Physical Therapy Daily Note    Name: Brenda C Stargardt  Clinic Number: [id number]  Date of Treatment: 3/29/2019   Diagnosis:   Encounter Diagnoses   Name Primary?    Decreased range of motion of neck     Decreased strength     Posture imbalance        Visit number: 2 (30 authorized)  Start date: 3/15/19  POC End date: 4/26/19  Authorization end date: 3/11/2020    Time in: 1:05  Time Out: 2:10  Total Treatment Time: 60    Precautions: standard    Subjective:    Prema reports performing exercises some since initial evaluation and has noticed an increase in awareness of posture while standing. She has not been to appts here due to her mother moving and her mother in law having pneumonia and having to help out with both of them. Patient reports their pain to be 4/10 on a 0-10 scale with 0 being no pain and 10 being the worst pain imaginable.    Objective    The patient received the following supervised modalities after being cleared for contradictions: IFC Electrical Stimulation:  Prema received IFC Electrical Stimulation for pain control applied to the cervical spine and upper back. Pt received stimulation at 100 % scan at a frequency of 80-150Mhz for 10 minutes with MH to cervical spine and upper back. Prema tolderated treatment well without any adverse effects.      Prema received the following manual therapy techniques: Myofacial release and IASTM were applied to the: B levator scapula, mid trap attachment on medial border of scapular, infraspinatus muscle belly and B upper trap for 25 minutes.     Pt received the following manual therapy techniques x 25 min. applied to B levator scap to include: dry needling with trigger point/manual therapy techniques to the B levator scapu with needles at two points along levator scap insertion point on superior angle of scapula. Patient gave written consent to undergo dry needling and this is in the media section in pts chart. All  needles were removed and changes in signs and symptoms were assessed. Dry needling was performed to decrease inflammation, increase circulation, decrease pain and restore homeostasis.      Written Home Exercises Provided: none this visit. Continue with current HEP.   Pt demo good understanding of the education provided. Prema demonstrated good return demonstration of activities.     Assessment:   Pt responded well to IFC, no increase in symptoms. Dry needling performed only at two points along insertion of levator scapula on superior angle of scapula due to patient having poor reaction to dry needling in the past with increased needles. Pt presents with increased tightness along superior medial border of scapular and rhomboid minor B which improved with manual techniques.     Pt will continue to benefit from skilled PT intervention. Medical Necessity is demonstrated by:  Pain limits function of effected part for some activities, Unable to participate fully in daily activities, Requires skilled supervision to complete and progress HEP and Weakness.    Patient is making good progress towards established goals.    New/Revised goals: none  Short Term Goals:  3 weeks  1. Pt will present with increased rhomboid strength by one half grade for increased stability to cervical spine for decreased pain.   2. Pt will participate in cervical strength tests without report of m. Spasms after testing.   3. Pt will present with increased cervical AROM into rotation to the left by 5 degrees for increased ease with turning head during a conversation.   4. Pt will report decreased pain to cervical spine for paint at worst of 8/10 at end of busy day.    Long Term Goals: 6 weeks  1. Pt will present with increased rhomboid strength by one full grade for increased stability to cervical spine for decreased pain.   2. Pt will present with in cervical strength one half grade for increased stability and decreased pain to cervical spine and  head.   3. Pt will present with increased cervical AROM into rotation to the left by 10 degrees for increased ease with turning head during a conversation.   4. Pt will report decreased pain to cervical spine for paint at worst of 6/10 at end of busy day.  5. Pt will be independent with HEP and self management of symptoms.    Plan:  Continue with established Plan of Care towards PT goals.      unk

## 2021-04-13 ENCOUNTER — IMMUNIZATION (OUTPATIENT)
Dept: FAMILY MEDICINE | Facility: CLINIC | Age: 56
End: 2021-04-13
Payer: COMMERCIAL

## 2021-04-13 DIAGNOSIS — Z23 NEED FOR VACCINATION: Primary | ICD-10-CM

## 2021-04-13 PROCEDURE — 91300 COVID-19, MRNA, LNP-S, PF, 30 MCG/0.3 ML DOSE VACCINE: CPT | Mod: PBBFAC | Performed by: INTERNAL MEDICINE

## 2021-04-13 PROCEDURE — 0002A COVID-19, MRNA, LNP-S, PF, 30 MCG/0.3 ML DOSE VACCINE: CPT | Mod: PBBFAC | Performed by: INTERNAL MEDICINE

## 2021-04-19 ENCOUNTER — PATIENT MESSAGE (OUTPATIENT)
Dept: NEUROLOGY | Facility: CLINIC | Age: 56
End: 2021-04-19

## 2021-04-19 ENCOUNTER — TELEPHONE (OUTPATIENT)
Dept: NEUROLOGY | Facility: CLINIC | Age: 56
End: 2021-04-19

## 2021-04-20 ENCOUNTER — PROCEDURE VISIT (OUTPATIENT)
Dept: NEUROLOGY | Facility: CLINIC | Age: 56
End: 2021-04-20
Payer: COMMERCIAL

## 2021-04-20 ENCOUNTER — PATIENT MESSAGE (OUTPATIENT)
Dept: NEUROLOGY | Facility: CLINIC | Age: 56
End: 2021-04-20

## 2021-04-20 VITALS
DIASTOLIC BLOOD PRESSURE: 68 MMHG | HEART RATE: 56 BPM | TEMPERATURE: 98 F | SYSTOLIC BLOOD PRESSURE: 111 MMHG | RESPIRATION RATE: 17 BRPM | WEIGHT: 122.69 LBS | BODY MASS INDEX: 19.72 KG/M2 | HEIGHT: 66 IN

## 2021-04-20 DIAGNOSIS — G43.719 INTRACTABLE CHRONIC MIGRAINE WITHOUT AURA AND WITHOUT STATUS MIGRAINOSUS: Primary | ICD-10-CM

## 2021-04-20 PROCEDURE — 64615 PR CHEMODENERVATION OF MUSCLE FOR CHRONIC MIGRAINE: ICD-10-PCS | Mod: S$GLB,,, | Performed by: PSYCHIATRY & NEUROLOGY

## 2021-04-20 PROCEDURE — 64615 CHEMODENERV MUSC MIGRAINE: CPT | Mod: S$GLB,,, | Performed by: PSYCHIATRY & NEUROLOGY

## 2021-04-27 ENCOUNTER — OFFICE VISIT (OUTPATIENT)
Dept: PSYCHIATRY | Facility: CLINIC | Age: 56
End: 2021-04-27
Payer: COMMERCIAL

## 2021-04-27 DIAGNOSIS — F41.1 GAD (GENERALIZED ANXIETY DISORDER): Primary | ICD-10-CM

## 2021-04-27 PROCEDURE — 90791 PR PSYCHIATRIC DIAGNOSTIC EVALUATION: ICD-10-PCS | Mod: S$GLB,,, | Performed by: SOCIAL WORKER

## 2021-04-27 PROCEDURE — 90791 PSYCH DIAGNOSTIC EVALUATION: CPT | Mod: S$GLB,,, | Performed by: SOCIAL WORKER

## 2021-04-27 PROCEDURE — 99999 PR PBB SHADOW E&M-EST. PATIENT-LVL IV: ICD-10-PCS | Mod: PBBFAC,,, | Performed by: SOCIAL WORKER

## 2021-04-27 PROCEDURE — 99999 PR PBB SHADOW E&M-EST. PATIENT-LVL IV: CPT | Mod: PBBFAC,,, | Performed by: SOCIAL WORKER

## 2021-05-05 ENCOUNTER — OFFICE VISIT (OUTPATIENT)
Dept: PSYCHIATRY | Facility: CLINIC | Age: 56
End: 2021-05-05
Payer: COMMERCIAL

## 2021-05-05 DIAGNOSIS — F41.1 GAD (GENERALIZED ANXIETY DISORDER): Primary | ICD-10-CM

## 2021-05-05 PROCEDURE — 90834 PR PSYCHOTHERAPY W/PATIENT, 45 MIN: ICD-10-PCS | Mod: S$GLB,,, | Performed by: SOCIAL WORKER

## 2021-05-05 PROCEDURE — 99999 PR PBB SHADOW E&M-EST. PATIENT-LVL III: ICD-10-PCS | Mod: PBBFAC,,, | Performed by: SOCIAL WORKER

## 2021-05-05 PROCEDURE — 90834 PSYTX W PT 45 MINUTES: CPT | Mod: S$GLB,,, | Performed by: SOCIAL WORKER

## 2021-05-05 PROCEDURE — 99999 PR PBB SHADOW E&M-EST. PATIENT-LVL III: CPT | Mod: PBBFAC,,, | Performed by: SOCIAL WORKER

## 2021-05-14 DIAGNOSIS — M25.511 RIGHT SHOULDER PAIN: Primary | ICD-10-CM

## 2021-05-20 ENCOUNTER — OFFICE VISIT (OUTPATIENT)
Dept: PSYCHIATRY | Facility: CLINIC | Age: 56
End: 2021-05-20
Payer: COMMERCIAL

## 2021-05-20 ENCOUNTER — CLINICAL SUPPORT (OUTPATIENT)
Dept: REHABILITATION | Facility: HOSPITAL | Age: 56
End: 2021-05-20
Payer: COMMERCIAL

## 2021-05-20 DIAGNOSIS — M25.611 DECREASED RANGE OF MOTION OF RIGHT SHOULDER: ICD-10-CM

## 2021-05-20 DIAGNOSIS — M25.511 RIGHT SHOULDER PAIN: ICD-10-CM

## 2021-05-20 DIAGNOSIS — F41.1 GAD (GENERALIZED ANXIETY DISORDER): Primary | ICD-10-CM

## 2021-05-20 DIAGNOSIS — F90.0 ADHD (ATTENTION DEFICIT HYPERACTIVITY DISORDER), INATTENTIVE TYPE: ICD-10-CM

## 2021-05-20 DIAGNOSIS — M25.511 RIGHT SHOULDER PAIN, UNSPECIFIED CHRONICITY: ICD-10-CM

## 2021-05-20 DIAGNOSIS — R29.898 DECREASED STRENGTH OF UPPER EXTREMITY: ICD-10-CM

## 2021-05-20 PROBLEM — R53.1 DECREASED STRENGTH: Status: RESOLVED | Noted: 2019-03-29 | Resolved: 2021-05-20

## 2021-05-20 PROBLEM — R29.3 POSTURE IMBALANCE: Status: RESOLVED | Noted: 2019-03-29 | Resolved: 2021-05-20

## 2021-05-20 PROCEDURE — 99999 PR PBB SHADOW E&M-EST. PATIENT-LVL II: ICD-10-PCS | Mod: PBBFAC,,, | Performed by: SOCIAL WORKER

## 2021-05-20 PROCEDURE — 99999 PR PBB SHADOW E&M-EST. PATIENT-LVL II: CPT | Mod: PBBFAC,,, | Performed by: SOCIAL WORKER

## 2021-05-20 PROCEDURE — 97162 PT EVAL MOD COMPLEX 30 MIN: CPT | Mod: PN

## 2021-05-20 PROCEDURE — 97110 THERAPEUTIC EXERCISES: CPT | Mod: PN

## 2021-05-20 PROCEDURE — 90834 PR PSYCHOTHERAPY W/PATIENT, 45 MIN: ICD-10-PCS | Mod: S$GLB,,, | Performed by: SOCIAL WORKER

## 2021-05-20 PROCEDURE — 90834 PSYTX W PT 45 MINUTES: CPT | Mod: S$GLB,,, | Performed by: SOCIAL WORKER

## 2021-05-26 ENCOUNTER — CLINICAL SUPPORT (OUTPATIENT)
Dept: REHABILITATION | Facility: HOSPITAL | Age: 56
End: 2021-05-26
Payer: COMMERCIAL

## 2021-05-26 DIAGNOSIS — R29.898 DECREASED STRENGTH OF UPPER EXTREMITY: ICD-10-CM

## 2021-05-26 DIAGNOSIS — M25.511 ACUTE PAIN OF RIGHT SHOULDER: Primary | ICD-10-CM

## 2021-05-26 DIAGNOSIS — M25.611 DECREASED RANGE OF MOTION OF RIGHT SHOULDER: ICD-10-CM

## 2021-05-26 PROCEDURE — 97140 MANUAL THERAPY 1/> REGIONS: CPT | Mod: PN

## 2021-05-26 PROCEDURE — 97110 THERAPEUTIC EXERCISES: CPT | Mod: PN

## 2021-05-26 PROCEDURE — 97035 APP MDLTY 1+ULTRASOUND EA 15: CPT | Mod: PN

## 2021-06-02 ENCOUNTER — CLINICAL SUPPORT (OUTPATIENT)
Dept: REHABILITATION | Facility: HOSPITAL | Age: 56
End: 2021-06-02
Payer: COMMERCIAL

## 2021-06-02 DIAGNOSIS — M25.511 ACUTE PAIN OF RIGHT SHOULDER: Primary | ICD-10-CM

## 2021-06-02 DIAGNOSIS — M25.611 DECREASED RANGE OF MOTION OF RIGHT SHOULDER: ICD-10-CM

## 2021-06-02 DIAGNOSIS — R29.898 DECREASED STRENGTH OF UPPER EXTREMITY: ICD-10-CM

## 2021-06-02 PROCEDURE — 97110 THERAPEUTIC EXERCISES: CPT | Mod: PN

## 2021-06-02 PROCEDURE — 97140 MANUAL THERAPY 1/> REGIONS: CPT | Mod: PN

## 2021-06-04 ENCOUNTER — OFFICE VISIT (OUTPATIENT)
Dept: PSYCHIATRY | Facility: CLINIC | Age: 56
End: 2021-06-04
Payer: COMMERCIAL

## 2021-06-04 DIAGNOSIS — F41.1 GAD (GENERALIZED ANXIETY DISORDER): Primary | ICD-10-CM

## 2021-06-04 PROCEDURE — 99999 PR PBB SHADOW E&M-EST. PATIENT-LVL II: ICD-10-PCS | Mod: PBBFAC,,, | Performed by: SOCIAL WORKER

## 2021-06-04 PROCEDURE — 90834 PSYTX W PT 45 MINUTES: CPT | Mod: S$GLB,,, | Performed by: SOCIAL WORKER

## 2021-06-04 PROCEDURE — 99999 PR PBB SHADOW E&M-EST. PATIENT-LVL II: CPT | Mod: PBBFAC,,, | Performed by: SOCIAL WORKER

## 2021-06-04 PROCEDURE — 90834 PR PSYCHOTHERAPY W/PATIENT, 45 MIN: ICD-10-PCS | Mod: S$GLB,,, | Performed by: SOCIAL WORKER

## 2021-06-07 ENCOUNTER — PATIENT MESSAGE (OUTPATIENT)
Dept: PSYCHIATRY | Facility: CLINIC | Age: 56
End: 2021-06-07

## 2021-06-08 ENCOUNTER — CLINICAL SUPPORT (OUTPATIENT)
Dept: REHABILITATION | Facility: HOSPITAL | Age: 56
End: 2021-06-08
Payer: COMMERCIAL

## 2021-06-08 ENCOUNTER — OFFICE VISIT (OUTPATIENT)
Dept: PSYCHIATRY | Facility: CLINIC | Age: 56
End: 2021-06-08
Payer: COMMERCIAL

## 2021-06-08 DIAGNOSIS — R29.898 DECREASED STRENGTH OF UPPER EXTREMITY: ICD-10-CM

## 2021-06-08 DIAGNOSIS — F41.1 GAD (GENERALIZED ANXIETY DISORDER): Primary | ICD-10-CM

## 2021-06-08 DIAGNOSIS — M25.611 DECREASED RANGE OF MOTION OF RIGHT SHOULDER: ICD-10-CM

## 2021-06-08 DIAGNOSIS — M25.511 ACUTE PAIN OF RIGHT SHOULDER: Primary | ICD-10-CM

## 2021-06-08 PROCEDURE — 99999 PR PBB SHADOW E&M-EST. PATIENT-LVL II: CPT | Mod: PBBFAC,,, | Performed by: SOCIAL WORKER

## 2021-06-08 PROCEDURE — 90834 PR PSYCHOTHERAPY W/PATIENT, 45 MIN: ICD-10-PCS | Mod: S$GLB,,, | Performed by: SOCIAL WORKER

## 2021-06-08 PROCEDURE — 97140 MANUAL THERAPY 1/> REGIONS: CPT | Mod: PN

## 2021-06-08 PROCEDURE — 90834 PSYTX W PT 45 MINUTES: CPT | Mod: S$GLB,,, | Performed by: SOCIAL WORKER

## 2021-06-08 PROCEDURE — 99999 PR PBB SHADOW E&M-EST. PATIENT-LVL II: ICD-10-PCS | Mod: PBBFAC,,, | Performed by: SOCIAL WORKER

## 2021-06-22 ENCOUNTER — CLINICAL SUPPORT (OUTPATIENT)
Dept: REHABILITATION | Facility: HOSPITAL | Age: 56
End: 2021-06-22
Payer: COMMERCIAL

## 2021-06-22 DIAGNOSIS — M25.511 CHRONIC RIGHT SHOULDER PAIN: Primary | ICD-10-CM

## 2021-06-22 DIAGNOSIS — G89.29 CHRONIC RIGHT SHOULDER PAIN: Primary | ICD-10-CM

## 2021-06-22 DIAGNOSIS — R29.898 DECREASED STRENGTH OF UPPER EXTREMITY: ICD-10-CM

## 2021-06-22 DIAGNOSIS — M25.611 DECREASED RANGE OF MOTION OF RIGHT SHOULDER: ICD-10-CM

## 2021-06-22 PROCEDURE — 97140 MANUAL THERAPY 1/> REGIONS: CPT | Mod: PN

## 2021-06-22 PROCEDURE — 97110 THERAPEUTIC EXERCISES: CPT | Mod: PN

## 2021-06-29 ENCOUNTER — CLINICAL SUPPORT (OUTPATIENT)
Dept: REHABILITATION | Facility: HOSPITAL | Age: 56
End: 2021-06-29
Payer: COMMERCIAL

## 2021-06-29 DIAGNOSIS — M25.511 ACUTE PAIN OF RIGHT SHOULDER: Primary | ICD-10-CM

## 2021-06-29 DIAGNOSIS — R29.898 DECREASED STRENGTH OF UPPER EXTREMITY: ICD-10-CM

## 2021-06-29 DIAGNOSIS — M25.611 DECREASED RANGE OF MOTION OF RIGHT SHOULDER: ICD-10-CM

## 2021-06-29 PROCEDURE — 97014 ELECTRIC STIMULATION THERAPY: CPT | Mod: PN

## 2021-06-29 PROCEDURE — 97110 THERAPEUTIC EXERCISES: CPT | Mod: PN

## 2021-06-29 PROCEDURE — 97140 MANUAL THERAPY 1/> REGIONS: CPT | Mod: PN

## 2021-07-06 ENCOUNTER — PROCEDURE VISIT (OUTPATIENT)
Dept: NEUROLOGY | Facility: CLINIC | Age: 56
End: 2021-07-06
Payer: COMMERCIAL

## 2021-07-06 VITALS
HEIGHT: 66 IN | BODY MASS INDEX: 20.05 KG/M2 | RESPIRATION RATE: 17 BRPM | WEIGHT: 124.75 LBS | TEMPERATURE: 99 F | SYSTOLIC BLOOD PRESSURE: 104 MMHG | HEART RATE: 62 BPM | DIASTOLIC BLOOD PRESSURE: 74 MMHG

## 2021-07-06 DIAGNOSIS — G43.719 INTRACTABLE CHRONIC MIGRAINE WITHOUT AURA AND WITHOUT STATUS MIGRAINOSUS: Primary | ICD-10-CM

## 2021-07-06 PROCEDURE — 64615 PR CHEMODENERVATION OF MUSCLE FOR CHRONIC MIGRAINE: ICD-10-PCS | Mod: S$GLB,,, | Performed by: PSYCHIATRY & NEUROLOGY

## 2021-07-06 PROCEDURE — 64615 CHEMODENERV MUSC MIGRAINE: CPT | Mod: S$GLB,,, | Performed by: PSYCHIATRY & NEUROLOGY

## 2021-07-13 ENCOUNTER — CLINICAL SUPPORT (OUTPATIENT)
Dept: REHABILITATION | Facility: HOSPITAL | Age: 56
End: 2021-07-13
Payer: COMMERCIAL

## 2021-07-13 DIAGNOSIS — R29.898 DECREASED STRENGTH OF UPPER EXTREMITY: ICD-10-CM

## 2021-07-13 DIAGNOSIS — M25.511 ACUTE PAIN OF RIGHT SHOULDER: Primary | ICD-10-CM

## 2021-07-13 DIAGNOSIS — M25.611 DECREASED RANGE OF MOTION OF RIGHT SHOULDER: ICD-10-CM

## 2021-07-13 PROCEDURE — 97110 THERAPEUTIC EXERCISES: CPT | Mod: PN

## 2021-07-13 PROCEDURE — 97140 MANUAL THERAPY 1/> REGIONS: CPT | Mod: PN

## 2021-07-13 PROCEDURE — 97014 ELECTRIC STIMULATION THERAPY: CPT | Mod: PN

## 2021-07-22 ENCOUNTER — OFFICE VISIT (OUTPATIENT)
Dept: PSYCHIATRY | Facility: CLINIC | Age: 56
End: 2021-07-22
Payer: COMMERCIAL

## 2021-07-22 DIAGNOSIS — F41.1 GAD (GENERALIZED ANXIETY DISORDER): Primary | ICD-10-CM

## 2021-07-22 PROCEDURE — 90834 PSYTX W PT 45 MINUTES: CPT | Mod: 95,,, | Performed by: SOCIAL WORKER

## 2021-07-22 PROCEDURE — 1159F PR MEDICATION LIST DOCUMENTED IN MEDICAL RECORD: ICD-10-PCS | Mod: CPTII,,, | Performed by: SOCIAL WORKER

## 2021-07-22 PROCEDURE — 1159F MED LIST DOCD IN RCRD: CPT | Mod: CPTII,,, | Performed by: SOCIAL WORKER

## 2021-07-22 PROCEDURE — 90834 PR PSYCHOTHERAPY W/PATIENT, 45 MIN: ICD-10-PCS | Mod: 95,,, | Performed by: SOCIAL WORKER

## 2021-07-27 ENCOUNTER — CLINICAL SUPPORT (OUTPATIENT)
Dept: REHABILITATION | Facility: HOSPITAL | Age: 56
End: 2021-07-27
Payer: COMMERCIAL

## 2021-07-27 DIAGNOSIS — R29.898 DECREASED STRENGTH OF UPPER EXTREMITY: ICD-10-CM

## 2021-07-27 DIAGNOSIS — M25.511 ACUTE PAIN OF RIGHT SHOULDER: Primary | ICD-10-CM

## 2021-07-27 DIAGNOSIS — M25.611 DECREASED RANGE OF MOTION OF RIGHT SHOULDER: ICD-10-CM

## 2021-07-27 PROCEDURE — 97014 ELECTRIC STIMULATION THERAPY: CPT | Mod: PN

## 2021-07-27 PROCEDURE — 97140 MANUAL THERAPY 1/> REGIONS: CPT | Mod: PN

## 2021-07-27 PROCEDURE — 97110 THERAPEUTIC EXERCISES: CPT | Mod: PN

## 2021-08-10 ENCOUNTER — CLINICAL SUPPORT (OUTPATIENT)
Dept: REHABILITATION | Facility: HOSPITAL | Age: 56
End: 2021-08-10
Payer: COMMERCIAL

## 2021-08-10 DIAGNOSIS — R29.898 DECREASED STRENGTH OF UPPER EXTREMITY: ICD-10-CM

## 2021-08-10 DIAGNOSIS — M25.611 DECREASED RANGE OF MOTION OF RIGHT SHOULDER: ICD-10-CM

## 2021-08-10 DIAGNOSIS — M25.511 ACUTE PAIN OF RIGHT SHOULDER: Primary | ICD-10-CM

## 2021-08-10 PROCEDURE — 97140 MANUAL THERAPY 1/> REGIONS: CPT | Mod: PN

## 2021-08-10 PROCEDURE — 97110 THERAPEUTIC EXERCISES: CPT | Mod: PN

## 2021-08-17 ENCOUNTER — CLINICAL SUPPORT (OUTPATIENT)
Dept: REHABILITATION | Facility: HOSPITAL | Age: 56
End: 2021-08-17
Payer: COMMERCIAL

## 2021-08-17 ENCOUNTER — PATIENT MESSAGE (OUTPATIENT)
Dept: PSYCHIATRY | Facility: CLINIC | Age: 56
End: 2021-08-17

## 2021-08-17 DIAGNOSIS — M25.611 DECREASED RANGE OF MOTION OF RIGHT SHOULDER: ICD-10-CM

## 2021-08-17 DIAGNOSIS — M25.511 ACUTE PAIN OF RIGHT SHOULDER: Primary | ICD-10-CM

## 2021-08-17 DIAGNOSIS — R29.898 DECREASED STRENGTH OF UPPER EXTREMITY: ICD-10-CM

## 2021-08-17 PROCEDURE — 97110 THERAPEUTIC EXERCISES: CPT | Mod: PN

## 2021-08-17 PROCEDURE — 97140 MANUAL THERAPY 1/> REGIONS: CPT | Mod: PN

## 2021-08-19 ENCOUNTER — OFFICE VISIT (OUTPATIENT)
Dept: PSYCHIATRY | Facility: CLINIC | Age: 56
End: 2021-08-19
Payer: COMMERCIAL

## 2021-08-19 DIAGNOSIS — F90.0 ADHD (ATTENTION DEFICIT HYPERACTIVITY DISORDER), INATTENTIVE TYPE: ICD-10-CM

## 2021-08-19 DIAGNOSIS — F41.1 GAD (GENERALIZED ANXIETY DISORDER): Primary | ICD-10-CM

## 2021-08-19 PROCEDURE — 1159F PR MEDICATION LIST DOCUMENTED IN MEDICAL RECORD: ICD-10-PCS | Mod: CPTII,,, | Performed by: SOCIAL WORKER

## 2021-08-19 PROCEDURE — 1159F MED LIST DOCD IN RCRD: CPT | Mod: CPTII,,, | Performed by: SOCIAL WORKER

## 2021-08-19 PROCEDURE — 90834 PSYTX W PT 45 MINUTES: CPT | Mod: 95,,, | Performed by: SOCIAL WORKER

## 2021-08-19 PROCEDURE — 90834 PR PSYCHOTHERAPY W/PATIENT, 45 MIN: ICD-10-PCS | Mod: 95,,, | Performed by: SOCIAL WORKER

## 2021-08-24 ENCOUNTER — CLINICAL SUPPORT (OUTPATIENT)
Dept: REHABILITATION | Facility: HOSPITAL | Age: 56
End: 2021-08-24
Payer: COMMERCIAL

## 2021-08-24 DIAGNOSIS — M25.511 ACUTE PAIN OF RIGHT SHOULDER: Primary | ICD-10-CM

## 2021-08-24 DIAGNOSIS — G43.719 INTRACTABLE CHRONIC MIGRAINE WITHOUT AURA AND WITHOUT STATUS MIGRAINOSUS: ICD-10-CM

## 2021-08-24 DIAGNOSIS — R29.898 DECREASED STRENGTH OF UPPER EXTREMITY: ICD-10-CM

## 2021-08-24 DIAGNOSIS — M25.611 DECREASED RANGE OF MOTION OF RIGHT SHOULDER: ICD-10-CM

## 2021-08-24 PROCEDURE — 97140 MANUAL THERAPY 1/> REGIONS: CPT | Mod: PN

## 2021-08-24 RX ORDER — RIMEGEPANT SULFATE 75 MG/75MG
75 TABLET, ORALLY DISINTEGRATING ORAL DAILY PRN
Qty: 8 TABLET | Refills: 5 | Status: SHIPPED | OUTPATIENT
Start: 2021-08-24 | End: 2022-03-07 | Stop reason: SDUPTHER

## 2021-09-21 ENCOUNTER — PROCEDURE VISIT (OUTPATIENT)
Dept: NEUROLOGY | Facility: CLINIC | Age: 56
End: 2021-09-21
Payer: COMMERCIAL

## 2021-09-21 ENCOUNTER — TELEPHONE (OUTPATIENT)
Dept: NEUROLOGY | Facility: CLINIC | Age: 56
End: 2021-09-21

## 2021-09-21 VITALS
HEIGHT: 66 IN | DIASTOLIC BLOOD PRESSURE: 74 MMHG | WEIGHT: 125.13 LBS | BODY MASS INDEX: 20.11 KG/M2 | HEART RATE: 67 BPM | RESPIRATION RATE: 17 BRPM | SYSTOLIC BLOOD PRESSURE: 115 MMHG | TEMPERATURE: 99 F

## 2021-09-21 DIAGNOSIS — G43.719 INTRACTABLE CHRONIC MIGRAINE WITHOUT AURA AND WITHOUT STATUS MIGRAINOSUS: Primary | ICD-10-CM

## 2021-09-21 PROCEDURE — 64615 CHEMODENERV MUSC MIGRAINE: CPT | Mod: S$GLB,,, | Performed by: PSYCHIATRY & NEUROLOGY

## 2021-09-21 PROCEDURE — 64615 PR CHEMODENERVATION OF MUSCLE FOR CHRONIC MIGRAINE: ICD-10-PCS | Mod: S$GLB,,, | Performed by: PSYCHIATRY & NEUROLOGY

## 2021-09-21 RX ORDER — LASMIDITAN 100 MG/1
100 TABLET ORAL DAILY PRN
Qty: 8 TABLET | Refills: 4 | Status: SHIPPED | OUTPATIENT
Start: 2021-09-21 | End: 2022-11-29

## 2021-09-21 RX ORDER — OXYCODONE AND ACETAMINOPHEN 5; 325 MG/1; MG/1
1 TABLET ORAL EVERY 4 HOURS PRN
COMMUNITY
End: 2021-10-26 | Stop reason: ALTCHOICE

## 2021-10-01 ENCOUNTER — TELEPHONE (OUTPATIENT)
Dept: PHARMACY | Facility: CLINIC | Age: 56
End: 2021-10-01

## 2021-10-02 ENCOUNTER — PATIENT MESSAGE (OUTPATIENT)
Dept: PSYCHIATRY | Facility: CLINIC | Age: 56
End: 2021-10-02

## 2021-10-05 ENCOUNTER — OFFICE VISIT (OUTPATIENT)
Dept: PSYCHIATRY | Facility: CLINIC | Age: 56
End: 2021-10-05
Payer: COMMERCIAL

## 2021-10-05 DIAGNOSIS — F41.1 GAD (GENERALIZED ANXIETY DISORDER): Primary | ICD-10-CM

## 2021-10-05 PROCEDURE — 1159F MED LIST DOCD IN RCRD: CPT | Mod: CPTII,95,, | Performed by: SOCIAL WORKER

## 2021-10-05 PROCEDURE — 90834 PSYTX W PT 45 MINUTES: CPT | Mod: 95,,, | Performed by: SOCIAL WORKER

## 2021-10-05 PROCEDURE — 90834 PR PSYCHOTHERAPY W/PATIENT, 45 MIN: ICD-10-PCS | Mod: 95,,, | Performed by: SOCIAL WORKER

## 2021-10-05 PROCEDURE — 1159F PR MEDICATION LIST DOCUMENTED IN MEDICAL RECORD: ICD-10-PCS | Mod: CPTII,95,, | Performed by: SOCIAL WORKER

## 2021-10-19 ENCOUNTER — PATIENT MESSAGE (OUTPATIENT)
Dept: PSYCHIATRY | Facility: CLINIC | Age: 56
End: 2021-10-19
Payer: COMMERCIAL

## 2021-10-20 ENCOUNTER — OFFICE VISIT (OUTPATIENT)
Dept: PSYCHIATRY | Facility: CLINIC | Age: 56
End: 2021-10-20
Payer: COMMERCIAL

## 2021-10-20 DIAGNOSIS — F41.1 GAD (GENERALIZED ANXIETY DISORDER): Primary | ICD-10-CM

## 2021-10-20 PROCEDURE — 90834 PSYTX W PT 45 MINUTES: CPT | Mod: S$GLB,,, | Performed by: SOCIAL WORKER

## 2021-10-20 PROCEDURE — 1159F PR MEDICATION LIST DOCUMENTED IN MEDICAL RECORD: ICD-10-PCS | Mod: CPTII,S$GLB,, | Performed by: SOCIAL WORKER

## 2021-10-20 PROCEDURE — 1159F MED LIST DOCD IN RCRD: CPT | Mod: CPTII,S$GLB,, | Performed by: SOCIAL WORKER

## 2021-10-20 PROCEDURE — 99999 PR PBB SHADOW E&M-EST. PATIENT-LVL II: ICD-10-PCS | Mod: PBBFAC,,, | Performed by: SOCIAL WORKER

## 2021-10-20 PROCEDURE — 90834 PR PSYCHOTHERAPY W/PATIENT, 45 MIN: ICD-10-PCS | Mod: S$GLB,,, | Performed by: SOCIAL WORKER

## 2021-10-20 PROCEDURE — 99999 PR PBB SHADOW E&M-EST. PATIENT-LVL II: CPT | Mod: PBBFAC,,, | Performed by: SOCIAL WORKER

## 2021-11-23 PROBLEM — G47.01 INSOMNIA DUE TO MEDICAL CONDITION: Chronic | Status: ACTIVE | Noted: 2021-11-23

## 2021-11-24 ENCOUNTER — TELEPHONE (OUTPATIENT)
Dept: NEUROLOGY | Facility: CLINIC | Age: 56
End: 2021-11-24
Payer: COMMERCIAL

## 2021-12-07 ENCOUNTER — TELEPHONE (OUTPATIENT)
Dept: NEUROLOGY | Facility: CLINIC | Age: 56
End: 2021-12-07
Payer: COMMERCIAL

## 2021-12-07 ENCOUNTER — PROCEDURE VISIT (OUTPATIENT)
Dept: NEUROLOGY | Facility: CLINIC | Age: 56
End: 2021-12-07
Payer: COMMERCIAL

## 2021-12-07 VITALS
SYSTOLIC BLOOD PRESSURE: 111 MMHG | TEMPERATURE: 98 F | WEIGHT: 128.88 LBS | HEIGHT: 66 IN | RESPIRATION RATE: 17 BRPM | HEART RATE: 67 BPM | DIASTOLIC BLOOD PRESSURE: 73 MMHG | BODY MASS INDEX: 20.71 KG/M2

## 2021-12-07 DIAGNOSIS — G43.719 INTRACTABLE CHRONIC MIGRAINE WITHOUT AURA AND WITHOUT STATUS MIGRAINOSUS: Primary | ICD-10-CM

## 2021-12-07 PROCEDURE — 64615 CHEMODENERV MUSC MIGRAINE: CPT | Mod: S$GLB,,, | Performed by: PSYCHIATRY & NEUROLOGY

## 2021-12-07 PROCEDURE — 64615 PR CHEMODENERVATION OF MUSCLE FOR CHRONIC MIGRAINE: ICD-10-PCS | Mod: S$GLB,,, | Performed by: PSYCHIATRY & NEUROLOGY

## 2021-12-07 RX ORDER — OXYCODONE AND ACETAMINOPHEN 5; 325 MG/1; MG/1
TABLET ORAL
COMMUNITY
Start: 2021-12-03 | End: 2022-02-22

## 2021-12-13 ENCOUNTER — OFFICE VISIT (OUTPATIENT)
Dept: NEUROLOGY | Facility: CLINIC | Age: 56
End: 2021-12-13
Payer: COMMERCIAL

## 2021-12-13 VITALS
SYSTOLIC BLOOD PRESSURE: 114 MMHG | TEMPERATURE: 99 F | HEART RATE: 56 BPM | HEIGHT: 66 IN | BODY MASS INDEX: 20.57 KG/M2 | WEIGHT: 128 LBS | RESPIRATION RATE: 17 BRPM | DIASTOLIC BLOOD PRESSURE: 78 MMHG

## 2021-12-13 DIAGNOSIS — G44.309 POST-TRAUMATIC HEADACHE, NOT INTRACTABLE, UNSPECIFIED CHRONICITY PATTERN: ICD-10-CM

## 2021-12-13 DIAGNOSIS — G43.719 INTRACTABLE CHRONIC MIGRAINE WITHOUT AURA AND WITHOUT STATUS MIGRAINOSUS: Primary | ICD-10-CM

## 2021-12-13 DIAGNOSIS — G89.29 CHRONIC RIGHT SHOULDER PAIN: ICD-10-CM

## 2021-12-13 DIAGNOSIS — M25.511 CHRONIC RIGHT SHOULDER PAIN: ICD-10-CM

## 2021-12-13 DIAGNOSIS — M79.18 CERVICAL MYOFASCIAL PAIN SYNDROME: ICD-10-CM

## 2021-12-13 PROCEDURE — 99999 PR PBB SHADOW E&M-EST. PATIENT-LVL V: ICD-10-PCS | Mod: PBBFAC,,, | Performed by: PSYCHIATRY & NEUROLOGY

## 2021-12-13 PROCEDURE — 99215 PR OFFICE/OUTPT VISIT, EST, LEVL V, 40-54 MIN: ICD-10-PCS | Mod: S$GLB,,, | Performed by: PSYCHIATRY & NEUROLOGY

## 2021-12-13 PROCEDURE — 99999 PR PBB SHADOW E&M-EST. PATIENT-LVL V: CPT | Mod: PBBFAC,,, | Performed by: PSYCHIATRY & NEUROLOGY

## 2021-12-13 PROCEDURE — 99215 OFFICE O/P EST HI 40 MIN: CPT | Mod: S$GLB,,, | Performed by: PSYCHIATRY & NEUROLOGY

## 2022-01-21 ENCOUNTER — CLINICAL SUPPORT (OUTPATIENT)
Dept: REHABILITATION | Facility: HOSPITAL | Age: 57
End: 2022-01-21
Payer: COMMERCIAL

## 2022-01-21 DIAGNOSIS — M25.511 ACUTE PAIN OF RIGHT SHOULDER: Primary | ICD-10-CM

## 2022-01-21 DIAGNOSIS — R29.898 DECREASED STRENGTH OF UPPER EXTREMITY: ICD-10-CM

## 2022-01-21 DIAGNOSIS — M25.611 DECREASED RANGE OF MOTION OF RIGHT SHOULDER: ICD-10-CM

## 2022-01-21 PROCEDURE — 97140 MANUAL THERAPY 1/> REGIONS: CPT | Mod: PN

## 2022-01-21 PROCEDURE — 97161 PT EVAL LOW COMPLEX 20 MIN: CPT | Mod: PN

## 2022-01-21 PROCEDURE — 97110 THERAPEUTIC EXERCISES: CPT | Mod: PN

## 2022-01-22 NOTE — PLAN OF CARE
[facility]   Physical Therapy Initial Evaluation       Name: Brenda C Stargardter  Clinic Number: [id number]    Therapy Diagnosis:    Encounter Diagnosis   Name Primary?    Acute pain of right shoulder Yes      Physician: Patrick López P*    Physician Orders: PT Eval and Treat  Medical Diagnosis from Referral:   M25.511 (ICD-10-CM) - Right shoulder pain   M75.111 (ICD-10-CM) - Partial tear of right rotator cuff       Evaluation Date: 1/21/2022  Authorization Period Expiration: 12/31/22  Plan of Care Expiration: 04/21//2022    Progress Update: 02/21/2022    Visit # / Visits authorized: 1 / 12    FOTO: Visit #1 - 1 / 3     PRECAUTIONS: Standard Precautions     MD Visit: 2/2022    Date of Surgery 12/3/21   Surgery Performed Right RC repair with patch   Post-Op Percautions RC protocol       Time In: 345  Time Out: 430  Total Appointment Time (timed & untimed codes): 45 minutes    SUBJECTIVE     Date of onset: 09/21    History of current condition - Prema is a 56 y.o. female whom reports she had a RC repair on 12-3-21. Previously she had a right RC repair in 9-21, was doing rehab when she slipped at home fell and tore her RC repair, needing to have surgery again. She reports she is out of her sling now and her pain is better this time compared to her original sx.   Prema's current exercise regiment includes: pendulum and table slides.  Seeking Physical Therapy for improving her right shoulder motion and strength after surgery.    MERLY: Original insidious/2nd fall  Falls: 1, slipped in kitches  Physician Instructions (per patient): none  Post-op instructions: RC protocol  Other concerns: none    Imaging: No updated imaging for this episode of care:     Prior Therapy: Yes  Social History: Pt lives with their family   Living Environment: home   ADLs unable to complete: Grooming, Household ADLS, Any Lifting or carrying   Gym/Home Equipment: bands  Occupation: Pt is self employed  Prior  Level of Function: Independent with all ADLs  Current Level of Function: 60% of PLOF    Pain:  Current 6 /10, worst 8 /10, best 5 /10   Location: right shoulder, has other cervical and lumbar pain  Description: Aching, Throbbing, Grabbing and Tight  Aggravating Factors: motion  Easing Factors: rest    Dominant Extremity: Right    Pts goals: Pt reported goals are to improve her shoulder motion and strength in order to perform her daily activities without restrictions    _______________________________________________________  Medical History:   Past Medical History:   Diagnosis Date    Abdominal or pelvic swelling, mass, or lump, right lower quadrant     Anxiety     Arthritis     Asthma, mild persistent     Cervicalgia     COPD (chronic obstructive pulmonary disease)     Enlargement of lymph nodes     Eosinophilic esophagitis     Gastroparesis     Headache(784.0)     Heartburn     Helicobacter pylori (H. pylori)     History of positive PPD, untreated     History of psychiatric hospitalization     after son , hospitalized for eating disorder    Hx of psychiatric care     Hypothyroidism     Migraines     Occipital neuralgia     Other selective immunoglobulin deficiencies     Other syndromes affecting cervical region     Psychiatric problem     Spondylosis of cervical spine     Therapy     Unspecified asthma(493.90)     Unspecified disorder of thyroid     Unspecified viral meningitis        Surgical History:    has a past surgical history that includes Cholecystectomy; Varicose vein surgery; Lymph node biopsy (Right, ); Axillary abcess irrigation and debridement; Excisional hemorrhoidectomy (12/21/15); Mouth surgery; Hysterectomy (); Total abdominal hysterectomy w/ bilateral salpingoophorectomy (); and Rotator cuff repair (Right, 2021).    Medications:   has a current medication list which includes the following prescription(s): albuterol sulfate, clobetasol 0.05%,  clotrimazole-betamethasone 1-0.05%, collagen, cyanocobalamin, estradiol 0.05 mg/24 hr td ptsw, fexofenadine hcl, fluticasone propionate, fluticasone furoate-vilanterol, emgality pen, galcanezumab-gnlm, hydrocodone-acetaminophen, intrarosa, lasmiditan, levothyroxine, mag/aluminum/sod bicarb/alginc, nystatin, onabotulinumtoxina, ondansetron, ondansetron, oxycodone-acetaminophen, proctosol hc, nurtec, sumatriptan, testosterone, tizanidine, and zolpidem, and the following Facility-Administered Medications: onabotulinumtoxina, onabotulinumtoxina, onabotulinumtoxina, onabotulinumtoxina, onabotulinumtoxina, and triamcinolone acetonide.    Allergies:   Review of patient's allergies indicates:   Allergen Reactions    Reglan [metoclopramide hcl] Other (See Comments)     Weakness could not hold an object in her hands.    Benadryl [diphenhydramine hcl] Other (See Comments)     Restless leg, aggitation    Strattera [atomoxetine] Palpitations    Phenergan [promethazine] Other (See Comments)     Akathisia      Prednisone Nausea And Vomiting    Sulfa (sulfonamide antibiotics) Other (See Comments)     Muscle weakness          OBJECTIVE     RANGE OF MOTION:                                    Shoulder AROM/PROM Right   Left Goal   Forward Flexion (180) 90/110  180  Initial: 90   ER at 90 degrees (90) NT  90  Initial: 90   ER at 45 degrees   25/45  45  Initial: 25   Functional ER (C7) NT      IR  20/35  70  Initial: 20     STRENGTH:    U/E MMT Right Goal   Shoulder Flexion 3/5 5/5 B   Shoulder Abduction 3/5 5/5 B   Shoulder IR 3/5 5/5 B   Shoulder ER 3-/5 5/5 B   Elbow Flexion  4-/5 5/5 B   Elbow Extension 3+/5 5/5 B     U/E MMT Left Goal   Shoulder Flexion 4+/5 5/5 B   Shoulder Abduction 4/5 5/5 B   Shoulder IR 4+/5 5/5 B   Shoulder ER 4/5 5/5 B   Elbow Flexion  4+/5 5/5 B   Elbow Extension 4+/5 5/5 B     MUSCLE LENGTH:     Muscle Tested  Right Left  Goal   Upper Trapezius  increased increased Normal B    Levator Scapulae   increased increased Normal B   Sternocleidomastoid increased increased Normal B   Scalenes  increased increased Normal B    Pectoralis Minor  increased increased Normal B   Pectoralis Major increased increased Normal B     JOINT MOBILITY:     Joint Motion Tested Right Left  Goal   GHJ Posterior Glide Hypomobile Normal Normal B   GHJ Inferior Glide Hypomobile Normal Normal B   GHJ Lateral Glide Hypomobile Normal Normal B   GHJ Anterior Glide Normal Normal Normal B             Sensation:    - Proximal to incision: intact to light touch  - Distal to incision: intact to light touch    Palpation: Increased tone and tenderness noted with palpation to: bilateral upper traps levators, right side infraspinatus and subscapularis    Posture:  Pt presents with postural abnormalities which include: forward head, rounded shoulders , increased thoracic kyphosis , rounded shoulders and ankle/foot pronation    Gait: N/A    Movement Analysis: N/A    Balance: N/A      FUNCTION:     CMS Impairment/Limitation/Restriction for FOTO DASH Survey    Therapist reviewed FOTO scores for Brenda C Stargardter on 1/21/2022.   FOTO documents entered into [employer] - see Media section.    Limitation Score: 55%         TREATMENT     Total Treatment time separate from Evaluation: (30) minutes    Rehabilitation program:   Phase 1: (0-6 weeks) - 01/2022  Phase 2: (6-12 weeks)- 02/2022  Phase 3: (12+ weeks)- 03/2022    Prema received the following interventions    MANUAL THERAPY TECHNIQUES:  Joint mobilizations, Myofacial release, Soft tissue Mobilization, and PROM were applied to the: right for (20) minutes, including: x = exercises performed today    Manual Intervention 1/21/2022    Soft Tissue Massage  upper trapezius and levator scapulae    Joint Mobility GH  Grade (I); GHJ oscillations/AP/Lat/SupInf      Med/Lat/Sup/Inf/UR/DR   PROM  Supine: Flexion/Abduction/IR/ER               Plan for Next Visit:  DN Upper trap and Levator     THERAPEUTICS EXERCISES:   to develop strength, endurance, ROM, flexibility, and posture for (10) minutes including: x = exercises performed     Ther Ex 1/21/2022    Scapular Retractions     Dowel flexion supine     Pulleys       Plan for Next Visit:        PATIENT EDUCATION AND HOME EXERCISES     Education/Self-Care provided:  (included in treatment) minutes    Patient educated on the impairments noted above and the effects of physical therapy intervention to improve overall condition and QOL.    Patient was educated on all the above exercise prior/during/after for proper posture, positioning, and execution for safe performance with home exercise program.    Self Care:   o Edema Education: provided to patient and/or family to focus on ball squeezes and wrist mobility to help with circulation  -   o Heigene: Released to shower POD#3, no bath/emersion in water    Exercise/Activity modifications:   o 1/21/2022: EVAL: continue with pendulums and table slides    Written Home Exercises Provided: yes. Prefers: Printed Copies  Exercises were reviewed and Prema was able to demonstrate them prior to the end of the session.  Prema demonstrated good understanding of the education provided. See EMR under Patient Instructions for exercises provided during therapy sessions.      ASSESSMENT     Prema is a 56 y.o. female referred to outpatient physical therapy with a medical diagnosis of right RC tear s/p right RC repair  by Dr. Andres on 12-3-21.  Prema is over 6 weeks post op, and is recently able to discharge her abduction brace and sling.   Signs and symptoms are consistent with this stage of recovery, with physical exam findings that support decreased shoulder girdle ROM, decreased scapular and shoulder strength, Glenohumeral joint hypomobility, increased joint and soft tissue edema, and impaired functional mobility. The above impairments will be addressed through manual therapy techniques, therapeutic exercises, functional training, and  "modalities as necessary. Patient was treated and educated on exercises for home program, progression of therapy, and benefits of therapy to achieve full functional mobility.     Pt prognosis is Good.   Pt will benefit from skilled outpatient Physical Therapy to address the deficits stated above and in the chart below, provide pt/family education, and to maximize pt's level of independence.     Plan of care discussed with patient: Yes  Pt's spiritual, cultural and educational needs considered and patient is agreeable to the plan of care and goals as stated below:     Anticipated Barriers for therapy: none    Medical Necessity is demonstrated by the following  History  Co-morbidities and personal factors that may impact the plan of care Co-morbidities:   difficulty sleeping    Personal Factors:   no deficits     low   Examination  Body Structures and Functions, activity limitations and participation restrictions that may impact the plan of care Body Regions:   neck  upper extremities  trunk    Body Systems:    gross symmetry  ROM  strength  gross coordinated movement  transfers  transitions  motor control    Participation Restrictions:   See above in "Current Level of Function"     Activity limitations:   Learning and applying knowledge  no deficits    General Tasks and Commands  no deficits    Communication  no deficits    Mobility  lifting and carrying objects  fine hand use (grasping/picking up)    Self care  washing oneself (bathing, drying, washing hands)  caring for body parts (brushing teeth, shaving, grooming)    Domestic Life  shopping  cooking  doing house work (cleaning house, washing dishes, laundry)    Interactions/Relationships  no deficits    Life Areas  no deficits    Community and Social Life  community life  recreation and leisure         low   Clinical Presentation stable and uncomplicated low   Decision Making/ Complexity Score: low       GOALS:    SHORT TERM GOALS: 4 weeks, () Progress   1. Recent " signs and systems trend is improving in order to progress towards LTG's.    2. Patient will improve AROM to below measures to progress towards long term goals.  a. Shoulder Flexion: pain free AROM >130*  b. Shoulder ER @ 90*: pain free AROM > 60*  c. Shoulder functional IR: pain free AROM > L5    3. Patient will be independent with HEP in order to further progress and return to maximal function.    4. Pain rating at Worst: 4/10 in order to progress towards increased independence with activity.    5. Patient will be able to correct postural deviations in sitting and standing, to decrease pain and promote long term stability.      LONG TERM GOALS: 8 weeks, () Progress   1. Patient will return to normal ADL, recreational, and work related activities with less pain and limitation.     2. Patient will improve AROM to stated goals in order to return to maximal functional potential.     3. Patient will improve strength to stated goals of appropriate musculature in order to improve functional independence.     4. Pain Rating at Best: 1/10 to improve Quality of Life and return to prior level activities.     5. Pain will Improve FOTO Score to the predicted outcome score: 35%    6. Patient will have met/partially met personal goal of: able to use her right UE without pain or limitation        PLAN   Plan of care Certification: 1/21/2022 to 04/21/2022    Outpatient Physical Therapy 3 times weekly for 4 weeks to include any combination of the following interventions: virtual visits, dry needling, modalities, electrical stimulation (IFC, Pre-Mod, Attended with Functional Dry Needling), Manual Therapy, Moist Heat/ Ice, Neuromuscular Re-ed, Patient Education, Self Care, Therapeutic Exercise, DN, Functional Training and Therapeutic Activites     Thank you for this referral.    Lincoln Corcoran, PT      I CERTIFY THE NEED FOR THESE SERVICES FURNISHED UNDER THIS PLAN OF TREATMENT AND WHILE UNDER MY CARE   Physician's comments:      Physician's Signature: ___________________________________________________

## 2022-01-24 ENCOUNTER — DOCUMENTATION ONLY (OUTPATIENT)
Dept: REHABILITATION | Facility: HOSPITAL | Age: 57
End: 2022-01-24

## 2022-01-24 ENCOUNTER — CLINICAL SUPPORT (OUTPATIENT)
Dept: REHABILITATION | Facility: HOSPITAL | Age: 57
End: 2022-01-24
Payer: COMMERCIAL

## 2022-01-24 PROCEDURE — 97140 MANUAL THERAPY 1/> REGIONS: CPT | Mod: PN,CQ

## 2022-01-24 PROCEDURE — 97110 THERAPEUTIC EXERCISES: CPT | Mod: PN,CQ

## 2022-01-24 NOTE — PROGRESS NOTES
30 day visit PT-PTA face-face discussion with CARISA Corcoran re: patient status, POC, and plan for progression done 1/24/22.  Jessica Cavazos, PTA

## 2022-01-26 ENCOUNTER — CLINICAL SUPPORT (OUTPATIENT)
Dept: REHABILITATION | Facility: HOSPITAL | Age: 57
End: 2022-01-26
Payer: COMMERCIAL

## 2022-01-26 DIAGNOSIS — M25.511 ACUTE PAIN OF RIGHT SHOULDER: Primary | ICD-10-CM

## 2022-01-26 PROCEDURE — 97110 THERAPEUTIC EXERCISES: CPT | Mod: PN,CQ

## 2022-01-26 PROCEDURE — 97140 MANUAL THERAPY 1/> REGIONS: CPT | Mod: PN,CQ

## 2022-01-26 NOTE — PROGRESS NOTES
"  Physical Therapy Daily Treatment Note     Name: Brenda C Stargardter  Clinic Number: [id number]    Therapy Diagnosis: No diagnosis found.  Physician: Patrick López P*    Visit Date: 1/26/2022  Physician: Patrick López P*    Physician Orders: PT Eval and Treat  Medical Diagnosis from Referral:   M25.511 (ICD-10-CM) - Right shoulder pain   M75.111 (ICD-10-CM) - Partial tear of right rotator cuff         Evaluation Date: 1/21/2022  Authorization Period Expiration: 12/31/22  Plan of Care Expiration: 04/21//2022                         Progress Update: 02/21/2022                        Visit # / Visits authorized: 2 / 12                    FOTO: Visit #1 - 1 / 3       Time In: 1034  Time Out: 1140  Total Billable Time: 41 minutes    Precautions: Standard    Subjective     Pt reports: "Not as bad today"  "I feel like I have more control" re: scapular setting  She was compliant with home exercise program.  Response to previous treatment: positive  Functional change: none    Pain: 4/10  Location: right shoulder      Objective     Rehabilitation program:   Phase 1: (0-6 weeks) - 01/2022  Phase 2: (6-12 weeks)- 02/2022  Phase 3: (12+ weeks)- 03/2022    Prema received therapeutic exercises to develop strength, flexibility and posture for 31 minutes including:    Overhead Pulleys: flexion, scaption x 2 minutes each    Scapular retractions w/setting x 20  Rail wipes flexion x 20    Red Theraband: rows, extension x 20 each  Isometric walks: flexion, extension, ER, IR x 10 each    Supine flexion with dowel x 20  Supine SA punch with dowel x 20  Supine ER with dowel x 20  Supine horizontal add/abd w/dowel x 20      Manual therapy: x 10 minutes  Superior/inferior clavicular mobs, grade I  PROM flexion, ER  Posterior, inferior joint mobs, grade II    CP x 10 minutes R shoulder    Home Exercises Provided and Patient Education Provided     Education provided:   - cont HEP    Written Home Exercises Provided: Patient " instructed to cont prior HEP.  Exercises were reviewed and Prema was able to demonstrate them prior to the end of the session.  Prema demonstrated good  understanding of the education provided.     See EMR under Patient Instructions for exercises provided prior visit.    Assessment     Prema showed improvement in scapular setting prior to exercises today.  She reported minimal relief following clavicular mobs.  She remains limited in her AROM flexion and strength, but had improve tolerance to PRE's today.  Continued posture education, leonel scapular strengthening.  Prema Is progressing well towards her goals.   Pt prognosis is Good.     Pt will continue to benefit from skilled outpatient physical therapy to address the deficits listed in the problem list box on initial evaluation, provide pt/family education and to maximize pt's level of independence in the home and community environment.     Pt's spiritual, cultural and educational needs considered and pt agreeable to plan of care and goals.    Anticipated barriers to physical therapy: none    GOALS:     SHORT TERM GOALS: 4 weeks, () Progress   1. Recent signs and systems trend is improving in order to progress towards LTG's.  progressing   2. Patient will improve AROM to below measures to progress towards long term goals.  a. Shoulder Flexion: pain free AROM >130*  b. Shoulder ER @ 90*: pain free AROM > 60*  c. Shoulder functional IR: pain free AROM > L5   progressing   3. Patient will be independent with HEP in order to further progress and return to maximal function.   progressing   4. Pain rating at Worst: 4/10 in order to progress towards increased independence with activity.   progressing   5. Patient will be able to correct postural deviations in sitting and standing, to decrease pain and promote long term stability.   progressing      LONG TERM GOALS: 8 weeks, () Progress   1. Patient will return to normal ADL, recreational, and work related activities  with less pain and limitation.    progressing   2. Patient will improve AROM to stated goals in order to return to maximal functional potential.    progressing   3. Patient will improve strength to stated goals of appropriate musculature in order to improve functional independence.    progressing   4. Pain Rating at Best: 1/10 to improve Quality of Life and return to prior level activities.    progressing   5. Pain will Improve FOTO Score to the predicted outcome score: 35%   progressing   6. Patient will have met/partially met personal goal of: able to use her right UE without pain or limitation   progressing          Plan     Cont per POC, posture, strengthening  DN as needed, per PT    Jessica Cavazos, PTA

## 2022-01-27 ENCOUNTER — IMMUNIZATION (OUTPATIENT)
Dept: PRIMARY CARE CLINIC | Facility: CLINIC | Age: 57
End: 2022-01-27
Payer: COMMERCIAL

## 2022-01-27 DIAGNOSIS — Z23 NEED FOR VACCINATION: Primary | ICD-10-CM

## 2022-01-27 PROCEDURE — 91300 COVID-19, MRNA, LNP-S, PF, 30 MCG/0.3 ML DOSE VACCINE: CPT | Mod: S$GLB,,, | Performed by: FAMILY MEDICINE

## 2022-01-27 PROCEDURE — 0003A COVID-19, MRNA, LNP-S, PF, 30 MCG/0.3 ML DOSE VACCINE: ICD-10-PCS | Mod: S$GLB,,, | Performed by: FAMILY MEDICINE

## 2022-01-27 PROCEDURE — 91300 COVID-19, MRNA, LNP-S, PF, 30 MCG/0.3 ML DOSE VACCINE: ICD-10-PCS | Mod: S$GLB,,, | Performed by: FAMILY MEDICINE

## 2022-01-27 PROCEDURE — 0003A COVID-19, MRNA, LNP-S, PF, 30 MCG/0.3 ML DOSE VACCINE: CPT | Mod: S$GLB,,, | Performed by: FAMILY MEDICINE

## 2022-01-31 ENCOUNTER — CLINICAL SUPPORT (OUTPATIENT)
Dept: REHABILITATION | Facility: HOSPITAL | Age: 57
End: 2022-01-31
Payer: COMMERCIAL

## 2022-01-31 PROCEDURE — 97110 THERAPEUTIC EXERCISES: CPT | Mod: PN,CQ

## 2022-01-31 PROCEDURE — 97112 NEUROMUSCULAR REEDUCATION: CPT | Mod: PN,CQ

## 2022-01-31 NOTE — PROGRESS NOTES
Physical Therapy Daily Treatment Note     Name: Brenda C Stargardter  Clinic Number: [id number]    Therapy Diagnosis: No diagnosis found.  Physician: Patrick López P*    Visit Date: 1/31/2022  Physician: Patrick López P*    Physician Orders: PT Eval and Treat  Medical Diagnosis from Referral:   M25.511 (ICD-10-CM) - Right shoulder pain   M75.111 (ICD-10-CM) - Partial tear of right rotator cuff         Evaluation Date: 1/21/2022  Authorization Period Expiration: 12/31/22  Plan of Care Expiration: 04/21//2022                         Progress Update: 02/21/2022                        Visit # / Visits authorized: 3 / 12                    FOTO: Visit #1 - 1 / 3       Time In: 1132  Time Out: 1215  Total Billable Time: 43 minutes    Precautions: Standard    Subjective     Pt reports: she had a vaccine Thursday in her L arm, felt achy, and R arm hurts more  She was compliant with home exercise program.  Response to previous treatment: positive  Functional change: none    Pain: 4/10  Location: right shoulder      Objective     Rehabilitation program:    12/3/21 DOS  Phase 1: (0-6 weeks) - 01/2022  Phase 2: (6-12 weeks)- 02/2022  Phase 3: (12+ weeks)- 03/2022    Prema received therapeutic exercises to develop strength, flexibility and posture for 33 minutes including:  NMRE x 10 minutes to improve posture, proprioception:    UBE 2/2  Overhead Pulleys: flexion, scaption x 2 minutes each    Wall wipes flexion x 20    Red Theraband: rows, extension x 20 each  B ER Yellow theraband x 20   NMRE  ER Yellow theraband x 20NMRE    Supine flexion with 3# dowel x 20  Supine SA punch with 3# dowel x 20  Supine ER with 3# dowel x 20  SL ER x 20  Supine horizontal abd Yellow theraband x 20  Prone scapular retraction with scapular setting x 20  Prone shoulder flexion x 20    NOT PERFORMED   Isometric walks:  ER, IR x 10 each    Scapular retractions w/setting x 20       Manual therapy: x 0 minutes  Superior/inferior  clavicular mobs, grade I  PROM flexion, ER  Posterior, inferior joint mobs, grade II    CP x 8 minutes R shoulder    Home Exercises Provided and Patient Education Provided     Education provided:   - cont HEP    Written Home Exercises Provided: Patient instructed to cont prior HEP.  Exercises were reviewed and Prema was able to demonstrate them prior to the end of the session.  Prema demonstrated good  understanding of the education provided.     See EMR under Patient Instructions for exercises provided prior visit.    Assessment     Prema arrived reporting increased pain from a vaccine she received last week.  Prema has improve lower trap facilitation and is now able to set scapula w/o difficulty with exercises.  Flexion and ER remain limited in ROM and strength, but she is making good progress overall.  Continued strengthening to improve ADL's, functional activites.  Prema Is progressing well towards her goals.   Pt prognosis is Good.     Pt will continue to benefit from skilled outpatient physical therapy to address the deficits listed in the problem list box on initial evaluation, provide pt/family education and to maximize pt's level of independence in the home and community environment.     Pt's spiritual, cultural and educational needs considered and pt agreeable to plan of care and goals.    Anticipated barriers to physical therapy: none    GOALS:     SHORT TERM GOALS: 4 weeks, () Progress   1. Recent signs and systems trend is improving in order to progress towards LTG's.  progressing   2. Patient will improve AROM to below measures to progress towards long term goals.  a. Shoulder Flexion: pain free AROM >130*  b. Shoulder ER @ 90*: pain free AROM > 60*  c. Shoulder functional IR: pain free AROM > L5   progressing   3. Patient will be independent with HEP in order to further progress and return to maximal function.   progressing   4. Pain rating at Worst: 4/10 in order to progress towards increased  independence with activity.   progressing   5. Patient will be able to correct postural deviations in sitting and standing, to decrease pain and promote long term stability.   progressing      LONG TERM GOALS: 8 weeks, () Progress   1. Patient will return to normal ADL, recreational, and work related activities with less pain and limitation.    progressing   2. Patient will improve AROM to stated goals in order to return to maximal functional potential.    progressing   3. Patient will improve strength to stated goals of appropriate musculature in order to improve functional independence.    progressing   4. Pain Rating at Best: 1/10 to improve Quality of Life and return to prior level activities.    progressing   5. Pain will Improve FOTO Score to the predicted outcome score: 35%   progressing   6. Patient will have met/partially met personal goal of: able to use her right UE without pain or limitation   progressing          Plan     Cont per POC, posture, strengthening  DN as needed, per PT    Jessica Cavazos, PTA

## 2022-02-02 ENCOUNTER — CLINICAL SUPPORT (OUTPATIENT)
Dept: REHABILITATION | Facility: HOSPITAL | Age: 57
End: 2022-02-02
Payer: COMMERCIAL

## 2022-02-02 DIAGNOSIS — G43.719 INTRACTABLE CHRONIC MIGRAINE WITHOUT AURA AND WITHOUT STATUS MIGRAINOSUS: ICD-10-CM

## 2022-02-02 PROCEDURE — 97112 NEUROMUSCULAR REEDUCATION: CPT | Mod: PN,CQ

## 2022-02-02 PROCEDURE — 97110 THERAPEUTIC EXERCISES: CPT | Mod: PN,CQ

## 2022-02-02 RX ORDER — GALCANEZUMAB 120 MG/ML
120 INJECTION, SOLUTION SUBCUTANEOUS
Qty: 1 ML | Refills: 11 | Status: SHIPPED | OUTPATIENT
Start: 2022-02-02 | End: 2023-02-23 | Stop reason: SDUPTHER

## 2022-02-02 NOTE — PROGRESS NOTES
Physical Therapy Daily Treatment Note     Name: Brenda C Stargardter  Clinic Number: [id number]    Therapy Diagnosis: No diagnosis found.  Physician: Patrick López P*    Visit Date: 2/2/2022  Physician: Patrick López P*    Physician Orders: PT Eval and Treat  Medical Diagnosis from Referral:   M25.511 (ICD-10-CM) - Right shoulder pain   M75.111 (ICD-10-CM) - Partial tear of right rotator cuff         Evaluation Date: 1/21/2022  Authorization Period Expiration: 12/31/22  Plan of Care Expiration: 04/21//2022                         Progress Update: 02/21/2022                        Visit # / Visits authorized: 3 / 12                    FOTO: Visit #1 - 1 / 3       Time In: 1129-late arrival  Time Out: 1158  Total Billable Time: 29 minutes    Precautions: Standard    Subjective     Pt reports: increased soreness/stiff today  She was compliant with home exercise program.  Response to previous treatment: soreness  Functional change: none    Pain: 5/10  Location: right shoulder      Objective     Rehabilitation program:    12/3/21 DOS  Phase 1: (0-6 weeks) - 01/2022  Phase 2: (6-12 weeks)- 02/2022  Phase 3: (12+ weeks)- 03/2022    Prema received therapeutic exercises to develop strength, flexibility and posture for 19 minutes including:  NMRE x 10 minutes to improve posture, proprioception:    UBE 2/2  Overhead Pulleys: flexion, scaption x 2 minutes each    Wall wipes flexion x 20    Red Theraband: rows, extension x 20 each  B ER Yellow theraband x 20   NMRE  ER Yellow theraband x 20 NMRE    Supine flexion with 3# dowel x 20  Supine SA punch with 3# dowel x 20    NOT PERFORMED time  Supine ER with 3# dowel x 20  SL ER x 20  Supine horizontal abd Yellow theraband x 20  Prone scapular retraction with scapular setting x 20  Prone shoulder flexion x 20    NOT PERFORMED   Isometric walks:  ER, IR x 10 each    Scapular retractions w/setting x 20       Manual therapy: x 0 minutes  Superior/inferior clavicular  mobs, grade I  PROM flexion, ER  Posterior, inferior joint mobs, grade II    CP x 8 minutes R shoulder    Home Exercises Provided and Patient Education Provided     Education provided:   - cont HEP    Written Home Exercises Provided: Patient instructed to cont prior HEP.  Exercises were reviewed and Prema was able to demonstrate them prior to the end of the session.  Prema demonstrated good  understanding of the education provided.     See EMR under Patient Instructions for exercises provided prior visit.    Assessment     Prema is making progress with leonel scapular strength.  She does continue with protracted scapulae and limited A/P ROM shoulder flexion.  Continued therapy to address strength, ROM, posture.  Prema Is progressing well towards her goals.   Pt prognosis is Good.     Pt will continue to benefit from skilled outpatient physical therapy to address the deficits listed in the problem list box on initial evaluation, provide pt/family education and to maximize pt's level of independence in the home and community environment.     Pt's spiritual, cultural and educational needs considered and pt agreeable to plan of care and goals.    Anticipated barriers to physical therapy: none    GOALS:     SHORT TERM GOALS: 4 weeks, () Progress   1. Recent signs and systems trend is improving in order to progress towards LTG's.  progressing   2. Patient will improve AROM to below measures to progress towards long term goals.  a. Shoulder Flexion: pain free AROM >130*  b. Shoulder ER @ 90*: pain free AROM > 60*  c. Shoulder functional IR: pain free AROM > L5   progressing   3. Patient will be independent with HEP in order to further progress and return to maximal function.   progressing   4. Pain rating at Worst: 4/10 in order to progress towards increased independence with activity.   progressing   5. Patient will be able to correct postural deviations in sitting and standing, to decrease pain and promote long term  stability.   progressing      LONG TERM GOALS: 8 weeks, () Progress   1. Patient will return to normal ADL, recreational, and work related activities with less pain and limitation.    progressing   2. Patient will improve AROM to stated goals in order to return to maximal functional potential.    progressing   3. Patient will improve strength to stated goals of appropriate musculature in order to improve functional independence.    progressing   4. Pain Rating at Best: 1/10 to improve Quality of Life and return to prior level activities.    progressing   5. Pain will Improve FOTO Score to the predicted outcome score: 35%   progressing   6. Patient will have met/partially met personal goal of: able to use her right UE without pain or limitation   progressing          Plan     Cont per POC, posture, strengthening  DN as needed, per PT    Jessica Cavazos, PTA

## 2022-02-03 ENCOUNTER — TELEPHONE (OUTPATIENT)
Dept: PHARMACY | Facility: CLINIC | Age: 57
End: 2022-02-03
Payer: COMMERCIAL

## 2022-02-04 ENCOUNTER — CLINICAL SUPPORT (OUTPATIENT)
Dept: REHABILITATION | Facility: HOSPITAL | Age: 57
End: 2022-02-04
Payer: COMMERCIAL

## 2022-02-04 ENCOUNTER — DOCUMENTATION ONLY (OUTPATIENT)
Dept: REHABILITATION | Facility: HOSPITAL | Age: 57
End: 2022-02-04

## 2022-02-04 DIAGNOSIS — M75.111 INCOMPLETE ROTATOR CUFF TEAR OR RUPTURE OF RIGHT SHOULDER, NOT SPECIFIED AS TRAUMATIC: Primary | ICD-10-CM

## 2022-02-04 PROCEDURE — 97140 MANUAL THERAPY 1/> REGIONS: CPT | Mod: PN

## 2022-02-04 PROCEDURE — 97110 THERAPEUTIC EXERCISES: CPT | Mod: PN

## 2022-02-04 NOTE — PROGRESS NOTES
Physical Therapy Daily Treatment Note     Name: Brenda C Stargardter  Clinic Number: [id number]    Therapy Diagnosis: No diagnosis found.  Physician: Patrick López P*    Visit Date: 2/4/2022  Physician: Patrick López P*    Physician Orders: PT Eval and Treat  Medical Diagnosis from Referral:   M25.511 (ICD-10-CM) - Right shoulder pain   M75.111 (ICD-10-CM) - Partial tear of right rotator cuff         Evaluation Date: 1/21/2022  Authorization Period Expiration: 12/31/22  Plan of Care Expiration: 04/21//2022                         Progress Update: 02/21/2022                        Visit # / Visits authorized:  / 12                    FOTO: Visit #1 - 1 / 3       Time In: 915  Time Out: 1015  Total Billable Time: 60 minutes    Precautions: Standard    Subjective     Pt reports: she saw MD and he would like us to focus on more Range of Motion at this point. Her shoulder is feeling stiff and sore today. Wants to DN her upper traps/Levator  She was compliant with home exercise program.  Response to previous treatment: soreness  Functional change: none    Pain: 5/10  Location: right shoulder      Objective     Rehabilitation program:    12/3/21 DOS  Phase 1: (0-6 weeks) - 01/2022  Phase 2: (6-12 weeks)- 02/2022  Phase 3: (12+ weeks)- 03/2022    Prema received therapeutic exercises to develop strength, flexibility and posture for 30 minutes including:  NMRE x 10 minutes to improve posture, proprioception:    UBE 2/2 NT  Overhead Pulleys: flexion, scaption x 2 minutes each    Wall wipes flexion x 20    Red Theraband: rows, extension x 20 each  B ER Yellow theraband x 20   NMRE  ER Yellow theraband x 20 NMRE    Supine flexion with 3# dowel x 20  Supine ER with Dowel x 20  Supine SA punch with 3# dowel x 20    NOT PERFORMED time  SL ER x 20  Supine horizontal abd Yellow theraband x 20  Prone scapular retraction with scapular setting x 20  Prone shoulder flexion x 20    NOT PERFORMED   Isometric walks:  ER, IR  x 10 each    Scapular retractions w/setting x 20       Manual therapy: x 30 minutes  Superior/inferior clavicular mobs, grade I  PROM  Flexion, ER IR ABD  Posterior, inferior joint mobs, grade II  STM lateral scapula border  UT and Levator Palpation for DN    CP x 8 minutes R shoulder    Dry Needling Performed by Lincoln Corcoran, PT, Cert. DN: The patient was cleared of all contraindications and educated on the risks and effects of dry needling, and post needling expectations. The patient was agreeable to dry needling treatment. Pt signed consent form pre needling. Dry Needling was performed using 0.30 x 30mm needles to right Upper trap and levator using pistoning and coning technique without electrical stimulation . No Adverse effects  Home Exercises Provided and Patient Education Provided     Education provided:   - cont Home Exercise Program, discussed possible soreness for DN and she should stay as active as possible moving her shoulder to help with soreness    Written Home Exercises Provided: Patient instructed to cont prior HEP.  Exercises were reviewed and Prema was able to demonstrate them prior to the end of the session.  Prema demonstrated good  understanding of the education provided.     See EMR under Patient Instructions for exercises provided prior visit.    Assessment     Prema is making progress with leonel scapular strength.  She does continue with protracted scapulae and limited A/P ROM shoulder flexion.  Continued therapy to address strength, ROM, posture. Has chronic cervical pain which may also be contributing to her upper trap and levator discomfort  Prema Is progressing well towards her goals.   Pt prognosis is Good.     Pt will continue to benefit from skilled outpatient physical therapy to address the deficits listed in the problem list box on initial evaluation, provide pt/family education and to maximize pt's level of independence in the home and community environment.     Pt's spiritual,  cultural and educational needs considered and pt agreeable to plan of care and goals.    Anticipated barriers to physical therapy: none    GOALS:     SHORT TERM GOALS: 4 weeks, () Progress   1. Recent signs and systems trend is improving in order to progress towards LTG's.  progressing   2. Patient will improve AROM to below measures to progress towards long term goals.  a. Shoulder Flexion: pain free AROM >130*  b. Shoulder ER @ 90*: pain free AROM > 60*  c. Shoulder functional IR: pain free AROM > L5   progressing   3. Patient will be independent with HEP in order to further progress and return to maximal function.   progressing   4. Pain rating at Worst: 4/10 in order to progress towards increased independence with activity.   progressing   5. Patient will be able to correct postural deviations in sitting and standing, to decrease pain and promote long term stability.   progressing      LONG TERM GOALS: 8 weeks, () Progress   1. Patient will return to normal ADL, recreational, and work related activities with less pain and limitation.    progressing   2. Patient will improve AROM to stated goals in order to return to maximal functional potential.    progressing   3. Patient will improve strength to stated goals of appropriate musculature in order to improve functional independence.    progressing   4. Pain Rating at Best: 1/10 to improve Quality of Life and return to prior level activities.    progressing   5. Pain will Improve FOTO Score to the predicted outcome score: 35%   progressing   6. Patient will have met/partially met personal goal of: able to use her right UE without pain or limitation   progressing          Plan     Cont per POC, posture, strengthening  DN as needed, per PT    Lincoln Corcoran, PT

## 2022-02-04 NOTE — PROGRESS NOTES
30 day visit PT-PTA face-face discussion with CARISA Corcoran re: patient status, POC, and plan for progression done 2/4/22.  Jessica Cavazos, PTA

## 2022-02-07 ENCOUNTER — CLINICAL SUPPORT (OUTPATIENT)
Dept: REHABILITATION | Facility: HOSPITAL | Age: 57
End: 2022-02-07
Payer: COMMERCIAL

## 2022-02-07 DIAGNOSIS — M75.111 NONTRAUMATIC INCOMPLETE TEAR OF RIGHT ROTATOR CUFF: Primary | ICD-10-CM

## 2022-02-07 PROCEDURE — 97140 MANUAL THERAPY 1/> REGIONS: CPT | Mod: PN

## 2022-02-07 PROCEDURE — 97110 THERAPEUTIC EXERCISES: CPT | Mod: PN

## 2022-02-07 NOTE — PROGRESS NOTES
Physical Therapy Daily Treatment Note     Name: Brenda C Stargardt  Clinic Number: [id number]    Therapy Diagnosis: No diagnosis found.  Physician: Patrick López P*    Visit Date: 2/7/2022  Physician: Patrick López P*    Physician Orders: PT Eval and Treat  Medical Diagnosis from Referral:   M25.511 (ICD-10-CM) - Right shoulder pain   M75.111 (ICD-10-CM) - Partial tear of right rotator cuff         Evaluation Date: 1/21/2022  Authorization Period Expiration: 12/31/22  Plan of Care Expiration: 04/21//2022                         Progress Update: 02/21/2022                        Visit # / Visits authorized:  / 12                    FOTO: Visit #1 - 1 / 3       Time In: 0700   Time Out: 0750  Total Billable Time: 50 minutes    Precautions: Standard    Subjective     Pt reports: she feels stiff today, was having Migraine symptoms over the weekend. Wants to DN again  She was compliant with home exercise program.  Response to previous treatment: soreness  Functional change: none    Pain: 5/10  Location: right shoulder      Objective     Rehabilitation program:    12/3/21 DOS  Phase 1: (0-6 weeks) - 01/2022  Phase 2: (6-12 weeks)- 02/2022  Phase 3: (12+ weeks)- 03/2022    Prema received therapeutic exercises to develop strength, flexibility and posture for 25 minutes including:  NMRE x 10 minutes to improve posture, proprioception:    UBE 3/3   Overhead Pulleys: flexion, scaption x 2 minutes each    Wall wipes flexion x 20    Red Theraband: rows, extension x 20 each  B ER Yellow theraband x 20   NMRE  ER Yellow theraband x 20 NMRE    Supine flexion with 3# dowel x 20  Supine ER with Dowel x 20  Supine SA punch with 3# dowel x 20    NOT PERFORMED time  SL ER x 20  Supine horizontal abd Yellow theraband x 20  Prone scapular retraction with scapular setting x 20  Prone shoulder flexion x 20    NOT PERFORMED   Isometric walks:  ER, IR x 10 each    Scapular retractions w/setting x 20       Manual therapy: x  25 minutes  Superior/inferior clavicular mobs, grade I  NT  PROM  Flexion, ER IR ABD  Posterior, inferior joint mobs, grade II  STM lateral scapula border  UT and Levator Palpation for DN    CP x 8 minutes R shoulder    Dry Needling Performed by Lincoln Corcoran, PT, Cert. DN: The patient was cleared of all contraindications and educated on the risks and effects of dry needling, and post needling expectations. The patient was agreeable to dry needling treatment. Pt signed consent form pre needling. Dry Needling was performed using 0.30 x 30mm needles to right Upper trap and levator using pistoning and coning technique without electrical stimulation . No Adverse effects  Home Exercises Provided and Patient Education Provided     Education provided:   - cont Home Exercise Program, discussed possible soreness for DN and she should stay as active as possible moving her shoulder to help with soreness    Written Home Exercises Provided: Patient instructed to cont prior HEP.  Exercises were reviewed and Prema was able to demonstrate them prior to the end of the session.  Prema demonstrated good  understanding of the education provided.     See EMR under Patient Instructions for exercises provided prior visit.    Assessment     Prema is making progress with leonel scapular strength.  She does continue with protracted scapulae and limited A/P ROM shoulder flexion. Upper trap/Levator had less guarding after DN, her PROM is improving, needs better active motion. Continued therapy to address strength, ROM, posture. Has chronic cervical pain which may also be contributing to her upper trap and levator discomfort  Prema Is progressing well towards her goals.   Pt prognosis is Good.     Pt will continue to benefit from skilled outpatient physical therapy to address the deficits listed in the problem list box on initial evaluation, provide pt/family education and to maximize pt's level of independence in the home and community  environment.     Pt's spiritual, cultural and educational needs considered and pt agreeable to plan of care and goals.    Anticipated barriers to physical therapy: none    GOALS:     SHORT TERM GOALS: 4 weeks, () Progress   1. Recent signs and systems trend is improving in order to progress towards LTG's.  progressing   2. Patient will improve AROM to below measures to progress towards long term goals.  a. Shoulder Flexion: pain free AROM >130*  b. Shoulder ER @ 90*: pain free AROM > 60*  c. Shoulder functional IR: pain free AROM > L5   progressing   3. Patient will be independent with HEP in order to further progress and return to maximal function.   progressing   4. Pain rating at Worst: 4/10 in order to progress towards increased independence with activity.   progressing   5. Patient will be able to correct postural deviations in sitting and standing, to decrease pain and promote long term stability.   progressing      LONG TERM GOALS: 8 weeks, () Progress   1. Patient will return to normal ADL, recreational, and work related activities with less pain and limitation.    progressing   2. Patient will improve AROM to stated goals in order to return to maximal functional potential.    progressing   3. Patient will improve strength to stated goals of appropriate musculature in order to improve functional independence.    progressing   4. Pain Rating at Best: 1/10 to improve Quality of Life and return to prior level activities.    progressing   5. Pain will Improve FOTO Score to the predicted outcome score: 35%   progressing   6. Patient will have met/partially met personal goal of: able to use her right UE without pain or limitation   progressing          Plan     Cont per POC, posture, strengthening  DN as needed, per PT    Lincoln Corcoran, PT

## 2022-02-11 ENCOUNTER — CLINICAL SUPPORT (OUTPATIENT)
Dept: REHABILITATION | Facility: HOSPITAL | Age: 57
End: 2022-02-11
Payer: COMMERCIAL

## 2022-02-11 DIAGNOSIS — M75.111 INCOMPLETE ROTATOR CUFF TEAR OR RUPTURE OF RIGHT SHOULDER, NOT SPECIFIED AS TRAUMATIC: Primary | ICD-10-CM

## 2022-02-11 PROCEDURE — 97140 MANUAL THERAPY 1/> REGIONS: CPT | Mod: PN

## 2022-02-11 PROCEDURE — 97110 THERAPEUTIC EXERCISES: CPT | Mod: PN

## 2022-02-11 NOTE — PROGRESS NOTES
Physical Therapy Daily Treatment Note     Name: Brenda C Stargardter  Clinic Number: [id number]    Therapy Diagnosis: No diagnosis found.  Physician: Patrick López P*    Visit Date: 2/11/2022  Physician: Patrick López P*    Physician Orders: PT Eval and Treat  Medical Diagnosis from Referral:   M25.511 (ICD-10-CM) - Right shoulder pain   M75.111 (ICD-10-CM) - Partial tear of right rotator cuff         Evaluation Date: 1/21/2022  Authorization Period Expiration: 12/31/22  Plan of Care Expiration: 04/21//2022                         Progress Update: 02/21/2022                        Visit # / Visits authorized:  / 12                    FOTO: Visit #1 - 1 / 3       Time In: 1100  Time Out: 1200  Total Billable Time: 60 minutes    Precautions: Standard    Subjective     Pt reports: she feels stiff today, was out of town slept in hotel and bed was hard/uncomfortable  She was compliant with home exercise program.  Response to previous treatment: soreness  Functional change: none    Pain: 5/10  Location: right shoulder      Objective     Rehabilitation program:    12/3/21 DOS  Phase 1: (0-6 weeks) - 01/2022  Phase 2: (6-12 weeks)- 02/2022  Phase 3: (12+ weeks)- 03/2022    Prema received therapeutic exercises to develop strength, flexibility and posture for 30 minutes including:  NMRE x 0 minutes to improve posture, proprioception:    UBE 4/4  Overhead Pulleys: flexion, scaption x 4 minutes each    Wall wipes flexion x 20    Red Theraband: rows, extension x 20 each  B ER Yellow theraband x 20   NMRE  ER Yellow theraband x 20 NMRE    Supine flexion with 3# dowel x 20  Supine ER with Dowel x 20  Supine SA punch with 3# dowel x 20    NOT PERFORMED time  SL ER x 20  Supine horizontal abd Yellow theraband x 20  Prone scapular retraction with scapular setting x 20  Prone shoulder flexion x 20    NOT PERFORMED   Isometric walks:  ER, IR x 10 each    Scapular retractions w/setting x 20       Manual therapy: x 30  minutes  Superior/inferior clavicular mobs, grade I  NT  PROM  Flexion, ER IR ABD  Posterior, inferior joint mobs, grade II  STM lateral scapula border  UT and Levator Palpation for DN    CP x 8 minutes R shoulder    Dry Needling Performed by Lincoln Corcoran, PT, Cert. DN: The patient was cleared of all contraindications and educated on the risks and effects of dry needling, and post needling expectations. The patient was agreeable to dry needling treatment. Pt signed consent form pre needling. Dry Needling was performed using 0.30 x 30mm needles to right Upper trap and levator using pistoning and coning technique without electrical stimulation . No Adverse effects  Home Exercises Provided and Patient Education Provided     Education provided:   - cont Home Exercise Program, discussed possible soreness for DN and she should stay as active as possible moving her shoulder to help with soreness  Discussed return to recreation, wants to kayak in calm water for short duration, feel she will be able to do this as does not involve shoulder elevation and can use trunk to help with paddling  Written Home Exercises Provided: Patient instructed to cont prior HEP.  Exercises were reviewed and Prema was able to demonstrate them prior to the end of the session.  Prema demonstrated good  understanding of the education provided.     See EMR under Patient Instructions for exercises provided prior visit.    Assessment     Prema is making progress with leonel scapular strength.  She does continue with protracted scapulae and limited A/P ROM shoulder flexion. Upper trap/Levator had less guarding after DN, her PROM is improving, needs better active motion. Continued therapy to address strength, ROM, posture. Has chronic cervical pain which may also be contributing to her upper trap and levator discomfort  Prema Is progressing well towards her goals.   Pt prognosis is Good.     Pt will continue to benefit from skilled outpatient physical  therapy to address the deficits listed in the problem list box on initial evaluation, provide pt/family education and to maximize pt's level of independence in the home and community environment.     Pt's spiritual, cultural and educational needs considered and pt agreeable to plan of care and goals.    Anticipated barriers to physical therapy: none    GOALS:     SHORT TERM GOALS: 4 weeks, () Progress   1. Recent signs and systems trend is improving in order to progress towards LTG's.  progressing   2. Patient will improve AROM to below measures to progress towards long term goals.  a. Shoulder Flexion: pain free AROM >130*  b. Shoulder ER @ 90*: pain free AROM > 60*  c. Shoulder functional IR: pain free AROM > L5   progressing   3. Patient will be independent with HEP in order to further progress and return to maximal function.   progressing   4. Pain rating at Worst: 4/10 in order to progress towards increased independence with activity.   progressing   5. Patient will be able to correct postural deviations in sitting and standing, to decrease pain and promote long term stability.   progressing      LONG TERM GOALS: 8 weeks, () Progress   1. Patient will return to normal ADL, recreational, and work related activities with less pain and limitation.    progressing   2. Patient will improve AROM to stated goals in order to return to maximal functional potential.    progressing   3. Patient will improve strength to stated goals of appropriate musculature in order to improve functional independence.    progressing   4. Pain Rating at Best: 1/10 to improve Quality of Life and return to prior level activities.    progressing   5. Pain will Improve FOTO Score to the predicted outcome score: 35%   progressing   6. Patient will have met/partially met personal goal of: able to use her right UE without pain or limitation   progressing          Plan     Cont per POC, posture, strengthening  DN as needed, per PT    Lincoln  Nilo, PT

## 2022-02-17 ENCOUNTER — CLINICAL SUPPORT (OUTPATIENT)
Dept: REHABILITATION | Facility: HOSPITAL | Age: 57
End: 2022-02-17
Payer: COMMERCIAL

## 2022-02-17 DIAGNOSIS — M75.111 NONTRAUMATIC INCOMPLETE TEAR OF ROTATOR CUFF, RIGHT: Primary | ICD-10-CM

## 2022-02-17 PROCEDURE — 97140 MANUAL THERAPY 1/> REGIONS: CPT | Mod: PN

## 2022-02-17 PROCEDURE — 97110 THERAPEUTIC EXERCISES: CPT | Mod: PN

## 2022-02-17 NOTE — PROGRESS NOTES
Physical Therapy Daily Treatment Note     Name: Brenda C Stargardter  Clinic Number: [id number]    Therapy Diagnosis:Physician:  Encounter Diagnosis   Name Primary?    Acute pain of right shoulder        Visit Date: 2/17/2022  Physician: Patrick López P*    Physician Orders: PT Eval and Treat  Medical Diagnosis from Referral:   M25.511 (ICD-10-CM) - Right shoulder pain   M75.111 (ICD-10-CM) - Partial tear of right rotator cuff         Evaluation Date: 1/21/2022  Authorization Period Expiration: 12/31/22  Plan of Care Expiration: 04/21//2022                         Progress Update: 02/21/2022                        Visit # / Visits authorized:  / 12                    FOTO: Visit #1 - 1 / 3       Time In: 1120  Time Out: 1200  Total Billable Time: 40 minutes    Precautions: Standard    Subjective     Pt reports: she feels about her baseline today  She was compliant with home exercise program.  Response to previous treatment: soreness  Functional change: none    Pain: 5/10  Location: right shoulder      Objective     Rehabilitation program:    12/3/21 DOS  Phase 1: (0-6 weeks) - 01/2022  Phase 2: (6-12 weeks)- 02/2022  Phase 3: (12+ weeks)- 03/2022    Prema received therapeutic exercises to develop strength, flexibility and posture for 10 minutes including:  NMRE x 0 minutes to improve posture, proprioception:    UBE 5/5  Overhead Pulleys: flexion, scaption x 4 minutes each    Wall wipes flexion x 20    Red Theraband: rows, extension x 20 each  B ER Yellow theraband x 20   NMRE  ER Yellow theraband x 20 NMRE    Supine flexion with 3# dowel x 20  Supine ER with Dowel x 20  Supine SA punch with 3# dowel x 20    NOT PERFORMED time  SL ER x 20  Supine horizontal abd Yellow theraband x 20  Prone scapular retraction with scapular setting x 20  Prone shoulder flexion x 20    NOT PERFORMED   Isometric walks:  ER, IR x 10 each    Scapular retractions w/setting x 20       Manual therapy: x 30  minutes  Superior/inferior clavicular mobs, grade I  NT  PROM  Flexion, ER IR ABD  Posterior, inferior joint mobs, grade II  STM lateral scapula border  UT and Levator Palpation for DN    CP x 8 minutes R shoulder    Dry Needling Performed by Lincoln Corcoran, PT, Cert. DN: The patient was cleared of all contraindications and educated on the risks and effects of dry needling, and post needling expectations. The patient was agreeable to dry needling treatment. Pt signed consent form pre needling. Dry Needling was performed using 0.30 x 30mm needles to right Upper trap and levator using pistoning and coning technique without electrical stimulation . No Adverse effects  Home Exercises Provided and Patient Education Provided     Education provided:   - cont Home Exercise Program, discussed possible soreness for DN and she should stay as active as possible moving her shoulder to help with soreness  Discussed Healthy back program for her chronic cervical and lumbar pain  Written Home Exercises Provided: Patient instructed to cont prior HEP.  Exercises were reviewed and Prema was able to demonstrate them prior to the end of the session.  Prema demonstrated good  understanding of the education provided.     See EMR under Patient Instructions for exercises provided prior visit.    Assessment     Prema is making progress with leonel scapular strength.  She does continue with protracted scapulae and limited A/P ROM shoulder flexion. Upper trap/Levator had less guarding after DN, her PROM is improving, needs better active motion. Continued therapy to address strength, ROM, posture. Has chronic cervical pain which may also be contributing to her upper trap and levator discomfort  Prema Is progressing well towards her goals.   Pt prognosis is Good.     Pt will continue to benefit from skilled outpatient physical therapy to address the deficits listed in the problem list box on initial evaluation, provide pt/family education and to  maximize pt's level of independence in the home and community environment.     Pt's spiritual, cultural and educational needs considered and pt agreeable to plan of care and goals.    Anticipated barriers to physical therapy: none    GOALS:     SHORT TERM GOALS: 4 weeks, () Progress   1. Recent signs and systems trend is improving in order to progress towards LTG's.  progressing   2. Patient will improve AROM to below measures to progress towards long term goals.  a. Shoulder Flexion: pain free AROM >130*  b. Shoulder ER @ 90*: pain free AROM > 60*  c. Shoulder functional IR: pain free AROM > L5   progressing   3. Patient will be independent with HEP in order to further progress and return to maximal function.   progressing   4. Pain rating at Worst: 4/10 in order to progress towards increased independence with activity.   progressing   5. Patient will be able to correct postural deviations in sitting and standing, to decrease pain and promote long term stability.   progressing      LONG TERM GOALS: 8 weeks, () Progress   1. Patient will return to normal ADL, recreational, and work related activities with less pain and limitation.    progressing   2. Patient will improve AROM to stated goals in order to return to maximal functional potential.    progressing   3. Patient will improve strength to stated goals of appropriate musculature in order to improve functional independence.    progressing   4. Pain Rating at Best: 1/10 to improve Quality of Life and return to prior level activities.    progressing   5. Pain will Improve FOTO Score to the predicted outcome score: 35%   progressing   6. Patient will have met/partially met personal goal of: able to use her right UE without pain or limitation   progressing          Plan     Cont per POC, posture, strengthening  DN as needed, per PT    Lincoln Corcoran, PT

## 2022-02-22 ENCOUNTER — PROCEDURE VISIT (OUTPATIENT)
Dept: NEUROLOGY | Facility: CLINIC | Age: 57
End: 2022-02-22
Payer: COMMERCIAL

## 2022-02-22 ENCOUNTER — CLINICAL SUPPORT (OUTPATIENT)
Dept: REHABILITATION | Facility: HOSPITAL | Age: 57
End: 2022-02-22
Payer: COMMERCIAL

## 2022-02-22 VITALS
DIASTOLIC BLOOD PRESSURE: 77 MMHG | RESPIRATION RATE: 17 BRPM | TEMPERATURE: 99 F | HEART RATE: 62 BPM | WEIGHT: 128 LBS | BODY MASS INDEX: 20.57 KG/M2 | HEIGHT: 66 IN | SYSTOLIC BLOOD PRESSURE: 111 MMHG

## 2022-02-22 DIAGNOSIS — M25.511 ACUTE PAIN OF RIGHT SHOULDER: ICD-10-CM

## 2022-02-22 DIAGNOSIS — G43.719 INTRACTABLE CHRONIC MIGRAINE WITHOUT AURA AND WITHOUT STATUS MIGRAINOSUS: Primary | ICD-10-CM

## 2022-02-22 DIAGNOSIS — M75.111 INCOMPLETE ROTATOR CUFF TEAR OR RUPTURE OF RIGHT SHOULDER, NOT SPECIFIED AS TRAUMATIC: Primary | ICD-10-CM

## 2022-02-22 PROCEDURE — 64615 PR CHEMODENERVATION OF MUSCLE FOR CHRONIC MIGRAINE: ICD-10-PCS | Mod: S$GLB,,, | Performed by: PSYCHIATRY & NEUROLOGY

## 2022-02-22 PROCEDURE — 97110 THERAPEUTIC EXERCISES: CPT | Mod: PN

## 2022-02-22 PROCEDURE — 97140 MANUAL THERAPY 1/> REGIONS: CPT | Mod: PN

## 2022-02-22 PROCEDURE — 64615 CHEMODENERV MUSC MIGRAINE: CPT | Mod: S$GLB,,, | Performed by: PSYCHIATRY & NEUROLOGY

## 2022-02-22 NOTE — PROGRESS NOTES
Physical Therapy Daily Treatment Note     Name: Brenda C Stargardter  Clinic Number: [id number]    Therapy Diagnosis:Physician:  Encounter Diagnosis   Name Primary?    Acute pain of right shoulder        Visit Date: 2/22/2022  Physician: Patrick López P*    Physician Orders: PT Eval and Treat  Medical Diagnosis from Referral:   M25.511 (ICD-10-CM) - Right shoulder pain   M75.111 (ICD-10-CM) - Partial tear of right rotator cuff         Evaluation Date: 1/21/2022  Authorization Period Expiration: 12/31/22  Plan of Care Expiration: 04/21//2022                         Progress Update: 02/21/2022                        Visit # / Visits authorized:  / 12                    FOTO: Visit #1 - 1 / 3       Time In: 1245  Time Out: 130  Total Billable Time: 45 minutes    Precautions: Standard    Subjective     Pt reports: she just left MD office had Botox to forehead, cervical spine and Trap muscles for Migraines, is sore now  She was compliant with home exercise program.  Response to previous treatment: soreness  Functional change: none    Pain: 5/10  Location: right shoulder      Objective     Rehabilitation program:    12/3/21 DOS  Phase 1: (0-6 weeks) - 01/2022  Phase 2: (6-12 weeks)- 02/2022  Phase 3: (12+ weeks)- 03/2022    Prema received therapeutic exercises to develop strength, flexibility and posture for 15 minutes including:  NMRE x 0 minutes to improve posture, proprioception:    UBE 5/5  Overhead Pulleys: flexion, scaption x 4 minutes each  Serratus roll up wall x 20  Yellow TheraBand ER walkouts x 20  Wall wipes flexion x 20 NT    Red Theraband: rows, extension x 20 each NT  B ER Yellow theraband x 20   NMRE NT  ER Yellow theraband x 20 NMRE NT    Supine flexion with 3# dowel x 20  Supine ER with Dowel x 20  Supine SA punch with 3# dowel x 20    NOT PERFORMED time  SL ER x 20  Supine horizontal abd Yellow theraband x 20  Prone scapular retraction with scapular setting x 20  Prone shoulder flexion x  20    NOT PERFORMED   Isometric walks:  ER, IR x 10 each    Scapular retractions w/setting x 20       Manual therapy: x 30 minutes  Superior/inferior clavicular mobs, grade I  NT  PROM  Flexion, ER IR ABD  Posterior, inferior joint mobs, grade II  STM lateral scapula border   UT and Levator Palpation for DN NT  Manual Resisted IR/ER, Rhythmic stabilization and Resisted PNF diagonal    CP x 8 minutes R shoulder    Dry Needling Performed by Lincoln Corcoran, PT, Cert. DN: The patient was cleared of all contraindications and educated on the risks and effects of dry needling, and post needling expectations. The patient was agreeable to dry needling treatment. Pt signed consent form pre needling. Dry Needling was performed using 0.30 x 30mm needles to right Upper trap and levator using pistoning and coning technique without electrical stimulation . No Adverse effects  Home Exercises Provided and Patient Education Provided     Education provided:   - cont Home Exercise Program, discussed possible soreness for DN and she should stay as active as possible moving her shoulder to help with soreness  Discussed Healthy back program for her chronic cervical and lumbar pain  Written Home Exercises Provided: Patient instructed to cont prior HEP.  Exercises were reviewed and Prema was able to demonstrate them prior to the end of the session.  Prema demonstrated good  understanding of the education provided.     See EMR under Patient Instructions for exercises provided prior visit.    Assessment     Prema is making progress with leonel scapular strength, and Rotator Cuff strength.  She does continue with protracted scapulae and limited A/P ROM shoulder flexion. Progressing with active and passive Range of Motion . Continued therapy to address strength, ROM, posture. Has chronic cervical pain which may also be contributing to her upper trap and levator discomfort  Prema Is progressing well towards her goals.   Pt prognosis is Good.      Pt will continue to benefit from skilled outpatient physical therapy to address the deficits listed in the problem list box on initial evaluation, provide pt/family education and to maximize pt's level of independence in the home and community environment.     Pt's spiritual, cultural and educational needs considered and pt agreeable to plan of care and goals.    Anticipated barriers to physical therapy: none    GOALS:     SHORT TERM GOALS: 4 weeks, () Progress   1. Recent signs and systems trend is improving in order to progress towards LTG's.  progressing   2. Patient will improve AROM to below measures to progress towards long term goals.  a. Shoulder Flexion: pain free AROM >130*  b. Shoulder ER @ 90*: pain free AROM > 60*  c. Shoulder functional IR: pain free AROM > L5   progressing   3. Patient will be independent with HEP in order to further progress and return to maximal function.   progressing   4. Pain rating at Worst: 4/10 in order to progress towards increased independence with activity.   progressing   5. Patient will be able to correct postural deviations in sitting and standing, to decrease pain and promote long term stability.   progressing      LONG TERM GOALS: 8 weeks, () Progress   1. Patient will return to normal ADL, recreational, and work related activities with less pain and limitation.    progressing   2. Patient will improve AROM to stated goals in order to return to maximal functional potential.    progressing   3. Patient will improve strength to stated goals of appropriate musculature in order to improve functional independence.    progressing   4. Pain Rating at Best: 1/10 to improve Quality of Life and return to prior level activities.    progressing   5. Pain will Improve FOTO Score to the predicted outcome score: 35%   progressing   6. Patient will have met/partially met personal goal of: able to use her right UE without pain or limitation   progressing          Plan     Cont per  POC, posture, strengthening  DN as needed, per PT    Lincoln Corcoran, PT

## 2022-02-22 NOTE — PROCEDURES
Procedures    2021  With Botox injections I have achieved well over 50%  improvement in the patient's symptoms. Migraines have been reduced at least 7 days per month and the number of cumulative hours suffering with headaches has been reduced at least 100 total hours per month. Today she  does have a headache indicating that the Botox has worn off. Frequency of treatment is every 3 months unless no response to the treatments, at which time we will discontinue the injections.        ROS: Positive for photophobia, phonophobia, nausea, insomnia     Past Medical History:   Diagnosis Date    Abdominal or pelvic swelling, mass, or lump, right lower quadrant     Anxiety     Arthritis     Asthma, mild persistent     Cervicalgia     COPD (chronic obstructive pulmonary disease)     Enlargement of lymph nodes     Eosinophilic esophagitis     Gastroparesis     Headache(784.0)     Heartburn     Helicobacter pylori (H. pylori)     History of positive PPD, untreated     History of psychiatric hospitalization     after son , hospitalized for eating disorder    Hx of psychiatric care     Hypothyroidism     Migraines     Occipital neuralgia     Other selective immunoglobulin deficiencies     Other syndromes affecting cervical region     Psychiatric problem     Spondylosis of cervical spine     Therapy     Unspecified asthma(493.90)     Unspecified disorder of thyroid     Unspecified viral meningitis          Current Outpatient Medications   Medication Sig    albuterol sulfate (PROAIR RESPICLICK) 90 mcg/actuation inhaler Inhale 1-2 puffs into the lungs every 4 (four) hours as needed for Wheezing. Rescue    clobetasol 0.05% (TEMOVATE) 0.05 % Oint     clotrimazole-betamethasone 1-0.05% (LOTRISONE) cream Apply topically as needed.     COLLAGEN MISC by Misc.(Non-Drug; Combo Route) route.    cyanocobalamin 500 MCG tablet Take 500 mcg by mouth once daily.    estradiol 0.05 mg/24 hr td ptsw  (VIVELLE-DOT) 0.05 mg/24 hr estradiol 0.05 mg/24 hr semiweekly transdermal patch   Apply 1 patch twice a week by transdermal route.    FEXOFENADINE HCL (ALLEGRA ORAL) Take by mouth.    fluticasone-vilanterol (BREO) 100-25 mcg/dose diskus inhaler Inhale 1 puff into the lungs once daily. Controller    galcanezumab-gnlm (EMGALITY PEN) 120 mg/mL PnIj Inject 1 pen (120 mg) into the skin every 28 days.    galcanezumab-gnlm 120 mg/mL PnIj Inject 2 pens (240 mg) subcutaneous at separate sites, once (loading dose). Start maintenance dose 28 days later (Patient taking differently: Inject 2 pens (240 mg) subcutaneous at separate sites, once (loading dose). Start maintenance dose 28 days later)    hydrocodone-acetaminophen 7.5-325mg (NORCO) 7.5-325 mg per tablet Take 1 tablet by mouth nightly as needed.     INTRAROSA 6.5 mg Inst INSERT 1 VAGINAL INSERT EVERY DAY BY VAGINAL ROUTE.    lasmiditan (REYVOW) tablet 100 mg Take 1 tablet (100 mg total) by mouth daily as needed (migraine).    levothyroxine (SYNTHROID) 50 MCG tablet TAKE 1 TABLET BY MOUTH EVERY DAY IN THE MORNING    MG TRISILICATE/ALH/NAHCO3/AA (GAVISCON ORAL) Take 5 mLs by mouth as needed.     nystatin (MYCOSTATIN) 100,000 unit/mL suspension Take 6 mLs (600,000 Units total) by mouth 3 (three) times daily. Swish and swallow.    onabotulinumtoxina (BOTOX) 200 unit SolR Inject 155 Units into the muscle into the head/neck area every 90 days    ondansetron (ZOFRAN) 4 MG tablet Take 4 mg by mouth every 6 (six) hours as needed.    ondansetron (ZOFRAN-ODT) 8 MG TbDL Take 1 tablet (8 mg total) by mouth every 6 (six) hours as needed (nausea).    PROCTOSOL HC 2.5 % rectal cream Place 1 Dose rectally as needed.     rimegepant (NURTEC) 75 mg odt Take 1 tablet (75 mg total) by mouth daily as needed for Migraine. Place ODT tablet on the tongue; alternatively the ODT tablet may be placed under the tongue    sumatriptan (IMITREX) 100 MG tablet TAKE 1 BY MOUTH EVERY 2  HRS. AS NEEDED FOR MIGRAINE. UP  MG/DAY. HOLD FOR BP > 150 MM SYSTOLIC    testosterone (ANDROGEL) 1 % (50 mg/5 gram) GlPk APPLY 0.5 ML TO INNER THIGH EVERY DAY AS DIRECTED    tiZANidine (ZANAFLEX) 4 MG tablet TAKE 1 TABLET BY MOUTH NIGHTLY AS NEEDED.    zolpidem (AMBIEN) 5 MG Tab Take 1 tablet (5 mg total) by mouth nightly as needed (sleeplessness).    fluticasone (FLOVENT HFA) 220 mcg/actuation inhaler Inhale 1 puff into the lungs 2 (two) times daily. Controller     Current Facility-Administered Medications   Medication    onabotulinumtoxina injection 200 Units    onabotulinumtoxina injection 200 Units    onabotulinumtoxina injection 200 Units    onabotulinumtoxina injection 200 Units    triamcinolone acetonide injection 40 mg          Vitals:    02/22/22 1046   BP: 111/77   Pulse: 62   Resp: 17   Temp: 98.6 °F (37 °C)     Body mass index is 20.66 kg/m².    Physical Exam:  Alert and fully oriented   Lungs CTA  Heart RRR  CN II-XII intact  Motor normal bulk and tone, symmetric strength  Coordination intact FTN  Sensory in tact to LT  Gait: normal, pace and base     Data review:    Lab Results   Component Value Date     11/01/2021     09/18/2015    K 3.7 11/01/2021    K 4.1 09/18/2015    CL 96 (L) 11/01/2021     09/18/2015    CO2 35 (H) 11/01/2021    BUN 20 11/01/2021    CREATININE 0.87 11/01/2021    CREATININE 0.92 09/18/2015    GLU 81 11/01/2021    AST 21 11/01/2021    ALT 16 11/01/2021    ALBUMIN 4.4 11/01/2021    ALBUMIN 3.6 09/18/2015    PROT 6.9 11/01/2021    BILITOT 0.7 11/01/2021    CHOL 222 (H) 11/01/2021    HDL 85 11/01/2021    LDLCALC 112 (H) 11/01/2021    LDLCALC 77 09/18/2015    TRIG 142 11/01/2021       Lab Results   Component Value Date    WBC 5.9 11/09/2020    HGB 13.5 11/09/2020    HCT 41.6 11/09/2020    MCV 94.3 11/09/2020     11/09/2020       Lab Results   Component Value Date    TSH 1.35 11/01/2021     No results found for this or any previous  visit.    A/P:     Chronic Migraine responding to Botox as expected. Continue treatment until patient in true remission, meaning that the patient stays headache free when Botox wears off in 10 to 12 weeks. That will be the time to stop.  Encouraged the patient to comply with with early acute intervention for escalations    BOTOX PROCEDURE    PROCEDURE PERFORMED: Botulinum toxin injection (70833)    CLINICAL INDICATION: G43.719    A time out was conducted just before the start of the procedure to verify the correct patient and procedure, procedure location, and all relevant critical information.     DESCRIPTION OF PROCEDURE: After obtaining informed consent and under aseptic technique, a total of 155 units of botulinum toxin type A were injected in the following muscles: Procerus 5 units,  5 units   bilaterally, frontalis 20 units, temporalis 20 units bilaterally, occipitalis 15 units, upper cervical paraspinals 10 units bilaterally and trapezius 15 units bilaterally. The patient was given a total of 155 units in 31 sites.The patient tolerated the procedure well. There were no complications.    The patient was scheduled for repeat treatment in 10 to 12 weeks     Unavoidable waste 45 units      August Smiley M.D  Medical Director, Headache and Facial Pain  [facility]

## 2022-02-28 ENCOUNTER — CLINICAL SUPPORT (OUTPATIENT)
Dept: REHABILITATION | Facility: HOSPITAL | Age: 57
End: 2022-02-28
Payer: COMMERCIAL

## 2022-02-28 DIAGNOSIS — M25.611 DECREASED RANGE OF MOTION OF RIGHT SHOULDER: ICD-10-CM

## 2022-02-28 DIAGNOSIS — R29.898 DECREASED STRENGTH OF UPPER EXTREMITY: ICD-10-CM

## 2022-02-28 DIAGNOSIS — M75.111 NONTRAUMATIC INCOMPLETE TEAR OF ROTATOR CUFF, RIGHT: Primary | ICD-10-CM

## 2022-02-28 DIAGNOSIS — M25.511 ACUTE PAIN OF RIGHT SHOULDER: ICD-10-CM

## 2022-02-28 PROCEDURE — 97140 MANUAL THERAPY 1/> REGIONS: CPT | Mod: PN

## 2022-02-28 PROCEDURE — 97110 THERAPEUTIC EXERCISES: CPT | Mod: PN

## 2022-02-28 NOTE — PROGRESS NOTES
Physical Therapy Daily Treatment Note     Name: Brenda C Stargardter  Clinic Number: [id number]    Therapy Diagnosis:Physician:  Encounter Diagnosis   Name Primary?    Acute pain of right shoulder        Visit Date: 2/28/2022  Physician: Patrick López P*    Physician Orders: PT Eval and Treat  Medical Diagnosis from Referral:   M25.511 (ICD-10-CM) - Right shoulder pain   M75.111 (ICD-10-CM) - Partial tear of right rotator cuff         Evaluation Date: 1/21/2022  Authorization Period Expiration: 12/31/22  Plan of Care Expiration: 04/21//2022                         Progress Update: 02/21/2022                        Visit # / Visits authorized:  12/ 12                    FOTO: Visit #1 - 1 / 3       Time In: 940  Time Out: 1040  Total Billable Time: minutes    Precautions: Standard    Subjective     Pt reports: she feels botox is helping, she has been trying to use her arm more  She was compliant with home exercise program.  Response to previous treatment: soreness  Functional change: can lift glasses from cabinet    Pain: 5/10  Location: right shoulder      Objective     Rehabilitation program:    12/3/21 DOS  Phase 1: (0-6 weeks) - 01/2022  Phase 2: (6-12 weeks)- 02/2022  Phase 3: (12+ weeks)- 03/2022    Prema received therapeutic exercises to develop strength, flexibility and posture for 30 minutes including:  NMRE x 0 minutes to improve posture, proprioception:    UBE 5/5  Overhead Pulleys: flexion, scaption x 4 minutes each  Serratus roll up wall x 20  Yellow TheraBand ER walkouts with punch at end x 20  Yellow TheraBand looped around wrist, abd and flexion x 20  Yellow TheraBand scaption x 10     Red Theraband: rows, extension x 20 each NT  B ER Yellow theraband x 20   NMRE NT  ER Yellow theraband x 20 NMRE NT    Supine flexion with 3# dowel x 20  Supine ER with Dowel x 20  Supine SA punch with 3# dowel x 20    NOT PERFORMED time  SL ER x 20  Supine horizontal abd Yellow theraband x 20  Prone scapular  retraction with scapular setting x 20  Prone shoulder flexion x 20    NOT PERFORMED   Isometric walks:  ER, IR x 10 each    Scapular retractions w/setting x 20       Manual therapy: x 30 minutes  Superior/inferior clavicular mobs, grade I  NT  PROM  Flexion, ER IR ABD  Posterior, inferior joint mobs, grade II  STM lateral scapula border   UT and Levator Palpation for DN NT  Manual Resisted IR/ER, Rhythmic stabilization and Resisted PNF diagonal    CP x 8 minutes R shoulder    Dry Needling Performed by Lincoln Corcoran, PT, Cert. DN: The patient was cleared of all contraindications and educated on the risks and effects of dry needling, and post needling expectations. The patient was agreeable to dry needling treatment. Pt signed consent form pre needling. Dry Needling was performed using 0.30 x 30mm needles to right Upper trap and levator using pistoning and coning technique without electrical stimulation . No Adverse effects  Home Exercises Provided and Patient Education Provided     Education provided:   - cont Home Exercise Program, discussed possible soreness for DN and she should stay as active as possible moving her shoulder to help with soreness  Discussed Healthy back program for her chronic cervical and lumbar pain  Written Home Exercises Provided: Patient instructed to cont prior HEP.  Exercises were reviewed and Prema was able to demonstrate them prior to the end of the session.  Prema demonstrated good  understanding of the education provided.     See EMR under Patient Instructions for exercises provided prior visit.    Assessment     Prema is making progress with leonel scapular strength, and Rotator Cuff strength.  She does have small hike with shoulder elevation, improves with cueing to activate scapula muscles. Progressing with active and passive Range of Motion . Continued therapy to address strength, ROM, posture. Has chronic cervical pain which may also be contributing to her upper trap and levator  francia Lloyd Is progressing well towards her goals.   Pt prognosis is Good.     Pt will continue to benefit from skilled outpatient physical therapy to address the deficits listed in the problem list box on initial evaluation, provide pt/family education and to maximize pt's level of independence in the home and community environment.     Pt's spiritual, cultural and educational needs considered and pt agreeable to plan of care and goals.    Anticipated barriers to physical therapy: none    GOALS:     SHORT TERM GOALS: 4 weeks, () Progress   1. Recent signs and systems trend is improving in order to progress towards LTG's.  progressing   2. Patient will improve AROM to below measures to progress towards long term goals.  a. Shoulder Flexion: pain free AROM >130*  b. Shoulder ER @ 90*: pain free AROM > 60*  c. Shoulder functional IR: pain free AROM > L5   progressing   3. Patient will be independent with HEP in order to further progress and return to maximal function.   progressing   4. Pain rating at Worst: 4/10 in order to progress towards increased independence with activity.   progressing   5. Patient will be able to correct postural deviations in sitting and standing, to decrease pain and promote long term stability.   progressing      LONG TERM GOALS: 8 weeks, () Progress   1. Patient will return to normal ADL, recreational, and work related activities with less pain and limitation.    progressing   2. Patient will improve AROM to stated goals in order to return to maximal functional potential.    progressing   3. Patient will improve strength to stated goals of appropriate musculature in order to improve functional independence.    progressing   4. Pain Rating at Best: 1/10 to improve Quality of Life and return to prior level activities.    progressing   5. Pain will Improve FOTO Score to the predicted outcome score: 35%   progressing   6. Patient will have met/partially met personal goal of: able to use  her right UE without pain or limitation   progressing          Plan     Cont per POC, posture, strengthening  DN as needed, per PT    Lincoln Corcoran, PT

## 2022-03-03 ENCOUNTER — CLINICAL SUPPORT (OUTPATIENT)
Dept: REHABILITATION | Facility: HOSPITAL | Age: 57
End: 2022-03-03
Payer: COMMERCIAL

## 2022-03-03 DIAGNOSIS — M75.111 INCOMPLETE ROTATOR CUFF TEAR OR RUPTURE OF RIGHT SHOULDER, NOT SPECIFIED AS TRAUMATIC: ICD-10-CM

## 2022-03-03 DIAGNOSIS — M25.511 ACUTE PAIN OF RIGHT SHOULDER: Primary | ICD-10-CM

## 2022-03-03 DIAGNOSIS — M75.111 INCOMPLETE TEAR OF RIGHT ROTATOR CUFF: Primary | ICD-10-CM

## 2022-03-03 PROCEDURE — 97140 MANUAL THERAPY 1/> REGIONS: CPT | Mod: PN

## 2022-03-03 PROCEDURE — 97110 THERAPEUTIC EXERCISES: CPT | Mod: PN

## 2022-03-03 NOTE — PROGRESS NOTES
Physical Therapy Daily Treatment Note     Name: Brenda C Stargardt  Clinic Number: [id number]    Therapy Diagnosis:Physician:  Encounter Diagnosis   Name Primary?    Acute pain of right shoulder        Visit Date: 3/3/2022  Physician: Patrick López P*    Physician Orders: PT Eval and Treat  Medical Diagnosis from Referral:   M25.511 (ICD-10-CM) - Right shoulder pain   M75.111 (ICD-10-CM) - Partial tear of right rotator cuff         Evaluation Date: 1/21/2022  Authorization Period Expiration: 12/31/22  Plan of Care Expiration: 04/21//2022                         Progress Update: 02/21/2022                        Visit # / Visits authorized:  12/ 12                    FOTO: Visit #1 - 2 / 3       Time In: 1105  Time Out: 1205  Total Billable Time: 60 minutes    Precautions: Standard    Subjective     Pt reports: she feels stiff, did some kayaking  She was compliant with home exercise program.  Response to previous treatment: soreness  Functional change: can do more recreational activity that she likes    Pain: 5/10  Location: right shoulder      Objective     Rehabilitation program:    12/3/21 DOS  Phase 1: (0-6 weeks) - 01/2022  Phase 2: (6-12 weeks)- 02/2022  Phase 3: (12+ weeks)- 03/2022    Prema received therapeutic exercises to develop strength, flexibility and posture for 30 minutes including:  NMRE x 0 minutes to improve posture, proprioception:    UBE 5/5  Overhead Pulleys: flexion, scaption x 4 minutes each  Serratus roll up wall x 20  Yellow TheraBand ER walkouts with punch at end x 15  Yellow TheraBand looped around wrist, abd and flexion x 20  Yellow TheraBand scaption x 15    Red Theraband: rows, extension x 20 each NT  B ER Yellow theraband x 20   NMRE NT  ER Yellow theraband x 20 NMRE NT    Supine flexion with 3# dowel x 20  Supine ER with Dowel x 20  Supine SA punch with 3# dowel x 20    NOT PERFORMED time  SL ER x 20  Supine horizontal abd Yellow theraband x 20  Prone scapular retraction  with scapular setting x 20  Prone shoulder flexion x 20    NOT PERFORMED   Isometric walks:  ER, IR x 10 each    Scapular retractions w/setting x 20       Manual therapy: x 30 minutes  Superior/inferior clavicular mobs, grade I  NT  PROM  Flexion, ER IR ABD  Posterior, inferior joint mobs, grade II  STM lateral scapula border   UT and Levator Palpation for DN NT  Manual Resisted IR/ER, Rhythmic stabilization and Resisted PNF diagonal    CP x 8 minutes R shoulder    Dry Needling Performed by Lincoln Corcoran, PT, Cert. DN: The patient was cleared of all contraindications and educated on the risks and effects of dry needling, and post needling expectations. The patient was agreeable to dry needling treatment. Pt signed consent form pre needling. Dry Needling was performed using 0.30 x 30mm needles to right Upper trap and levator using pistoning and coning technique without electrical stimulation . No Adverse effects  Home Exercises Provided and Patient Education Provided     Education provided:   - cont Home Exercise Program, discussed possible soreness for DN and she should stay as active as possible moving her shoulder to help with soreness  Discussed Healthy back program for her chronic cervical and lumbar pain  Written Home Exercises Provided: Patient instructed to cont prior HEP.  Exercises were reviewed and Prema was able to demonstrate them prior to the end of the session.  Prema demonstrated good  understanding of the education provided.     See EMR under Patient Instructions for exercises provided prior visit.    Assessment     Prema is making progress with leonel scapular strength, and Rotator Cuff strength.  She does have small hike with shoulder elevation, improves with cueing to activate scapula muscles. Progressing with active and passive Range of Motion . Continued therapy to address strength, ROM, posture. Has chronic cervical pain which may also be contributing to her upper trap and levator  francia Lloyd Is progressing well towards her goals.   Pt prognosis is Good.     Pt will continue to benefit from skilled outpatient physical therapy to address the deficits listed in the problem list box on initial evaluation, provide pt/family education and to maximize pt's level of independence in the home and community environment.     Pt's spiritual, cultural and educational needs considered and pt agreeable to plan of care and goals.    Anticipated barriers to physical therapy: none    GOALS:     SHORT TERM GOALS: 4 weeks, () Progress   1. Recent signs and systems trend is improving in order to progress towards LTG's.  progressing   2. Patient will improve AROM to below measures to progress towards long term goals.  a. Shoulder Flexion: pain free AROM >130*  b. Shoulder ER @ 90*: pain free AROM > 60*  c. Shoulder functional IR: pain free AROM > L5   progressing   3. Patient will be independent with HEP in order to further progress and return to maximal function.   progressing   4. Pain rating at Worst: 4/10 in order to progress towards increased independence with activity.   progressing   5. Patient will be able to correct postural deviations in sitting and standing, to decrease pain and promote long term stability.   progressing      LONG TERM GOALS: 8 weeks, () Progress   1. Patient will return to normal ADL, recreational, and work related activities with less pain and limitation.    progressing   2. Patient will improve AROM to stated goals in order to return to maximal functional potential.    progressing   3. Patient will improve strength to stated goals of appropriate musculature in order to improve functional independence.    progressing   4. Pain Rating at Best: 1/10 to improve Quality of Life and return to prior level activities.    progressing   5. Pain will Improve FOTO Score to the predicted outcome score: 35%   progressing   6. Patient will have met/partially met personal goal of: able to use  her right UE without pain or limitation   progressing          Plan     Cont per POC, posture, strengthening  DN as needed, per PT    Lincoln Corcoran, PT

## 2022-03-07 DIAGNOSIS — G43.719 INTRACTABLE CHRONIC MIGRAINE WITHOUT AURA AND WITHOUT STATUS MIGRAINOSUS: ICD-10-CM

## 2022-03-07 RX ORDER — RIMEGEPANT SULFATE 75 MG/75MG
75 TABLET, ORALLY DISINTEGRATING ORAL DAILY PRN
Qty: 8 TABLET | Refills: 5 | Status: SHIPPED | OUTPATIENT
Start: 2022-03-07 | End: 2022-10-03 | Stop reason: SDUPTHER

## 2022-03-11 ENCOUNTER — CLINICAL SUPPORT (OUTPATIENT)
Dept: REHABILITATION | Facility: HOSPITAL | Age: 57
End: 2022-03-11
Payer: COMMERCIAL

## 2022-03-11 DIAGNOSIS — M25.511 ACUTE PAIN OF RIGHT SHOULDER: Primary | ICD-10-CM

## 2022-03-11 DIAGNOSIS — M75.111 NONTRAUMATIC INCOMPLETE TEAR OF ROTATOR CUFF, RIGHT: ICD-10-CM

## 2022-03-11 DIAGNOSIS — R29.898 DECREASED STRENGTH OF UPPER EXTREMITY: ICD-10-CM

## 2022-03-11 DIAGNOSIS — M25.611 DECREASED RANGE OF MOTION OF RIGHT SHOULDER: ICD-10-CM

## 2022-03-11 PROCEDURE — 97140 MANUAL THERAPY 1/> REGIONS: CPT | Mod: PN

## 2022-03-11 PROCEDURE — 97110 THERAPEUTIC EXERCISES: CPT | Mod: PN

## 2022-03-11 NOTE — PROGRESS NOTES
Physical Therapy Daily Treatment Note     Name: Brenda C Stargardt  Clinic Number: [id number]    Therapy Diagnosis:Physician:  Encounter Diagnosis   Name Primary?    Acute pain of right shoulder        Visit Date: 3/11/2022  Physician: Patrick López P*    Physician Orders: PT Eval and Treat  Medical Diagnosis from Referral:   M25.511 (ICD-10-CM) - Right shoulder pain   M75.111 (ICD-10-CM) - Partial tear of right rotator cuff         Evaluation Date: 1/21/2022  Authorization Period Expiration: 12/31/22  Plan of Care Expiration: 04/21//2022                         Progress Update: 02/21/2022                        Visit # / Visits authorized:  13/ 12                    FOTO: Visit #1 - 2 / 3       Time In: 1000  Time Out: 1100  Total Billable Time: 60 minutes    Precautions: Standard    Subjective     Pt reports: she has been working more on her motion, and feels as if it better, but does hurt some after she stretches  She was compliant with home exercise program.  Response to previous treatment: soreness  Functional change: can do more recreational activity that she likes, bike and kayak    Pain: 5/10  Location: right shoulder      Objective     Rehabilitation program:    12/3/21 DOS  Phase 1: (0-6 weeks) - 01/2022  Phase 2: (6-12 weeks)- 02/2022  Phase 3: (12+ weeks)- 03/2022    Prema received therapeutic exercises to develop strength, flexibility and posture for 30 minutes including:  NMRE x 0 minutes to improve posture, proprioception:    UBE 5/5  Overhead Pulleys: flexion, scaption x 4 minutes each  Serratus roll up wall x 20  Yellow TheraBand ER walkouts with punch at end x 15  Yellow TheraBand looped around wrist, abd and flexion x 20  Yellow TheraBand scaption x 15  Yellow TheraBand bilateral chest press x 20    Prone T x 15  Prone Y x 15  Prone Row 4# x 15      NOT PERFORMED time  SL ER x 20  Supine horizontal abd Yellow theraband x 20  Prone scapular retraction with scapular setting x 20  Prone  shoulder flexion x 20    NOT PERFORMED   Isometric walks:  ER, IR x 10 each    Scapular retractions w/setting x 20       Manual therapy: x 30 minutes  Superior/inferior clavicular mobs, grade I  NT  PROM  Flexion, ER IR ABD  Posterior, inferior joint mobs, grade II  STM lateral scapula border   UT and Levator Palpation for DN NT  Manual Resisted IR/ER, Rhythmic stabilization and Resisted PNF diagonal    CP x 8 minutes R shoulder    Dry Needling Performed by Lincoln Corcoran, PT, Cert. DN: The patient was cleared of all contraindications and educated on the risks and effects of dry needling, and post needling expectations. The patient was agreeable to dry needling treatment. Pt signed consent form pre needling. Dry Needling was performed using 0.30 x 30mm needles to right Upper trap and levator using pistoning and coning technique without electrical stimulation . No Adverse effects  Home Exercises Provided and Patient Education Provided     Education provided:   - cont Home Exercise Program, discussed possible soreness for DN and she should stay as active as possible moving her shoulder to help with soreness  Discussed Healthy back program for her chronic cervical and lumbar pain  Written Home Exercises Provided: Patient instructed to cont prior HEP.  Exercises were reviewed and Prema was able to demonstrate them prior to the end of the session.  Prema demonstrated good  understanding of the education provided.     See EMR under Patient Instructions for exercises provided prior visit.    Assessment     Prema is making progress with leonel scapular strength, and Rotator Cuff strength.  She does have small hike with shoulder elevation, improves with cueing to activate scapula muscles. Progressing with active and passive Range of Motion . Continued therapy to address strength, ROM, posture. Has chronic cervical pain which may also be contributing to her upper trap and levator discomfort  Prema Is progressing well towards  her goals.   Pt prognosis is Good.     Pt will continue to benefit from skilled outpatient physical therapy to address the deficits listed in the problem list box on initial evaluation, provide pt/family education and to maximize pt's level of independence in the home and community environment.     Pt's spiritual, cultural and educational needs considered and pt agreeable to plan of care and goals.    Anticipated barriers to physical therapy: none    GOALS:     SHORT TERM GOALS: 4 weeks, () Progress   1. Recent signs and systems trend is improving in order to progress towards LTG's.  progressing   2. Patient will improve AROM to below measures to progress towards long term goals.  a. Shoulder Flexion: pain free AROM >130*  b. Shoulder ER @ 90*: pain free AROM > 60*  c. Shoulder functional IR: pain free AROM > L5   progressing   3. Patient will be independent with HEP in order to further progress and return to maximal function.   progressing   4. Pain rating at Worst: 4/10 in order to progress towards increased independence with activity.   progressing   5. Patient will be able to correct postural deviations in sitting and standing, to decrease pain and promote long term stability.   progressing      LONG TERM GOALS: 8 weeks, () Progress   1. Patient will return to normal ADL, recreational, and work related activities with less pain and limitation.    progressing   2. Patient will improve AROM to stated goals in order to return to maximal functional potential.    progressing   3. Patient will improve strength to stated goals of appropriate musculature in order to improve functional independence.    progressing   4. Pain Rating at Best: 1/10 to improve Quality of Life and return to prior level activities.    progressing   5. Pain will Improve FOTO Score to the predicted outcome score: 35%   progressing   6. Patient will have met/partially met personal goal of: able to use her right UE without pain or limitation    progressing          Plan     Cont per POC, posture, strengthening  DN as needed, per PT    Lincoln Corcoran, PT

## 2022-03-25 ENCOUNTER — CLINICAL SUPPORT (OUTPATIENT)
Dept: REHABILITATION | Facility: HOSPITAL | Age: 57
End: 2022-03-25
Payer: COMMERCIAL

## 2022-03-25 DIAGNOSIS — M25.511 ACUTE PAIN OF RIGHT SHOULDER: Primary | ICD-10-CM

## 2022-03-25 DIAGNOSIS — R29.898 DECREASED STRENGTH OF UPPER EXTREMITY: ICD-10-CM

## 2022-03-25 DIAGNOSIS — M25.611 DECREASED RANGE OF MOTION OF RIGHT SHOULDER: ICD-10-CM

## 2022-03-25 PROCEDURE — 97140 MANUAL THERAPY 1/> REGIONS: CPT | Mod: PN

## 2022-03-25 PROCEDURE — 97110 THERAPEUTIC EXERCISES: CPT | Mod: PN

## 2022-03-25 NOTE — PROGRESS NOTES
Physical Therapy Daily Treatment Note     Name: Brenda C Stargardter  Clinic Number: [id number]    Therapy Diagnosis:Physician:  Encounter Diagnosis   Name Primary?    Acute pain of right shoulder        Visit Date: 3/25/2022  Physician: Patrick López P*    Physician Orders: PT Eval and Treat  Medical Diagnosis from Referral:   M25.511 (ICD-10-CM) - Right shoulder pain   M75.111 (ICD-10-CM) - Partial tear of right rotator cuff         Evaluation Date: 1/21/2022  Authorization Period Expiration: 12/31/22  Plan of Care Expiration: 04/21//2022                         Progress Update: 02/21/2022                        Visit # / Visits authorized:  13/ 12                    FOTO: Visit #1 - 2 / 3       Time In: 300  Time Out: 400  Total Billable Time: 60 minutes    Precautions: Standard    Subjective     Pt reports: she has been out of town, and her shoulder/trap feels tight  She was compliant with home exercise program.  Response to previous treatment: soreness  Functional change: can do more recreational activity that she likes, bike and kayak    Pain: 5/10  Location: right shoulder      Objective     Rehabilitation program:    12/3/21 DOS  Phase 1: (0-6 weeks) - 01/2022  Phase 2: (6-12 weeks)- 02/2022  Phase 3: (12+ weeks)- 03/2022    Prema received therapeutic exercises to develop strength, flexibility and posture for 30 minutes including:  NMRE x 0 minutes to improve posture, proprioception:    UBE 5/5  Overhead Pulleys: flexion, scaption x 4 minutes each  S/L ER arm roll 1# x 20  Serratus roll up wall x 20  Red TheraBand ER walkouts with punch at end x 15  Red TheraBand Diagonals x 10  Yellow TheraBand looped around wrist, abd and flexion x 20  Yellow TheraBand scaption x 15  Yellow TheraBand bilateral chest press x 20    Prone T x 20  Prone Y x 20  Prone Row 4# x 20      NOT PERFORMED time  SL ER x 20  Supine horizontal abd Yellow theraband x 20  Prone scapular retraction with scapular setting x  20  Prone shoulder flexion x 20    NOT PERFORMED   Isometric walks:  ER, IR x 10 each    Scapular retractions w/setting x 20       Manual therapy: x 30 minutes  Superior/inferior clavicular mobs, grade I    PROM  Flexion, ER IR ABD  Posterior, inferior joint mobs, grade II  STM lateral scapula border   UT and Levator Palpation for DN NT  Manual Resisted IR/ER, Rhythmic stabilization and Resisted PNF diagonal    CP x 8 minutes R shoulder    Dry Needling Performed by Lincoln Corcoran, PT, Cert. DN: The patient was cleared of all contraindications and educated on the risks and effects of dry needling, and post needling expectations. The patient was agreeable to dry needling treatment. Pt signed consent form pre needling. Dry Needling was performed using 0.30 x 30mm needles to right Upper trap and levator using pistoning and coning technique without electrical stimulation . No Adverse effects  Home Exercises Provided and Patient Education Provided     Education provided:   - cont Home Exercise Program, discussed possible soreness for DN and she should stay as active as possible moving her shoulder to help with soreness  Discussed Healthy back program for her chronic cervical and lumbar pain  Written Home Exercises Provided: Patient instructed to cont prior HEP.  Exercises were reviewed and Prema was able to demonstrate them prior to the end of the session.  Prema demonstrated good  understanding of the education provided.     See EMR under Patient Instructions for exercises provided prior visit.    Assessment     Prema is making progress with leonel scapular strength, and Rotator Cuff strength.  Shoulder hike with elevation is improved when  activating scapula muscles. Progressing with active and passive Range of Motion . Continued therapy to address strength, ROM, posture. Has chronic cervical pain which may also be contributing to her upper trap and levator discomfort  Prema Is progressing well towards her goals.   Pt  prognosis is Good.     Pt will continue to benefit from skilled outpatient physical therapy to address the deficits listed in the problem list box on initial evaluation, provide pt/family education and to maximize pt's level of independence in the home and community environment.     Pt's spiritual, cultural and educational needs considered and pt agreeable to plan of care and goals.    Anticipated barriers to physical therapy: none    GOALS:     SHORT TERM GOALS: 4 weeks, () Progress   1. Recent signs and systems trend is improving in order to progress towards LTG's.  progressing   2. Patient will improve AROM to below measures to progress towards long term goals.  a. Shoulder Flexion: pain free AROM >130*  b. Shoulder ER @ 90*: pain free AROM > 60*  c. Shoulder functional IR: pain free AROM > L5   progressing   3. Patient will be independent with HEP in order to further progress and return to maximal function.   progressing   4. Pain rating at Worst: 4/10 in order to progress towards increased independence with activity.   progressing   5. Patient will be able to correct postural deviations in sitting and standing, to decrease pain and promote long term stability.   progressing      LONG TERM GOALS: 8 weeks, () Progress   1. Patient will return to normal ADL, recreational, and work related activities with less pain and limitation.    progressing   2. Patient will improve AROM to stated goals in order to return to maximal functional potential.    progressing   3. Patient will improve strength to stated goals of appropriate musculature in order to improve functional independence.    progressing   4. Pain Rating at Best: 1/10 to improve Quality of Life and return to prior level activities.    progressing   5. Pain will Improve FOTO Score to the predicted outcome score: 35%   progressing   6. Patient will have met/partially met personal goal of: able to use her right UE without pain or limitation   progressing           Plan     Cont per POC, posture, strengthening  DN as needed, per PT    Lincoln Corcoran, PT

## 2022-03-28 ENCOUNTER — CLINICAL SUPPORT (OUTPATIENT)
Dept: REHABILITATION | Facility: HOSPITAL | Age: 57
End: 2022-03-28
Payer: COMMERCIAL

## 2022-03-28 DIAGNOSIS — M25.611 DECREASED RANGE OF MOTION OF RIGHT SHOULDER: ICD-10-CM

## 2022-03-28 DIAGNOSIS — R29.898 DECREASED STRENGTH OF UPPER EXTREMITY: ICD-10-CM

## 2022-03-28 DIAGNOSIS — M75.111 NONTRAUMATIC INCOMPLETE TEAR OF ROTATOR CUFF, RIGHT: ICD-10-CM

## 2022-03-28 DIAGNOSIS — M25.511 ACUTE PAIN OF RIGHT SHOULDER: Primary | ICD-10-CM

## 2022-03-28 PROCEDURE — 97110 THERAPEUTIC EXERCISES: CPT | Mod: PN

## 2022-03-28 PROCEDURE — 97140 MANUAL THERAPY 1/> REGIONS: CPT | Mod: PN

## 2022-03-28 NOTE — PROGRESS NOTES
Physical Therapy Daily Treatment Note     Name: Brenda C Stargardter  Clinic Number: [id number]    Therapy Diagnosis:Physician:  Encounter Diagnosis   Name Primary?    Acute pain of right shoulder        Visit Date: 3/28/2022  Physician: Patrick López P*    Physician Orders: PT Eval and Treat  Medical Diagnosis from Referral:   M25.511 (ICD-10-CM) - Right shoulder pain   M75.111 (ICD-10-CM) - Partial tear of right rotator cuff         Evaluation Date: 1/21/2022  Authorization Period Expiration: 12/31/22  Plan of Care Expiration: 04/21//2022                         Progress Update: 02/21/2022                        Visit # / Visits authorized:  14/ 20                    FOTO: Visit #1 - 2 / 3       Time In: 1200  Time Out: 100  Total Billable Time: 60 minutes    Precautions: Standard    Subjective     Pt reports: she was able to do some kayaking over the weekend no issues  She was compliant with home exercise program.  Response to previous treatment: soreness  Functional change: can do more recreational activity that she likes, bike and kayak    Pain: 5/10  Location: right shoulder      Objective     Rehabilitation program:    12/3/21 DOS  Phase 1: (0-6 weeks) - 01/2022  Phase 2: (6-12 weeks)- 02/2022  Phase 3: (12+ weeks)- 03/2022    Prema received therapeutic exercises to develop strength, flexibility and posture for 30 minutes including:  NMRE x 0 minutes to improve posture, proprioception:    UBE 5/5  Overhead Pulleys: flexion, scaption x 4 minutes each  S/L ER arm roll 1# x 20  Serratus roll up wall x 20  Red TheraBand ER walkouts with punch at end x 15  Red TheraBand Diagonals x 10  Yellow TheraBand looped around wrist, abd and flexion x 20  Yellow TheraBand scaption x 15  Yellow TheraBand bilateral chest press x 20    Prone T x 20  Prone Y x 20  Prone Row 4# x 20      NOT PERFORMED time  SL ER x 20  Supine horizontal abd Yellow theraband x 20  Prone scapular retraction with scapular setting x  20  Prone shoulder flexion x 20    NOT PERFORMED   Isometric walks:  ER, IR x 10 each    Scapular retractions w/setting x 20       Manual therapy: x 30 minutes  Superior/inferior clavicular mobs, grade I    PROM  Flexion, ER IR ABD  Posterior, inferior joint mobs, grade II  STM lateral scapula border   UT and Levator Palpation for DN NT  Manual Resisted IR/ER, Rhythmic stabilization and Resisted PNF diagonal    CP x 8 minutes R shoulder    Dry Needling Performed by Lincoln Corcoran, PT, Cert. DN: The patient was cleared of all contraindications and educated on the risks and effects of dry needling, and post needling expectations. The patient was agreeable to dry needling treatment. Pt signed consent form pre needling. Dry Needling was performed using 0.30 x 30mm needles to right Upper trap and levator using pistoning and coning technique without electrical stimulation . No Adverse effects  Home Exercises Provided and Patient Education Provided     Education provided:   - cont Home Exercise Program, discussed possible soreness for DN and she should stay as active as possible moving her shoulder to help with soreness  Discussed Healthy back program for her chronic cervical and lumbar pain  Written Home Exercises Provided: Patient instructed to cont prior HEP.  Exercises were reviewed and Prema was able to demonstrate them prior to the end of the session.  Prema demonstrated good  understanding of the education provided.     See EMR under Patient Instructions for exercises provided prior visit.    Assessment     Prema is making progress with leonel scapular strength, and Rotator Cuff strength.  Shoulder hike with elevation is improved when  activating scapula muscles. Progressing with active and passive Range of Motion . Continued therapy to address strength, ROM, posture. Has chronic cervical pain which may also be contributing to her upper trap and levator discomfort  Prema Is progressing well towards her goals.   Pt  prognosis is Good.     Pt will continue to benefit from skilled outpatient physical therapy to address the deficits listed in the problem list box on initial evaluation, provide pt/family education and to maximize pt's level of independence in the home and community environment.     Pt's spiritual, cultural and educational needs considered and pt agreeable to plan of care and goals.    Anticipated barriers to physical therapy: none    GOALS:     SHORT TERM GOALS: 4 weeks, () Progress   1. Recent signs and systems trend is improving in order to progress towards LTG's.  progressing   2. Patient will improve AROM to below measures to progress towards long term goals.  a. Shoulder Flexion: pain free AROM >130*  b. Shoulder ER @ 90*: pain free AROM > 60*  c. Shoulder functional IR: pain free AROM > L5   progressing   3. Patient will be independent with HEP in order to further progress and return to maximal function.   progressing   4. Pain rating at Worst: 4/10 in order to progress towards increased independence with activity.   progressing   5. Patient will be able to correct postural deviations in sitting and standing, to decrease pain and promote long term stability.   progressing      LONG TERM GOALS: 8 weeks, () Progress   1. Patient will return to normal ADL, recreational, and work related activities with less pain and limitation.    progressing   2. Patient will improve AROM to stated goals in order to return to maximal functional potential.    progressing   3. Patient will improve strength to stated goals of appropriate musculature in order to improve functional independence.    progressing   4. Pain Rating at Best: 1/10 to improve Quality of Life and return to prior level activities.    progressing   5. Pain will Improve FOTO Score to the predicted outcome score: 35%   progressing   6. Patient will have met/partially met personal goal of: able to use her right UE without pain or limitation   progressing           Plan     Cont per POC, posture, strengthening  DN as needed, per PT    Lincoln Corcoran, PT

## 2022-03-31 NOTE — PLAN OF CARE
Outpatient Therapy Updated Plan of Care     Visit Date: 3/28/2022    Name: Brenda C Stargardter  [facility] Number: [id number]    Therapy Diagnosis:   Encounter Diagnoses   Name Primary?    Acute pain of right shoulder Yes    Decreased range of motion of right shoulder     Decreased strength of upper extremity     Nontraumatic incomplete tear of rotator cuff, right      Physician: Patrick López P*    Physician Orders: PT Eval and Treat   Medical Diagnosis from Referral:   M25.511 (ICD-10-CM) - Right shoulder pain   M75.111 (ICD-10-CM) - Partial tear of right rotator cuff       Evaluation Date: 3/28/2022  Current Certification Period: 1/21/22 to 4/21/22  Authorization Period Expiration: 12/31/22  Plan of Care Expiration: 4/21/22  Visit # / Visits authorized: 14/ 20    Precautions: Standard  Functional Level Prior to Evaluation:  Independent    Subjective     Update: Patient reports she is feeling better and able to do more. She was able to start bike riding and kayaking again with no increase in her discomfort. Feels as if she is stronger and more flexible    Pain:  Use of Numeric Pain Scale:  Current 4/10, worst 6/10, best 4/10   Location: right shoulder   Description: Aching    Objective     Active Range of Motion (AROM)/Passive Range of Motion (PROM):     Shoulder (Degrees) AROM (Right) PROM (Right) AROM (Left) PROM (Left) Comments   Flexion 155 170      Extension 15 25      Abduction 140 165      Adduction        Internal Rotation 60 70      External Rotation 70 85      Apley's Internal Rotation NT       Apley's External Rotation NT       Elbow Flexion WNL       Elbow Extension WNL       Elbow Supination WNL       Elbow Pronation WNL       Wrist Flexion WNL       Wrist Extension WNL       Ulnar Deviation WNL       Radial Deviation WNL         Joint Capsule: Normal    Muscle Length Tension Testing:    Shoulder Right  Left  Comments   Pec Minor Tight     Pec Major Clavicular Tight     Pec Major Sternal Tight        Manual Muscle Testing:     RUE 5/5 4+/5 4/5 4-/5 3+/5 3/5 3-/5 2+/5 2/5 2-/5 1/5 0   Shoulder Flexion  x             Shoulder Extension x              Shoulder Abduction  x             Shoulder Adduction x              Shoulder Internal Rotation  x             Shoulder External Rotation  x             Elbow Flexion x              Elbow Extension x              Elbow Supination x              Elbow Pronation x              Wrist Extension x              Wrist Flexion x              Radial Deviation x              Ulnar Deviation x                    Movement Analysis:Does still have a small shoulder hike with elevation, but this continues to improve    Palpation for Tenderness: Chronic pain and tightness in her upper traps, Rhomboids and cervical muscles, this was even before shoulder injury      Assessment     Update: Prema continues to improve after her rotator cuff repair. Her pain is controlled, she has chronic pain issues. Her motion is improved, her shoulder strength and stability are also better. She does still have a small hike with elevation, but that is getting better as well. She was able to start doing some recreational activities she likes.She can do a home program of a couple more weeks of therapy.    Previous Short Term Goals Status:   Mostly Met  New Short Term Goals Status:   Met  Long Term Goal Status:   continue per initial plan of care.  Reasons for Recertification of Therapy:   Try to get 5/5 strength in Rotator Cuff     Plan     Updated Certification Period: 3/28/2022 to 5/28/22  Recommended Treatment Plan: 2 times per week for 4 weeks: Manual Therapy, Moist Heat/ Ice, Patient Education, Self Care, Therapeutic Activities and Therapeutic Exercise  Other Recommendations:     Lincoln Corcoran, PT  3/28/2022      I CERTIFY THE NEED FOR THESE SERVICES FURNISHED UNDER THIS PLAN OF TREATMENT AND WHILE UNDER MY CARE    Physician's comments:        Physician's Signature:  ___________________________________________________

## 2022-04-04 ENCOUNTER — CLINICAL SUPPORT (OUTPATIENT)
Dept: REHABILITATION | Facility: HOSPITAL | Age: 57
End: 2022-04-04
Payer: COMMERCIAL

## 2022-04-04 DIAGNOSIS — R29.898 DECREASED STRENGTH OF UPPER EXTREMITY: ICD-10-CM

## 2022-04-04 DIAGNOSIS — M25.511 ACUTE PAIN OF RIGHT SHOULDER: ICD-10-CM

## 2022-04-04 DIAGNOSIS — M25.611 DECREASED RANGE OF MOTION OF RIGHT SHOULDER: Primary | ICD-10-CM

## 2022-04-04 DIAGNOSIS — M75.111 NONTRAUMATIC INCOMPLETE TEAR OF ROTATOR CUFF, RIGHT: ICD-10-CM

## 2022-04-04 PROCEDURE — 97110 THERAPEUTIC EXERCISES: CPT | Mod: PN

## 2022-04-04 PROCEDURE — 97140 MANUAL THERAPY 1/> REGIONS: CPT | Mod: PN

## 2022-04-04 NOTE — PROGRESS NOTES
Physical Therapy Daily Treatment Note     Name: Brenda C Stargardt  Clinic Number: [id number]    Therapy Diagnosis:Physician:  Encounter Diagnosis   Name Primary?    Acute pain of right shoulder        Visit Date: 4/4/2022  Physician: Patrick López P*    Physician Orders: PT Eval and Treat  Medical Diagnosis from Referral:   M25.511 (ICD-10-CM) - Right shoulder pain   M75.111 (ICD-10-CM) - Partial tear of right rotator cuff         Evaluation Date: 1/21/2022  Authorization Period Expiration: 12/31/22  Plan of Care Expiration: 04/21//2022                         Progress Update: 02/21/2022                        Visit # / Visits authorized:  14/ 20                    FOTO: Visit #1 - 2 / 3       Time In: 1050  Time Out: 1150  Total Billable Time:60 minutes    Precautions: Standard    Subjective     Pt reports: she saw MD and he would like a few more weeks of Therapy, but overall improved with strength and motion  She was compliant with home exercise program.  Response to previous treatment: soreness  Functional change: can do more recreational activity that she likes, bike and kayak    Pain:4/10  Location: right shoulder      Objective     Rehabilitation program:    12/3/21 DOS  Phase 1: (0-6 weeks) - 01/2022  Phase 2: (6-12 weeks)- 02/2022  Phase 3: (12+ weeks)- 03/2022    Prema received therapeutic exercises to develop strength, flexibility and posture for 30 minutes including:  NMRE x 0 minutes to improve posture, proprioception:    UBE 5/5  Overhead Pulleys: flexion, scaption x 4 minutes each  S/L ER arm roll 1# x 20  Serratus roll up wall x 20  Red TheraBand ER walkouts with punch at end x 15  Red TheraBand Diagonals x 10  Yellow TheraBand looped around wrist, abd and flexion x 20  Yellow TheraBand scaption x 15  Yellow TheraBand bilateral chest press x 20    Prone T x 20  Prone Y x 20  Prone Row 4# x 20      NOT PERFORMED time  SL ER x 20  Supine horizontal abd Yellow theraband x 20  Prone scapular  retraction with scapular setting x 20  Prone shoulder flexion x 20    NOT PERFORMED   Isometric walks:  ER, IR x 10 each    Scapular retractions w/setting x 20       Manual therapy: x 30 minutes  Superior/inferior clavicular mobs, grade I    PROM  Flexion, ER IR ABD  Posterior, inferior joint mobs, grade II  STM lateral scapula border   UT and Levator Palpation for DN NT  Manual Resisted IR/ER, Rhythmic stabilization and Resisted PNF diagonal    CP x 8 minutes R shoulder    Dry Needling Performed by Lincoln Corcoran, PT, Cert. DN: The patient was cleared of all contraindications and educated on the risks and effects of dry needling, and post needling expectations. The patient was agreeable to dry needling treatment. Pt signed consent form pre needling. Dry Needling was performed using 0.30 x 30mm needles to right Upper trap and levator using pistoning and coning technique without electrical stimulation . No Adverse effects  Home Exercises Provided and Patient Education Provided     Education provided:   - cont Home Exercise Program, discussed possible soreness for DN and she should stay as active as possible moving her shoulder to help with soreness  Discussed Healthy back program for her chronic cervical and lumbar pain  Written Home Exercises Provided: Patient instructed to cont prior HEP.  Exercises were reviewed and Prema was able to demonstrate them prior to the end of the session.  Prema demonstrated good  understanding of the education provided.     See EMR under Patient Instructions for exercises provided prior visit.    Assessment     Prema is making progress with leonel scapular strength, and Rotator Cuff strength.  Shoulder hike with elevation is improved when  activating scapula muscles. Progressing with active and passive Range of Motion. Her motion is good after stretching and MT.  Continued therapy to address strength, ROM, posture. Has chronic cervical pain which may also be contributing to her upper  trap and levator discomfort  Prema Is progressing well towards her goals.   Pt prognosis is Good.     Pt will continue to benefit from skilled outpatient physical therapy to address the deficits listed in the problem list box on initial evaluation, provide pt/family education and to maximize pt's level of independence in the home and community environment.     Pt's spiritual, cultural and educational needs considered and pt agreeable to plan of care and goals.    Anticipated barriers to physical therapy: none    GOALS:     SHORT TERM GOALS: 4 weeks, () Progress   1. Recent signs and systems trend is improving in order to progress towards LTG's.  progressing   2. Patient will improve AROM to below measures to progress towards long term goals.  a. Shoulder Flexion: pain free AROM >130*  b. Shoulder ER @ 90*: pain free AROM > 60*  c. Shoulder functional IR: pain free AROM > L5   progressing   3. Patient will be independent with HEP in order to further progress and return to maximal function.   progressing   4. Pain rating at Worst: 4/10 in order to progress towards increased independence with activity.   progressing   5. Patient will be able to correct postural deviations in sitting and standing, to decrease pain and promote long term stability.   progressing      LONG TERM GOALS: 8 weeks, () Progress   1. Patient will return to normal ADL, recreational, and work related activities with less pain and limitation.    progressing   2. Patient will improve AROM to stated goals in order to return to maximal functional potential.    progressing   3. Patient will improve strength to stated goals of appropriate musculature in order to improve functional independence.    progressing   4. Pain Rating at Best: 1/10 to improve Quality of Life and return to prior level activities.    progressing   5. Pain will Improve FOTO Score to the predicted outcome score: 35%   progressing   6. Patient will have met/partially met personal  goal of: able to use her right UE without pain or limitation   progressing          Plan     Cont per POC, posture, strengthening  DN as needed, per PT    Lincoln Corcoran, PT

## 2022-04-11 ENCOUNTER — CLINICAL SUPPORT (OUTPATIENT)
Dept: REHABILITATION | Facility: HOSPITAL | Age: 57
End: 2022-04-11
Payer: COMMERCIAL

## 2022-04-11 DIAGNOSIS — M25.511 ACUTE PAIN OF RIGHT SHOULDER: Primary | ICD-10-CM

## 2022-04-11 PROCEDURE — 97110 THERAPEUTIC EXERCISES: CPT | Mod: PN

## 2022-04-11 PROCEDURE — 97140 MANUAL THERAPY 1/> REGIONS: CPT | Mod: PN

## 2022-04-11 NOTE — PROGRESS NOTES
Physical Therapy Daily Treatment Note     Name: Brenda C Stargardt  Clinic Number: [id number]    Therapy Diagnosis:Physician:  Encounter Diagnosis   Name Primary?    Acute pain of right shoulder        Visit Date: 4/11/2022  Physician: Patrick López P*    Physician Orders: PT Eval and Treat  Medical Diagnosis from Referral:   M25.511 (ICD-10-CM) - Right shoulder pain   M75.111 (ICD-10-CM) - Partial tear of right rotator cuff         Evaluation Date: 1/21/2022  Authorization Period Expiration: 12/31/22  Plan of Care Expiration: 04/21//2022                         Progress Update: 02/21/2022                        Visit # / Visits authorized:  15/ 20                    FOTO: Visit #1 - 2 / 3       Time In: 430  Time Out: 530  Total Billable Time:60 minutes    Precautions: Standard    Subjective     Pt reports: she said was able to do some more kayaking, some shoulder soreness  She was compliant with home exercise program.  Response to previous treatment: soreness  Functional change: can do more recreational activity that she likes, bike and kayak    Pain:4/10  Location: right shoulder      Objective     Rehabilitation program:    12/3/21 DOS  Phase 1: (0-6 weeks) - 01/2022  Phase 2: (6-12 weeks)- 02/2022  Phase 3: (12+ weeks)- 03/2022    Prema received therapeutic exercises to develop strength, flexibility and posture for 30 minutes including:  NMRE x 0 minutes to improve posture, proprioception:    UBE 5/5  Overhead Pulleys: flexion, scaption x 4 minutes each  S/L ER arm roll 1# x 20  Serratus roll up wall x 20  Red TheraBand ER walkouts with punch at end x 15  Red TheraBand Diagonals x 10  Yellow TheraBand looped around wrist, abd and flexion x 20  Yellow TheraBand scaption x 15  Yellow TheraBand bilateral chest press x 20    Prone T x 20  Prone Y x 20  Prone Row 4# x 20      NOT PERFORMED time  SL ER x 20  Supine horizontal abd Yellow theraband x 20  Prone scapular retraction with scapular setting x  20  Prone shoulder flexion x 20    NOT PERFORMED   Isometric walks:  ER, IR x 10 each    Scapular retractions w/setting x 20       Manual therapy: x 30 minutes  Superior/inferior clavicular mobs, grade I    PROM  Flexion, ER IR ABD  Posterior, inferior joint mobs, grade II  STM lateral scapula border   UT and Levator Palpation for DN NT  Manual Resisted IR/ER, Rhythmic stabilization and Resisted PNF diagonal    CP x 8 minutes R shoulder    Dry Needling Performed by Lincoln Corcoran, PT, Cert. DN: The patient was cleared of all contraindications and educated on the risks and effects of dry needling, and post needling expectations. The patient was agreeable to dry needling treatment. Pt signed consent form pre needling. Dry Needling was performed using 0.30 x 30mm needles to right Upper trap and levator using pistoning and coning technique without electrical stimulation . No Adverse effects  Home Exercises Provided and Patient Education Provided     Education provided:   - cont Home Exercise Program, discussed possible soreness for DN and she should stay as active as possible moving her shoulder to help with soreness  Discussed Healthy back program for her chronic cervical and lumbar pain  Written Home Exercises Provided: Patient instructed to cont prior HEP.  Exercises were reviewed and Prema was able to demonstrate them prior to the end of the session.  Prema demonstrated good  understanding of the education provided.     See EMR under Patient Instructions for exercises provided prior visit.    Assessment     Prema is making progress with leonel scapular strength, and Rotator Cuff strength.  Shoulder hike with elevation is improved when  activating scapula muscles. Progressing with active and passive Range of Motion. Her motion is good after stretching and MT.  Continued therapy to address strength, ROM, posture. Has chronic cervical pain which may also be contributing to her upper trap and levator discomfort  Prema  Is progressing well towards her goals.   Pt prognosis is Good.     Pt will continue to benefit from skilled outpatient physical therapy to address the deficits listed in the problem list box on initial evaluation, provide pt/family education and to maximize pt's level of independence in the home and community environment.     Pt's spiritual, cultural and educational needs considered and pt agreeable to plan of care and goals.    Anticipated barriers to physical therapy: none    GOALS:     SHORT TERM GOALS: 4 weeks, () Progress   1. Recent signs and systems trend is improving in order to progress towards LTG's.  progressing   2. Patient will improve AROM to below measures to progress towards long term goals.  a. Shoulder Flexion: pain free AROM >130*  b. Shoulder ER @ 90*: pain free AROM > 60*  c. Shoulder functional IR: pain free AROM > L5   progressing   3. Patient will be independent with HEP in order to further progress and return to maximal function.   progressing   4. Pain rating at Worst: 4/10 in order to progress towards increased independence with activity.   progressing   5. Patient will be able to correct postural deviations in sitting and standing, to decrease pain and promote long term stability.   progressing      LONG TERM GOALS: 8 weeks, () Progress   1. Patient will return to normal ADL, recreational, and work related activities with less pain and limitation.    progressing   2. Patient will improve AROM to stated goals in order to return to maximal functional potential.    progressing   3. Patient will improve strength to stated goals of appropriate musculature in order to improve functional independence.    progressing   4. Pain Rating at Best: 1/10 to improve Quality of Life and return to prior level activities.    progressing   5. Pain will Improve FOTO Score to the predicted outcome score: 35%   progressing   6. Patient will have met/partially met personal goal of: able to use her right UE  without pain or limitation   progressing          Plan     Cont per POC, posture, strengthening  DN as needed, per PT    Lincoln Corcoran, PT

## 2022-04-21 ENCOUNTER — CLINICAL SUPPORT (OUTPATIENT)
Dept: REHABILITATION | Facility: HOSPITAL | Age: 57
End: 2022-04-21
Payer: COMMERCIAL

## 2022-04-21 DIAGNOSIS — M25.511 ACUTE PAIN OF RIGHT SHOULDER: Primary | ICD-10-CM

## 2022-04-21 PROCEDURE — 97110 THERAPEUTIC EXERCISES: CPT | Mod: PN

## 2022-04-21 PROCEDURE — 97140 MANUAL THERAPY 1/> REGIONS: CPT | Mod: PN

## 2022-04-21 NOTE — PROGRESS NOTES
Physical Therapy Daily Treatment Note     Name: Brenda C Stargardt  Clinic Number: [id number]    Therapy Diagnosis:Physician:  Encounter Diagnosis   Name Primary?    Acute pain of right shoulder        Visit Date: 4/21/2022  Physician: Patrick López P*    Physician Orders: PT Eval and Treat  Medical Diagnosis from Referral:   M25.511 (ICD-10-CM) - Right shoulder pain   M75.111 (ICD-10-CM) - Partial tear of right rotator cuff         Evaluation Date: 1/21/2022  Authorization Period Expiration: 12/31/22  Plan of Care Expiration: 04/21//2022                         Progress Update: 02/21/2022                        Visit # / Visits authorized:  16/ 20                    FOTO: Visit #1 - 2 / 3       Time In: 0930  Time Out: 1030  Total Billable Time:60 minutes    Precautions: Standard    Subjective     Pt reports: she said is doing better, working at home on her motion and strength. Will be last day starting healthy back  She was compliant with home exercise program.  Response to previous treatment: soreness  Functional change: can do more recreational activity that she likes, bike and kayak    Pain:3/10  Location: right shoulder      Objective     Rehabilitation program:    12/3/21 DOS  Phase 1: (0-6 weeks) - 01/2022  Phase 2: (6-12 weeks)- 02/2022  Phase 3: (12+ weeks)- 03/2022    Prema received therapeutic exercises to develop strength, flexibility and posture for 30 minutes including:  NMRE x 0 minutes to improve posture, proprioception:    UBE 5/5  Overhead Pulleys: flexion, scaption x 4 minutes each  S/L ER arm roll 1# x 20  Serratus roll up wall x 20  Red TheraBand ER walkouts with punch at end x 15  Red TheraBand Diagonals x 10  Yellow TheraBand looped around wrist, abd and flexion x 20  Yellow TheraBand scaption x 15  Yellow TheraBand bilateral chest press x 20    Prone T x 20  Prone Y x 20  Prone Row 4# x 20      NOT PERFORMED time  SL ER x 20  Supine horizontal abd Yellow theraband x 20  Prone  scapular retraction with scapular setting x 20  Prone shoulder flexion x 20    NOT PERFORMED   Isometric walks:  ER, IR x 10 each    Scapular retractions w/setting x 20       Manual therapy: x 30 minutes  Superior/inferior clavicular mobs, grade I    PROM  Flexion, ER IR ABD  Posterior, inferior joint mobs, grade II  STM lateral scapula border   UT and Levator Palpation for DN NT  Manual Resisted IR/ER, Rhythmic stabilization and Resisted PNF diagonal    CP x 8 minutes R shoulder    Dry Needling Performed by Lincoln Corcoran, PT, Cert. DN: The patient was cleared of all contraindications and educated on the risks and effects of dry needling, and post needling expectations. The patient was agreeable to dry needling treatment. Pt signed consent form pre needling. Dry Needling was performed using 0.30 x 30mm needles to right Upper trap and levator using pistoning and coning technique without electrical stimulation . No Adverse effects  Home Exercises Provided and Patient Education Provided     Education provided:   - cont Home Exercise Program, discussed possible soreness for DN and she should stay as active as possible moving her shoulder to help with soreness  Discussed Healthy back program for her chronic cervical and lumbar pain  Written Home Exercises Provided: Patient instructed to cont prior HEP.  Exercises were reviewed and Prema was able to demonstrate them prior to the end of the session.  Prema demonstrated good  understanding of the education provided.     See EMR under Patient Instructions for exercises provided prior visit.    Assessment     Prema is making progress with leonel scapular strength 4+/5, and Rotator Cuff strength 5/5.  Shoulder hike with elevation is improved when  activating scapula muscles. Progressing with active and passive Range of Motion. Her motion is good after stretching and MT.  Continued therapy to address strength, ROM, posture. Has chronic cervical pain which may also be  contributing to her upper trap and levator discomfort  Prema Is progressing well towards her goals.   Pt prognosis is Good.     Pt will continue to benefit from skilled outpatient physical therapy to address the deficits listed in the problem list box on initial evaluation, provide pt/family education and to maximize pt's level of independence in the home and community environment.     Pt's spiritual, cultural and educational needs considered and pt agreeable to plan of care and goals.    Anticipated barriers to physical therapy: none    GOALS:     SHORT TERM GOALS: 4 weeks, () Progress   1. Recent signs and systems trend is improving in order to progress towards LTG's.  progressing   2. Patient will improve AROM to below measures to progress towards long term goals.  a. Shoulder Flexion: pain free AROM >130*  b. Shoulder ER @ 90*: pain free AROM > 60*  c. Shoulder functional IR: pain free AROM > L5   progressing   3. Patient will be independent with HEP in order to further progress and return to maximal function.   progressing   4. Pain rating at Worst: 4/10 in order to progress towards increased independence with activity.   progressing   5. Patient will be able to correct postural deviations in sitting and standing, to decrease pain and promote long term stability.   progressing      LONG TERM GOALS: 8 weeks, () Progress   1. Patient will return to normal ADL, recreational, and work related activities with less pain and limitation.    progressing   2. Patient will improve AROM to stated goals in order to return to maximal functional potential.    progressing   3. Patient will improve strength to stated goals of appropriate musculature in order to improve functional independence.    progressing   4. Pain Rating at Best: 1/10 to improve Quality of Life and return to prior level activities.    progressing   5. Pain will Improve FOTO Score to the predicted outcome score: 35%   progressing   6. Patient will have  met/partially met personal goal of: able to use her right UE without pain or limitation   progressing          Plan     Cont per POC, posture, strengthening  DN as needed, per PT    Lincoln Corcoran, PT

## 2022-05-10 ENCOUNTER — PROCEDURE VISIT (OUTPATIENT)
Dept: NEUROLOGY | Facility: CLINIC | Age: 57
End: 2022-05-10
Payer: COMMERCIAL

## 2022-05-10 VITALS
BODY MASS INDEX: 19.76 KG/M2 | WEIGHT: 122.94 LBS | DIASTOLIC BLOOD PRESSURE: 76 MMHG | SYSTOLIC BLOOD PRESSURE: 114 MMHG | HEIGHT: 66 IN | HEART RATE: 58 BPM | TEMPERATURE: 98 F | RESPIRATION RATE: 17 BRPM

## 2022-05-10 DIAGNOSIS — G43.719 INTRACTABLE CHRONIC MIGRAINE WITHOUT AURA AND WITHOUT STATUS MIGRAINOSUS: Primary | ICD-10-CM

## 2022-05-10 PROCEDURE — 64615 CHEMODENERV MUSC MIGRAINE: CPT | Mod: S$GLB,,, | Performed by: PSYCHIATRY & NEUROLOGY

## 2022-05-10 PROCEDURE — 64615 PR CHEMODENERVATION OF MUSCLE FOR CHRONIC MIGRAINE: ICD-10-PCS | Mod: S$GLB,,, | Performed by: PSYCHIATRY & NEUROLOGY

## 2022-05-10 RX ORDER — DICLOFENAC EPOLAMINE 0.01 G/1
SYSTEM TOPICAL
COMMUNITY
Start: 2022-01-18

## 2022-05-10 NOTE — PROCEDURES
Procedures    2021  With Botox injections I have achieved well over 50%  improvement in the patient's symptoms. Migraines have been reduced at least 7 days per month and the number of cumulative hours suffering with headaches has been reduced at least 100 total hours per month. Today she  does have a headache indicating that the Botox has worn off. Frequency of treatment is every 3 months unless no response to the treatments, at which time we will discontinue the injections.        ROS: Positive for photophobia, phonophobia, nausea, insomnia     Past Medical History:   Diagnosis Date    Abdominal or pelvic swelling, mass, or lump, right lower quadrant     Anxiety     Arthritis     Asthma, mild persistent     Cervicalgia     COPD (chronic obstructive pulmonary disease)     Enlargement of lymph nodes     Eosinophilic esophagitis     Gastroparesis     Headache(784.0)     Heartburn     Helicobacter pylori (H. pylori)     History of positive PPD, untreated     History of psychiatric hospitalization     after son , hospitalized for eating disorder    Hx of psychiatric care     Hypothyroidism     Migraines     Occipital neuralgia     Other selective immunoglobulin deficiencies     Other syndromes affecting cervical region     Psychiatric problem     Spondylosis of cervical spine     Therapy     Unspecified asthma(493.90)     Unspecified disorder of thyroid     Unspecified viral meningitis          Current Outpatient Medications   Medication Sig    albuterol sulfate (PROAIR RESPICLICK) 90 mcg/actuation inhaler Inhale 1-2 puffs into the lungs every 4 (four) hours as needed for Wheezing. Rescue    clobetasol 0.05% (TEMOVATE) 0.05 % Oint     clotrimazole-betamethasone 1-0.05% (LOTRISONE) cream Apply topically as needed.     COLLAGEN MISC by Misc.(Non-Drug; Combo Route) route.    COVID-19 vac, fanny,Pfizer,,PF, (PFIZER COVID-19) 30 mcg/0.3 mL injection Pfizer-Groopie COVID-19 Vaccine (PF)  30 mcg/0.3 mL IM susp (purple)   Inject by intramuscular route.    cyanocobalamin 500 MCG tablet Take 500 mcg by mouth once daily.    diclofenac (FLECTOR) 1.3 % PT12 APPLY 1 PATCH BY TRANSDERMAL ROUTE 2 TIMES EVERY DAY TO MOST PAINFUL AREA    estradiol 0.05 mg/24 hr td ptsw (VIVELLE-DOT) 0.05 mg/24 hr estradiol 0.05 mg/24 hr semiweekly transdermal patch   Apply 1 patch twice a week by transdermal route.    FEXOFENADINE HCL (ALLEGRA ORAL) Take by mouth.    fluticasone (FLOVENT HFA) 220 mcg/actuation inhaler Inhale 1 puff into the lungs 2 (two) times daily. Controller    fluticasone-vilanterol (BREO) 100-25 mcg/dose diskus inhaler Inhale 1 puff into the lungs once daily. Controller    galcanezumab-gnlm (EMGALITY PEN) 120 mg/mL PnIj Inject 1 pen (120 mg) into the skin every 28 days.    galcanezumab-gnlm 120 mg/mL PnIj Inject 2 pens (240 mg) subcutaneous at separate sites, once (loading dose). Start maintenance dose 28 days later (Patient taking differently: Inject 2 pens (240 mg) subcutaneous at separate sites, once (loading dose). Start maintenance dose 28 days later)    hydrocodone-acetaminophen 7.5-325mg (NORCO) 7.5-325 mg per tablet Take 1 tablet by mouth nightly as needed.     INTRAROSA 6.5 mg Inst INSERT 1 VAGINAL INSERT EVERY DAY BY VAGINAL ROUTE.    lasmiditan (REYVOW) tablet 100 mg Take 1 tablet (100 mg total) by mouth daily as needed (migraine).    levothyroxine (SYNTHROID) 50 MCG tablet TAKE 1 TABLET BY MOUTH EVERY DAY IN THE MORNING    MG TRISILICATE/ALH/NAHCO3/AA (GAVISCON ORAL) Take 5 mLs by mouth as needed.     nystatin (MYCOSTATIN) 100,000 unit/mL suspension Take 6 mLs (600,000 Units total) by mouth 3 (three) times daily. Swish and swallow.    onabotulinumtoxina (BOTOX) 200 unit SolR Inject 155 Units into the muscle into the head/neck area every 90 days    ondansetron (ZOFRAN) 4 MG tablet Take 4 mg by mouth every 6 (six) hours as needed.    ondansetron (ZOFRAN-ODT) 8 MG TbDL Take 1  tablet (8 mg total) by mouth every 6 (six) hours as needed (nausea).    PROCTOSOL HC 2.5 % rectal cream Place 1 Dose rectally as needed.     rimegepant (NURTEC) 75 mg odt Take 1 tablet (75 mg total) by mouth daily as needed for Migraine. Place ODT tablet on the tongue; alternatively the ODT tablet may be placed under the tongue    sumatriptan (IMITREX) 100 MG tablet TAKE 1 BY MOUTH EVERY 2 HRS. AS NEEDED FOR MIGRAINE. UP  MG/DAY. HOLD FOR BP > 150 MM SYSTOLIC    testosterone (ANDROGEL) 1 % (50 mg/5 gram) GlPk APPLY 0.5 ML TO INNER THIGH EVERY DAY AS DIRECTED    tiZANidine (ZANAFLEX) 4 MG tablet TAKE 1 TABLET BY MOUTH NIGHTLY AS NEEDED.    zolpidem (AMBIEN) 5 MG Tab Take 1 tablet (5 mg total) by mouth nightly as needed (sleeplessness).     Current Facility-Administered Medications   Medication    onabotulinumtoxina injection 200 Units    onabotulinumtoxina injection 200 Units    onabotulinumtoxina injection 200 Units    onabotulinumtoxina injection 200 Units    triamcinolone acetonide injection 40 mg          Vitals:    05/10/22 0951   BP: 114/76   Pulse: (!) 58   Resp: 17   Temp: 97.6 °F (36.4 °C)     Body mass index is 19.84 kg/m².    Physical Exam:  Alert and fully oriented   Lungs CTA  Heart RRR  CN II-XII intact  Motor normal bulk and tone, symmetric strength  Coordination intact FTN  Sensory in tact to LT  Gait: normal, pace and base     Data review:    Lab Results   Component Value Date     11/01/2021     09/18/2015    K 3.7 11/01/2021    K 4.1 09/18/2015    CL 96 (L) 11/01/2021     09/18/2015    CO2 35 (H) 11/01/2021    BUN 20 11/01/2021    CREATININE 0.87 11/01/2021    CREATININE 0.92 09/18/2015    GLU 81 11/01/2021    AST 21 11/01/2021    ALT 16 11/01/2021    ALBUMIN 4.4 11/01/2021    ALBUMIN 3.6 09/18/2015    PROT 6.9 11/01/2021    BILITOT 0.7 11/01/2021    CHOL 222 (H) 11/01/2021    HDL 85 11/01/2021    LDLCALC 112 (H) 11/01/2021    LDLCALC 77 09/18/2015    TRIG 142  11/01/2021       Lab Results   Component Value Date    WBC 5.9 11/09/2020    HGB 13.5 11/09/2020    HCT 41.6 11/09/2020    MCV 94.3 11/09/2020     11/09/2020       Lab Results   Component Value Date    TSH 1.35 11/01/2021     No results found for this or any previous visit.    A/P:     Chronic Migraine responding to Botox as expected. Continue treatment until patient in true remission, meaning that the patient stays headache free when Botox wears off in 10 to 12 weeks. That will be the time to stop.  Encouraged the patient to comply with with early acute intervention for escalations    BOTOX PROCEDURE    PROCEDURE PERFORMED: Botulinum toxin injection (76317)    CLINICAL INDICATION: G43.719    A time out was conducted just before the start of the procedure to verify the correct patient and procedure, procedure location, and all relevant critical information.     DESCRIPTION OF PROCEDURE: After obtaining informed consent and under aseptic technique, a total of 155 units of botulinum toxin type A were injected in the following muscles: Procerus 5 units,  5 units   bilaterally, frontalis 20 units, temporalis 20 units bilaterally, occipitalis 15 units, upper cervical paraspinals 10 units bilaterally and trapezius 15 units bilaterally. The patient was given a total of 155 units in 31 sites.The patient tolerated the procedure well. There were no complications.    The patient was scheduled for repeat treatment in 10 to 12 weeks     Unavoidable waste 45 units      August Smiley M.D  Medical Director, Headache and Facial Pain  [facility]

## 2022-05-13 ENCOUNTER — CLINICAL SUPPORT (OUTPATIENT)
Dept: REHABILITATION | Facility: HOSPITAL | Age: 57
End: 2022-05-13
Payer: COMMERCIAL

## 2022-05-13 DIAGNOSIS — M53.86 DECREASED RANGE OF MOTION OF INTERVERTEBRAL DISCS OF LUMBAR SPINE: ICD-10-CM

## 2022-05-13 DIAGNOSIS — R68.89 POSTURAL INSTABILITY OF TRUNK: Primary | ICD-10-CM

## 2022-05-13 PROCEDURE — 97162 PT EVAL MOD COMPLEX 30 MIN: CPT | Mod: PN

## 2022-05-13 PROCEDURE — 97110 THERAPEUTIC EXERCISES: CPT | Mod: PN

## 2022-05-16 PROBLEM — R68.89 POSTURAL INSTABILITY OF TRUNK: Status: ACTIVE | Noted: 2022-05-16

## 2022-05-16 PROBLEM — M53.86 DECREASED RANGE OF MOTION OF INTERVERTEBRAL DISCS OF LUMBAR SPINE: Status: ACTIVE | Noted: 2022-05-16
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
## 2022-05-18 ENCOUNTER — CLINICAL SUPPORT (OUTPATIENT)
Dept: REHABILITATION | Facility: HOSPITAL | Age: 57
End: 2022-05-18
Payer: COMMERCIAL

## 2022-05-18 DIAGNOSIS — M53.86 DECREASED RANGE OF MOTION OF INTERVERTEBRAL DISCS OF LUMBAR SPINE: ICD-10-CM

## 2022-05-18 DIAGNOSIS — R68.89 POSTURAL INSTABILITY OF TRUNK: Primary | ICD-10-CM

## 2022-05-18 PROCEDURE — 97110 THERAPEUTIC EXERCISES: CPT | Mod: PN

## 2022-05-18 NOTE — PROGRESS NOTES
"[facility] Physical Therapy Treatment      [facility]  [facility] Number[id number]    Therapy Diagnosis:   Encounter Diagnoses   Name Primary?    Postural instability of trunk Yes    Decreased range of motion of intervertebral discs of lumbar spine      Physician: Cara Valdez FNP    Visit Date: 2022      Physician Orders: PT Eval and Treat   Medical Diagnosis from Referral: M54.50 (ICD-10-CM) - Lumbago      Evaluation Date: 2022  Authorization Period Expiration: 2022   Plan of Care Expiration: 2022  Reassessment Due: 2022  Visit # / Visits authorized:  ( total visits 2)     Time In: 1:03 pm  Time Out: 2:03 pm  Total Billable Time: 60 minutes    Precautions: Standard    Pattern of pain determined: Pattern 1 PEP    Subjective   Prema reports improvement of symptoms.      Patient reports tolerating previous visit well. Reported feeling more mobile with having radicular symptoms less frequently   Patient reports their pain to be 3/10 on a 0-10 scale with 0 being no pain and 10 being the worst pain imaginable.  Pain Location: Broad Lumbar spine     Work and leisure: Kayaking, biking, walking  Pt goals: To work out and exercise without pain    Objective       Baseline Isometric Testing on Med X equipment: Testing administered by PT  Date of testin2022  ROM 72 deg   Max Peak Torque 158    Min Peak Torque 22    Flex/Ext Ratio 8/1   % below normative data 41       Outcomes:  Initial score: 53% limitation  Visit 5 score:  Goal: 25% limitation      Treatment    Pt was instructed in and performed the following:     Prema received therapeutic exercises to develop/improved posture, cardiovascular endurance, muscular endurance, lumbar/cervical ROM, strength and muscular endurance for 45 minutes including the following exercises:     Open books 5 x 10" B  Double leg bridges on BB x 20 - keeping knees straight throughout)   Repeated lumbar extension in " standing    Windmills x 15 B  Prone hip extension w/ knee flexion 2# AW   Prone alt arm and leg extension x 15 B    - Tactile cues to prevent lumbar rotation ( more difficulty initiating with RUE and LLE combined)      [facility] [facility]    [facility] 5/18/[facility]   Visit Number 2   VAS Pain Rating 4   Extension in Lying -   Extension in Standing 30   Lumbar Extension Seat Pad -   Femur Restraint -   Top Dead Center -   Counterweight -   Lumbar Flexion -   Lumbar Extension -   Lumbar Peak Torque -   Min Torque -   Test Percent Below Normative Data -   Lumbar Weight 55   Repetitions 20   Rating of Perceived Exertion 3   Ice - Z Lie (in min.) -             Peripheral muscle strengthening which included 1 set of 15-20 repetitions at a slow, controlled 10-13 second per rep pace focused on strengthening supporting musculature for improved body mechanics and functional mobility.  Pt and therapist focused on proper form during treatment to ensure optimal strengthening of each targeted muscle group.  Machines were utilized including torso rotation, chest press, rowing, biceps, and triceps. Leg extension, leg curl, hip abd, hip add, and leg press added visit 3       Prema received the following manual therapy techniques: Joint mobilizations were applied to the: Thoracic Spine for 5 minutes.       Home Exercises Provided and Patient Education Provided   Stretching: Open books, repeated lumbar extension  Strengthening: Chilango Pavon on BB  Cardio program (V5): Not yet provided  Lifting education (V11):Not yet provided  Using Lumbar Roll:Instructed, not yet using    Education provided:   - Purpose of each exercise and the need to stabilize her errector spinae muscles in order to maintain this achieved motion    Written Home Exercises Provided: yes.  Exercises were reviewed and Prema was able to demonstrate them prior to the end of the session.  Prema demonstrated good  understanding of the education provided.      See EMR under Patient Instructions for exercises provided 5/18/2022.          Assessment     Prema demonstrated more available lumbar extension and left side-gliding motion compared to previous treatment sessions. Reported some stiffness following windmills and additional lumbar extension performed to resolve this which she improved when performing.   Noted poor activation of errector spinae musculature in prone and on the med-ex machine requiring additional tactile and verbal cues to maintain appropriate sequencing throughout.   She may benefit from more multi-level exercises at the next visit to better incorporate her lateral trunk musculature.       Patient is making good progress towards established goals.  Pt will continue to benefit from skilled outpatient physical therapy to address the deficits stated in the impairment chart, provide pt/family education and to maximize pt's level of independence in the home and community environment.     Anticipated Barriers for therapy: None  Pt's spiritual, cultural and educational needs considered and pt agreeable to plan of care and goals as stated below:             Goals:       Short term goals:  6 weeks or 10 visits   1.  Pt will demonstrate increased lumbar ROM by at least 0 degrees from the initial ROM value with improvements noted in functional ROM and ability to perform ADLs.  (approp and ongoing)  2.  Pt will demonstrate increased MedX average isometric strength value  by 20% from initial test resulting in improved ability to perform bending, lifting, and carrying activities safely, confidently.  (approp and ongoing)  3.  Patient report a reduction in worst pain score by 1-2 points for improved tolerance for being able to walk longer distances without pain.  (approp and ongoing)  4.  Pt able to perform HEP correctly with minimal cueing or supervision from therapist to encourage independent management of symptoms. (approp and ongoing)        Long term goals:  10 weeks or 20 visits   1. Pt will demonstrate increased lumbar ROM by at least 0 degrees from initial ROM value, resulting in improved ability to perform functional fwd bending while standing and sitting. (approp and ongoing)  2. Pt will demonstrate increased MedX average isometric strength value  by 35% from initial test resulting in improved ability to perform bending, lifting, and carrying activities safely, confidently.  (approp and ongoing)  3. Pt to demonstrate ability to independently control and reduce their pain through posture positioning and mechanical movements throughout a typical day.  (approp and ongoing)  4.  Pt will demonstrate reduced pain and improved functional outcomes as reported on the FOTO by reaching a limitation score of < or = 25% or less in order to demonstrate subjective improvement in pt's condition.    (approp and ongoing)  5. Pt will demonstrate independence with the HEP at discharge  (approp and ongoing)  6.  Prema will tolerate walking or biking for an hour and kayaking without pain to allow her to return to her previously active lifestyle  (approp and ongoing)      Plan   Continue with established Plan of Care towards established PT goals.     Ritika Larkin, PT, DPT

## 2022-05-20 ENCOUNTER — CLINICAL SUPPORT (OUTPATIENT)
Dept: REHABILITATION | Facility: HOSPITAL | Age: 57
End: 2022-05-20
Payer: COMMERCIAL

## 2022-05-20 DIAGNOSIS — M53.86 DECREASED RANGE OF MOTION OF INTERVERTEBRAL DISCS OF LUMBAR SPINE: ICD-10-CM

## 2022-05-20 DIAGNOSIS — R68.89 POSTURAL INSTABILITY OF TRUNK: Primary | ICD-10-CM

## 2022-05-20 PROCEDURE — 97110 THERAPEUTIC EXERCISES: CPT | Mod: PN

## 2022-05-20 PROCEDURE — 97140 MANUAL THERAPY 1/> REGIONS: CPT | Mod: PN

## 2022-05-20 NOTE — PROGRESS NOTES
[facility]      Name: Prema INIGUEZ Stargardter  Clinic Number: [id number]    Therapy Diagnosis:   Encounter Diagnoses   Name Primary?    Postural instability of trunk Yes    Decreased range of motion of intervertebral discs of lumbar spine      Physician: Cara Valdez FNP    Visit Date: 2022      Physician Orders: PT Eval and Treat   Medical Diagnosis from Referral: M54.50 (ICD-10-CM) - Lumbago      Evaluation Date: 2022  Authorization Period Expiration: 2022   Plan of Care Expiration: 2022  Reassessment Due: 2022  Visit # / Visits authorized:  ( total visits 3)     Time In: 2:32 pm  Time Out: 3:45 pm  Total Billable Time: 60 minutes    Precautions: Standard    Pattern of pain determined: Pattern 1 PEP    Subjective   Prema reports improvement of symptoms.      Patient reports tolerating previous visit well. Spent a lot of time lifting and bending over the last couple days and woke up with some increased soreness following her exercises she felt a little better. Endorsing headache and soreness along the right side of the neck    Patient reports their pain to be 6/10 on a 0-10 scale with 0 being no pain and 10 being the worst pain imaginable.  Pain Location: Broad Lumbar spine     Work and leisure: Kayaking, biking, walking  Pt goals: To work out and exercise without pain, return to running    Objective       Baseline Isometric Testing on Med X equipment: Testing administered by PT  Date of testin2022  ROM 72 deg   Max Peak Torque 158    Min Peak Torque 22    Flex/Ext Ratio 8/1   % below normative data 41       Outcomes:  Initial score: 53% limitation  Visit 5 score:  Goal: 25% limitation      Treatment    Pt was instructed in and performed the following:     Prema received therapeutic exercises to develop/improved posture, cardiovascular endurance, muscular endurance, lumbar/cervical ROM, strength and muscular endurance for 45 minutes  "including the following exercises:       Open books 5 x 10" B  Double leg bridges on foam roll w/ UE press-down with knee flexion x 15   Repeated lumbar extension in standing x 20    - Cued to keep hands near the spine to allow for full end range motion    [place] x 15 B - NP  Prone hip extension w/ knee flexion 2# AW   Prone alt arm and leg extension x 15 B    - Tactile cues to prevent lumbar rotation ( more difficulty initiating with RUE and LLE combined)    + Prone lower trap pattern # 1 x 20    - Intermittent tactile cues to encourage end range scapular retraction   + Standing Upper trap pattern #1 with rolling foam roll x 20   + Seated thoracic extension over chair back x 15      Med-ex flowsheet    [facility] 5/20/2022   Visit Number 3   VAS Pain Rating 6   Extension in Lying 15   Extension in Standing 15   Cervical Extension Seat Pad 0   Seat Adjustment 472   Top Dead Center 66   Counterweight 1.8   Cervical Flexion 78   Cervical Extension 0   Cervical Peak Torque 138   Lumbar Extension Seat Pad -   Femur Restraint -   Top Dead Center -   Counterweight -   Lumbar Flexion -   Lumbar Extension -   Lumbar Peak Torque -   Min Torque -   Test Percent Below Normative Data -   Lumbar Weight 65   Repetitions 20   Rating of Perceived Exertion 3   Ice - Z Lie (in min.) 10         Peripheral muscle strengthening which included 1 set of 15-20 repetitions at a slow, controlled 10-13 second per rep pace focused on strengthening supporting musculature for improved body mechanics and functional mobility.  Pt and therapist focused on proper form during treatment to ensure optimal strengthening of each targeted muscle group.  Machines were utilized including torso rotation, chest press, rowing, biceps, and triceps. Leg extension, leg curl, hip abd, hip add, and leg press added visit 3       Prema received the following manual therapy techniques: Joint mobilizations were applied to the: Thoracic Spine and neck for " 15 minutes.     IASTM right levator, Upper trap and lateral errector spinae  PA glides Grade 2/3 to improve mobility         Home Exercises Provided and Patient Education Provided   Stretching: Open books, repeated lumbar extension  Strengthening: Chilango Pavon on BB  Cardio program (V5): Not yet provided  Lifting education (V11):Not yet provided  Using Lumbar Roll:Instructed, not yet using    Education provided:   - Purpose of each exercise and the need to stabilize her errector spinae muscles in order to maintain this achieved motion    Written Home Exercises Provided: yes.  Exercises were reviewed and Prema was able to demonstrate them prior to the end of the session.  Prema demonstrated good  understanding of the education provided.     See EMR under Patient Instructions for exercises provided 5/18/2022.          Assessment     Prema reported continued lumbar soreness and core flexion exercises were not performed. Continued lack of thoracic extension motion observed and added several more exercises to address this. Added IASTM to the right levator scapulae musculature with a positive reduction of symptoms and immediately followed up with strengthening her shoulder blade upward rotational muscles to maintain this advancement made with soft tissue work.       Patient is making good progress towards established goals.  Pt will continue to benefit from skilled outpatient physical therapy to address the deficits stated in the impairment chart, provide pt/family education and to maximize pt's level of independence in the home and community environment.     Anticipated Barriers for therapy: None  Pt's spiritual, cultural and educational needs considered and pt agreeable to plan of care and goals as stated below:       Goals:       Short term goals:  6 weeks or 10 visits   1.  Pt will demonstrate increased lumbar ROM by at least 0 degrees from the initial ROM value with improvements noted in functional ROM and  ability to perform ADLs.  (approp and ongoing)  2.  Pt will demonstrate increased MedX average isometric strength value  by 20% from initial test resulting in improved ability to perform bending, lifting, and carrying activities safely, confidently.  (approp and ongoing)  3.  Patient report a reduction in worst pain score by 1-2 points for improved tolerance for being able to walk longer distances without pain.  (approp and ongoing)  4.  Pt able to perform HEP correctly with minimal cueing or supervision from therapist to encourage independent management of symptoms. (approp and ongoing)        Long term goals: 10 weeks or 20 visits   1. Pt will demonstrate increased lumbar ROM by at least 0 degrees from initial ROM value, resulting in improved ability to perform functional fwd bending while standing and sitting. (approp and ongoing)  2. Pt will demonstrate increased MedX average isometric strength value  by 35% from initial test resulting in improved ability to perform bending, lifting, and carrying activities safely, confidently.  (approp and ongoing)  3. Pt to demonstrate ability to independently control and reduce their pain through posture positioning and mechanical movements throughout a typical day.  (approp and ongoing)  4.  Pt will demonstrate reduced pain and improved functional outcomes as reported on the FOTO by reaching a limitation score of < or = 25% or less in order to demonstrate subjective improvement in pt's condition.    (approp and ongoing)  5. Pt will demonstrate independence with the HEP at discharge  (approp and ongoing)  6.  Prema will tolerate walking or biking for an hour and kayaking without pain to allow her to return to her previously active lifestyle  (approp and ongoing)      Plan   Continue with established Plan of Care towards established PT goals.     Ritika Larkin, PT, DPT

## 2022-05-25 ENCOUNTER — CLINICAL SUPPORT (OUTPATIENT)
Dept: REHABILITATION | Facility: HOSPITAL | Age: 57
End: 2022-05-25
Payer: COMMERCIAL

## 2022-05-25 DIAGNOSIS — M53.86 DECREASED RANGE OF MOTION OF INTERVERTEBRAL DISCS OF LUMBAR SPINE: ICD-10-CM

## 2022-05-25 DIAGNOSIS — R68.89 POSTURAL INSTABILITY OF TRUNK: Primary | ICD-10-CM

## 2022-05-25 PROCEDURE — 97110 THERAPEUTIC EXERCISES: CPT | Mod: PN

## 2022-05-25 PROCEDURE — 97112 NEUROMUSCULAR REEDUCATION: CPT | Mod: PN

## 2022-05-27 ENCOUNTER — DOCUMENTATION ONLY (OUTPATIENT)
Dept: REHABILITATION | Facility: HOSPITAL | Age: 57
End: 2022-05-27
Payer: COMMERCIAL

## 2022-05-27 ENCOUNTER — CLINICAL SUPPORT (OUTPATIENT)
Dept: REHABILITATION | Facility: HOSPITAL | Age: 57
End: 2022-05-27
Payer: COMMERCIAL

## 2022-05-27 DIAGNOSIS — R68.89 POSTURAL INSTABILITY OF TRUNK: Primary | ICD-10-CM

## 2022-05-27 DIAGNOSIS — M53.86 DECREASED RANGE OF MOTION OF INTERVERTEBRAL DISCS OF LUMBAR SPINE: ICD-10-CM

## 2022-05-27 PROCEDURE — 97112 NEUROMUSCULAR REEDUCATION: CPT | Mod: PN

## 2022-05-27 PROCEDURE — 97110 THERAPEUTIC EXERCISES: CPT | Mod: PN

## 2022-05-27 NOTE — PROGRESS NOTES
"[facility]      [facility]  Cl[facility] Number: [id number]    Therapy Diagnosis:   Encounter Diagnoses   Name Primary?    Postural instability of trunk Yes    Decreased range of motion of intervertebral discs of lumbar spine      Physician: Cara Valdez FNP    Visit Date: 2022      Physician Orders: PT Eval and Treat   Medical Diagnosis from Referral: M54.50 (ICD-10-CM) - Lumbago      Evaluation Date: 2022  Authorization Period Expiration: 2022   Plan of Care Expiration: 2022  Reassessment Due: 2022  Visit # / Visits authorized:  ( total visits 5)     Time In: 12:00 pm  Time Out: 1:00 pm  Total Billable Time: 60 minutes    Precautions: Standard    Pattern of pain determined: Pattern 1 PEP    Subjective   Prema reports improvement of symptoms.      Have been able to mitigate my symptoms with my stretches. Went paddle boarding the other day without much difficulty     Patient reports their pain to be 0/10 on a 0-10 scale with 0 being no pain and 10 being the worst pain imaginable.  Pain Location: Broad Lumbar spine     Work and leisure: Kayaking, biking, walking  Pt goals: To work out and exercise without pain, return to running    Objective       Baseline Isometric Testing on Med X equipment: Testing administered by PT  Date of testin2022  ROM 72 deg   Max Peak Torque 158    Min Peak Torque 22    Flex/Ext Ratio 8/1   % below normative data 41       Outcomes:  Initial score: 53% limitation  Visit 5 score:  Goal: 25% limitation      Treatment    Pt was instructed in and performed the following:     Prema received therapeutic exercises to develop/improved posture, cardiovascular endurance, muscular endurance, lumbar/cervical ROM, strength and muscular endurance for 45 minutes including the following exercises:       Open books 5 x 10" B  Double leg bridges on foam roll w/ UE press-down with knee flexion x 20  Repeated " lumbar extension in standing x 20    - Cued to keep hands near the spine to allow for full end range motion    Prone hip extension w/ knee flexion 3# AW   Prone alt arm and leg extension x 15 B  W/ 3# AW   - Tactile cues to prevent lumbar rotation ( more difficulty initiating with RUE and LLE combined)    Prone lower trap pattern # 1 x 20    - Intermittent tactile cues to encourage end range scapular retraction   Standing Upper trap pattern #1 with rolling foam roll x 20   Seated thoracic extension over chair back x 15  + Lumbar locked thoracic extension/ rotation x 15 B    - Moderate assistance to the left w/ RTB, no assistance to the right (full ROM demonstrated)   + Prone I's, Y's and T's w/ cervical retraction on BB x 15   + Prone lumbar extension 2 x 10    - Intermittent verbal cues to prevent compensation by maintaining complete knee extension      Med-ex flowsheet    [facility] 5/27/2022   Visit Number 5   VAS Pain Rating 0   Extension in Lying 15   Extension in Standing 15   Cervical Extension Seat Pad -   Seat Adjustment -   Top Dead Center -   Counterweight -   Cervical Flexion -   Cervical Extension -   Cervical Peak Torque -   Cervical Weight -   Repetitions -   Rating of Perceived Exertion -   Lumbar Extension Seat Pad -   Femur Restraint -   Top Dead Center -   Counterweight -   Lumbar Flexion -   Lumbar Extension -   Lumbar Peak Torque -   Min Torque -   Test Percent Below Normative Data -   Lumbar Weight 75   Repetitions 16   Rating of Perceived Exertion 4   Ice - Z Lie (in min.) 10             Peripheral muscle strengthening which included 1 set of 15-20 repetitions at a slow, controlled 10-13 second per rep pace focused on strengthening supporting musculature for improved body mechanics and functional mobility.  Pt and therapist focused on proper form during treatment to ensure optimal strengthening of each targeted muscle group.  Machines were utilized including torso rotation, chest  press, rowing, biceps, and triceps. Leg extension, leg curl, hip abd, hip add, and leg press added visit 3       Prema received the following manual therapy techniques: Joint mobilizations were applied to the: Thoracic Spine and neck for 0 minutes.     IASTM right levator, Upper trap and lateral errector spinae  PA glides Grade 2/3 to improve mobility       Prema participated in neuromuscular re-education to address coordination, posture and maximum utilization of available lumbar motion including: for 10 minutes    - LE rolling prone to supine 1x1 for moderate hand over hand assistance  - Side-lying neutral gapping techniques x 10 B w/ moderate therapist overpressure  Quadruped donkey kicks at end range oscillation 2 x 10    ~ Tactile cues to maintain level lumbar spine       Home Exercises Provided and Patient Education Provided   Stretching: Open books, repeated lumbar extension  Strengthening: [place]ilango Pavon on BB  Cardio program (V5): Not yet provided  Lifting education (V11):Not yet provided  Using Lumbar Roll:Instructed, not yet using    Education provided:   - Purpose of each exercise and the need to stabilize her errector spinae muscles in order to maintain this achieved motion    Written Home Exercises Provided: yes.  Exercises were reviewed and Prema was able to demonstrate them prior to the end of the session.  Prema demonstrated good  understanding of the education provided.     See EMR under Patient Instructions for exercises provided 5/18/2022.          Assessment     Prema reported reduction of both neck and back pain this date and was able to tolerate the addition of repeated lumbar extension stabilization exercises along with cervical retraction stabilization while performing Is, Ys and Ts on the BB. Reported some fatigue with this but was able to perform. Tolerated the med-ex machine as well. Still noting a downward rotation of both shoulder blades with the right more involved than the  left and additional exercises were added to address this.       Patient is making good progress towards established goals.  Pt will continue to benefit from skilled outpatient physical therapy to address the deficits stated in the impairment chart, provide pt/family education and to maximize pt's level of independence in the home and community environment.     Anticipated Barriers for therapy: None  Pt's spiritual, cultural and educational needs considered and pt agreeable to plan of care and goals as stated below:       Goals:       Short term goals:  6 weeks or 10 visits   1.  Pt will demonstrate increased lumbar ROM by at least 0 degrees from the initial ROM value with improvements noted in functional ROM and ability to perform ADLs.  (approp and ongoing)  2.  Pt will demonstrate increased MedX average isometric strength value  by 20% from initial test resulting in improved ability to perform bending, lifting, and carrying activities safely, confidently.  (approp and ongoing)  3.  Patient report a reduction in worst pain score by 1-2 points for improved tolerance for being able to walk longer distances without pain.  (approp and ongoing)  4.  Pt able to perform HEP correctly with minimal cueing or supervision from therapist to encourage independent management of symptoms. (approp and ongoing)        Long term goals: 10 weeks or 20 visits   1. Pt will demonstrate increased lumbar ROM by at least 0 degrees from initial ROM value, resulting in improved ability to perform functional fwd bending while standing and sitting. (approp and ongoing)  2. Pt will demonstrate increased MedX average isometric strength value  by 35% from initial test resulting in improved ability to perform bending, lifting, and carrying activities safely, confidently.  (approp and ongoing)  3. Pt to demonstrate ability to independently control and reduce their pain through posture positioning and mechanical movements throughout a typical day.   (approp and ongoing)  4.  Pt will demonstrate reduced pain and improved functional outcomes as reported on the FOTO by reaching a limitation score of < or = 25% or less in order to demonstrate subjective improvement in pt's condition.    (approp and ongoing)  5. Pt will demonstrate independence with the HEP at discharge  (approp and ongoing)  6.  Prema will tolerate walking or biking for an hour and kayaking without pain to allow her to return to her previously active lifestyle  (approp and ongoing)      Plan   Continue with established Plan of Care towards established PT goals.     Ritika Larkin, PT, DPT

## 2022-05-27 NOTE — PROGRESS NOTES
Health  Consult Note    [facility]  [facility] Number: [id number]  Physician: Cara Valdez FNP  Past Medical History:   Diagnosis Date    Abdominal or pelvic swelling, mass, or lump, right lower quadrant     Anxiety     Arthritis     Asthma, mild persistent     Cervicalgia     COPD (chronic obstructive pulmonary disease)     Enlargement of lymph nodes     Eosinophilic esophagitis     Gastroparesis     Headache(784.0)     Heartburn     Helicobacter pylori (H. pylori)     History of positive PPD, untreated     History of psychiatric hospitalization     after son , hospitalized for eating disorder    Hx of psychiatric care     Hypothyroidism     Migraines     Occipital neuralgia     Other selective immunoglobulin deficiencies     Other syndromes affecting cervical region     Psychiatric problem     Spondylosis of cervical spine     Therapy     Unspecified asthma(493.90)     Unspecified disorder of thyroid     Unspecified viral meningitis      Time In: 1330  Time Out: 1430    Health  Agreement signed: Yes    Coaching performed performed: In person    Subjective:   Patient reports to health  after her Physical Therapy visit.  Very interested in health coaching. Wants to improve quality of life, decrease family related stress, increase organizational skills, increase physical activity level.     Vision:  Healthy and fit, energy and feeling well, living an active happy lifestyle    Values: Inner peace, spirituality, self-acceptance, happiness    Strengths:  Dependable, intelligent, mulittasker, motivated    Challenges:  Organization, cleaning in an organized manner, ADHD, hyperfocus, extended family relationships (but sets boundaries)    Support:  Spends significant amount of time with , friends occasionally    Hobbies:  Gardening, walking on the beach, paddleboarding, kayaking, cycling, being outside    Objective:  Prema created 3 SMART goals  today:  5/27/2022  1. Journal for 3/7 days to get a glimpse of daily activites  2. Will begin establishing a daily plan, begin by writing down daily responsibilities 1/7  3. 3/7 days Prema will use a timer with tasks, sticking to the tasks until the timer goes off.  If distracted by a thought write it down quickly in small notebook to address AFTER the timer goes off.    INITIAL date next visit  One a scale of 1-10, with 10 being 100% happy, how would you rate your happiness in each of the wellness areas below?    Happiness:         1     2     3     4     5     6     7     8     9     10    Initial Date: DC Date: +/- Total Change   Exercise/Movement 10     Physical Health 10      Stress Level 8     Nutrition  9     Sleep 7     Play 8     Body Image 7     Relationships 7     Energy/Vitality 7       Assessment:   Patient would like to focus on better, long term organizational skills.  Happy with her nutrition and her cardio level.     Plan:  Patient goals for next visit include assess this weeks goals    Health : Margarita Moss, PTA  5/27/2022

## 2022-05-30 ENCOUNTER — CLINICAL SUPPORT (OUTPATIENT)
Dept: REHABILITATION | Facility: HOSPITAL | Age: 57
End: 2022-05-30
Payer: COMMERCIAL

## 2022-05-30 DIAGNOSIS — R68.89 POSTURAL INSTABILITY OF TRUNK: Primary | ICD-10-CM

## 2022-05-30 DIAGNOSIS — M53.86 DECREASED RANGE OF MOTION OF INTERVERTEBRAL DISCS OF LUMBAR SPINE: ICD-10-CM

## 2022-05-30 PROCEDURE — 97110 THERAPEUTIC EXERCISES: CPT | Mod: PN

## 2022-05-30 PROCEDURE — 97112 NEUROMUSCULAR REEDUCATION: CPT | Mod: PN

## 2022-05-30 NOTE — PROGRESS NOTES
"[facility]      [facility]  Cl[facility] Number: [id number]    Therapy Diagnosis:   Encounter Diagnoses   Name Primary?    Postural instability of trunk Yes    Decreased range of motion of intervertebral discs of lumbar spine      Physician: Cara Valdez FNP    Visit Date: 2022      Physician Orders: PT Eval and Treat   Medical Diagnosis from Referral: M54.50 (ICD-10-CM) - Lumbago   Evaluation Date: 2022  Authorization Period Expiration: 2022   Plan of Care Expiration: 2022  Reassessment Due: 2022  Visit # / Visits authorized:  ( total visits 6)     Time In: 12:20 pm  Time Out: 1:20 pm  Total Billable Time: 60 minutes    Precautions: Standard    Pattern of pain determined: Pattern 1 PEP    Subjective   Prema reports improvement of symptoms.      Was able to paddle board for well over an hour and felt stronger doing that     Patient reports their pain to be 0/10 on a 0-10 scale with 0 being no pain and 10 being the worst pain imaginable.  Pain Location: Broad Lumbar spine     Work and leisure: Kayaking, biking, walking  Pt goals: To work out and exercise without pain, return to running    Objective       Baseline Isometric Testing on Med X equipment: Testing administered by PT  Date of testin2022  ROM 72 deg   Max Peak Torque 158    Min Peak Torque 22    Flex/Ext Ratio 8/1   % below normative data 41       Outcomes:  Initial score: 53% limitation  Visit 5 score:  Goal: 25% limitation      Treatment    Pt was instructed in and performed the following:     Prema received therapeutic exercises to develop/improved posture, cardiovascular endurance, muscular endurance, lumbar/cervical ROM, strength and muscular endurance for 45 minutes including the following exercises:       Open books 5 x 10" B  Double leg bridges on foam roll w/ UE press-down with knee flexion x 20  Repeated lumbar extension in standing x 20    - Cued to " keep hands near the spine to allow for full end range motion    Prone alt arm and leg extension x 15 B  W/ 3# AW   - Tactile cues to prevent lumbar rotation ( more difficulty initiating with RUE and LLE combined)      Prema participated in neuromuscular re-education to improve coordination and trunk control for 12 minutes including:     Prone lower trap pattern # 1 x 20    - Intermittent tactile cues to encourage end range scapular retraction   Standing Upper trap pattern #1 with rolling foam roll x 20   Seated thoracic extension over chair back x 15  Lumbar locked thoracic extension/ rotation x 15 B    -full range teach back understanding (hand placed on the lower back instead of on the front of the shoulder)  Prone I's, Y's and T's w/ cervical retraction on BB x 15       Prone lumbar extension 2 x 10    - Intermittent verbal cues to prevent compensation by maintaining complete knee extension        Med-PeopleString flowsheet    [facility] 5/30/2022   Visit Number 6   VAS Pain Rating 0   Extension in Lying 15   Extension in Standing 15   Cervical Extension Seat Pad -   Seat Adjustment -   Top Dead Center -   Counterweight -   Cervical Flexion -   Cervical Extension -   Cervical Peak Torque -   Cervical Weight -   Repetitions -   Rating of Perceived Exertion -   Lumbar Extension Seat Pad -   Femur Restraint -   Top Dead Center -   Counterweight -   Lumbar Flexion -   Lumbar Extension -   Lumbar Peak Torque -   Min Torque -   Test Percent Below Normative Data -   Lumbar Weight 70   Repetitions 20   Rating of Perceived Exertion 4   Ice - Z Lie (in min.) 10             Peripheral muscle strengthening which included 1 set of 15-20 repetitions at a slow, controlled 10-13 second per rep pace focused on strengthening supporting musculature for improved body mechanics and functional mobility.  Pt and therapist focused on proper form during treatment to ensure optimal strengthening of each targeted muscle group.  Machines  were utilized including torso rotation, chest press, rowing, biceps, and triceps. Leg extension, leg curl, hip abd, hip add, and leg press added visit 3       Prema received the following manual therapy techniques: Joint mobilizations were applied to the: Thoracic Spine and neck for 0 minutes.     IASTM right levator, Upper trap and lateral errector spinae  PA glides Grade 2/3 to improve mobility       Prema participated in neuromuscular re-education to address coordination, posture and maximum utilization of available lumbar motion including: for 10 minutes    - LE rolling prone to supine 1x1 for moderate hand over hand assistance  - Side-lying neutral gapping techniques x 10 B w/ moderate therapist overpressure  Quadruped donkey kicks at end range oscillation 2 x 10    ~ Tactile cues to maintain level lumbar spine       Home Exercises Provided and Patient Education Provided   Stretching: Open books, repeated lumbar extension  Strengthening: Chilango Pavon on BB  Cardio program (V5): Not yet provided  Lifting education (V11):Not yet provided  Using Lumbar Roll:Instructed, not yet using    Education provided:   - Purpose of each exercise and the need to stabilize her errector spinae muscles in order to maintain this achieved motion    Written Home Exercises Provided: yes.  Exercises were reviewed and Prema was able to demonstrate them prior to the end of the session.  Prema demonstrated good  understanding of the education provided.     See EMR under Patient Instructions for exercises provided 5/18/2022.          Assessment     Prema demonstrated better available thoracic extension/ rotation and was able to progress lumbar locked thoracic extension/ rotation to be performed without assistance this date and better segmental lumbar extension/ rotation observed with LE rolling.   Encouraged pay attention to the number of minutes of aerobic activities to strength training and nutritional intake and was in  agreement. Was to provided strength training hand outs to perform better follow-up.       Patient is making good progress towards established goals.  Pt will continue to benefit from skilled outpatient physical therapy to address the deficits stated in the impairment chart, provide pt/family education and to maximize pt's level of independence in the home and community environment.     Anticipated Barriers for therapy: None  Pt's spiritual, cultural and educational needs considered and pt agreeable to plan of care and goals as stated below:       Goals:       Short term goals:  6 weeks or 10 visits   1.  Pt will demonstrate increased lumbar ROM by at least 0 degrees from the initial ROM value with improvements noted in functional ROM and ability to perform ADLs.  (approp and ongoing)  2.  Pt will demonstrate increased MedX average isometric strength value  by 20% from initial test resulting in improved ability to perform bending, lifting, and carrying activities safely, confidently.  (approp and ongoing)  3.  Patient report a reduction in worst pain score by 1-2 points for improved tolerance for being able to walk longer distances without pain.  (approp and ongoing)  4.  Pt able to perform HEP correctly with minimal cueing or supervision from therapist to encourage independent management of symptoms. (approp and ongoing)        Long term goals: 10 weeks or 20 visits   1. Pt will demonstrate increased lumbar ROM by at least 0 degrees from initial ROM value, resulting in improved ability to perform functional fwd bending while standing and sitting. (approp and ongoing)  2. Pt will demonstrate increased MedX average isometric strength value  by 35% from initial test resulting in improved ability to perform bending, lifting, and carrying activities safely, confidently.  (approp and ongoing)  3. Pt to demonstrate ability to independently control and reduce their pain through posture positioning and mechanical movements  throughout a typical day.  (approp and ongoing)  4.  Pt will demonstrate reduced pain and improved functional outcomes as reported on the FOTO by reaching a limitation score of < or = 25% or less in order to demonstrate subjective improvement in pt's condition.    (approp and ongoing)  5. Pt will demonstrate independence with the HEP at discharge  (approp and ongoing)  6.  Prema will tolerate walking or biking for an hour and kayaking without pain to allow her to return to her previously active lifestyle  (approp and ongoing)      Plan   Continue with established Plan of Care towards established PT goals.     Ritika Larkin, PT, DPT

## 2022-06-01 ENCOUNTER — CLINICAL SUPPORT (OUTPATIENT)
Dept: REHABILITATION | Facility: HOSPITAL | Age: 57
End: 2022-06-01
Payer: COMMERCIAL

## 2022-06-01 DIAGNOSIS — R68.89 POSTURAL INSTABILITY OF TRUNK: Primary | ICD-10-CM

## 2022-06-01 DIAGNOSIS — M53.86 DECREASED RANGE OF MOTION OF INTERVERTEBRAL DISCS OF LUMBAR SPINE: ICD-10-CM

## 2022-06-01 PROCEDURE — 97112 NEUROMUSCULAR REEDUCATION: CPT | Mod: PN

## 2022-06-01 PROCEDURE — 97530 THERAPEUTIC ACTIVITIES: CPT | Mod: PN

## 2022-06-01 PROCEDURE — 97110 THERAPEUTIC EXERCISES: CPT | Mod: PN

## 2022-06-01 NOTE — PROGRESS NOTES
[facility]      [facility]  [facility] Number: [id number]    Therapy Diagnosis:   Encounter Diagnoses   Name Primary?    Postural instability of trunk Yes    Decreased range of motion of intervertebral discs of lumbar spine      Physician: Cara Valdez FNP    Visit Date: 2022      Physician Orders: PT Eval and Treat   Medical Diagnosis from Referral: M54.50 (ICD-10-CM) - Lumbago   Evaluation Date: 2022  Authorization Period Expiration: 2022   Plan of Care Expiration: 2022  Reassessment Due: 2022  Visit # / Visits authorized:  ( total visits 7)     Time In: 12:20 pm  Time Out: 1:20 pm  Total Billable Time: 60 minutes    Precautions: Standard    Pattern of pain determined: Pattern 1 PEP    Subjective   Prema reports improvement of symptoms.      Was able to paddle board for well over an hour and felt stronger doing that. Only notices symptoms in her back when walking or standing once the back is already irritated, mostly following sitting in the car for an extended period of time.       Patient reports their pain to be 0/10 on a 0-10 scale with 0 being no pain and 10 being the worst pain imaginable.  Pain Location: Broad Lumbar spine     Work and leisure: Kayaking, biking, walking  Pt goals: To work out and exercise without pain, return to running    Objective       Baseline Isometric Testing on Med X equipment: Testing administered by PT  Date of testin2022  ROM 72 deg   Max Peak Torque 158    Min Peak Torque 22    Flex/Ext Ratio 8/1   % below normative data 41       Outcomes:  Initial score: 53% limitation  Visit 5 score:  Goal: 25% limitation        Treatment    Pt was instructed in and performed the following:     Prema received therapeutic exercises to develop/improved posture, cardiovascular endurance, muscular endurance, lumbar/cervical ROM, strength and muscular endurance for 30 minutes including the following  "exercises:       Open books 5 x 10" B  Double leg bridges on foam roll w/ UE press-down with knee flexion x 20  Repeated lumbar extension in standing x 20    - Cued to keep hands near the spine to allow for full end range motion    Prone alt arm and leg extension x 15 B  W/ 3# AW   - Tactile cues to prevent lumbar rotation ( more difficulty initiating with RUE and LLE combined)      Prema participated in neuromuscular re-education to improve coordination and trunk control for 18 minutes including:     Prone lower trap pattern # 1 x 20    - Intermittent tactile cues to encourage end range scapular retraction   Standing Upper trap pattern #1 with rolling foam roll x 20   Seated thoracic extension over chair back x 15  Lumbar locked thoracic extension/ rotation x 15 B    -full range teach back understanding (hand placed on the lower back instead of on the front of the shoulder)  Prone I's, Y's and T's w/ cervical retraction on BB x 15 - NP    Prone lumbar extension 2 x 10    - Intermittent verbal cues to prevent compensation by maintaining complete knee extension      Prema participated in therapeutic activities to improve her ability to lift, squat and perform other functional daily tasks for 10 minutes including:     + Hip hinge training w/ un weighted dowel along the spine x 12   + Hip hinge w/ 12# bar weight to knees x 20    - W/ Forward leaning row     Med-ex flowsheet    [facility] 6/1/2022   Visit Number 7   VAS Pain Rating 0   Extension in Lying 15   Extension in Standing 15   Cervical Extension Seat Pad -   Seat Adjustment -   Top Dead Center -   Counterweight -   Cervical Flexion -   Cervical Extension -   Cervical Peak Torque -   Cervical Weight 90   Repetitions 20   Rating of Perceived Exertion 3   Lumbar Extension Seat Pad -   Femur Restraint -   Top Dead Center -   Counterweight -   Lumbar Flexion -   Lumbar Extension -   Lumbar Peak Torque -   Min Torque -   Test Percent Below Normative Data - "   Lumbar Weight 72   Repetitions 20   Rating of Perceived Exertion 3   Ice - Z Lie (in min.) 10               Peripheral muscle strengthening which included 1 set of 15-20 repetitions at a slow, controlled 10-13 second per rep pace focused on strengthening supporting musculature for improved body mechanics and functional mobility.  Pt and therapist focused on proper form during treatment to ensure optimal strengthening of each targeted muscle group.  Machines were utilized including torso rotation, chest press, rowing, biceps, and triceps. Leg extension, leg curl, hip abd, hip add, and leg press added visit 3       Prema received the following manual therapy techniques: Joint mobilizations were applied to the: Thoracic Spine and neck for 0 minutes.     IASTM right levator, Upper trap and lateral errector spinae  PA glides Grade 2/3 to improve mobility         Home Exercises Provided and Patient Education Provided   Stretching: Open books, repeated lumbar extension  Strengthening: Chilango Pavon on BB  Cardio program (V5): Not yet provided  Lifting education (V11):Not yet provided  Using Lumbar Roll:Instructed, not yet using    Education provided:   - Purpose of each exercise and the need to stabilize her errector spinae muscles in order to maintain this achieved motion    Written Home Exercises Provided: yes.  Exercises were reviewed and Prema was able to demonstrate them prior to the end of the session.  Prema demonstrated good  understanding of the education provided.     See EMR under Patient Instructions for exercises provided 5/18/2022.          Assessment     Prema continues to report having the most issues following an extended period of sitting and encouraged to continue with her repeated lumbar extensions, especially after a long period of driving prior to walking to reduce her symptoms and verbalized understanding. Started lifting training in preperation for her strength training at home along with  her continued reduction of symptoms.   Able to transition to complete teach back education using the light dowel along the spine and was able to progress using a 12# dowel being held in the front of the body.   Denied any increase in pain. Tolerated the weight on both the neck and lumbar machine well reporting moderate exertion for both with an 18 degree ROM improvement from last session to this one.       Patient is making good progress towards established goals.  Pt will continue to benefit from skilled outpatient physical therapy to address the deficits stated in the impairment chart, provide pt/family education and to maximize pt's level of independence in the home and community environment.     Anticipated Barriers for therapy: None  Pt's spiritual, cultural and educational needs considered and pt agreeable to plan of care and goals as stated below:       Goals:       Short term goals:  6 weeks or 10 visits   1.  Pt will demonstrate increased lumbar ROM by at least 0 degrees from the initial ROM value with improvements noted in functional ROM and ability to perform ADLs.  (approp and ongoing)  2.  Pt will demonstrate increased MedX average isometric strength value  by 20% from initial test resulting in improved ability to perform bending, lifting, and carrying activities safely, confidently.  (approp and ongoing)  3.  Patient report a reduction in worst pain score by 1-2 points for improved tolerance for being able to walk longer distances without pain.  (approp and ongoing)  4.  Pt able to perform HEP correctly with minimal cueing or supervision from therapist to encourage independent management of symptoms. (approp and ongoing)        Long term goals: 10 weeks or 20 visits   1. Pt will demonstrate increased lumbar ROM by at least 0 degrees from initial ROM value, resulting in improved ability to perform functional fwd bending while standing and sitting. (approp and ongoing)  2. Pt will demonstrate increased  MedX average isometric strength value  by 35% from initial test resulting in improved ability to perform bending, lifting, and carrying activities safely, confidently.  (approp and ongoing)  3. Pt to demonstrate ability to independently control and reduce their pain through posture positioning and mechanical movements throughout a typical day.  (approp and ongoing)  4.  Pt will demonstrate reduced pain and improved functional outcomes as reported on the FOTO by reaching a limitation score of < or = 25% or less in order to demonstrate subjective improvement in pt's condition.    (approp and ongoing)  5. Pt will demonstrate independence with the HEP at discharge  (approp and ongoing)  6.  Prema will tolerate walking or biking for an hour and kayaking without pain to allow her to return to her previously active lifestyle  (approp and ongoing)      Plan   Continue with established Plan of Care towards established PT goals.     Ritika Larkin, PT, DPT

## 2022-06-03 NOTE — PROGRESS NOTES
Health  Consult Note    Name: Prema INIGUEZ Stargardter  Clinic Number: [id number]  Physician: Cara Valdez FNP  Past Medical History:   Diagnosis Date    Abdominal or pelvic swelling, mass, or lump, right lower quadrant     Anxiety     Arthritis     Asthma, mild persistent     Cervicalgia     COPD (chronic obstructive pulmonary disease)     Enlargement of lymph nodes     Eosinophilic esophagitis     Gastroparesis     Headache(784.0)     Heartburn     Helicobacter pylori (H. pylori)     History of positive PPD, untreated     History of psychiatric hospitalization     after son , hospitalized for eating disorder    Hx of psychiatric care     Hypothyroidism     Migraines     Occipital neuralgia     Other selective immunoglobulin deficiencies     Other syndromes affecting cervical region     Psychiatric problem     Spondylosis of cervical spine     Therapy     Unspecified asthma(493.90)     Unspecified disorder of thyroid     Unspecified viral meningitis      Time In: 1330  Time Out: 1430    Health  Agreement signed: Yes    Coaching performed performed: In person    Subjective:   Patient reports to health  after her Physical Therapy visit.  Prema presented to coaching with her notebook where she took notes over the last few days.  Pleased with her ability to focus on tasks in 5 minute increments without being distracted and leaving to begin another task.     Vision:  Healthy and fit, energy and feeling well, living an active happy lifestyle    Values: Inner peace, spirituality, self-acceptance, happiness    Strengths:  Dependable, intelligent, mulittasker, motivated    Challenges:  Organization, cleaning in an organized manner, ADHD, hyperfocus, extended family relationships (but sets boundaries)    Support:  Spends significant amount of time with , friends occasionally    Hobbies:  Gardening, walking on the beach, paddleboarding, kayaking, cycling, being  outside    Objective:  Prema created 3 SMART goals today:  5/27/2022  1. Journal for 3/7 days to get a glimpse of daily activites (Met)  2. Will begin establishing a daily plan, begin by writing down daily responsibilities 1/7(Met)  3. 3/7 days Prema will use a timer with tasks, sticking to the tasks until the timer goes off.  If distracted by a thought write it down quickly in small notebook to address AFTER the timer goes off.(Met)  6/1/2022  1. Prema will perform her strengthening HEP on Tuesday and Thu/Fri/or Sat and write it down on accountability sheet given. 2/7 days  2. 2/7 days Prema will research cleaning ways that will best serve her and her ADHD (in addition to using the time)  3. 1/7 Prema will begin writing out her cleaning plan    INITIAL date next visit  One a scale of 1-10, with 10 being 100% happy, how would you rate your happiness in each of the wellness areas below?    Happiness:         1     2     3     4     5     6     7     8     9     10    Initial Date: DC Date: +/- Total Change   Exercise/Movement 10     Physical Health 10      Stress Level 8     Nutrition  9     Sleep 7     Play 8     Body Image 7     Relationships 7     Energy/Vitality 7       Assessment:   Patient would like to focus on better, long term organizational skills.  Happy with her nutrition and her cardio level.     Plan:  Patient goals for next visit include assess this weeks goals    Health : Margarita Moss, PTA  6/1/2022

## 2022-06-06 ENCOUNTER — CLINICAL SUPPORT (OUTPATIENT)
Dept: REHABILITATION | Facility: HOSPITAL | Age: 57
End: 2022-06-06
Payer: COMMERCIAL

## 2022-06-06 DIAGNOSIS — R68.89 POSTURAL INSTABILITY OF TRUNK: Primary | ICD-10-CM

## 2022-06-06 DIAGNOSIS — M53.86 DECREASED RANGE OF MOTION OF INTERVERTEBRAL DISCS OF LUMBAR SPINE: ICD-10-CM

## 2022-06-06 PROCEDURE — 97110 THERAPEUTIC EXERCISES: CPT | Mod: PN

## 2022-06-06 PROCEDURE — 97530 THERAPEUTIC ACTIVITIES: CPT | Mod: PN

## 2022-06-06 NOTE — PROGRESS NOTES
[facility]      [facility] [place]  [facility] Number: [id number]    Therapy Diagnosis:   Encounter Diagnoses   Name Primary?    Postural instability of trunk Yes    Decreased range of motion of intervertebral discs of lumbar spine      Physician: Cara Valdez FNP    Visit Date: 2022      Physician Orders: PT Eval and Treat   Medical Diagnosis from Referral: M54.50 (ICD-10-CM) - Lumbago   Evaluation Date: 2022  Authorization Period Expiration: 2022   Plan of Care Expiration: 2022  Reassessment Due: 2022  Visit # / Visits authorized:  ( total visits 7)     Time In: 10:00 am  Time Out: 11:00 am  Total Billable Time: 60 minutes    Precautions: Standard    Pattern of pain determined: Pattern 1 PEP    Subjective   Prema reports improvement of symptoms.      Had one episode of shooting leg pain which I was able to resolve with my exercises. We were doing a lot of sitting, static standing and doing things that typically aggravate my back. Was able to do some lifting and elliptical without increasing my pain.      Patient reports their pain to be 0/10 on a 0-10 scale with 0 being no pain and 10 being the worst pain imaginable.  Pain Location: Broad Lumbar spine     Work and leisure: Kayaking, biking, walking  Pt goals: To work out and exercise without pain, return to running    Objective       Baseline Isometric Testing on Med X equipment: Testing administered by PT  Date of testin2022  ROM 72 deg   Max Peak Torque 158    Min Peak Torque 22    Flex/Ext Ratio 8/1   % below normative data 41       Outcomes:  Initial score: 53% limitation  Visit 5 score:  Goal: 25% limitation        Treatment    Pt was instructed in and performed the following:     Prema received therapeutic exercises to develop/improved posture, cardiovascular endurance, muscular endurance, lumbar/cervical ROM, strength and muscular endurance for 30 minutes including the  "following exercises:       Open books 5 x 10" B  Double leg bridges on foam roll w/ UE press-down with knee flexion x 20  Repeated lumbar extension in standing x 20    - Cued to keep hands near the spine to allow for full end range motion    Prone alt arm and leg extension x 15 B  W/ 3# AW   - Tactile cues to prevent lumbar rotation ( more difficulty initiating with RUE and LLE combined)      Prema participated in neuromuscular re-education to improve coordination and trunk control for 18 minutes including:     Prone lower trap pattern # 1 x 20    - Intermittent tactile cues to encourage end range scapular retraction   Standing Upper trap pattern #1 with rolling foam roll x 20   Seated thoracic extension over chair back x 15   - Clinician overpressure starting in a slightly forward flexed position  Lumbar locked thoracic extension/ rotation x 15 B    -full range teach back understanding (hand placed on the lower back instead of on the front of the shoulder)  Prone I's, Y's and T's w/ cervical retraction on BB x 15    Prone lumbar extension 2 x 10    - Intermittent verbal cues to prevent compensation by maintaining complete knee extension      Prema participated in therapeutic activities to improve her ability to lift, squat and perform other functional daily tasks for 10 minutes including:      Hip hinge training w/ un weighted dowel along the spine x 12    Hip hinge w/ 12# bar weight to knees x 20    - W/ Forward leaning row     Med-ex flowsheet    [facility] 6/6/2022   Visit Number 8   VAS Pain Rating 0   Retraction with Extension 20   Sidebending 20   Extension in Lying 15   Extension in Standing 15   Cervical Extension Seat Pad -   Seat Adjustment -   Top Dead Center -   Counterweight -   Cervical Flexion -   Cervical Extension -   Cervical Peak Torque -   Cervical Weight -   Repetitions -   Rating of Perceived Exertion -   Lumbar Extension Seat Pad -   Femur Restraint -   Top Dead Center - "   Counterweight -   Lumbar Flexion -   Lumbar Extension -   Lumbar Peak Torque -   Min Torque -   Test Percent Below Normative Data -   Lumbar Weight 78   Repetitions 20   Rating of Perceived Exertion 3   Ice - Z Lie (in min.) 10         Peripheral muscle strengthening which included 1 set of 15-20 repetitions at a slow, controlled 10-13 second per rep pace focused on strengthening supporting musculature for improved body mechanics and functional mobility.  Pt and therapist focused on proper form during treatment to ensure optimal strengthening of each targeted muscle group.  Machines were utilized including torso rotation, chest press, rowing, biceps, and triceps. Leg extension, leg curl, hip abd, hip add, and leg press added visit 3       Prema received the following manual therapy techniques: Joint mobilizations were applied to the: Thoracic Spine and neck for 0 minutes.     IASTM right levator, Upper trap and lateral errector spinae  PA glides Grade 2/3 to improve mobility         Home Exercises Provided and Patient Education Provided   Stretching: Open books, repeated lumbar extension  Strengthening: Chilango Pavon on BB  Cardio program (V5): Not yet provided  Lifting education (V11):Not yet provided  Using Lumbar Roll:Instructed, not yet using    Education provided:   - Purpose of each exercise and the need to stabilize her errector spinae muscles in order to maintain this achieved motion    Written Home Exercises Provided: yes.  Exercises were reviewed and Prema was able to demonstrate them prior to the end of the session.  Prema demonstrated good  understanding of the education provided.     See EMR under Patient Instructions for exercises provided 5/18/2022.          Assessment     Prema reported slight increase of back pain with extended standing and sitting activities over the weekend. Was able to impliment repeated standing lumbar extension following extended driving with a positive change.    Minimal lower cervical and upper thoracic extension observed and added in seated thoracic extension with therapist over pressure between T4 and T8 with overpressure provided underneath of the arms to produce this motion.   Reported a reduction of catching and stiffness in the neck with repeated combined cervical retraction and extension along with right side-bending. Encouraged to prioritize thoracic extension and right side-bending.         Patient is making good progress towards established goals.  Pt will continue to benefit from skilled outpatient physical therapy to address the deficits stated in the impairment chart, provide pt/family education and to maximize pt's level of independence in the home and community environment.     Anticipated Barriers for therapy: None  Pt's spiritual, cultural and educational needs considered and pt agreeable to plan of care and goals as stated below:       Goals:       Short term goals:  6 weeks or 10 visits   1.  Pt will demonstrate increased lumbar ROM by at least 0 degrees from the initial ROM value with improvements noted in functional ROM and ability to perform ADLs.  (approp and ongoing)  2.  Pt will demonstrate increased MedX average isometric strength value  by 20% from initial test resulting in improved ability to perform bending, lifting, and carrying activities safely, confidently.  (approp and ongoing)  3.  Patient report a reduction in worst pain score by 1-2 points for improved tolerance for being able to walk longer distances without pain.  (approp and ongoing)  4.  Pt able to perform HEP correctly with minimal cueing or supervision from therapist to encourage independent management of symptoms. (approp and ongoing)        Long term goals: 10 weeks or 20 visits   1. Pt will demonstrate increased lumbar ROM by at least 0 degrees from initial ROM value, resulting in improved ability to perform functional fwd bending while standing and sitting. (approp and  ongoing)  2. Pt will demonstrate increased MedX average isometric strength value  by 35% from initial test resulting in improved ability to perform bending, lifting, and carrying activities safely, confidently.  (approp and ongoing)  3. Pt to demonstrate ability to independently control and reduce their pain through posture positioning and mechanical movements throughout a typical day.  (approp and ongoing)  4.  Pt will demonstrate reduced pain and improved functional outcomes as reported on the FOTO by reaching a limitation score of < or = 25% or less in order to demonstrate subjective improvement in pt's condition.    (approp and ongoing)  5. Pt will demonstrate independence with the HEP at discharge  (approp and ongoing)  6.  Prema will tolerate walking or biking for an hour and kayaking without pain to allow her to return to her previously active lifestyle  (approp and ongoing)      Plan   Continue with established Plan of Care towards established PT goals.     Ritika Larkin, PT, DPT

## 2022-06-07 NOTE — PROGRESS NOTES
Health  Consult Note    Name: Prema INIGUEZ Stargardter  Clinic Number: [id number]  Physician: Cara Valdez FNP  Past Medical History:   Diagnosis Date    Abdominal or pelvic swelling, mass, or lump, right lower quadrant     Anxiety     Arthritis     Asthma, mild persistent     Cervicalgia     COPD (chronic obstructive pulmonary disease)     Enlargement of lymph nodes     Eosinophilic esophagitis     Gastroparesis     Headache(784.0)     Heartburn     Helicobacter pylori (H. pylori)     History of positive PPD, untreated     History of psychiatric hospitalization     after son , hospitalized for eating disorder    Hx of psychiatric care     Hypothyroidism     Migraines     Occipital neuralgia     Other selective immunoglobulin deficiencies     Other syndromes affecting cervical region     Psychiatric problem     Spondylosis of cervical spine     Therapy     Unspecified asthma(493.90)     Unspecified disorder of thyroid     Unspecified viral meningitis      Time In: 0800  Time Out: 0830    Health  Agreement signed: Yes    Coaching performed performed: In person    Subjective:   Patient reports to health  after her Physical Therapy visit.  Prema presented to coaching with her notebook where she took notes over the last few days.  Pleased with her ability to focus on tasks in 5 minute increments without being distracted and leaving to begin another task.     Vision:  Healthy and fit, energy and feeling well, living an active happy lifestyle    Values: Inner peace, spirituality, self-acceptance, happiness    Strengths:  Dependable, intelligent, mulittasker, motivated    Challenges:  Organization, cleaning in an organized manner, ADHD, hyperfocus, extended family relationships (but sets boundaries)    Support:  Spends significant amount of time with , friends occasionally    Hobbies:  Gardening, walking on the beach, paddleboarding, kayaking, cycling, being  outside    Objective:  Prema created 3 SMART goals today:  5/27/2022  1. Journal for 3/7 days to get a glimpse of daily activites (Met)  2. Will begin establishing a daily plan, begin by writing down daily responsibilities 1/7(Met)  3. 3/7 days Prema will use a timer with tasks, sticking to the tasks until the timer goes off.  If distracted by a thought write it down quickly in small notebook to address AFTER the timer goes off.(Met)  6/1/2022  1. Prema will perform her strengthening HEP on Tuesday and Thu/Fri/or Sat and write it down on accountability sheet given. 2/7 days (doing well)  2. 2/7 days Prema will research cleaning ways that will best serve her and her ADHD (in addition to using the time)  3. 1/7 Prema will begin writing out her cleaning plan (progressing)  6/8/2022  1. Prema will begin her cleaning plan, through admitted trial and error for a successful routine 3/7 days a week  2. Prema will meditate daily to continue to maintain peacefulness in times of stress with family events    INITIAL date next visit  One a scale of 1-10, with 10 being 100% happy, how would you rate your happiness in each of the wellness areas below?    Happiness:         1     2     3     4     5     6     7     8     9     10    Initial Date: DC Date: +/- Total Change   Exercise/Movement 10     Physical Health 10      Stress Level 8     Nutrition  9     Sleep 7     Play 8     Body Image 7     Relationships 7     Energy/Vitality 7       Assessment:   Patient would like to focus on better, long term organizational skills.  Happy with her nutrition and her cardio level.     Plan:  Patient goals for next visit include assess this weeks goals    Health : Margarita Moss, PTA  6/8/2022

## 2022-06-08 ENCOUNTER — CLINICAL SUPPORT (OUTPATIENT)
Dept: REHABILITATION | Facility: HOSPITAL | Age: 57
End: 2022-06-08
Payer: COMMERCIAL

## 2022-06-08 ENCOUNTER — DOCUMENTATION ONLY (OUTPATIENT)
Dept: REHABILITATION | Facility: HOSPITAL | Age: 57
End: 2022-06-08
Payer: COMMERCIAL

## 2022-06-08 DIAGNOSIS — M53.86 DECREASED RANGE OF MOTION OF INTERVERTEBRAL DISCS OF LUMBAR SPINE: ICD-10-CM

## 2022-06-08 DIAGNOSIS — R68.89 POSTURAL INSTABILITY OF TRUNK: Primary | ICD-10-CM

## 2022-06-08 PROCEDURE — 97112 NEUROMUSCULAR REEDUCATION: CPT | Mod: PN

## 2022-06-08 PROCEDURE — 97110 THERAPEUTIC EXERCISES: CPT | Mod: PN

## 2022-06-08 PROCEDURE — 97530 THERAPEUTIC ACTIVITIES: CPT | Mod: PN

## 2022-06-08 NOTE — PROGRESS NOTES
"[facility]      [facility] [place]  [facility] Number[facility] [id number]    Therapy Diagnosis:   Encounter Diagnoses   Name Primary?    Postural instability of trunk Yes    Decreased range of motion of intervertebral discs of lumbar spine      Physician: Cara Valdez FNP    Visit Date: 2022      Physician Orders: PT Eval and Treat   Medical Diagnosis from Referral: M54.50 (ICD-10-CM) - Lumbago   Evaluation Date: 2022  Authorization Period Expiration: 2022   Plan of Care Expiration: 2022  Reassessment Due: 2022  Visit # / Visits authorized:  ( total visits 9)     Time In: 10:00 am  Time Out: 11:00 am  Total Billable Time: 60 minutes    Precautions: Standard    Pattern of pain determined: Pattern 1 PEP    Subjective   Prema reports improvement of symptoms.      Continue to have some shooting leg pain on the left side. Did some yoga the other day that involved getting into multiple different positions and have been a little sore today as a result.     Patient reports their pain to be 0/10 on a 0-10 scale with 0 being no pain and 10 being the worst pain imaginable.  Pain Location: Broad Lumbar spine     Work and leisure: Kayaking, biking, walking  Pt goals: To work out and exercise without pain, return to running    Objective       Baseline Isometric Testing on Med X equipment: Testing administered by PT  Date of testin2022  ROM 72 deg   Max Peak Torque 158    Min Peak Torque 22    Flex/Ext Ratio 8/1   % below normative data 41       Outcomes:  Initial score: 53% limitation  Visit 5 score:  Goal: 25% limitation        Treatment    Pt was instructed in and performed the following:     Prema received therapeutic exercises to develop/improved posture, cardiovascular endurance, muscular endurance, lumbar/cervical ROM, strength and muscular endurance for 30 minutes including the following exercises:       Open books 5 x 10" B  Double leg " bridges on foam roll w/ UE press-down with knee flexion x 20 -NP  Repeated lumbar extension in standing x 20    - Cued to keep hands near the spine to allow for full end range motion    Prone alt arm and leg extension x 15 B  W/ 3# AW   - Tactile cues to prevent lumbar rotation ( more difficulty initiating with RUE and LLE combined)  Side-lying hip circuits: CW/ CCW, Flex/ext, abduction x 10 B    - More fatigue on the left   + Fire Hy[employer]ants RTB x 12B       Prema participated in neuromuscular re-education to improve coordination and trunk control for 18 minutes including:     Prone lower trap pattern # 1 x 20    - Intermittent tactile cues to encourage end range scapular retraction   Standing Upper trap pattern #1 with rolling foam roll x 20   Seated thoracic extension over chair back x 15   - Clinician overpressure starting in a slightly forward flexed position  Lumbar locked thoracic extension/ rotation x 15 B    -full range teach back understanding (hand placed on the lower back instead of on the front of the shoulder)  Prone I's, Y's and T's w/ cervical retraction on BB x 15    Prone lumbar extension 2 x 10    - Intermittent verbal cues to prevent compensation by maintaining complete knee extension      Prema participated in therapeutic activities to improve her ability to lift, squat and perform other functional daily tasks for 10 minutes including:      Hip hinge training w/ un weighted dowel along the spine x 12    Hip hinge w/ 12# bar weight to knees x 20   25# KB lift off the floor    - W/ Forward leaning row     Med-ex flowsheet    [facility] 6/8/2022   Visit Number 9   VAS Pain Rating 1   Retraction with Extension -   Sidebending -   Extension in Lying 20   Extension in Standing -   Cervical Extension Seat Pad -   Seat Adjustment -   Top Dead Center -   Counterweight -   Cervical Flexion -   Cervical Extension -   Cervical Peak Torque -   Cervical Weight 112   Repetitions 20   Rating of  Perceived Exertion 3   Lumbar Extension Seat Pad -   Femur Restraint -   Top Dead Center -   Counterweight -   Lumbar Flexion -   Lumbar Extension -   Lumbar Peak Torque -   Min Torque -   Test Percent Below Normative Data -   Lumbar Weight 84   Repetitions 17   Rating of Perceived Exertion 6   Ice - Z Lie (in min.) 10         Peripheral muscle strengthening which included 1 set of 15-20 repetitions at a slow, controlled 10-13 second per rep pace focused on strengthening supporting musculature for improved body mechanics and functional mobility.  Pt and therapist focused on proper form during treatment to ensure optimal strengthening of each targeted muscle group.  Machines were utilized including torso rotation, chest press, rowing, biceps, and triceps. Leg extension, leg curl, hip abd, hip add, and leg press added visit 3       Prema received the following manual therapy techniques: Joint mobilizations were applied to the: Thoracic Spine and neck for 0 minutes.     IASTM right levator, Upper trap and lateral errector spinae  PA glides Grade 2/3 to improve mobility         Home Exercises Provided and Patient Education Provided   Stretching: Open books, repeated lumbar extension  Strengthening: Chilango Pavon on BB  Cardio program (V5): Not yet provided  Lifting education (V11):Not yet provided  Using Lumbar Roll:Instructed, not yet using    Education provided:   - Purpose of each exercise and the need to stabilize her errector spinae muscles in order to maintain this achieved motion    Written Home Exercises Provided: yes.  Exercises were reviewed and Prema was able to demonstrate them prior to the end of the session.  Prema demonstrated good  understanding of the education provided.     See EMR under Patient Instructions for exercises provided 5/18/2022.          Assessment     Prema demonstrated better initiation of intra-thoracic motion with Is, Ys and Ts on the balance ball followed seated thoracic  extension over the chair. Verbal cues needed to keep her elbows in front of her to encourage maximum extension there. Noted she was more quick to fatigue with left hip circuit compared to right which is the symptomatic side. Prone press-ups progressed to patient overpressure with a slight report of pinching at end range that reduced with additional repetitions. Prema is progressing well in terms of strength tolerating progression of lumbar and cervical exercises.          Patient is making good progress towards established goals.  Pt will continue to benefit from skilled outpatient physical therapy to address the deficits stated in the impairment chart, provide pt/family education and to maximize pt's level of independence in the home and community environment.     Anticipated Barriers for therapy: None  Pt's spiritual, cultural and educational needs considered and pt agreeable to plan of care and goals as stated below:       Goals:       Short term goals:  6 weeks or 10 visits   1.  Pt will demonstrate increased lumbar ROM by at least 0 degrees from the initial ROM value with improvements noted in functional ROM and ability to perform ADLs.  (approp and ongoing)  2.  Pt will demonstrate increased MedX average isometric strength value  by 20% from initial test resulting in improved ability to perform bending, lifting, and carrying activities safely, confidently.  (approp and ongoing)  3.  Patient report a reduction in worst pain score by 1-2 points for improved tolerance for being able to walk longer distances without pain.  (approp and ongoing)  4.  Pt able to perform HEP correctly with minimal cueing or supervision from therapist to encourage independent management of symptoms. (approp and ongoing)        Long term goals: 10 weeks or 20 visits   1. Pt will demonstrate increased lumbar ROM by at least 0 degrees from initial ROM value, resulting in improved ability to perform functional fwd bending while standing  and sitting. (approp and ongoing)  2. Pt will demonstrate increased MedX average isometric strength value  by 35% from initial test resulting in improved ability to perform bending, lifting, and carrying activities safely, confidently.  (approp and ongoing)  3. Pt to demonstrate ability to independently control and reduce their pain through posture positioning and mechanical movements throughout a typical day.  (approp and ongoing)  4.  Pt will demonstrate reduced pain and improved functional outcomes as reported on the FOTO by reaching a limitation score of < or = 25% or less in order to demonstrate subjective improvement in pt's condition.    (approp and ongoing)  5. Pt will demonstrate independence with the HEP at discharge  (approp and ongoing)  6.  Prema will tolerate walking or biking for an hour and kayaking without pain to allow her to return to her previously active lifestyle  (approp and ongoing)      Plan   Continue with established Plan of Care towards established PT goals.     Ritika Larkin, PT, DPT

## 2022-06-15 ENCOUNTER — CLINICAL SUPPORT (OUTPATIENT)
Dept: REHABILITATION | Facility: HOSPITAL | Age: 57
End: 2022-06-15
Payer: COMMERCIAL

## 2022-06-15 ENCOUNTER — DOCUMENTATION ONLY (OUTPATIENT)
Dept: REHABILITATION | Facility: HOSPITAL | Age: 57
End: 2022-06-15

## 2022-06-15 DIAGNOSIS — M53.86 DECREASED RANGE OF MOTION OF INTERVERTEBRAL DISCS OF LUMBAR SPINE: ICD-10-CM

## 2022-06-15 DIAGNOSIS — R68.89 POSTURAL INSTABILITY OF TRUNK: Primary | ICD-10-CM

## 2022-06-15 PROCEDURE — 97110 THERAPEUTIC EXERCISES: CPT | Mod: PN,CQ

## 2022-06-15 PROCEDURE — 97530 THERAPEUTIC ACTIVITIES: CPT | Mod: PN,CQ

## 2022-06-15 PROCEDURE — 97112 NEUROMUSCULAR REEDUCATION: CPT | Mod: PN,CQ

## 2022-06-15 NOTE — PROGRESS NOTES
"[facility]      [facility]  [facility] Number: [id number]    Therapy Diagnosis:   Encounter Diagnoses   Name Primary?    Postural instability of trunk Yes    Decreased range of motion of intervertebral discs of lumbar spine      Physician: Cara Valdez FNP    Visit Date: 6/15/2022      Physician Orders: PT Eval and Treat   Medical Diagnosis from Referral: M54.50 (ICD-10-CM) - Lumbago   Evaluation Date: 2022  Authorization Period Expiration: 2022   Plan of Care Expiration: 2022  Reassessment Due: 2022  Visit # / Visits authorized:  ( total visits 10)     Time In: 11:00 am  Time Out: 12:00 pm  Total Billable Time: 60 minutes    Precautions: Standard    Pattern of pain determined: Pattern 1 PEP    Subjective   Prema reports improvement of symptoms overall but is experiencing slightly more soreness today after driving a good bit recently as well as going paddle boarding this morning.     Patient reports their pain to be 5/10 on a 0-10 scale with 0 being no pain and 10 being the worst pain imaginable.  Pain Location: Broad Lumbar spine     Work and leisure: Kayaking, biking, walking  Pt goals: To work out and exercise without pain, return to running    Objective       Baseline Isometric Testing on Med X equipment: Testing administered by PT  Date of testin2022  ROM 72 deg   Max Peak Torque 158    Min Peak Torque 22    Flex/Ext Ratio 8/1   % below normative data 41       Outcomes:  Initial score: 53% limitation  Visit 5 score:  Goal: 25% limitation        Treatment    Pt was instructed in and performed the following:     Prema received therapeutic exercises to develop/improved posture, cardiovascular endurance, muscular endurance, lumbar/cervical ROM, strength and muscular endurance for 30 minutes including the following exercises:       Open books 5 x 10" B  Double leg bridges on foam roll w/ UE press-down with knee flexion x 20 " -NP  Repeated lumbar extension in standing x 20    - Cued to keep hands near the spine to allow for full end range motion    Prone alt arm and leg extension x 15 B  W/ 3# AW   - Tactile cues to prevent lumbar rotation ( more difficulty initiating with RUE and LLE combined)  Side-lying hip circuits: CW/ CCW, Flex/ext, abduction x 10 B    - More fatigue on the left   + Fire Hydrants RTB x 12B       Prema participated in neuromuscular re-education to improve coordination and trunk control for 18 minutes including:     Prone lower trap pattern # 2 x 20    - Intermittent tactile cues to encourage end range scapular retraction   Standing Upper trap pattern #1 with rolling foam roll x 20   Seated thoracic extension over chair back x 15   - Clinician overpressure starting in a slightly forward flexed position  Lumbar locked thoracic extension/ rotation x 15 B    -full range teach back understanding (hand placed on the lower back instead of on the front of the shoulder)  Prone I's, Y's and T's w/ cervical retraction on BB x 15    Prone lumbar extension 2 x 10    - Intermittent verbal cues to prevent compensation by maintaining complete knee extension      Prema participated in therapeutic activities to improve her ability to lift, squat and perform other functional daily tasks for 10 minutes including:      Hip hinge training w/ un weighted dowel along the spine x 12    Hip hinge w/ 12# bar weight to knees x 20   25# KB lift off the floor    - W/ Forward leaning row     Med-ex flowsheet  [facility] 6/15/2022   Visit Number 10   VAS Pain Rating 5   Retraction with Extension -   Sidebending -   Extension in Lying 20   Extension in Standing 20   Cervical Extension Seat Pad -   Seat Adjustment -   Top Dead Center -   Counterweight -   Cervical Flexion -   Cervical Extension -   Cervical Peak Torque -   Cervical Weight -   Repetitions -   Rating of Perceived Exertion -   Lumbar Extension Seat Pad -   Femur Restraint -    Top Dead Center -   Counterweight -   Lumbar Flexion -   Lumbar Extension -   Lumbar Peak Torque -   Min Torque -   Test Percent Below Normative Data -   Lumbar Weight 84   Repetitions 20   Rating of Perceived Exertion 4   Ice - Z Lie (in min.) 10     Peripheral muscle strengthening which included 1 set of 15-20 repetitions at a slow, controlled 10-13 second per rep pace focused on strengthening supporting musculature for improved body mechanics and functional mobility.  Pt and therapist focused on proper form during treatment to ensure optimal strengthening of each targeted muscle group.  Machines were utilized including torso rotation, chest press, rowing, biceps, and triceps. Leg extension, leg curl, hip abd, hip add, and leg press added visit 3     Prema received the following manual therapy techniques: Joint mobilizations were applied to the: Thoracic Spine and neck for 0 minutes.     IASTM right levator, Upper trap and lateral errector spinae  PA glides Grade 2/3 to improve mobility     Home Exercises Provided and Patient Education Provided   Stretching: Open books, repeated lumbar extension  Strengthening: Chilango Pavon on BB  Cardio program (V5): Not yet provided  Lifting education (V11):Not yet provided  Using Lumbar Roll:Instructed, not yet using    Education provided:   - Purpose of each exercise and the need to stabilize her errector spinae muscles in order to maintain this achieved motion    Written Home Exercises Provided: yes.  Exercises were reviewed and Prema was able to demonstrate them prior to the end of the session.  Prema demonstrated good  understanding of the education provided.     See EMR under Patient Instructions for exercises provided 5/18/2022.    Assessment     Prema was able to complete all recommended therapeutic exercises without exacerbation of symptoms. She did require cues in order to encourage true thoracic rotation when performing lumbar locked quadruped exercise.   Prema is progressing well in terms of strength tolerating progression of lumbar and cervical exercises. She was able to perform increased repetitions with decreased exertion rate reported.     Patient is making good progress towards established goals.  Pt will continue to benefit from skilled outpatient physical therapy to address the deficits stated in the impairment chart, provide pt/family education and to maximize pt's level of independence in the home and community environment.     Anticipated Barriers for therapy: None  Pt's spiritual, cultural and educational needs considered and pt agreeable to plan of care and goals as stated below:     Goals:     Short term goals:  6 weeks or 10 visits   1.  Pt will demonstrate increased lumbar ROM by at least 0 degrees from the initial ROM value with improvements noted in functional ROM and ability to perform ADLs.  (approp and ongoing)  2.  Pt will demonstrate increased MedX average isometric strength value  by 20% from initial test resulting in improved ability to perform bending, lifting, and carrying activities safely, confidently.  (approp and ongoing)  3.  Patient report a reduction in worst pain score by 1-2 points for improved tolerance for being able to walk longer distances without pain.  (approp and ongoing)  4.  Pt able to perform HEP correctly with minimal cueing or supervision from therapist to encourage independent management of symptoms. (approp and ongoing)        Long term goals: 10 weeks or 20 visits   1. Pt will demonstrate increased lumbar ROM by at least 0 degrees from initial ROM value, resulting in improved ability to perform functional fwd bending while standing and sitting. (approp and ongoing)  2. Pt will demonstrate increased MedX average isometric strength value  by 35% from initial test resulting in improved ability to perform bending, lifting, and carrying activities safely, confidently.  (approp and ongoing)  3. Pt to demonstrate ability  to independently control and reduce their pain through posture positioning and mechanical movements throughout a typical day.  (approp and ongoing)  4.  Pt will demonstrate reduced pain and improved functional outcomes as reported on the FOTO by reaching a limitation score of < or = 25% or less in order to demonstrate subjective improvement in pt's condition.    (approp and ongoing)  5. Pt will demonstrate independence with the HEP at discharge  (approp and ongoing)  6.  Prema will tolerate walking or biking for an hour and kayaking without pain to allow her to return to her previously active lifestyle  (approp and ongoing)      Plan   Continue with established Plan of Care towards established PT goals.     Dede Chapa PTA

## 2022-06-17 ENCOUNTER — CLINICAL SUPPORT (OUTPATIENT)
Dept: REHABILITATION | Facility: HOSPITAL | Age: 57
End: 2022-06-17
Payer: COMMERCIAL

## 2022-06-17 DIAGNOSIS — M53.86 DECREASED RANGE OF MOTION OF INTERVERTEBRAL DISCS OF LUMBAR SPINE: ICD-10-CM

## 2022-06-17 DIAGNOSIS — R68.89 POSTURAL INSTABILITY OF TRUNK: Primary | ICD-10-CM

## 2022-06-17 PROCEDURE — 97530 THERAPEUTIC ACTIVITIES: CPT | Mod: PN

## 2022-06-17 PROCEDURE — 97110 THERAPEUTIC EXERCISES: CPT | Mod: PN

## 2022-06-17 PROCEDURE — 97112 NEUROMUSCULAR REEDUCATION: CPT | Mod: PN

## 2022-06-17 NOTE — PROGRESS NOTES
[facility]      [facility]  [facility] Number: [id number]    Therapy Diagnosis:   Encounter Diagnoses   Name Primary?    Postural instability of trunk Yes    Decreased range of motion of intervertebral discs of lumbar spine      Physician: Cara Valdez FNP    Visit Date: 2022      Physician Orders: PT Eval and Treat   Medical Diagnosis from Referral: M54.50 (ICD-10-CM) - Lumbago   Evaluation Date: 2022  Authorization Period Expiration: 2022   Plan of Care Expiration: 2022  Reassessment Due: 2022  Visit # / Visits authorized: 10/ 20 ( total visits 11)     Time In: 11:15 am  Time Out: 12:00 pm  Total Billable Time: 60 minutes    Precautions: Standard    Pattern of pain determined: Pattern 1 PEP    Subjective   Prema reports improvement of symptoms overall but is experiencing slightly more soreness today after driving a good bit recently as well as going paddle boarding this morning.   Feeling stronger, standing more upright and a significant reduction of radicular symptoms, however intensity of back specific symptoms are the same    Patient reports their pain to be 5/10 on a 0-10 scale with 0 being no pain and 10 being the worst pain imaginable.  Pain Location: Broad Lumbar spine     Work and leisure: Kayaking, biking, walking  Pt goals: To work out and exercise without pain, return to running    Objective       Baseline Isometric Testing on Med X equipment: Testing administered by PT  Date of testin2022  ROM 72 deg   Max Peak Torque 158    Min Peak Torque 22    Flex/Ext Ratio 8/1   % below normative data 41       Visit 10 Isometric Testing on Med X equipment: Testing administered by PT  Date of testin2022  ROM 72 deg   Max Peak Torque 148   Min Peak Torque 84   Flex/Ext Ratio 2/1   % below normative data 7       Outcomes:  Initial score: 53% limitation  Visit 5 score:  Goal: 25% limitation        Treatment    Pt was  "instructed in and performed the following:     Prema received therapeutic exercises to develop/improved posture, cardiovascular endurance, muscular endurance, lumbar/cervical ROM, strength and muscular endurance for 30 minutes including the following exercises:       Open books 5 x 10" B  Double leg bridges on foam roll w/ UE press-down with knee flexion x 20 -NP  Repeated lumbar extension in standing x 20    - Cued to keep hands near the spine to allow for full end range motion    Prone alt arm and leg extension x 15 B  W/ 3# AW   - Tactile cues to prevent lumbar rotation ( more difficulty initiating with RUE and LLE combined)  Side-lying hip circuits: CW/ CCW, Flex/ext, abduction x 10 B    - More fatigue on the left   + Fire Hydrants RTB x 12B         Prema participated in neuromuscular re-education to improve coordination and trunk control for 18 minutes including:     Prone lower trap pattern # 2 x 20    - Intermittent tactile cues to encourage end range scapular retraction   Standing Upper trap pattern #1 with rolling foam roll x 20   Seated thoracic extension over chair back x 15   - Clinician overpressure starting in a slightly forward flexed position  Lumbar locked thoracic extension/ rotation x 15 B    -full range teach back understanding (hand placed on the lower back instead of on the front of the shoulder)  Prone I's, Y's and T's w/ cervical retraction on BB x 15    Prone lumbar extension 2 x 10    - Intermittent verbal cues to prevent compensation by maintaining complete knee extension      Prema participated in therapeutic activities to improve her ability to lift, squat and perform other functional daily tasks for 10 minutes including:      Hip hinge training w/ un weighted dowel along the spine x 12    Hip hinge w/ 12# bar weight to knees x 20   25# KB lift off the floor    - W/ Forward leaning row     Med-ex flowsheet    [facility] 6/17/2022   Visit Number 11   VAS Pain Rating 4 "   Retraction with Extension -   Sidebending -   Extension in Lying -   Extension in Standing 30   Cervical Extension Seat Pad -   Seat Adjustment -   Top Dead Center -   Counterweight -   Cervical Flexion -   Cervical Extension -   Cervical Peak Torque -   Cervical Weight -   Repetitions -   Rating of Perceived Exertion -   Lumbar Extension Seat Pad -   Femur Restraint -   Top Dead Center -   Counterweight -   Lumbar Flexion 72   Lumbar Extension 0   Lumbar Peak Torque 148   Min Torque 84   Test Percent Below Normative Data 7   Test Percent Gain in Strength from Initial  92   Lumbar Weight -   Repetitions -   Rating of Perceived Exertion -   Ice - Z Lie (in min.) 10       Peripheral muscle strengthening which included 1 set of 15-20 repetitions at a slow, controlled 10-13 second per rep pace focused on strengthening supporting musculature for improved body mechanics and functional mobility.  Pt and therapist focused on proper form during treatment to ensure optimal strengthening of each targeted muscle group.  Machines were utilized including torso rotation, chest press, rowing, biceps, and triceps. Leg extension, leg curl, hip abd, hip add, and leg press added visit 3     Prema received the following manual therapy techniques: Joint mobilizations were applied to the: Thoracic Spine and neck for 0 minutes.     IASTM right levator, Upper trap and lateral errector spinae  PA glides Grade 2/3 to improve mobility     Home Exercises Provided and Patient Education Provided   Stretching: Open books, repeated lumbar extension  Strengthening: Chilango Pavon on BB  Cardio program (V5): Not yet provided  Lifting education (V11):Not yet provided  Using Lumbar Roll:Instructed, not yet using    Education provided:   - Purpose of each exercise and the need to stabilize her errector spinae muscles in order to maintain this achieved motion    Written Home Exercises Provided: yes.  Exercises were reviewed and Prema was able to  demonstrate them prior to the end of the session.  Prema demonstrated good  understanding of the education provided.     See EMR under Patient Instructions for exercises provided 5/18/2022.    Assessment     Prema demonstrated better maintenance of lumbar positioning and was able to add in resisted fire-hydrants without any increase of symptoms.   Progressing to more functional based exercises and good set-up and performance with single and double leg dead lifting exercises without any increase of symptoms. Continues to report increased central back pain, however progress has improved with the frequency of radicular symptoms. More focus placed on trunk and core stabilization exercises without any increase of symptoms reported.       Patient is making good progress towards established goals.  Pt will continue to benefit from skilled outpatient physical therapy to address the deficits stated in the impairment chart, provide pt/family education and to maximize pt's level of independence in the home and community environment.     Anticipated Barriers for therapy: None  Pt's spiritual, cultural and educational needs considered and pt agreeable to plan of care and goals as stated below:     Goals:     Short term goals:  6 weeks or 10 visits   1.  Pt will demonstrate increased lumbar ROM by at least 0 degrees from the initial ROM value with improvements noted in functional ROM and ability to perform ADLs.  (approp and ongoing)  2.  Pt will demonstrate increased MedX average isometric strength value  by 20% from initial test resulting in improved ability to perform bending, lifting, and carrying activities safely, confidently.  (approp and ongoing)  3.  Patient report a reduction in worst pain score by 1-2 points for improved tolerance for being able to walk longer distances without pain.  (approp and ongoing)  4.  Pt able to perform HEP correctly with minimal cueing or supervision from therapist to encourage independent  management of symptoms. (approp and ongoing)        Long term goals: 10 weeks or 20 visits   1. Pt will demonstrate increased lumbar ROM by at least 0 degrees from initial ROM value, resulting in improved ability to perform functional fwd bending while standing and sitting. (approp and ongoing)  2. Pt will demonstrate increased MedX average isometric strength value  by 35% from initial test resulting in improved ability to perform bending, lifting, and carrying activities safely, confidently.  (approp and ongoing)  3. Pt to demonstrate ability to independently control and reduce their pain through posture positioning and mechanical movements throughout a typical day.  (approp and ongoing)  4.  Pt will demonstrate reduced pain and improved functional outcomes as reported on the FOTO by reaching a limitation score of < or = 25% or less in order to demonstrate subjective improvement in pt's condition.    (approp and ongoing)  5. Pt will demonstrate independence with the HEP at discharge  (approp and ongoing)  6.  Prema will tolerate walking or biking for an hour and kayaking without pain to allow her to return to her previously active lifestyle  (approp and ongoing)      Plan   Continue with established Plan of Care towards established PT goals.     Ritika Larkin, PT, DPT

## 2022-06-21 ENCOUNTER — CLINICAL SUPPORT (OUTPATIENT)
Dept: REHABILITATION | Facility: HOSPITAL | Age: 57
End: 2022-06-21
Payer: COMMERCIAL

## 2022-06-21 DIAGNOSIS — R68.89 POSTURAL INSTABILITY OF TRUNK: Primary | ICD-10-CM

## 2022-06-21 DIAGNOSIS — M53.86 DECREASED RANGE OF MOTION OF INTERVERTEBRAL DISCS OF LUMBAR SPINE: ICD-10-CM

## 2022-06-21 PROCEDURE — 97112 NEUROMUSCULAR REEDUCATION: CPT | Mod: PN

## 2022-06-21 PROCEDURE — 97110 THERAPEUTIC EXERCISES: CPT | Mod: PN

## 2022-06-21 PROCEDURE — 97530 THERAPEUTIC ACTIVITIES: CPT | Mod: PN

## 2022-06-21 NOTE — PROGRESS NOTES
[facility]      Name: Brenda C Stargardt  Clinic Number: [id number]    Therapy Diagnosis:   Encounter Diagnoses   Name Primary?    Postural instability of trunk Yes    Decreased range of motion of intervertebral discs of lumbar spine      Physician: Cara Valdez FNP    Visit Date: 2022      Physician Orders: PT Eval and Treat   Medical Diagnosis from Referral: M54.50 (ICD-10-CM) - Lumbago   Evaluation Date: 2022  Authorization Period Expiration: 2022   Plan of Care Expiration: 2022  Reassessment Due: 2022  Visit # / Visits authorized:  ( total visits 12)     Time In: 8:20 am  Time Out: 9:20 am  Total Billable Time: 60 minutes    Precautions: Standard    Pattern of pain determined: Pattern 1 PEP    Subjective   Prema reports improvement of symptoms. Low back pain symptoms down from a 7/10 to 5/10 today. Denied any radicular symptoms    Patient reports their pain to be 5/10 on a 0-10 scale with 0 being no pain and 10 being the worst pain imaginable.  Pain Location: Broad Lumbar spine     Work and leisure: Kayaking, biking, walking  Pt goals: To work out and exercise without pain, return to running    Objective       Baseline Isometric Testing on Med X equipment: Testing administered by PT  Date of testin2022  ROM 72 deg   Max Peak Torque 158    Min Peak Torque 22    Flex/Ext Ratio 8/1   % below normative data 41       Visit 10 Isometric Testing on Med X equipment: Testing administered by PT  Date of testin2022  ROM 72 deg   Max Peak Torque 148   Min Peak Torque 84   Flex/Ext Ratio 2/1   % below normative data 7       Outcomes:  Initial score: 53% limitation  Visit 5 score:  Goal: 25% limitation        Treatment    Pt was instructed in and performed the following:     Prema received therapeutic exercises to develop/improved posture, cardiovascular endurance, muscular endurance, lumbar/cervical ROM, strength and muscular  "endurance for 30 minutes including the following exercises:       Open books 5 x 10" B  Double leg bridges on BB w/ knee flexion x 20   Repeated lumbar extension in standing x 20    - Cued to keep hands near the spine to allow for full end range motion    Prone alt arm and leg extension on BB x 20    - more difficulty with the left leg extension    Side-lying hip circuits: CW/ CCW, Flex/ext, abduction x 10 B    - More fatigue on the left   Fire Hydrants RTB x 12B         Prema participated in neuromuscular re-education to improve coordination and trunk control for 18 minutes including:     Prone lower trap pattern # 2 x 20    - vc's for scapular retraction   Standing Upper trap pattern #1 with rolling foam roll x 20   Supine thoracic extension over foam roll 3 x 5 (w/ roll at different positions)  Plank w/ thoracic rotation x 10 B   - Knees w/ right rotation, toes with left rotation    Prone lumbar extension 2 x 10    - Intermittent verbal cues to prevent compensation by maintaining complete knee extension        Prema participated in therapeutic activities to improve her ability to lift, squat and perform other functional daily tasks for 10 minutes including:      Hip hinge training w/ un weighted dowel along the spine x 12    Hip hinge w/ 12# bar weight to knees x 20   25# KB lift off the floor    - W/ Forward leaning row       Med-ex flowsheet    [facility] 6/21/2022   Visit Number 12   VAS Pain Rating 4   Retraction with Extension -   Sidebending -   Extension in Lying -   Extension in Standing 30   Cervical Extension Seat Pad -   Seat Adjustment -   Top Dead Center -   Counterweight -   Cervical Flexion -   Cervical Extension -   Cervical Peak Torque -   Cervical Weight -   Repetitions -   Rating of Perceived Exertion -   Lumbar Extension Seat Pad -   Femur Restraint -   Top Dead Center -   Counterweight -   Lumbar Flexion -   Lumbar Extension -   Lumbar Peak Torque -   Min Torque -   Test Percent " Below Normative Data -   Test Percent Gain in Strength from Initial  -   Lumbar Weight 88   Repetitions 20   Rating of Perceived Exertion 3   Ice - Z Lie (in min.) 10         Peripheral muscle strengthening which included 1 set of 15-20 repetitions at a slow, controlled 10-13 second per rep pace focused on strengthening supporting musculature for improved body mechanics and functional mobility.  Pt and therapist focused on proper form during treatment to ensure optimal strengthening of each targeted muscle group.  Machines were utilized including torso rotation, chest press, rowing, biceps, and triceps. Leg extension, leg curl, hip abd, hip add, and leg press added visit 3     Prema received the following manual therapy techniques: Joint mobilizations were applied to the: Thoracic Spine and neck for 0 minutes.     IASTM right levator, Upper trap and lateral errector spinae  PA glides Grade 2/3 to improve mobility     Home Exercises Provided and Patient Education Provided   Stretching: Open books, repeated lumbar extension  Strengthening: Chilango Pavon on BB  Cardio program (V5): Not yet provided  Lifting education (V11):Not yet provided  Using Lumbar Roll:Instructed, not yet using    Education provided:   - Purpose of each exercise and the need to stabilize her errector spinae muscles in order to maintain this achieved motion    Written Home Exercises Provided: yes.  Exercises were reviewed and Prema was able to demonstrate them prior to the end of the session.  Prema demonstrated good  understanding of the education provided.     See EMR under Patient Instructions for exercises provided 5/18/2022.    Assessment     Prema demonstrated better maintenance of lumbar positioning and was able to add in resisted fire-hydrants without any increase of symptoms.   Progressing to more functional based exercises and good set-up and performance with single and double leg dead lifting exercises without any increase of  symptoms. Reduction of intensity of lumbar spine symptoms since her last session, although was unable to perform amount of carry-over with exercises due to family being in town.   Better control observed with plank generated thoracic rotation without any increase of pain. Additional increase in weight on the med-ex machine without any increase of symptoms.       Patient is making good progress towards established goals.  Pt will continue to benefit from skilled outpatient physical therapy to address the deficits stated in the impairment chart, provide pt/family education and to maximize pt's level of independence in the home and community environment.     Anticipated Barriers for therapy: None  Pt's spiritual, cultural and educational needs considered and pt agreeable to plan of care and goals as stated below:     Goals:     Short term goals:  6 weeks or 10 visits   1.  Pt will demonstrate increased lumbar ROM by at least 0 degrees from the initial ROM value with improvements noted in functional ROM and ability to perform ADLs.  (approp and ongoing)  2.  Pt will demonstrate increased MedX average isometric strength value  by 20% from initial test resulting in improved ability to perform bending, lifting, and carrying activities safely, confidently.  (approp and ongoing)  3.  Patient report a reduction in worst pain score by 1-2 points for improved tolerance for being able to walk longer distances without pain.  (approp and ongoing)  4.  Pt able to perform HEP correctly with minimal cueing or supervision from therapist to encourage independent management of symptoms. (approp and ongoing)        Long term goals: 10 weeks or 20 visits   1. Pt will demonstrate increased lumbar ROM by at least 0 degrees from initial ROM value, resulting in improved ability to perform functional fwd bending while standing and sitting. (approp and ongoing)  2. Pt will demonstrate increased MedX average isometric strength value  by 35% from  initial test resulting in improved ability to perform bending, lifting, and carrying activities safely, confidently.  (approp and ongoing)  3. Pt to demonstrate ability to independently control and reduce their pain through posture positioning and mechanical movements throughout a typical day.  (approp and ongoing)  4.  Pt will demonstrate reduced pain and improved functional outcomes as reported on the FOTO by reaching a limitation score of < or = 25% or less in order to demonstrate subjective improvement in pt's condition.    (approp and ongoing)  5. Pt will demonstrate independence with the HEP at discharge  (approp and ongoing)  6.  Prema will tolerate walking or biking for an hour and kayaking without pain to allow her to return to her previously active lifestyle  (approp and ongoing)      Plan   Continue with established Plan of Care towards established PT goals.     Ritika Larkin, PT, DPT

## 2022-06-24 ENCOUNTER — CLINICAL SUPPORT (OUTPATIENT)
Dept: REHABILITATION | Facility: HOSPITAL | Age: 57
End: 2022-06-24
Payer: COMMERCIAL

## 2022-06-24 DIAGNOSIS — R68.89 POSTURAL INSTABILITY OF TRUNK: Primary | ICD-10-CM

## 2022-06-24 DIAGNOSIS — M53.86 DECREASED RANGE OF MOTION OF INTERVERTEBRAL DISCS OF LUMBAR SPINE: ICD-10-CM

## 2022-06-24 PROCEDURE — 97112 NEUROMUSCULAR REEDUCATION: CPT | Mod: PN

## 2022-06-24 PROCEDURE — 97530 THERAPEUTIC ACTIVITIES: CPT | Mod: PN

## 2022-06-24 PROCEDURE — 97110 THERAPEUTIC EXERCISES: CPT | Mod: PN

## 2022-06-24 NOTE — PROGRESS NOTES
[facility]      [facility]  [facility] Number: [id number]    Therapy Diagnosis:   No diagnosis found.  Physician: Cara Valdez FNP    Visit Date: 2022      Physician Orders: PT Eval and Treat   Medical Diagnosis from Referral: M54.50 (ICD-10-CM) - Lumbago   Evaluation Date: 2022  Authorization Period Expiration: 2022   Plan of Care Expiration: 2022  Reassessment Due: 2022  Visit # / Visits authorized:  ( total visits 12)     Time In: 8:20 am  Time Out: 9:20 am  Total Billable Time: 60 minutes    Precautions: Standard    Pattern of pain determined: Pattern 1 PEP    Subjective   Prema reports improvement of symptoms. Low back pain symptoms down from a 5/10 to a 3/10 today. Denied any radicular symptoms.  She stated that she went paddle boarding and was able to manage her symptoms with prone extension on the board.    Patient reports their pain to be 3/10 on a 0-10 scale with 0 being no pain and 10 being the worst pain imaginable.  Pain Location: Broad Lumbar spine     Work and leisure: Kayaking, biking, walking  Pt goals: To work out and exercise without pain, return to running    Objective       Baseline Isometric Testing on Med X equipment: Testing administered by PT  Date of testin2022  ROM 72 deg   Max Peak Torque 158    Min Peak Torque 22    Flex/Ext Ratio 8/1   % below normative data 41       Visit 10 Isometric Testing on Med X equipment: Testing administered by PT  Date of testin2022  ROM 72 deg   Max Peak Torque 148   Min Peak Torque 84   Flex/Ext Ratio 2/1   % below normative data 7       Outcomes:  Initial score: 53% limitation  Visit 5 score:  Goal: 25% limitation        Treatment    Pt was instructed in and performed the following:     Prema received therapeutic exercises to develop/improved posture, cardiovascular endurance, muscular endurance, lumbar/cervical ROM, strength and muscular endurance for  "30 minutes including the following exercises:       Open books 5 x 10" B - NP  Double leg bridges on BB w/ knee flexion x 20 - NP    Repeated lumbar extension in standing x 20    - Cued to keep hands near the spine to allow for full end range motion    Prone alt arm and leg extension on BB x 20    - more difficulty with the left leg extension    Side-lying hip circuits: CW/ CCW, Flex/ext, abduction x 10 B    - More fatigue on the left   Fire Hydrants RTB x 12B     Prema participated in neuromuscular re-education to improve coordination and trunk control for 18 minutes including:     Prone lower trap pattern # 2 x 20    - vc's for scapular retraction   Standing Upper trap pattern #1 with rolling foam roll x 20   Supine thoracic extension over foam roll 3 x 5 (w/ roll at different positions)  Plank prone w/ thoracic rotation x 10 B   - Knees w/ ankles crossed with left and on toes right rotation    Prone lumbar extension 2 x 10    - Intermittent verbal cues to prevent compensation by maintaining complete knee extension        Prema participated in therapeutic activities to improve her ability to lift, squat and perform other functional daily tasks for 10 minutes including:      Hip hinge training w/ un weighted dowel along the spine x 12    Hip hinge w/ 12# bar weight to knees x 20   25# KB lift off the floor    - W/ Forward leaning row       Med-ex flowsheet    [facility] 6/24/2022   Visit Number 13   VAS Pain Rating 2   Retraction with Extension -   Sidebending -   Extension in Lying -   Extension in Standing -   Flexion in Lying 30   Cervical Extension Seat Pad -   Seat Adjustment -   Top Dead Center -   Counterweight -   Cervical Flexion -   Cervical Extension -   Cervical Peak Torque -   Cervical Weight -   Repetitions -   Rating of Perceived Exertion -   Lumbar Extension Seat Pad -   Femur Restraint -   Top Dead Center -   Counterweight -   Lumbar Flexion -   Lumbar Extension -   Lumbar Peak Torque - "   Min Torque -   Test Percent Below Normative Data -   Test Percent Gain in Strength from Initial  -   Lumbar Weight 93   Repetitions 20   Rating of Perceived Exertion 4   Ice - Z Lie (in min.) 10         Peripheral muscle strengthening which included 1 set of 15-20 repetitions at a slow, controlled 10-13 second per rep pace focused on strengthening supporting musculature for improved body mechanics and functional mobility.  Pt and therapist focused on proper form during treatment to ensure optimal strengthening of each targeted muscle group.  Machines were utilized including torso rotation, chest press, rowing, biceps, and triceps. Leg extension, leg curl, hip abd, hip add, and leg press added visit 3     Prema received the following manual therapy techniques: Joint mobilizations were applied to the: Thoracic Spine and neck for 0 minutes.     IASTM right levator, Upper trap and lateral errector spinae  PA glides Grade 2/3 to improve mobility     Home Exercises Provided and Patient Education Provided   Stretching: Open books, repeated lumbar extension  Strengthening: Chilango Pavon on BB  Cardio program (V5): Not yet provided  Lifting education (V11):Not yet provided  Using Lumbar Roll:Instructed, not yet using    Education provided:   - Purpose of each exercise and the need to stabilize her errector spinae muscles in order to maintain this achieved motion    Written Home Exercises Provided: yes.  Exercises were reviewed and Prema was able to demonstrate them prior to the end of the session.  Prema demonstrated good  understanding of the education provided.     See EMR under Patient Instructions for exercises provided 5/18/2022.    Assessment     Prema demonstrated better maintenance of lumbar positioning and was able to add in resisted fire-hydrants without any increase of symptoms.   Progressing to more functional based exercises and good set-up and performance with single and double leg dead lifting  exercises without any increase of symptoms. Reduction of intensity of lumbar spine symptoms since her last session, although was unable to perform amount of carry-over with exercises due to family being in town.   Better control observed with plank generated thoracic rotation without any increase of pain. Additional increase in weight on the med-ex machine without any increase of symptoms.       Patient is making good progress towards established goals.  Pt will continue to benefit from skilled outpatient physical therapy to address the deficits stated in the impairment chart, provide pt/family education and to maximize pt's level of independence in the home and community environment.     Anticipated Barriers for therapy: None  Pt's spiritual, cultural and educational needs considered and pt agreeable to plan of care and goals as stated below:     Goals:     Short term goals:  6 weeks or 10 visits   1.  Pt will demonstrate increased lumbar ROM by at least 0 degrees from the initial ROM value with improvements noted in functional ROM and ability to perform ADLs.  (approp and ongoing)  2.  Pt will demonstrate increased MedX average isometric strength value  by 20% from initial test resulting in improved ability to perform bending, lifting, and carrying activities safely, confidently.  (approp and ongoing)  3.  Patient report a reduction in worst pain score by 1-2 points for improved tolerance for being able to walk longer distances without pain.  (approp and ongoing)  4.  Pt able to perform HEP correctly with minimal cueing or supervision from therapist to encourage independent management of symptoms. (approp and ongoing)        Long term goals: 10 weeks or 20 visits   1. Pt will demonstrate increased lumbar ROM by at least 0 degrees from initial ROM value, resulting in improved ability to perform functional fwd bending while standing and sitting. (approp and ongoing)  2. Pt will demonstrate increased MedX average  isometric strength value  by 35% from initial test resulting in improved ability to perform bending, lifting, and carrying activities safely, confidently.  (approp and ongoing)  3. Pt to demonstrate ability to independently control and reduce their pain through posture positioning and mechanical movements throughout a typical day.  (approp and ongoing)  4.  Pt will demonstrate reduced pain and improved functional outcomes as reported on the FOTO by reaching a limitation score of < or = 25% or less in order to demonstrate subjective improvement in pt's condition.    (approp and ongoing)  5. Pt will demonstrate independence with the HEP at discharge  (approp and ongoing)  6.  Prema will tolerate walking or biking for an hour and kayaking without pain to allow her to return to her previously active lifestyle  (approp and ongoing)      Plan   Continue with established Plan of Care towards established PT goals.     Ritika Larkin, PT, DPT

## 2022-06-29 ENCOUNTER — CLINICAL SUPPORT (OUTPATIENT)
Dept: REHABILITATION | Facility: HOSPITAL | Age: 57
End: 2022-06-29
Payer: COMMERCIAL

## 2022-06-29 DIAGNOSIS — M53.86 DECREASED RANGE OF MOTION OF INTERVERTEBRAL DISCS OF LUMBAR SPINE: ICD-10-CM

## 2022-06-29 DIAGNOSIS — R68.89 POSTURAL INSTABILITY OF TRUNK: Primary | ICD-10-CM

## 2022-06-29 PROCEDURE — 97112 NEUROMUSCULAR REEDUCATION: CPT | Mod: PN

## 2022-06-29 PROCEDURE — 97110 THERAPEUTIC EXERCISES: CPT | Mod: PN

## 2022-06-29 NOTE — PROGRESS NOTES
[facility]      Name: Brenda C Stargardt  Clinic Number: [id number]    Therapy Diagnosis:   Encounter Diagnoses   Name Primary?    Postural instability of trunk Yes    Decreased range of motion of intervertebral discs of lumbar spine      Physician: Cara Valdez FNP    Visit Date: 2022      Physician Orders: PT Eval and Treat   Medical Diagnosis from Referral: M54.50 (ICD-10-CM) - Lumbago   Evaluation Date: 2022  Authorization Period Expiration: 2022   Plan of Care Expiration: 2022  Reassessment Due: 2022  Visit # / Visits authorized:  ( total visits 14)     Time In: 10:10 am  (late arrival)  Time Out:  11:10 am   Total Billable Time: 60 minutes    Precautions: Standard    Pattern of pain determined: Pattern 1 PEP    Subjective   Prema reports improvement of symptoms. Able to walk 2 miles without any reproduction of symptoms. Having only mild lumbar symptoms intermittently for the past several days    Patient reports their pain to be 0/10 on a 0-10 scale with 0 being no pain and 10 being the worst pain imaginable.  Pain Location: Broad Lumbar spine     Work and leisure: Kayaking, biking, walking  Pt goals: To work out and exercise without pain, return to running    Objective       Baseline Isometric Testing on Med X equipment: Testing administered by PT  Date of testin2022  ROM 72 deg   Max Peak Torque 158    Min Peak Torque 22    Flex/Ext Ratio 8/1   % below normative data 41       Visit 10 Isometric Testing on Med X equipment: Testing administered by PT  Date of testin2022  ROM 72 deg   Max Peak Torque 148   Min Peak Torque 84   Flex/Ext Ratio 2/1   % below normative data 7       Outcomes:  Initial score: 53% limitation  Visit 5 score:  Goal: 25% limitation        Treatment    Pt was instructed in and performed the following:     Prema received therapeutic exercises to develop/improved posture, cardiovascular  "endurance, muscular endurance, lumbar/cervical ROM, strength and muscular endurance for 45 minutes including the following exercises:       Open books 5 x 10" B  Repeated lumbar extension in standing x 4    - Repeated lumbar flexion in standing x 10     ~ Reported more ease and willingness to move with extension following  Lumbar extension over BB x 17   BB pikes x 17     Side-lying hip circuits: CW/ CCW, Flex/ext, abduction x 10 B    - More fatigue on the left   Fire Hydrants RTB x 12B   V- sit BB pass x 10         Prema participated in neuromuscular re-education to improve coordination and trunk control for 10 minutes including:     Supine thoracic extension over foam roll 3 x 5 (w/ roll at different positions)  Plank w/ thoracic rotation x 10 B   - Knees w/ right rotation, toes with left rotation    Prone lumbar extension 2 x 10    - Intermittent verbal cues to prevent compensation by maintaining complete knee extension        Prema participated in therapeutic activities to improve her ability to lift, squat and perform other functional daily tasks for 0 minutes including:      Hip hinge training w/ un weighted dowel along the spine x 12    Hip hinge w/ 12# bar weight to knees x 20   25# KB lift off the floor    - W/ Forward leaning row       Med-ex flowsheet    [facility] 6/29/2022   Visit Number 14   VAS Pain Rating 0   Retraction with Extension -   Sidebending -   Extension in Lying -   Extension in Standing 4   Flexion in Lying -   Flexion in Sitting 10   Cervical Extension Seat Pad -   Seat Adjustment -   Top Dead Center -   Counterweight -   Cervical Flexion -   Cervical Extension -   Cervical Peak Torque -   Cervical Weight -   Repetitions -   Rating of Perceived Exertion -   Lumbar Extension Seat Pad -   Femur Restraint -   Top Dead Center -   Counterweight -   Lumbar Flexion -   Lumbar Extension -   Lumbar Peak Torque -   Min Torque -   Test Percent Below Normative Data -   Test Percent Gain " in Strength from Initial  -   Lumbar Weight 95   Repetitions 20   Rating of Perceived Exertion 4   Ice - Z Lie (in min.) 10         Peripheral muscle strengthening which included 1 set of 15-20 repetitions at a slow, controlled 10-13 second per rep pace focused on strengthening supporting musculature for improved body mechanics and functional mobility.  Pt and therapist focused on proper form during treatment to ensure optimal strengthening of each targeted muscle group.  Machines were utilized including torso rotation, chest press, rowing, biceps, and triceps. Leg extension, leg curl, hip abd, hip add, and leg press added visit 3     Prema received the following manual therapy techniques: Joint mobilizations were applied to the: Thoracic Spine and neck for 0 minutes.     IASTM right levator, Upper trap and lateral errector spinae  PA glides Grade 2/3 to improve mobility     Home Exercises Provided and Patient Education Provided   Stretching: Open books, repeated lumbar extension  Strengthening: Chilango Pavon on BB  Cardio program (V5): Not yet provided  Lifting education (V11):Not yet provided  Using Lumbar Roll:Instructed, not yet using    Education provided:   - Initiation of transitioning from repeated lumbar extension to tolerating alternative forward flexion and reviewed stop light system for knowing when to stop this movement     Written Home Exercises Provided: yes.  Exercises were reviewed and Prema was able to demonstrate them prior to the end of the session.  Prema demonstrated good  understanding of the education provided.     See EMR under Patient Instructions for exercises provided 5/18/2022.    Assessment     Prema noted improved motion with side-lying open book stretches, and was able to successfully perform repeated standing forward flexion with a noted improvement in willingness to move towards lumbar extension following. Additional core and stabilization exercises provided. Initially  noted some difficulty with balance when performing the pikes but was able to transition to better control with good teach back understanding with this.         Patient is making good progress towards established goals.  Pt will continue to benefit from skilled outpatient physical therapy to address the deficits stated in the impairment chart, provide pt/family education and to maximize pt's level of independence in the home and community environment.     Anticipated Barriers for therapy: None  Pt's spiritual, cultural and educational needs considered and pt agreeable to plan of care and goals as stated below:     Goals:     Short term goals:  6 weeks or 10 visits   1.  Pt will demonstrate increased lumbar ROM by at least 0 degrees from the initial ROM value with improvements noted in functional ROM and ability to perform ADLs.  MET  2.  Pt will demonstrate increased MedX average isometric strength value  by 20% from initial test resulting in improved ability to perform bending, lifting, and carrying activities safely, confidently. MET  3.  Patient report a reduction in worst pain score by 1-2 points for improved tolerance for being able to walk longer distances without pain.  MET  4.  Pt able to perform HEP correctly with minimal cueing or supervision from therapist to encourage independent management of symptoms. MET      Long term goals: 10 weeks or 20 visits   1. Pt will demonstrate increased lumbar ROM by at least 0 degrees from initial ROM value, resulting in improved ability to perform functional fwd bending while standing and sitting. (approp and ongoing)  2. Pt will demonstrate increased MedX average isometric strength value  by 35% from initial test resulting in improved ability to perform bending, lifting, and carrying activities safely, confidently.  (approp and ongoing)  3. Pt to demonstrate ability to independently control and reduce their pain through posture positioning and mechanical movements  throughout a typical day.  (approp and ongoing)  4.  Pt will demonstrate reduced pain and improved functional outcomes as reported on the FOTO by reaching a limitation score of < or = 25% or less in order to demonstrate subjective improvement in pt's condition.    (approp and ongoing)  5. Pt will demonstrate independence with the HEP at discharge  (approp and ongoing)  6.  Prema will tolerate walking or biking for an hour and kayaking without pain to allow her to return to her previously active lifestyle  (approp and ongoing)      Plan   Continue with established Plan of Care towards established PT goals.     Ritika Larkin, PT, DPT

## 2022-07-06 ENCOUNTER — CLINICAL SUPPORT (OUTPATIENT)
Dept: REHABILITATION | Facility: HOSPITAL | Age: 57
End: 2022-07-06
Payer: COMMERCIAL

## 2022-07-06 ENCOUNTER — DOCUMENTATION ONLY (OUTPATIENT)
Dept: REHABILITATION | Facility: HOSPITAL | Age: 57
End: 2022-07-06
Payer: COMMERCIAL

## 2022-07-06 DIAGNOSIS — R68.89 POSTURAL INSTABILITY OF TRUNK: Primary | ICD-10-CM

## 2022-07-06 DIAGNOSIS — M53.86 DECREASED RANGE OF MOTION OF INTERVERTEBRAL DISCS OF LUMBAR SPINE: ICD-10-CM

## 2022-07-06 PROCEDURE — 97112 NEUROMUSCULAR REEDUCATION: CPT | Mod: PN

## 2022-07-06 PROCEDURE — 97110 THERAPEUTIC EXERCISES: CPT | Mod: PN

## 2022-07-06 NOTE — PATIENT INSTRUCTIONS
"HEALTHY BACK TOOLS        KEEP YOUR SPINE FEELING FINE   HEALTHY HABITS   Do your "GO TO" stretches 2/day   Get a good night's REST   Watch your POSTURE in sitting/standing Drink PLENTY of water   Use a lumbar roll Eat LOTS of fruits & vegetables   GET UP often (walk and/or stretch) Manage your STRESS   Make your workplace IDEAL FOR YOU  Don't smoke   Lift correctly EXERCISE                           WHAT TO DO WHEN SYMPTOMS FLARE UP  Back and neck pain may occasionally flare up.  If you experience a flare   up, remember your tools. Be encouraged, by remembering that flare-ups will   usually pass.   My Tools:    ~Use your "Go To" Stretches/Positions   ~Keep Moving-pain usually gets better if you move  ~Z lie (with or without ice)  10 min several times a day until symptoms reduce  ~Slowly resume normal activities   ~Practice Deep Breathing and Relaxation techniques                                                 MY EXERCISE PLAN  GO TO STRETCHES  2/day (like brushing your teeth) STRENGTHENING  2-3 times/week CARDIO PROGRAM  180 min/week   Standing Lumbar Extensions [employer] Paddle boarding   Prone Press-ups Bridges Running   Open books Hip Circuit  BETITO Work-outs ([employer])    V-sit [place] Forward Leaning Rows    Thoracic extension over chair back Plank w/ middle back rotation      "

## 2022-07-06 NOTE — PROGRESS NOTES
[facility] Treatment / Discharge     Name: Prema INIGUEZ Stargardter  Clinic Number: [id number]    Therapy Diagnosis:   Encounter Diagnoses   Name Primary?    Postural instability of trunk Yes    Decreased range of motion of intervertebral discs of lumbar spine      Physician: Cara Valdez FNP    Visit Date: 2022      Physician Orders: PT Eval and Treat   Medical Diagnosis from Referral: M54.50 (ICD-10-CM) - Lumbago   Evaluation Date: 2022  Authorization Period Expiration: 2022   Plan of Care Expiration: 2022  Reassessment Due: 2022  Visit # / Visits authorized:  ( total visits 15)     Time In: 10:55 am  (late arrival)  Time Out:  11:35 pm   Total Billable Time: 60 minutes    Precautions: Standard    Pattern of pain determined: Pattern 1 PEP    Subjective   Prema reports improvement of symptoms. Has continued to tolerate longer walks, extended time and static balance on the paddle board and has been progressing towards all strength goals. Feels like she is ready to continue her strengthening exercises at home due to her long commute and further improvement of symptoms. The shoulder is feeling much better as well since starting.     Patient reports their pain to be 0/10 on a 0-10 scale with 0 being no pain and 10 being the worst pain imaginable.  Pain Location: Broad Lumbar spine     Work and leisure: Kayaking, biking, walking  Pt goals: To work out and exercise without pain, return to running    Objective       Baseline Isometric Testing on Med X equipment: Testing administered by PT  Date of testin2022  ROM 72 deg   Max Peak Torque 158    Min Peak Torque 22    Flex/Ext Ratio 8/1   % below normative data 41       Visit 10 Isometric Testing on Med X equipment: Testing administered by PT  Date of testin2022  ROM 72 deg   Max Peak Torque 148   Min Peak Torque 84   Flex/Ext Ratio 2/1   % below normative data 7       Discharge Isometric  "Testing on Med X equipment: Testing administered by PT  Date of testin2022  ROM 72 deg   Max Peak Torque 167   Min Peak Torque 132   Flex/Ext Ratio 1.5/1   % ABOVE normative data 12     + 48% above normative data in complete extension     Outcomes:  Initial score: 53% limitation  Visit 5 score: 28%   Goal: 25% limitation    MONY :6.1% dysfunction (37% dysfunction at Initial)  Goal FOTO: 48% dsyfunction    Treatment    Pt was instructed in and performed the following:     Prema received therapeutic exercises to develop/improved posture, cardiovascular endurance, muscular endurance, lumbar/cervical ROM, strength and muscular endurance for 45 minutes including the following exercises:       Open books 5 x 10" B  Repeated lumbar extension in standing x 4    - Repeated lumbar flexion in standing x 10     ~ Reported more ease and willingness to move with extension following  Lumbar extension over BB x 17   BB pikes x 17     Side-lying hip circuits: CW/ CCW, Flex/ext, abduction x 10 B    - More fatigue on the left   Fire Hydrants RTB x 12B   V- sit BB pass x 10     [facility] 2022   Visit Number 15   VAS Pain Rating 0   Retraction with Extension -   Sidebending -   Extension in Lying -   Extension in Standing 15   Flexion in Lying -   Flexion in Sitting -   Cervical Extension Seat Pad -   Seat Adjustment -   Top Dead Center -   Counterweight -   Cervical Flexion -   Cervical Extension -   Cervical Peak Torque -   Cervical Weight -   Repetitions -   Rating of Perceived Exertion -   Lumbar Extension Seat Pad -   Femur Restraint -   Top Dead Center -   Counterweight -   Lumbar Flexion 72   Lumbar Extension 0   Lumbar Peak Torque 167   Min Torque 132   Test Percent Below Normative Data -   Test Percent Gain in Strength from Initial  148   Lumbar Weight -   Repetitions -   Rating of Perceived Exertion -   Ice - Z Lie (in min.) 0         Prema participated in neuromuscular re-education to improve " coordination and trunk control for 10 minutes including:     Supine thoracic extension over foam roll 3 x 5 (w/ roll at different positions)  Plank w/ thoracic rotation x 10 B   - Knees w/ right rotation, toes with left rotation    Prone lumbar extension 2 x 10    - Intermittent verbal cues to prevent compensation by maintaining complete knee extension        Prema participated in therapeutic activities to improve her ability to lift, squat and perform other functional daily tasks for 0 minutes including:      Hip hinge training w/ un weighted dowel along the spine x 12    Hip hinge w/ 12# bar weight to knees x 20   25# KB lift off the floor    - W/ Forward leaning row         Peripheral muscle strengthening which included 1 set of 15-20 repetitions at a slow, controlled 10-13 second per rep pace focused on strengthening supporting musculature for improved body mechanics and functional mobility.  Pt and therapist focused on proper form during treatment to ensure optimal strengthening of each targeted muscle group.  Machines were utilized including torso rotation, chest press, rowing, biceps, and triceps. Leg extension, leg curl, hip abd, hip add, and leg press added visit 3     Prema received the following manual therapy techniques: Joint mobilizations were applied to the: Thoracic Spine and neck for 0 minutes.     IASTM right levator, Upper trap and lateral errector spinae  PA glides Grade 2/3 to improve mobility     Home Exercises Provided and Patient Education Provided   Stretching: Open books, repeated lumbar extension  Strengthening: Chilango Pavon on BB  Cardio program (V5): Provided  Lifting education (V11):Provided  Using Lumbar Roll:Instructed, not yet using  Fridge magnet provided and reviewed what each of the exercises on the list were. Verbalized understanding    Education provided:   - Initiation of transitioning from repeated lumbar extension to tolerating alternative forward flexion and reviewed  stop light system for knowing when to stop this movement     Written Home Exercises Provided: yes.  Exercises were reviewed and Prema was able to demonstrate them prior to the end of the session.  Prema demonstrated good  understanding of the education provided.     See EMR under Patient Instructions for exercises provided 7/6/2022.    Assessment     Prema has achieved a complete reduction of symptoms which she is able to manage as needed without any overpressure or therapist assistance needed.   Re-tested on the med-ex machine improving by 500% compared to self from a complete extension position and 147% overall compared to initial. All STG and LTGs met at this time.   Provided with HEP and reviewed with her which she verbalized understanding with everything provided.  Equal strength bilaterally with good initiation and control with glute activation throughout the session.        Anticipated Barriers for therapy: None  Pt's spiritual, cultural and educational needs considered and pt agreeable to plan of care and goals as stated below:     Goals:     Short term goals:  6 weeks or 10 visits   1.  Pt will demonstrate increased lumbar ROM by at least 0 degrees from the initial ROM value with improvements noted in functional ROM and ability to perform ADLs.  MET  2.  Pt will demonstrate increased MedX average isometric strength value  by 20% from initial test resulting in improved ability to perform bending, lifting, and carrying activities safely, confidently. MET  3.  Patient report a reduction in worst pain score by 1-2 points for improved tolerance for being able to walk longer distances without pain.  MET  4.  Pt able to perform HEP correctly with minimal cueing or supervision from therapist to encourage independent management of symptoms. MET      Long term goals: 10 weeks or 20 visits   1. Pt will demonstrate increased lumbar ROM by at least 0 degrees from initial ROM value, resulting in improved ability to  perform functional fwd bending while standing and sitting. MET  2. Pt will demonstrate increased MedX average isometric strength value  by 35% from initial test resulting in improved ability to perform bending, lifting, and carrying activities safely, confidently.  MET  3. Pt to demonstrate ability to independently control and reduce their pain through posture positioning and mechanical movements throughout a typical day.  (approp and ongoing)  4.  Pt will demonstrate reduced pain and improved functional outcomes as reported on the FOTO by reaching a limitation score of < or = 25% or less in order to demonstrate subjective improvement in pt's condition.    MET  5. Pt will demonstrate independence with the HEP at discharge  MET  6.  Prema will tolerate walking or biking for an hour and kayaking without pain to allow her to return to her previously active lifestyle  MET      Plan   Discharge from physical therapy. As all goals have been met and is safe to continue with her HEP at home    Ritika Larkin, PT, DPT

## 2022-07-06 NOTE — PROGRESS NOTES
Health  Consult Note    [facility]  [facility] Number: [id number]  Physician: Cara Valdez FNP  Past Medical History:   Diagnosis Date    Abdominal or pelvic swelling, mass, or lump, right lower quadrant     Anxiety     Arthritis     Asthma, mild persistent     Cervicalgia     COPD (chronic obstructive pulmonary disease)     Enlargement of lymph nodes     Eosinophilic esophagitis     Gastroparesis     Headache(784.0)     Heartburn     Helicobacter pylori (H. pylori)     History of positive PPD, untreated     History of psychiatric hospitalization     after son , hospitalized for eating disorder    Hx of psychiatric care     Hypothyroidism     Migraines     Occipital neuralgia     Other selective immunoglobulin deficiencies     Other syndromes affecting cervical region     Psychiatric problem     Spondylosis of cervical spine     Therapy     Unspecified asthma(493.90)     Unspecified disorder of thyroid     Unspecified viral meningitis      Time In: 1200  Time Out: 1235    Health  Agreement signed: Yes    Coaching performed performed: In person    Subjective:   Patient reports to health  after her Physical Therapy visit. Prema speaks of the most peaceful moment with nature she experienced this morning while paddleboarding, a baby dolphin was swimming alongside her     Vision:  Healthy and fit, energy and feeling well, living an active happy lifestyle    Values: Inner peace, spirituality, self-acceptance, happiness    Strengths:  Dependable, intelligent, mulittasker, motivated    Challenges:  Organization, cleaning in an organized manner, ADHD, hyperfocus, extended family relationships (but sets boundaries)    Support:  Spends significant amount of time with , friends occasionally    Hobbies:  Gardening, walking on the beach, paddleboarding, kayaking, cycling, being outside    Objective:  Prema created 3 SMART goals today:  2022  1. Journal  for 3/7 days to get a glimpse of daily activites (Met)  2. Will begin establishing a daily plan, begin by writing down daily responsibilities 1/7(Met)  3. 3/7 days Prema will use a timer with tasks, sticking to the tasks until the timer goes off.  If distracted by a thought write it down quickly in small notebook to address AFTER the timer goes off.(Met)  6/1/2022  1. Prema will perform her strengthening HEP on Tuesday and Thu/Fri/or Sat and write it down on accountability sheet given. 2/7 days  2. 2/7 days Prema will research cleaning ways that will best serve her and her ADHD (in addition to using the time)  3. 1/7 Prema will begin writing out her cleaning plan  6/8/2022  1. Prema will begin her cleaning plan, through admitted trial and error for a successful routine 3/7 days a week  2. Prema will meditate daily to continue to maintain peacefulness in times of stress with family events  7/6/2022  1. Prema will look for a group, local or online, to participate in, to learn how to support herself and other family members with their addictions. (Was provided with a handout for local resources)      INITIAL 5/27/2022  One a scale of 1-10, with 10 being 100% happy, how would you rate your happiness in each of the wellness areas below?    Happiness:         1     2     3     4     5     6     7     8     9     10    Initial Date: DC Date: +/- Total Change   Exercise/Movement 10     Physical Health 10      Stress Level 8     Nutrition  9     Sleep 7     Play 8     Body Image 7     Relationships 7     Energy/Vitality 7       Assessment:   Today focused on boundaries with family members.  Happy with her nutrition and her cardio level.     Plan:  Patient goals for next visit include assess this weeks goals    Health : Margarita Moss, PTA  7/6/2022

## 2022-07-11 ENCOUNTER — DOCUMENTATION ONLY (OUTPATIENT)
Dept: REHABILITATION | Facility: HOSPITAL | Age: 57
End: 2022-07-11
Payer: COMMERCIAL

## 2022-07-11 NOTE — PROGRESS NOTES
Health  Wellness Visit Note    [facility]  [facility] Number: [id number]  Physician: Cara Valdez FNP  Diagnosis: No diagnosis found.  Past Medical History:   Diagnosis Date    Abdominal or pelvic swelling, mass, or lump, right lower quadrant     Anxiety     Arthritis     Asthma, mild persistent     Cervicalgia     COPD (chronic obstructive pulmonary disease)     Enlargement of lymph nodes     Eosinophilic esophagitis     Gastroparesis     Headache(784.0)     Heartburn     Helicobacter pylori (H. pylori)     History of positive PPD, untreated     History of psychiatric hospitalization     after son , hospitalized for eating disorder    Hx of psychiatric care     Hypothyroidism     Migraines     Occipital neuralgia     Other selective immunoglobulin deficiencies     Other syndromes affecting cervical region     Psychiatric problem     Spondylosis of cervical spine     Therapy     Unspecified asthma(493.90)     Unspecified disorder of thyroid     Unspecified viral meningitis      Visit Number: 16  Precautions: Standard      1st PT visit:  2022  Year of care end date:  2023  6 Month test  performed:  12 Month test performed:  Mind body plan:Contract signed.  comp visit  Patient level:C    Time In: 1230  Time Out: 1330  Total Treatment Time: 60    Wellness Vision   Handout on this week's wellness topic of Play  provided  along with a discussion on what it means, the benefits, and suggestions for practice.  Welcome to Wellness handout given.    Subjective:   Patient reports soreness in her back yesterday, but was able to stretch it and manage it herself.    Objective:   Prema completed therapeutic stretches (Standing extension, prone on elbow, prone press ups, open books, supermans, bridges with ball, hip circuit with 1#) and the following MedX exercise machines: core lumbar, torso rotation l/r, leg extension, leg curl, upright row, chest press, biceps  curl, triceps extension, leg press    See exercise log in patient folder for rate of exertion and repetitions completed.       Fitness Machine Education Key:  E=education on equipment initiated and further follow up and education needed  I=independent with  and exercise.  The patient:   Adjusts machines to his/her settings   Uses equipment levers, pins, weights safely   Maintains safe and correct posture while exercising   Moves through exercise with correct pace and control   Gets on and off equipment safely      Lumbar Ext. E Torso Rotation E Leg Press E   Leg Extension E Seated Leg Curl E Chest Press E   Seated Row E Hip ADD E Hip ABD E   Triceps Extension E Bicep Curl E Other:        Assessment:   Patient tolerated MedX Core Lumbar Strength and all other peripheral exercises without an increase in symptoms. Patient warmed up on treadmill for 15 minutes, performed strengthening and stretching exercises, declined ice    Plan:  Continue with established plan of care towards wellness goals.     Health  : Margarita Moss, PTA  7/11/2022

## 2022-07-22 ENCOUNTER — PROCEDURE VISIT (OUTPATIENT)
Dept: NEUROLOGY | Facility: CLINIC | Age: 57
End: 2022-07-22
Payer: COMMERCIAL

## 2022-07-22 ENCOUNTER — DOCUMENTATION ONLY (OUTPATIENT)
Dept: REHABILITATION | Facility: HOSPITAL | Age: 57
End: 2022-07-22
Payer: COMMERCIAL

## 2022-07-22 VITALS
BODY MASS INDEX: 19.76 KG/M2 | HEART RATE: 66 BPM | WEIGHT: 122.94 LBS | HEIGHT: 66 IN | RESPIRATION RATE: 17 BRPM | DIASTOLIC BLOOD PRESSURE: 75 MMHG | SYSTOLIC BLOOD PRESSURE: 115 MMHG

## 2022-07-22 DIAGNOSIS — G43.719 INTRACTABLE CHRONIC MIGRAINE WITHOUT AURA AND WITHOUT STATUS MIGRAINOSUS: Primary | ICD-10-CM

## 2022-07-22 PROCEDURE — 64615 CHEMODENERV MUSC MIGRAINE: CPT | Mod: S$GLB,,, | Performed by: PSYCHIATRY & NEUROLOGY

## 2022-07-22 PROCEDURE — 64615 PR CHEMODENERVATION OF MUSCLE FOR CHRONIC MIGRAINE: ICD-10-PCS | Mod: S$GLB,,, | Performed by: PSYCHIATRY & NEUROLOGY

## 2022-07-22 NOTE — PROCEDURES
Procedures    2021  With Botox injections I have achieved well over 50%  improvement in the patient's symptoms. Migraines have been reduced at least 7 days per month and the number of cumulative hours suffering with headaches has been reduced at least 100 total hours per month. Today she  does have a headache indicating that the Botox has worn off. Frequency of treatment is every 3 months unless no response to the treatments, at which time we will discontinue the injections.        ROS: Positive for photophobia, phonophobia, nausea, insomnia     Past Medical History:   Diagnosis Date    Abdominal or pelvic swelling, mass, or lump, right lower quadrant     Anxiety     Arthritis     Asthma, mild persistent     Cervicalgia     COPD (chronic obstructive pulmonary disease)     Enlargement of lymph nodes     Eosinophilic esophagitis     Gastroparesis     Headache(784.0)     Heartburn     Helicobacter pylori (H. pylori)     History of positive PPD, untreated     History of psychiatric hospitalization     after son , hospitalized for eating disorder    Hx of psychiatric care     Hypothyroidism     Migraines     Occipital neuralgia     Other selective immunoglobulin deficiencies     Other syndromes affecting cervical region     Psychiatric problem     Spondylosis of cervical spine     Therapy     Unspecified asthma(493.90)     Unspecified disorder of thyroid     Unspecified viral meningitis          Current Outpatient Medications   Medication Sig    albuterol sulfate (PROAIR RESPICLICK) 90 mcg/actuation inhaler Inhale 1-2 puffs into the lungs every 4 (four) hours as needed for Wheezing. Rescue    clobetasol 0.05% (TEMOVATE) 0.05 % Oint     clotrimazole-betamethasone 1-0.05% (LOTRISONE) cream Apply topically as needed.     COLLAGEN MISC by Misc.(Non-Drug; Combo Route) route.    COVID-19 vac, fanny,Pfizer,,PF, (PFIZER COVID-19) 30 mcg/0.3 mL injection Pf[employer] COVID-19 Vaccine (PF)  30 mcg/0.3 mL IM susp (purple)   Inject by intramuscular route.    cyanocobalamin 500 MCG tablet Take 500 mcg by mouth once daily.    diclofenac (FLECTOR) 1.3 % PT12 APPLY 1 PATCH BY TRANSDERMAL ROUTE 2 TIMES EVERY DAY TO MOST PAINFUL AREA    estradiol 0.05 mg/24 hr td ptsw (VIVELLE-DOT) 0.05 mg/24 hr estradiol 0.05 mg/24 hr semiweekly transdermal patch   Apply 1 patch twice a week by transdermal route.    FEXOFENADINE HCL (ALLEGRA ORAL) Take by mouth.    fluticasone-vilanterol (BREO) 100-25 mcg/dose diskus inhaler Inhale 1 puff into the lungs once daily. Controller    galcanezumab-gnlm (EMGALITY PEN) 120 mg/mL PnIj Inject 1 pen (120 mg) into the skin every 28 days.    galcanezumab-gnlm 120 mg/mL PnIj Inject 2 pens (240 mg) subcutaneous at separate sites, once (loading dose). Start maintenance dose 28 days later (Patient taking differently: Inject 2 pens (240 mg) subcutaneous at separate sites, once (loading dose). Start maintenance dose 28 days later)    hydrocodone-acetaminophen 7.5-325mg (NORCO) 7.5-325 mg per tablet Take 1 tablet by mouth nightly as needed.     INTRAROSA 6.5 mg Inst INSERT 1 VAGINAL INSERT EVERY DAY BY VAGINAL ROUTE.    lasmiditan (REYVOW) tablet 100 mg Take 1 tablet (100 mg total) by mouth daily as needed (migraine).    levothyroxine (SYNTHROID) 50 MCG tablet TAKE 1 TABLET BY MOUTH EVERY DAY IN THE MORNING    MG TRISILICATE/ALH/NAHCO3/AA (GAVISCON ORAL) Take 5 mLs by mouth as needed.     nystatin (MYCOSTATIN) 100,000 unit/mL suspension Take 6 mLs (600,000 Units total) by mouth 3 (three) times daily. Swish and swallow.    onabotulinumtoxina (BOTOX) 200 unit SolR Inject 155 Units into the muscle into the head/neck area every 90 days    ondansetron (ZOFRAN) 4 MG tablet Take 4 mg by mouth every 6 (six) hours as needed.    ondansetron (ZOFRAN-ODT) 8 MG TbDL Take 1 tablet (8 mg total) by mouth every 6 (six) hours as needed (nausea).    PROCTOSOL HC 2.5 % rectal cream Place 1 Dose  rectally as needed.     rimegepant (NURTEC) 75 mg odt Take 1 tablet (75 mg total) by mouth daily as needed for Migraine. Place ODT tablet on the tongue; alternatively the ODT tablet may be placed under the tongue    sumatriptan (IMITREX) 100 MG tablet TAKE 1 BY MOUTH EVERY 2 HRS. AS NEEDED FOR MIGRAINE. UP  MG/DAY. HOLD FOR BP > 150 MM SYSTOLIC    testosterone (ANDROGEL) 1 % (50 mg/5 gram) GlPk APPLY 0.5 ML TO INNER THIGH EVERY DAY AS DIRECTED    tiZANidine (ZANAFLEX) 4 MG tablet TAKE 1 TABLET BY MOUTH NIGHTLY AS NEEDED.    zolpidem (AMBIEN) 5 MG Tab TAKE 1 TABLET (5 MG TOTAL) BY MOUTH NIGHTLY AS NEEDED (SLEEPLESSNESS).    fluticasone (FLOVENT HFA) 220 mcg/actuation inhaler Inhale 1 puff into the lungs 2 (two) times daily. Controller     Current Facility-Administered Medications   Medication    onabotulinumtoxina injection 200 Units    onabotulinumtoxina injection 200 Units    onabotulinumtoxina injection 200 Units    onabotulinumtoxina injection 200 Units    triamcinolone acetonide injection 40 mg          Vitals:    07/22/22 0946   BP: 115/75   Pulse: 66   Resp: 17     Body mass index is 19.84 kg/m².    Physical Exam:  Alert and fully oriented   Lungs CTA  Heart RRR  CN II-XII intact  Motor normal bulk and tone, symmetric strength  Coordination intact FTN  Sensory in tact to LT  Gait: normal, pace and base     Data review:    Lab Results   Component Value Date     11/01/2021     09/18/2015    K 3.7 11/01/2021    K 4.1 09/18/2015    CL 96 (L) 11/01/2021     09/18/2015    CO2 35 (H) 11/01/2021    BUN 20 11/01/2021    CREATININE 0.87 11/01/2021    CREATININE 0.92 09/18/2015    GLU 81 11/01/2021    AST 21 11/01/2021    ALT 16 11/01/2021    ALBUMIN 4.4 11/01/2021    ALBUMIN 3.6 09/18/2015    PROT 6.9 11/01/2021    BILITOT 0.7 11/01/2021    CHOL 222 (H) 11/01/2021    HDL 85 11/01/2021    LDLCALC 112 (H) 11/01/2021    LDLCALC 77 09/18/2015    TRIG 142 11/01/2021       Lab Results    Component Value Date    WBC 5.9 11/09/2020    HGB 13.5 11/09/2020    HCT 41.6 11/09/2020    MCV 94.3 11/09/2020     11/09/2020       Lab Results   Component Value Date    TSH 1.35 11/01/2021     No results found for this or any previous visit.    A/P:     Chronic Migraine responding to Botox as expected. Continue treatment until patient in true remission, meaning that the patient stays headache free when Botox wears off in 10 to 12 weeks. That will be the time to stop.  Encouraged the patient to comply with with early acute intervention for escalations    BOTOX PROCEDURE    PROCEDURE PERFORMED: Botulinum toxin injection (32805)    CLINICAL INDICATION: G43.719    A time out was conducted just before the start of the procedure to verify the correct patient and procedure, procedure location, and all relevant critical information.     DESCRIPTION OF PROCEDURE: After obtaining informed consent and under aseptic technique, a total of 155 units of botulinum toxin type A were injected in the following muscles: Procerus 5 units,  5 units   bilaterally, frontalis 20 units, temporalis 20 units bilaterally, occipitalis 15 units, upper cervical paraspinals 10 units bilaterally and trapezius 15 units bilaterally. The patient was given a total of 155 units in 31 sites.The patient tolerated the procedure well. There were no complications.    The patient was scheduled for repeat treatment in 10 to 12 weeks     Unavoidable waste 45 units      August Smiley M.D  Medical Director, Headache and Facial Pain  [facility]

## 2022-07-22 NOTE — PROGRESS NOTES
Health  Wellness Visit Note    Name: Brenda C Stargardt  Clinic Number: [id number]  Physician: Cara Valdez FNP  Diagnosis: No diagnosis found.  Past Medical History:   Diagnosis Date    Abdominal or pelvic swelling, mass, or lump, right lower quadrant     Anxiety     Arthritis     Asthma, mild persistent     Cervicalgia     COPD (chronic obstructive pulmonary disease)     Enlargement of lymph nodes     Eosinophilic esophagitis     Gastroparesis     Headache(784.0)     Heartburn     Helicobacter pylori (H. pylori)     History of positive PPD, untreated     History of psychiatric hospitalization     after son , hospitalized for eating disorder    Hx of psychiatric care     Hypothyroidism     Migraines     Occipital neuralgia     Other selective immunoglobulin deficiencies     Other syndromes affecting cervical region     Psychiatric problem     Spondylosis of cervical spine     Therapy     Unspecified asthma(493.90)     Unspecified disorder of thyroid     Unspecified viral meningitis      Visit Number: 17  Precautions: Standard      1st PT visit:  2022  Year of care end date:  2023  6 Month test  performed:  12 Month test performed:  Mind body plan:Contract signed.  comp visit  Patient level:C    Time In: 0816  Time Out: 0900  Total Treatment Time: 44    Wellness Vision   Handout on this week's wellness topic of Laughter provided  along with a discussion on what it means, the benefits, and suggestions for practice.  Reviewed last weeks topic of Play      Subjective:   Patient reports soreness in her back yesterday, but was able to stretch it and manage it herself.  Reports she paddleboarded 3 miles in 4 hours this weekend.  Wasn't sore.  Was doing a HIIT workout when she felt pain in her knee and she has been icing and using caution until pain subsides.    Objective:   Prema completed therapeutic stretches (Standing extension, open books, supermans, bridges)  and the following MedX exercise machines: core lumbar, torso rotation l/r, leg extension, leg curl, upright row, chest press, biceps curl, triceps extension, leg press    See exercise log in patient folder for rate of exertion and repetitions completed.       Fitness Machine Education Key:  E=education on equipment initiated and further follow up and education needed  I=independent with  and exercise.  The patient:   Adjusts machines to his/her settings   Uses equipment levers, pins, weights safely   Maintains safe and correct posture while exercising   Moves through exercise with correct pace and control   Gets on and off equipment safely      Lumbar Ext. E Torso Rotation E Leg Press E   Leg Extension E Seated Leg Curl E Chest Press E   Seated Row E Hip ADD E Hip ABD E   Triceps Extension E Bicep Curl E Other:        Assessment:   Patient tolerated MedX Core Lumbar Strength and all other peripheral exercises without an increase in symptoms. Patient warmed up on bike for 5 minutes, performed strengthening and stretching exercises, declined ice    Plan:  Continue with established plan of care towards wellness goals.     Health  : Margarita Moss  7/22/2022

## 2022-08-03 ENCOUNTER — DOCUMENTATION ONLY (OUTPATIENT)
Dept: REHABILITATION | Facility: HOSPITAL | Age: 57
End: 2022-08-03
Attending: INTERNAL MEDICINE
Payer: COMMERCIAL

## 2022-08-03 NOTE — PROGRESS NOTES
Health  Wellness Visit Note    Name: Brenda C Stargardt  Clinic Number: [id number]  Physician: No ref. provider found  Diagnosis: No diagnosis found.  Past Medical History:   Diagnosis Date    Abdominal or pelvic swelling, mass, or lump, right lower quadrant     Anxiety     Arthritis     Asthma, mild persistent     Cervicalgia     COPD (chronic obstructive pulmonary disease)     Enlargement of lymph nodes     Eosinophilic esophagitis     Gastroparesis     Headache(784.0)     Heartburn     Helicobacter pylori (H. pylori)     History of positive PPD, untreated     History of psychiatric hospitalization     after son , hospitalized for eating disorder    Hx of psychiatric care     Hypothyroidism     Migraines     Occipital neuralgia     Other selective immunoglobulin deficiencies     Other syndromes affecting cervical region     Psychiatric problem     Spondylosis of cervical spine     Therapy     Unspecified asthma(493.90)     Unspecified disorder of thyroid     Unspecified viral meningitis      Visit Number: 18  Precautions: Standard      1st PT visit:  2022  Year of care end date:  2023  6 Month test  performed:  12 Month test performed:  Mind body plan: 9 mos Contract signed, Waiver signed  Patient level:C    Time In: 0710  Time Out: 0800  Total Treatment Time: 50    Wellness Vision   Handout on this week's wellness topic of SMART Goals provided  along with a discussion on what it means, the benefits, and suggestions for practice.  Reviewed last weeks topic of Self-Dating.      Subjective:   Patient reports she had a Dr appt this morning, that we need to finish session so she can leave by 8:00.  Patient reports she is paddleboarding regularly still.  Increased discomfort in her left low back today.    Objective:   Prema completed therapeutic stretches (Standing extension, open books, supermans, bridges) and the following MedX exercise machines: core lumbar, torso  rotation l/r, leg extension, leg curl, upright row, chest press, biceps curl, triceps extension, leg press    See exercise log in patient folder for rate of exertion and repetitions completed.       Fitness Machine Education Key:  E=education on equipment initiated and further follow up and education needed  I=independent with  and exercise.  The patient:   Adjusts machines to his/her settings   Uses equipment levers, pins, weights safely   Maintains safe and correct posture while exercising   Moves through exercise with correct pace and control   Gets on and off equipment safely      Lumbar Ext. E Torso Rotation E Leg Press E   Leg Extension E Seated Leg Curl E Chest Press E   Seated Row E Hip ADD E Hip ABD E   Triceps Extension E Bicep Curl E Other:        Assessment:   Patient tolerated MedX Core Lumbar Strength and all other peripheral exercises without an increase in symptoms.     Plan:  Continue with established plan of care towards wellness goals.     Health  : Margarita Moss  8/3/2022

## 2022-08-10 ENCOUNTER — DOCUMENTATION ONLY (OUTPATIENT)
Dept: REHABILITATION | Facility: HOSPITAL | Age: 57
End: 2022-08-10
Attending: INTERNAL MEDICINE
Payer: COMMERCIAL

## 2022-08-10 ENCOUNTER — DOCUMENTATION ONLY (OUTPATIENT)
Dept: REHABILITATION | Facility: HOSPITAL | Age: 57
End: 2022-08-10
Payer: COMMERCIAL

## 2022-08-10 NOTE — PROGRESS NOTES
Health  Wellness Visit Note    [facility]  Cl[facility] Number: [id number]  Physician: Cara Valdez FNP  Diagnosis: No diagnosis found.  Past Medical History:   Diagnosis Date    Abdominal or pelvic swelling, mass, or lump, right lower quadrant     Anxiety     Arthritis     Asthma, mild persistent     Cervicalgia     COPD (chronic obstructive pulmonary disease)     Enlargement of lymph nodes     Eosinophilic esophagitis     Gastroparesis     Headache(784.0)     Heartburn     Helicobacter pylori (H. pylori)     History of positive PPD, untreated     History of psychiatric hospitalization     after son , hospitalized for eating disorder    Hx of psychiatric care     Hypothyroidism     Migraines     Occipital neuralgia     Other selective immunoglobulin deficiencies     Other syndromes affecting cervical region     Psychiatric problem     Spondylosis of cervical spine     Therapy     Unspecified asthma(493.90)     Unspecified disorder of thyroid     Unspecified viral meningitis      Visit Number: 19  Precautions: Standard      1st PT visit:  2022  Year of care end date:  2023  6 Month test  performed:  12 Month test performed:  Mind body plan: 9 mos Contract signed, Waiver signed  Patient level:C    Time In: 1105  Time Out: 1200  Total Treatment Time: 55    Wellness Vision   Handout on this week's wellness topic of Small steps towards goals provided  along with a discussion on what it means, the benefits, and suggestions for practice.  Reviewed last weeks topic of SMART goals.      Subjective:   Patient reports she has been consistent with her home exercises, still enjoying her paddleboarding.    Objective:   Prema completed therapeutic stretches (Standing extension, open books, supermans, bridges with theraball hamstring curl, plank with twist) and the following MedX exercise machines: core lumbar, torso rotation l/r, leg extension, leg curl, upright  row, chest press, biceps curl, triceps extension, leg press    See exercise log in patient folder for rate of exertion and repetitions completed.       Fitness Machine Education Key:  E=education on equipment initiated and further follow up and education needed  I=independent with  and exercise.  The patient:   Adjusts machines to his/her settings   Uses equipment levers, pins, weights safely   Maintains safe and correct posture while exercising   Moves through exercise with correct pace and control   Gets on and off equipment safely      Lumbar Ext. E Torso Rotation E Leg Press E   Leg Extension E Seated Leg Curl E Chest Press E   Seated Row E Hip ADD E Hip ABD E   Triceps Extension E Bicep Curl E Other:        Assessment:   Patient tolerated MedX Core Lumbar Strength and all other peripheral exercises without an increase in symptoms.     Plan:  Continue with established plan of care towards wellness goals.     Health  : Margarita Moss  8/10/2022

## 2022-08-16 NOTE — PROGRESS NOTES
Health  Consult Note    Name: Brenda C Stargardt  Clinic Number: [id number]  Physician: No ref. provider found  Past Medical History:   Diagnosis Date    Abdominal or pelvic swelling, mass, or lump, right lower quadrant     Anxiety     Arthritis     Asthma, mild persistent     Cervicalgia     COPD (chronic obstructive pulmonary disease)     Enlargement of lymph nodes     Eosinophilic esophagitis     Gastroparesis     Headache(784.0)     Heartburn     Helicobacter pylori (H. pylori)     History of positive PPD, untreated     History of psychiatric hospitalization     after son , hospitalized for eating disorder    Hx of psychiatric care     Hypothyroidism     Migraines     Occipital neuralgia     Other selective immunoglobulin deficiencies     Other syndromes affecting cervical region     Psychiatric problem     Spondylosis of cervical spine     Therapy     Unspecified asthma(493.90)     Unspecified disorder of thyroid     Unspecified viral meningitis      Time In: 1200   Time Out: 1230    Health  Agreement signed: Yes    Coaching performed performed: In person    Subjective:   Patient reports to health  after her Physical Therapy visit. Remains doing her paddleboarding and enjoying it.    Vision:  Healthy and fit, energy and feeling well, living an active happy lifestyle    Values: Inner peace, spirituality, self-acceptance, happiness    Strengths:  Dependable, intelligent, mulittasker, motivated    Challenges:  Organization, cleaning in an organized manner, ADHD, hyperfocus, extended family relationships (but sets boundaries)    Support:  Spends significant amount of time with , friends occasionally    Hobbies:  Gardening, walking on the beach, paddleboarding, kayaking, cycling, being outside    Objective:  Prema created 3 SMART goals today:  2022  1. Journal for 3/7 days to get a glimpse of daily activites (Met)  2. Will begin establishing a daily plan, begin by writing down daily  responsibilities 1/7(Met)  3. 3/7 days Prema will use a timer with tasks, sticking to the tasks until the timer goes off.  If distracted by a thought write it down quickly in small notebook to address AFTER the timer goes off.(Met)  6/1/2022  1. Prema will perform her strengthening HEP on Tuesday and Thu/Fri/or Sat and write it down on accountability sheet given. 2/7 days  2. 2/7 days Prema will research cleaning ways that will best serve her and her ADHD (in addition to using the time)  3. 1/7 Prema will begin writing out her cleaning plan  6/8/2022  1. Prema will begin her cleaning plan, through admitted trial and error for a successful routine 3/7 days a week  2. Prema will meditate daily to continue to maintain peacefulness in times of stress with family events  7/6/2022  1. Prema will look for a group, local or online, to participate in, to learn how to support herself and other family members with their addictions. (Was provided with a handout for local resources)  8/10/2022  Prema will continue to visit family more often, to support daughter in supporting sons drinking  Prema will continue her journey with exercise with bike riding if paddleboarding isn't an option.    INITIAL 5/27/2022  One a scale of 1-10, with 10 being 100% happy, how would you rate your happiness in each of the wellness areas below?    Happiness:         1     2     3     4     5     6     7     8     9     10    Initial Date: DC Date: +/- Total Change   Exercise/Movement 10     Physical Health 10      Stress Level 8     Nutrition  9     Sleep 7     Play 8     Body Image 7     Relationships 7     Energy/Vitality 7       Assessment:   Today focused on boundaries with family members.  Happy with her nutrition and her cardio level.     Plan:  Patient goals for next visit include assess this weeks goals    Health : Margarita Moss, PTA  8/10/2022

## 2022-08-22 ENCOUNTER — TELEPHONE (OUTPATIENT)
Dept: REHABILITATION | Facility: HOSPITAL | Age: 57
End: 2022-08-22
Payer: COMMERCIAL

## 2022-08-23 NOTE — PROGRESS NOTES
"Health  Wellness Visit Note    Name: Brenda C Stargardter  Clinic Number: [id number]  Physician: Cara Valdez FNP  Diagnosis: No diagnosis found.  Past Medical History:   Diagnosis Date    Abdominal or pelvic swelling, mass, or lump, right lower quadrant     Anxiety     Arthritis     Asthma, mild persistent     Cervicalgia     COPD (chronic obstructive pulmonary disease)     Enlargement of lymph nodes     Eosinophilic esophagitis     Gastroparesis     Headache(784.0)     Heartburn     Helicobacter pylori (H. pylori)     History of positive PPD, untreated     History of psychiatric hospitalization     after son , hospitalized for eating disorder    Hx of psychiatric care     Hypothyroidism     Migraines     Occipital neuralgia     Other selective immunoglobulin deficiencies     Other syndromes affecting cervical region     Psychiatric problem     Spondylosis of cervical spine     Therapy     Unspecified asthma(493.90)     Unspecified disorder of thyroid     Unspecified viral meningitis      Visit Number: 20  Precautions: Standard      1st PT visit:  2022  Year of care end date:  2023  6 Month test  performed:  12 Month test performed:  Mind body plan: 9 mos Contract signed, Waiver signed  Patient level:C    Time In: 1110  Time Out: 1200  Total Treatment Time: 50    Wellness Vision   Handout on this week's wellness topic of Journaling towards goals provided along with a discussion on what it means, the benefits, and suggestions for practice.  Reviewed last weeks topic of stages of change.      Subjective:   Patient reports she has been consistent with her home exercises.  "I didn't go paddleboarding today because of the weather, but I walked on the beach for 3 miles."    Objective:   Prema completed therapeutic stretches (Standing extension, bridges with theraball hamstring curl, lumbar ext with kettlebell in flexed hip/knees with resistance at hip, hip circuit 5# " x 10-20 each) and the following MedX exercise machines: core lumbar, torso rotation l/r, leg extension, leg curl, upright row, chest press, biceps curl, triceps extension, leg press    See exercise log in patient folder for rate of exertion and repetitions completed.       Fitness Machine Education Key:  E=education on equipment initiated and further follow up and education needed  I=independent with  and exercise.  The patient:   Adjusts machines to his/her settings   Uses equipment levers, pins, weights safely   Maintains safe and correct posture while exercising   Moves through exercise with correct pace and control   Gets on and off equipment safely      Lumbar Ext. E Torso Rotation E Leg Press E   Leg Extension E Seated Leg Curl E Chest Press E   Seated Row E Hip ADD E Hip ABD E   Triceps Extension E Bicep Curl E Other:        Assessment:   Patient tolerated MedX Core Lumbar Strength and all other peripheral exercises without an increase in symptoms.     Plan:  Continue with established plan of care towards wellness goals.     Health  : Margarita Moss  8/24/2022

## 2022-08-24 ENCOUNTER — DOCUMENTATION ONLY (OUTPATIENT)
Dept: REHABILITATION | Facility: HOSPITAL | Age: 57
End: 2022-08-24
Payer: COMMERCIAL

## 2022-08-29 ENCOUNTER — PATIENT MESSAGE (OUTPATIENT)
Dept: REHABILITATION | Facility: HOSPITAL | Age: 57
End: 2022-08-29
Payer: COMMERCIAL

## 2022-09-02 ENCOUNTER — DOCUMENTATION ONLY (OUTPATIENT)
Dept: REHABILITATION | Facility: HOSPITAL | Age: 57
End: 2022-09-02
Attending: INTERNAL MEDICINE
Payer: COMMERCIAL

## 2022-09-09 ENCOUNTER — DOCUMENTATION ONLY (OUTPATIENT)
Dept: REHABILITATION | Facility: HOSPITAL | Age: 57
End: 2022-09-09
Payer: COMMERCIAL

## 2022-09-09 NOTE — PROGRESS NOTES
Health  Wellness Visit Note    [facility] [place]  [facility] Number: [id number]  Physician: Cara Valdez FNP  Diagnosis: No diagnosis found.  Past Medical History:   Diagnosis Date    Abdominal or pelvic swelling, mass, or lump, right lower quadrant     Anxiety     Arthritis     Asthma, mild persistent     Cervicalgia     COPD (chronic obstructive pulmonary disease)     Enlargement of lymph nodes     Eosinophilic esophagitis     Gastroparesis     Headache(784.0)     Heartburn     Helicobacter pylori (H. pylori)     History of positive PPD, untreated     History of psychiatric hospitalization     after son , hospitalized for eating disorder    Hx of psychiatric care     Hypothyroidism     Migraines     Occipital neuralgia     Other selective immunoglobulin deficiencies     Other syndromes affecting cervical region     Psychiatric problem     Spondylosis of cervical spine     Therapy     Unspecified asthma(493.90)     Unspecified disorder of thyroid     Unspecified viral meningitis      Visit Number: 22  Precautions: Standard      1st PT visit:  2022  Year of care end date:  2023  6 Month test  performed:  12 Month test performed:  Mind body plan: 9 mos Contract signed, Waiver signed  Patient level:C    Time In: 1210  Time Out: 1310  Total Treatment Time: 60    Wellness Vision   Handout on this week's wellness topic of Body Image provided along with a discussion on what it means, the benefits, and suggestions for practice.  Reviewed last weeks topic of Decisional Balance.        Subjective:   Patient reports she has been consistent with her home exercises.      Objective:   Prema completed therapeutic stretches (Standing extension, bridges with theraball hamstring curl, lumbar ext with kettlebell in flexed hip/knees with resistance at hip, mini squat with hip abd, ext, Flexion with alt lower extremity on glider on turf) and the following [employer] exercise machines: core lumbar, torso  rotation l/r, leg extension, leg curl, upright row, chest press, biceps curl, triceps extension, leg press    See exercise log in patient folder for rate of exertion and repetitions completed.       Fitness Machine Education Key:  E=education on equipment initiated and further follow up and education needed  I=independent with  and exercise.  The patient:  Adjusts machines to his/her settings  Uses equipment levers, pins, weights safely  Maintains safe and correct posture while exercising  Moves through exercise with correct pace and control  Gets on and off equipment safely      Lumbar Ext. E Torso Rotation E Leg Press E   Leg Extension E Seated Leg Curl E Chest Press E   Seated Row E Hip ADD E Hip ABD E   Triceps Extension E Bicep Curl E Other:        Assessment:   Patient tolerated Core Lumbar Strength and all other peripheral exercises without an increase in symptoms.     Plan:  Continue with established plan of care towards wellness goals.     Health  : Margarita Moss  9/9/2022

## 2022-09-20 NOTE — PROGRESS NOTES
Health  Wellness Visit Note    Name: Brenda C Stargardt  Clinic Number: [id number]  Physician: No ref. provider found  Diagnosis: No diagnosis found.  Past Medical History:   Diagnosis Date    Abdominal or pelvic swelling, mass, or lump, right lower quadrant     Anxiety     Arthritis     Asthma, mild persistent     Cervicalgia     COPD (chronic obstructive pulmonary disease)     Enlargement of lymph nodes     Eosinophilic esophagitis     Gastroparesis     Headache(784.0)     Heartburn     Helicobacter pylori (H. pylori)     History of positive PPD, untreated     History of psychiatric hospitalization     after son , hospitalized for eating disorder    Hx of psychiatric care     Hypothyroidism     Migraines     Occipital neuralgia     Other selective immunoglobulin deficiencies     Other syndromes affecting cervical region     Psychiatric problem     Spondylosis of cervical spine     Therapy     Unspecified asthma(493.90)     Unspecified disorder of thyroid     Unspecified viral meningitis      Visit Number: 23  Precautions: Standard      1st PT visit:  2022  Year of care end date:  2023  6 Month test  performed:  12 Month test performed:  Mind body plan: 9 mos Contract signed, Waiver signed  Patient level:C    Time In: 1110  Time Out: 1200  Total Treatment Time: 50    Wellness Vision   Handout on this week's wellness topic of Taking Control of Your Wellness provided along with a discussion on what it means, the benefits, and suggestions for practice.  Reviewed last weeks topic of Burnout Awareness.        Subjective:   Patient reports she was coming in from pad[place]peerboarding last week, and a wave knocked her backwards and she landed on a metal part of her cart near her right SI jt, did not puncture skin, has been icing and performing activity as tolerated.      Objective:   Prema completed therapeutic stretches (Standing extension, bridges , Sidelying CLAM with GTB x 12 left/right, prirformis  "stretch 3/30-60" in supine, hip flexor stretch 3/30-60" in standing) and the following MedX exercise machines: core lumbar, torso rotation l/r, leg extension, leg curl, upright row, chest press, biceps curl, triceps extension, leg press    See exercise log in patient folder for rate of exertion and repetitions completed.       Fitness Machine Education Key:  E=education on equipment initiated and further follow up and education needed  I=independent with  and exercise.  The patient:  Adjusts machines to his/her settings  Uses equipment levers, pins, weights safely  Maintains safe and correct posture while exercising  Moves through exercise with correct pace and control  Gets on and off equipment safely      Lumbar Ext. E Torso Rotation E Leg Press E   Leg Extension E Seated Leg Curl E Chest Press E   Seated Row E Hip ADD E Hip ABD E   Triceps Extension E Bicep Curl E Other:        Assessment:   Patient tolerated Core Lumbar Strength and all other peripheral exercises without an increase in symptoms. CP x 10' after workout.    Plan:  Continue with established plan of care towards wellness goals.     Health  : Margarita Moss  9/21/2022                    "

## 2022-09-21 ENCOUNTER — DOCUMENTATION ONLY (OUTPATIENT)
Dept: REHABILITATION | Facility: HOSPITAL | Age: 57
End: 2022-09-21
Payer: COMMERCIAL

## 2022-10-03 DIAGNOSIS — G43.719 INTRACTABLE CHRONIC MIGRAINE WITHOUT AURA AND WITHOUT STATUS MIGRAINOSUS: ICD-10-CM

## 2022-10-03 RX ORDER — RIMEGEPANT SULFATE 75 MG/75MG
75 TABLET, ORALLY DISINTEGRATING ORAL DAILY PRN
Qty: 8 TABLET | Refills: 5 | Status: SHIPPED | OUTPATIENT
Start: 2022-10-03 | End: 2023-03-01 | Stop reason: SDUPTHER

## 2022-10-13 ENCOUNTER — PROCEDURE VISIT (OUTPATIENT)
Dept: NEUROLOGY | Facility: CLINIC | Age: 57
End: 2022-10-13
Payer: COMMERCIAL

## 2022-10-13 VITALS
HEART RATE: 60 BPM | BODY MASS INDEX: 19.77 KG/M2 | DIASTOLIC BLOOD PRESSURE: 74 MMHG | WEIGHT: 123 LBS | RESPIRATION RATE: 17 BRPM | HEIGHT: 66 IN | SYSTOLIC BLOOD PRESSURE: 110 MMHG

## 2022-10-13 DIAGNOSIS — G43.719 INTRACTABLE CHRONIC MIGRAINE WITHOUT AURA AND WITHOUT STATUS MIGRAINOSUS: Primary | ICD-10-CM

## 2022-10-13 PROCEDURE — 64615 CHEMODENERV MUSC MIGRAINE: CPT | Mod: S$GLB,,, | Performed by: PSYCHIATRY & NEUROLOGY

## 2022-10-13 PROCEDURE — 64615 PR CHEMODENERVATION OF MUSCLE FOR CHRONIC MIGRAINE: ICD-10-PCS | Mod: S$GLB,,, | Performed by: PSYCHIATRY & NEUROLOGY

## 2022-10-13 NOTE — PROCEDURES
Procedures  2021  With Botox injections I have achieved well over 50%  improvement in the patient's symptoms. Migraines have been reduced at least 7 days per month and the number of cumulative hours suffering with headaches has been reduced at least 100 total hours per month. Today she  does have a headache indicating that the Botox has worn off. Frequency of treatment is every 3 months unless no response to the treatments, at which time we will discontinue the injections.        ROS: Positive for photophobia, phonophobia, nausea, insomnia     Past Medical History:   Diagnosis Date    Abdominal or pelvic swelling, mass, or lump, right lower quadrant     Anxiety     Arthritis     Asthma, mild persistent     Cervicalgia     COPD (chronic obstructive pulmonary disease)     Enlargement of lymph nodes     Eosinophilic esophagitis     Gastroparesis     Headache(784.0)     Heartburn     Helicobacter pylori (H. pylori)     History of positive PPD, untreated     History of psychiatric hospitalization     after son , hospitalized for eating disorder    Hx of psychiatric care     Hypothyroidism     Migraines     Occipital neuralgia     Other selective immunoglobulin deficiencies     Other syndromes affecting cervical region     Psychiatric problem     Spondylosis of cervical spine     Therapy     Unspecified asthma(493.90)     Unspecified disorder of thyroid     Unspecified viral meningitis          Current Outpatient Medications   Medication Sig    albuterol sulfate (PROAIR RESPICLICK) 90 mcg/actuation inhaler Inhale 1-2 puffs into the lungs every 4 (four) hours as needed for Wheezing. Rescue    clobetasol 0.05% (TEMOVATE) 0.05 % Oint     clotrimazole-betamethasone 1-0.05% (LOTRISONE) cream Apply topically as needed.     COLLAGEN MISC by Misc.(Non-Drug; Combo Route) route.    COVID-19 vac, fanny,Pfizer,,PF, (PFIZER COVID-19) 30 mcg/0.3 mL injection Pfizer-BioNTech COVID-19 Vaccine (PF) 30 mcg/0.3 mL IM susp (purple)    Inject by intramuscular route.    cyanocobalamin 500 MCG tablet Take 500 mcg by mouth once daily.    diclofenac (FLECTOR) 1.3 % PT12 APPLY 1 PATCH BY TRANSDERMAL ROUTE 2 TIMES EVERY DAY TO MOST PAINFUL AREA    estradiol 0.05 mg/24 hr td ptsw (VIVELLE-DOT) 0.05 mg/24 hr estradiol 0.05 mg/24 hr semiweekly transdermal patch   Apply 1 patch twice a week by transdermal route.    FEXOFENADINE HCL (ALLEGRA ORAL) Take by mouth.    fluticasone-vilanterol (BREO) 100-25 mcg/dose diskus inhaler Inhale 1 puff into the lungs once daily. Controller    galcanezumab-gnlm (EMGALITY PEN) 120 mg/mL PnIj Inject 1 pen (120 mg) into the skin every 28 days.    galcanezumab-gnlm 120 mg/mL PnIj Inject 2 pens (240 mg) subcutaneous at separate sites, once (loading dose). Start maintenance dose 28 days later (Patient taking differently: Inject 2 pens (240 mg) subcutaneous at separate sites, once (loading dose). Start maintenance dose 28 days later)    hydrocodone-acetaminophen 7.5-325mg (NORCO) 7.5-325 mg per tablet Take 1 tablet by mouth nightly as needed.     INTRAROSA 6.5 mg Inst INSERT 1 VAGINAL INSERT EVERY DAY BY VAGINAL ROUTE.    lasmiditan (REYVOW) tablet 100 mg Take 1 tablet (100 mg total) by mouth daily as needed (migraine).    levothyroxine (SYNTHROID) 50 MCG tablet TAKE 1 TABLET BY MOUTH EVERY DAY IN THE MORNING    MG TRISILICATE/ALH/NAHCO3/AA (GAVISCON ORAL) Take 5 mLs by mouth as needed.     nystatin (MYCOSTATIN) 100,000 unit/mL suspension Take 6 mLs (600,000 Units total) by mouth 3 (three) times daily. Swish and swallow.    onabotulinumtoxina (BOTOX) 200 unit SolR Inject 155 Units into the muscle into the head/neck area every 90 days    ondansetron (ZOFRAN) 4 MG tablet Take 4 mg by mouth every 6 (six) hours as needed.    ondansetron (ZOFRAN-ODT) 8 MG TbDL Take 1 tablet (8 mg total) by mouth every 6 (six) hours as needed (nausea).    PROCTOSOL HC 2.5 % rectal cream Place 1 Dose rectally as needed.     rimegepant (NURTEC) 75 mg odt  Take 1 tablet (75 mg total) by mouth daily as needed for Migraine. Place ODT tablet on the tongue; alternatively the ODT tablet may be placed under the tongue    sumatriptan (IMITREX) 100 MG tablet TAKE 1 BY MOUTH EVERY 2 HRS. AS NEEDED FOR MIGRAINE. UP  MG/DAY. HOLD FOR BP > 150 MM SYSTOLIC    testosterone (ANDROGEL) 1 % (50 mg/5 gram) GlPk APPLY 0.5 ML TO INNER THIGH EVERY DAY AS DIRECTED    tiZANidine (ZANAFLEX) 4 MG tablet TAKE 1 TABLET BY MOUTH NIGHTLY AS NEEDED.    zolpidem (AMBIEN) 5 MG Tab Take 1 tablet (5 mg total) by mouth nightly as needed (sleeplessness).    fluticasone (FLOVENT HFA) 220 mcg/actuation inhaler Inhale 1 puff into the lungs 2 (two) times daily. Controller     Current Facility-Administered Medications   Medication    onabotulinumtoxina injection 200 Units    onabotulinumtoxina injection 200 Units    onabotulinumtoxina injection 200 Units    onabotulinumtoxina injection 200 Units    triamcinolone acetonide injection 40 mg          Vitals:    10/13/22 1049   BP: 110/74   Pulse: 60   Resp: 17     Body mass index is 19.86 kg/m².    Physical Exam:  Alert and fully oriented   Lungs CTA  Heart RRR  CN II-XII intact  Motor normal bulk and tone, symmetric strength  Coordination intact FTN  Sensory in tact to LT  Gait: normal, pace and base     Data review:    Lab Results   Component Value Date     11/01/2021     09/18/2015    K 3.7 11/01/2021    K 4.1 09/18/2015    CL 96 (L) 11/01/2021     09/18/2015    CO2 35 (H) 11/01/2021    BUN 20 11/01/2021    CREATININE 0.87 11/01/2021    CREATININE 0.92 09/18/2015    GLU 81 11/01/2021    AST 21 11/01/2021    ALT 16 11/01/2021    ALBUMIN 4.4 11/01/2021    ALBUMIN 3.6 09/18/2015    PROT 6.9 11/01/2021    BILITOT 0.7 11/01/2021    CHOL 222 (H) 11/01/2021    HDL 85 11/01/2021    LDLCALC 112 (H) 11/01/2021    LDLCALC 77 09/18/2015    TRIG 142 11/01/2021       Lab Results   Component Value Date    WBC 5.9 11/09/2020    HGB 13.5 11/09/2020     HCT 41.6 11/09/2020    MCV 94.3 11/09/2020     11/09/2020       Lab Results   Component Value Date    TSH 1.35 11/01/2021     No results found for this or any previous visit.    A/P:     Chronic Migraine responding to Botox as expected. Continue treatment until patient in true remission, meaning that the patient stays headache free when Botox wears off in 10 to 12 weeks. That will be the time to stop.  Encouraged the patient to comply with with early acute intervention for escalations    BOTOX PROCEDURE    PROCEDURE PERFORMED: Botulinum toxin injection (17626)    CLINICAL INDICATION: G43.719    A time out was conducted just before the start of the procedure to verify the correct patient and procedure, procedure location, and all relevant critical information.     DESCRIPTION OF PROCEDURE: After obtaining informed consent and under aseptic technique, a total of 155 units of botulinum toxin type A were injected in the following muscles: Procerus 5 units,  5 units   bilaterally, frontalis 20 units, temporalis 20 units bilaterally, occipitalis 15 units, upper cervical paraspinals 10 units bilaterally and trapezius 15 units bilaterally. The patient was given a total of 155 units in 31 sites.The patient tolerated the procedure well. There were no complications.    The patient was scheduled for repeat treatment in 10 to 12 weeks     Unavoidable waste 45 units      August Smiley M.D  Medical Director, Headache and Facial Pain  [facility]

## 2022-10-20 ENCOUNTER — DOCUMENTATION ONLY (OUTPATIENT)
Dept: REHABILITATION | Facility: HOSPITAL | Age: 57
End: 2022-10-20
Attending: INTERNAL MEDICINE

## 2022-10-20 NOTE — PROGRESS NOTES
Health  Wellness Visit Note    Name: Brenda C Stargardt  Clinic Number: [id number]  Physician: Cara Valdez FNP  Diagnosis: No diagnosis found.  Past Medical History:   Diagnosis Date    Abdominal or pelvic swelling, mass, or lump, right lower quadrant     Anxiety     Arthritis     Asthma, mild persistent     Cervicalgia     COPD (chronic obstructive pulmonary disease)     Enlargement of lymph nodes     Eosinophilic esophagitis     Gastroparesis     Headache(784.0)     Heartburn     Helicobacter pylori (H. pylori)     History of positive PPD, untreated     History of psychiatric hospitalization     after son , hospitalized for eating disorder    Hx of psychiatric care     Hypothyroidism     Migraines     Occipital neuralgia     Other selective immunoglobulin deficiencies     Other syndromes affecting cervical region     Psychiatric problem     Spondylosis of cervical spine     Therapy     Unspecified asthma(493.90)     Unspecified disorder of thyroid     Unspecified viral meningitis        Visit Number: Wellness 9  Precautions: Standard    1st PT visit:  2022  Year of care end date:  2023  6 Month test  performed:[] 2022  12 Month test performed:[] 2023  Mind body plan:Plan A (9 months)  Waiver: [x] Signed   Patient level: C    Time In: 2:35p  Time Out: 3:40p  Total Treatment Time: 65 min    Wellness Vision :  Handout on this week's wellness topic Self-Assessment provided  along with a discussion on what it means, the benefits, and suggestions for practice.  Reviewed last week's topic of Communication.    Subjective:   Prema reports she has been feeling well overall, other than the occasional tightness she has in her left hip that can lead to some discomfort in that same leg. She has continued to paddle board and utilize her HEP most days.     Objective:   Prema completed therapeutic stretches and the following exercises/ fitness machines:     [x] Open Books x  "10  [x] Bridges x 15 w/ GTB (3" hold)  [x] Clams x 10 w/ GTB  [x] Supine piriformis stretch 3/30-60"  [x] Prone hip flexor stretch 3 x 30-60"   [x] Press Ups x 15  [x] Child's Pose 3 x 20" (VC's-Breathing)  [x] SKTC 3 x 20"    Fitness Equipment Education Key  (E, I, ) Used this   visit HEP or Alternate Exercise   core lumbar E [x] []    torso rotation E [x] []    leg extension E [x] []    leg curl E [x] []    chest press E [x] [x] Incline Push up @ #5   seated row E [x] []    triceps extension E [x] []    biceps curl E [] [x] #10 DB bicep curl   hip abduction E [x] []    Hip adduction E [x] []    leg press E [x] []      See exercise log in patient folder for rate of exertion and repetitions completed.     Fitness Machine Education Key:  E=education on equipment initiated and further follow up and education needed  I=independent with  and exercise.  The patient:  Adjusts machines to his/her settings  Uses equipment levers, pins, weights safely  Maintains safe and correct posture while exercising  Moves through exercise with correct pace and control  Gets on and off equipment safely    Assessment:   Prema tolerated exercises, stretches, and all peripheral strengthening equipment listed above, without any increase in symptoms.     Plan:  Continue with established plan of care towards wellness goals set by PT and Prema, including:  - Increasing HEP use daily, mainly after her paddle boarding or physical activity/exercise of that day.    Health : Trung Lyons  10/20/2022  "

## 2022-10-26 ENCOUNTER — DOCUMENTATION ONLY (OUTPATIENT)
Dept: REHABILITATION | Facility: HOSPITAL | Age: 57
End: 2022-10-26
Attending: FAMILY MEDICINE
Payer: COMMERCIAL

## 2022-11-01 ENCOUNTER — DOCUMENTATION ONLY (OUTPATIENT)
Dept: REHABILITATION | Facility: HOSPITAL | Age: 57
End: 2022-11-01
Payer: COMMERCIAL

## 2022-11-01 NOTE — PROGRESS NOTES
Health  Wellness Visit Note    Name: Brenda C Stargardter  Clinic Number: [id number]  Physician: Cara Valdez FNP  Diagnosis: No diagnosis found.  Past Medical History:   Diagnosis Date    Abdominal or pelvic swelling, mass, or lump, right lower quadrant     Anxiety     Arthritis     Asthma, mild persistent     Cervicalgia     COPD (chronic obstructive pulmonary disease)     Enlargement of lymph nodes     Eosinophilic esophagitis     Gastroparesis     Headache(784.0)     Heartburn     Helicobacter pylori (H. pylori)     History of positive PPD, untreated     History of psychiatric hospitalization     after son , hospitalized for eating disorder    Hx of psychiatric care     Hypothyroidism     Migraines     Occipital neuralgia     Other selective immunoglobulin deficiencies     Other syndromes affecting cervical region     Psychiatric problem     Spondylosis of cervical spine     Therapy     Unspecified asthma(493.90)     Unspecified disorder of thyroid     Unspecified viral meningitis        Visit Number: Wellness 10  Precautions: Standard    1st PT visit:  2022  Year of care end date:  2023  6 Month test  performed:[] 2022  12 Month test performed:[] 2023  Mind body plan:Plan A (9 months)  Waiver: [x] Signed   Patient level: C    Time In: 9:35 a  Time Out: 10:40 a  Total Treatment Time: 65 min    Wellness Vision :  Handout on this week's wellness topic Boundaries provided along with a discussion on what it means, the benefits, and suggestions for practice.  Reviewed last week's topic of Self-Assessment.    Subjective:   Prema reports no pain or discomfort in her low back today. She states that her HEP has continued to assist her when she has some discomfort or a flare up after paddle boarding. She continues to stay active and while incorporating her HEP into her daily routine.    Objective:   Prema completed therapeutic stretches and the following exercises/ fitness  "machines:     [x] Open Books x 10  [x] Bridges x 15 w/ GTB (3" hold)  [x] Clams x 10 w/ GTB  [x] Supine piriformis stretch 3/30-60"  [x] Prone hip flexor stretch 3 x 30-60"   [x] Press Ups x 15  [x] Child's Pose 3 x 20" (VC's-Breathing)  [x] SKTC 3 x 20"    Fitness Equipment Education Key  (E, I, ) Used this   visit HEP or Alternate Exercise   core lumbar E [x] []    torso rotation E [x] []    leg extension E [x] []    leg curl E [x] []    chest press E [x] [x] Incline Push up @ #5   seated row E [x] []    triceps extension E [x] []    biceps curl E [] [x] #10 DB bicep curl   hip abduction E [x] []    Hip adduction E [x] []    leg press E [x] []      See exercise log in patient folder for rate of exertion and repetitions completed.     Fitness Machine Education Key:  E=education on equipment initiated and further follow up and education needed  I=independent with  and exercise.  The patient:  Adjusts machines to his/her settings  Uses equipment levers, pins, weights safely  Maintains safe and correct posture while exercising  Moves through exercise with correct pace and control  Gets on and off equipment safely    Assessment:   Prema tolerated exercises, stretches, and all peripheral strengthening equipment listed above, without any increase in symptoms.     Plan:  Continue with established plan of care towards wellness goals set by PT and Prema, including:  - Increasing HEP use daily, mainly after her paddle boarding or physical activity/exercise of that day.    Health : Trung Lyons  10/31/2022  "

## 2022-11-08 ENCOUNTER — DOCUMENTATION ONLY (OUTPATIENT)
Dept: REHABILITATION | Facility: HOSPITAL | Age: 57
End: 2022-11-08
Payer: COMMERCIAL

## 2022-11-08 NOTE — PROGRESS NOTES
Health  Wellness Visit Note    [facility]  [facility] Number: [id number]  Physician: Cara Valdez FNP  Diagnosis: No diagnosis found.  Past Medical History:   Diagnosis Date    Abdominal or pelvic swelling, mass, or lump, right lower quadrant     Anxiety     Arthritis     Asthma, mild persistent     Cervicalgia     COPD (chronic obstructive pulmonary disease)     Enlargement of lymph nodes     Eosinophilic esophagitis     Gastroparesis     Headache(784.0)     Heartburn     Helicobacter pylori (H. pylori)     History of positive PPD, untreated     History of psychiatric hospitalization     after son , hospitalized for eating disorder    Hx of psychiatric care     Hypothyroidism     Migraines     Occipital neuralgia     Other selective immunoglobulin deficiencies     Other syndromes affecting cervical region     Psychiatric problem     Spondylosis of cervical spine     Therapy     Unspecified asthma(493.90)     Unspecified disorder of thyroid     Unspecified viral meningitis        Visit Number: Wellness 12  Precautions: Standard    1st PT visit:  2022  Year of care end date:  2023  6 Month test  performed:[] 2022  12 Month test performed:[] 2023  Mind body plan:Plan A (9 months)  Waiver: [x] Signed   Patient level: C    Time In: 9:35 a  Time Out: 10:40 a  Total Treatment Time: 65 min    Wellness Vision :  Handout on this week's wellness topic Sleep Positions provided along with a discussion on what it means, the benefits, and suggestions for practice.  Reviewed last week's topic of Sleep Requirements.    Subjective:   Prema reports with stiffness in her low back this morning. She tells me that she had some additional soreness and discomfort after her last wellness visit with sled pushes. Other than that slight discomfort she tells me she felt great and prefers the more functional and less fitness machines wellness visits.    Objective:   Prema completed  "therapeutic stretches and the following exercises/ fitness machines:     [x] Open Books x 10  [x] Bridges x 15 w/ GTB (3" hold)  [x] Clams x 10 w/ GTB  [] Supine piriformis stretch 3/30-60"  [] Prone hip flexor stretch 3 x 30-60"   [x] Press Ups x 15  [x] Child's Pose 3 x 20" (VC's-Breathing)  [] SKTC 3 x 20"  [x] Cat/Cow 2 x 10    Fitness Equipment Education Key  (E, I, ) Used this   visit HEP or Alternate Exercise   core lumbar E [x] []    torso rotation E [x] []    leg extension E [x] []    leg curl E [x] []    chest press E [] [x] Incline Push up @ #21   seated row E [] [x] TRX Row   triceps extension E [] []    biceps curl E [] [x] #10 DB bicep curl   hip abduction E [x] []    Hip adduction E [x] []    leg press E [] [x]  #15kb Goblet squat     See exercise log in patient folder for rate of exertion and repetitions completed.     Fitness Machine Education Key:  E=education on equipment initiated and further follow up and education needed  I=independent with  and exercise.  The patient:  Adjusts machines to his/her settings  Uses equipment levers, pins, weights safely  Maintains safe and correct posture while exercising  Moves through exercise with correct pace and control  Gets on and off equipment safely    Assessment:   Prema tolerated exercises, stretches, and all peripheral strengthening equipment listed above, without any increase in symptoms.     Plan:  Continue with established plan of care towards wellness goals set by PT and Prema, including:  - Increasing HEP use daily, mainly after her paddle boarding or physical activity/exercise of that day.    Health : Trung Lyons  11/8/2022  "

## 2022-11-08 NOTE — PROGRESS NOTES
Health  Wellness Visit Note    Name: Brenda C Stargardt  Clinic Number: [id number]  Physician: Cara Valdez FNP  Diagnosis: No diagnosis found.  Past Medical History:   Diagnosis Date    Abdominal or pelvic swelling, mass, or lump, right lower quadrant     Anxiety     Arthritis     Asthma, mild persistent     Cervicalgia     COPD (chronic obstructive pulmonary disease)     Enlargement of lymph nodes     Eosinophilic esophagitis     Gastroparesis     Headache(784.0)     Heartburn     Helicobacter pylori (H. pylori)     History of positive PPD, untreated     History of psychiatric hospitalization     after son , hospitalized for eating disorder    Hx of psychiatric care     Hypothyroidism     Migraines     Occipital neuralgia     Other selective immunoglobulin deficiencies     Other syndromes affecting cervical region     Psychiatric problem     Spondylosis of cervical spine     Therapy     Unspecified asthma(493.90)     Unspecified disorder of thyroid     Unspecified viral meningitis        Visit Number: Wellness 11  Precautions: Standard    1st PT visit:  2022  Year of care end date:  2023  6 Month test  performed:[] 2022  12 Month test performed:[] 2023  Mind body plan:Plan A (9 months)  Waiver: [x] Signed   Patient level: C    Time In: 9:35 a  Time Out: 10:40 a  Total Treatment Time: 65 min    Wellness Vision :  Handout on this week's wellness topic Sleep Requirements provided along with a discussion on what it means, the benefits, and suggestions for practice.  Reviewed last week's topic of Boundaries.    Subjective:   Prema reports she has been feeling well overall, other than the occasional tightness she has in her left hip that can lead to some discomfort in that same leg. She continues to paddle board as her main source of exercise and utilize her HEP most days.     Objective:   Prema completed therapeutic stretches and the following exercises/ fitness  "machines:     [x] Open Books x 10  [x] Bridges x 15 w/ GTB (3" hold)  [x] Clams x 10 w/ GTB  [x] Supine piriformis stretch 3/30-60"  [x] Prone hip flexor stretch 3 x 30-60"   [x] Press Ups x 15  [x] Child's Pose 3 x 20" (VC's-Breathing)  [x] SKTC 3 x 20"    Fitness Equipment Education Key  (E, I, ) Used this   visit HEP or Alternate Exercise   core lumbar E [x] []    torso rotation E [x] []    leg extension E [x] []    leg curl E [x] []    chest press E [x] [x] Incline Push up @ #5   seated row E [] [x] TRX Row   triceps extension E [] []    biceps curl E [] [x] #10 DB bicep curl   hip abduction E [x] []    Hip adduction E [x] []    leg press E [] [x] Sled Push 2 x 40yds     See exercise log in patient folder for rate of exertion and repetitions completed.     Fitness Machine Education Key:  E=education on equipment initiated and further follow up and education needed  I=independent with  and exercise.  The patient:  Adjusts machines to his/her settings  Uses equipment levers, pins, weights safely  Maintains safe and correct posture while exercising  Moves through exercise with correct pace and control  Gets on and off equipment safely    Assessment:   Prema tolerated exercises, stretches, and all peripheral strengthening equipment listed above, without any increase in symptoms.     Plan:  Continue with established plan of care towards wellness goals set by PT and Prema, including:  - Increasing HEP use daily, mainly after her paddle boarding or physical activity/exercise of that day.    Health : Trung Lyons  11/8/2022  "

## 2022-11-19 PROBLEM — K21.00 GASTROESOPHAGEAL REFLUX DISEASE WITH ESOPHAGITIS: Chronic | Status: ACTIVE | Noted: 2019-12-11

## 2022-11-19 PROBLEM — F41.1 GAD (GENERALIZED ANXIETY DISORDER): Chronic | Status: ACTIVE | Noted: 2017-05-24

## 2022-11-19 PROBLEM — K59.03 CONSTIPATION DUE TO OPIOID THERAPY: Chronic | Status: ACTIVE | Noted: 2018-02-23

## 2022-11-19 PROBLEM — D80.9 OTHER SELECTIVE IMMUNOGLOBULIN DEFICIENCIES: Chronic | Status: ACTIVE | Noted: 2020-04-29

## 2022-11-19 PROBLEM — M79.18 CERVICAL MYOFASCIAL PAIN SYNDROME: Chronic | Status: ACTIVE | Noted: 2017-08-08

## 2022-11-19 PROBLEM — F90.0 ADHD (ATTENTION DEFICIT HYPERACTIVITY DISORDER), INATTENTIVE TYPE: Chronic | Status: ACTIVE | Noted: 2017-05-24

## 2022-11-19 PROBLEM — M25.50 ARTHRALGIA OF MULTIPLE JOINTS: Chronic | Status: ACTIVE | Noted: 2018-10-27

## 2022-11-19 PROBLEM — K14.6 BURNING MOUTH SYNDROME: Chronic | Status: ACTIVE | Noted: 2020-06-05

## 2022-11-19 PROBLEM — T40.2X5A CONSTIPATION DUE TO OPIOID THERAPY: Chronic | Status: ACTIVE | Noted: 2018-02-23

## 2022-11-19 PROBLEM — M54.16 LUMBAR RADICULAR PAIN: Chronic | Status: ACTIVE | Noted: 2017-08-08

## 2022-11-19 PROBLEM — K20.0 EOSINOPHILIC ESOPHAGITIS: Chronic | Status: ACTIVE | Noted: 2017-09-06

## 2022-11-19 PROBLEM — R68.89 POSTURAL INSTABILITY OF TRUNK: Chronic | Status: ACTIVE | Noted: 2022-05-16

## 2022-11-19 PROBLEM — Z00.01 ENCOUNTER FOR GENERAL ADULT MEDICAL EXAMINATION WITH ABNORMAL FINDINGS: Status: ACTIVE | Noted: 2022-11-19

## 2022-11-21 ENCOUNTER — DOCUMENTATION ONLY (OUTPATIENT)
Dept: REHABILITATION | Facility: HOSPITAL | Age: 57
End: 2022-11-21
Payer: COMMERCIAL

## 2022-11-25 NOTE — PROGRESS NOTES
Health  Wellness Visit Note    Name: Brenda C Stargardter  Clinic Number: [id number]  Physician: Cara Valdez FNP  Diagnosis: No diagnosis found.  Past Medical History:   Diagnosis Date    Abdominal or pelvic swelling, mass, or lump, right lower quadrant     Anxiety     Arthritis     Asthma, mild persistent     Cervicalgia     COPD (chronic obstructive pulmonary disease)     Enlargement of lymph nodes     Eosinophilic esophagitis     Gastroparesis     Headache(784.0)     Heartburn     Helicobacter pylori (H. pylori)     History of positive PPD, untreated     History of psychiatric hospitalization     after son , hospitalized for eating disorder    Hx of psychiatric care     Hypothyroidism     Migraines     Occipital neuralgia     Other selective immunoglobulin deficiencies     Other syndromes affecting cervical region     Psychiatric problem     Spondylosis of cervical spine     Therapy     Unspecified asthma(493.90)     Unspecified disorder of thyroid     Unspecified viral meningitis        Visit Number: Wellness 13  Precautions: Standard    1st PT visit:  2022  Year of care end date:  2023  6 Month test  performed:[] 2022  12 Month test performed:[] 2023  Mind body plan:Plan A (9 months)  Waiver: [x] Signed   Patient level: C    Time In: 9:35 a  Time Out: 10:40 a  Total Treatment Time: 65 min    Wellness Vision :  Handout on this week's wellness topic Sleep Tips & Conditions provided along with a discussion on what it means, the benefits, and suggestions for practice.  Reviewed last week's topic of Sleep Journaling.    Subjective:   Prema reports with discomfort in her low back this morning after taking a week off for a business conference last week. She tells me that she was busy throughout the week running errands and assisting where needed, but was unable to use her HEP while away from home. She explained that she could and should have gone through her exercises,  "but after being so busy during the days into night, she used her down time to rest and recover for the next day. Prema insist that her discomfort this week is a result of her neglect of her HEP and will learn from the lack of self-care when traveling. Prema shared her enthusiasm for getting back to a more functional condition ahead of her daughters wedding and busy holiday season over the next month.    Objective:   Prema completed therapeutic stretches and the following exercises/ fitness machines:     [x] Open Books x 10  [x] Bridges x 15 w/ GTB (3" hold)  [x] Clams x 10 w/ GTB  [] Supine piriformis stretch 3/30-60"  [] Prone hip flexor stretch 3 x 30-60"   [x] Press Ups x 15  [x] Child's Pose 3 x 20" (VC's-Breathing)  [] SKTC 3 x 20"  [x] Cat/Cow 2 x 10    Fitness Equipment Education Key  (E, I, ) Used this   visit HEP or Alternate Exercise   core lumbar E [x] []    torso rotation E [x] []    leg extension E [x] []    leg curl E [x] []    chest press E [] [x] Incline Push up @ #21   seated row E [] [x] TRX Row   triceps extension E [] []    biceps curl E [] [x] #10 DB bicep curl   hip abduction E [x] []    Hip adduction E [x] []    leg press E [] [x]  #15kb Goblet squat     See exercise log in patient folder for rate of exertion and repetitions completed.     Fitness Machine Education Key:  E=education on equipment initiated and further follow up and education needed  I=independent with  and exercise.  The patient:  Adjusts machines to his/her settings  Uses equipment levers, pins, weights safely  Maintains safe and correct posture while exercising  Moves through exercise with correct pace and control  Gets on and off equipment safely    Assessment:   Prema tolerated exercises, stretches, and all peripheral strengthening equipment listed above, without any increase in symptoms.     Plan:  Continue with established plan of care towards wellness goals set by PT and Prema, including:  - Increasing " HEP use daily, mainly after her paddle boarding or physical activity/exercise of that day.  - Review stretches for breaks during her long drive to [place] within the next few weeks.    Health : Trung Lyons  11/25/2022

## 2022-11-29 PROBLEM — E23.6 EMPTY SELLA SYNDROME: Status: ACTIVE | Noted: 2022-11-29

## 2022-12-23 ENCOUNTER — PATIENT MESSAGE (OUTPATIENT)
Dept: NEUROLOGY | Facility: CLINIC | Age: 57
End: 2022-12-23
Payer: COMMERCIAL

## 2022-12-23 ENCOUNTER — TELEPHONE (OUTPATIENT)
Dept: NEUROLOGY | Facility: CLINIC | Age: 57
End: 2022-12-23
Payer: COMMERCIAL

## 2022-12-23 NOTE — TELEPHONE ENCOUNTER
----- Message from Corbin Nguyen sent at 12/23/2022  8:08 AM CST -----  Regarding: reschedule botox  Type:  Reschedule Botox    Caller is requesting a BOTOX appointment.      Name of Caller:  Prema    When is the first available appointment?  Dept Book    Procedure:  Botox    Best Call Back Number:  [phone]    Additional Information:  pt wants on same day just different time b/c of conflicting appt. Please call.

## 2022-12-30 ENCOUNTER — PROCEDURE VISIT (OUTPATIENT)
Dept: NEUROLOGY | Facility: CLINIC | Age: 57
End: 2022-12-30
Payer: COMMERCIAL

## 2022-12-30 VITALS
RESPIRATION RATE: 17 BRPM | SYSTOLIC BLOOD PRESSURE: 105 MMHG | WEIGHT: 123.88 LBS | TEMPERATURE: 98 F | DIASTOLIC BLOOD PRESSURE: 75 MMHG | HEIGHT: 66 IN | BODY MASS INDEX: 19.91 KG/M2 | HEART RATE: 65 BPM

## 2022-12-30 DIAGNOSIS — G43.719 INTRACTABLE CHRONIC MIGRAINE WITHOUT AURA AND WITHOUT STATUS MIGRAINOSUS: Primary | ICD-10-CM

## 2022-12-30 PROCEDURE — 64615 CHEMODENERV MUSC MIGRAINE: CPT | Mod: S$GLB,,, | Performed by: PSYCHIATRY & NEUROLOGY

## 2022-12-30 PROCEDURE — 64615 PR CHEMODENERVATION OF MUSCLE FOR CHRONIC MIGRAINE: ICD-10-PCS | Mod: S$GLB,,, | Performed by: PSYCHIATRY & NEUROLOGY

## 2022-12-30 NOTE — PROCEDURES
Procedures  2021  With Botox injections I have achieved well over 50%  improvement in the patient's symptoms. Migraines have been reduced at least 7 days per month and the number of cumulative hours suffering with headaches has been reduced at least 100 total hours per month. Today she  does have a headache indicating that the Botox has worn off. Frequency of treatment is every 3 months unless no response to the treatments, at which time we will discontinue the injections.        ROS: Positive for photophobia, phonophobia, nausea, insomnia     Past Medical History:   Diagnosis Date    Abdominal or pelvic swelling, mass, or lump, right lower quadrant     Anxiety     Arthritis     Asthma, mild persistent     Cervicalgia     COPD (chronic obstructive pulmonary disease)     Enlargement of lymph nodes     Eosinophilic esophagitis     Gastroparesis     Headache(784.0)     Heartburn     Helicobacter pylori (H. pylori)     History of positive PPD, untreated     History of psychiatric hospitalization     after son , hospitalized for eating disorder    Hx of psychiatric care     Hypothyroidism     Migraines     Occipital neuralgia     Other selective immunoglobulin deficiencies     Other syndromes affecting cervical region     Psychiatric problem     Spondylosis of cervical spine     Therapy     Unspecified asthma(493.90)     Unspecified disorder of thyroid     Unspecified viral meningitis          Current Outpatient Medications   Medication Sig    albuterol sulfate (PROAIR RESPICLICK) 90 mcg/actuation inhaler Inhale 1-2 puffs into the lungs every 4 (four) hours as needed for Wheezing. Rescue    clobetasol 0.05% (TEMOVATE) 0.05 % Oint     clotrimazole-betamethasone 1-0.05% (LOTRISONE) cream Apply topically as needed.     COLLAGEN MISC by Misc.(Non-Drug; Combo Route) route.    diclofenac (FLECTOR) 1.3 % PT12 APPLY 1 PATCH BY TRANSDERMAL ROUTE 2 TIMES EVERY DAY TO MOST PAINFUL AREA    estradioL (VIVELLE-DOT) 0.0375  mg/24 hr estradiol 0.0375 mg/24 hr semiweekly transdermal patch    FEXOFENADINE HCL (ALLEGRA ORAL) Take by mouth.    fluticasone-vilanterol (BREO) 100-25 mcg/dose diskus inhaler Inhale 1 puff into the lungs once daily. Controller    galcanezumab-gnlm (EMGALITY PEN) 120 mg/mL PnIj Inject 1 pen (120 mg) into the skin every 28 days.    galcanezumab-gnlm 120 mg/mL PnIj Inject 2 pens (240 mg) subcutaneous at separate sites, once (loading dose). Start maintenance dose 28 days later (Patient taking differently: Inject 2 pens (240 mg) subcutaneous at separate sites, once (loading dose). Start maintenance dose 28 days later)    hydrocodone-acetaminophen 7.5-325mg (NORCO) 7.5-325 mg per tablet Take 1 tablet by mouth nightly as needed.     INTRAROSA 6.5 mg Inst INSERT 1 VAGINAL INSERT EVERY DAY BY VAGINAL ROUTE.    levothyroxine (SYNTHROID) 50 MCG tablet TAKE 1 TABLET BY MOUTH EVERY DAY IN THE MORNING    MG TRISILICATE/ALH/NAHCO3/AA (GAVISCON ORAL) Take 5 mLs by mouth as needed.     onabotulinumtoxina (BOTOX) 200 unit SolR Inject 155 Units into the muscle into the head/neck area every 90 days    ondansetron (ZOFRAN-ODT) 8 MG TbDL Take 1 tablet (8 mg total) by mouth every 6 (six) hours as needed (nausea).    PROCTOSOL HC 2.5 % rectal cream Place 1 Dose rectally as needed.     rimegepant (NURTEC) 75 mg odt Take 1 tablet (75 mg total) by mouth daily as needed for Migraine. Place ODT tablet on the tongue; alternatively the ODT tablet may be placed under the tongue    sumatriptan (IMITREX) 100 MG tablet TAKE 1 BY MOUTH EVERY 2 HRS. AS NEEDED FOR MIGRAINE. UP  MG/DAY. HOLD FOR BP > 150 MM SYSTOLIC    testosterone (ANDROGEL) 1 % (50 mg/5 gram) GlPk APPLY 0.5 ML TO INNER THIGH EVERY DAY AS DIRECTED    zolpidem (AMBIEN) 5 MG Tab Take 1 tablet (5 mg total) by mouth nightly as needed (sleeplessness).    fluticasone (FLOVENT HFA) 220 mcg/actuation inhaler Inhale 1 puff into the lungs 2 (two) times daily. Controller     Current  Facility-Administered Medications   Medication    onabotulinumtoxina injection 200 Units    onabotulinumtoxina injection 200 Units    onabotulinumtoxina injection 200 Units    onabotulinumtoxina injection 200 Units    triamcinolone acetonide injection 40 mg          Vitals:    12/30/22 1420   BP: 105/75   Pulse: 65   Resp: 17   Temp: 97.8 °F (36.6 °C)     Body mass index is 20 kg/m².    Physical Exam:  Alert and fully oriented   Lungs CTA  Heart RRR  CN II-XII intact  Motor normal bulk and tone, symmetric strength  Coordination intact FTN  Sensory in tact to LT  Gait: normal, pace and base     Data review:    Lab Results   Component Value Date     12/28/2022     09/18/2015    K 4.7 12/28/2022    K 4.1 09/18/2015     12/28/2022     09/18/2015    CO2 30 12/28/2022    BUN 19 12/28/2022    CREATININE 0.91 12/28/2022    CREATININE 0.92 09/18/2015    GLU 77 12/28/2022    HGBA1C 5.0 12/28/2022    AST 18 12/28/2022    ALT 12 12/28/2022    ALBUMIN 4.1 12/28/2022    ALBUMIN 3.6 09/18/2015    PROT 6.5 12/28/2022    BILITOT 0.6 12/28/2022    CHOL 242 (H) 12/28/2022    HDL 83 12/28/2022    LDLCALC 133 (H) 12/28/2022    LDLCALC 77 09/18/2015    TRIG 143 12/28/2022       Lab Results   Component Value Date    WBC 5.5 12/28/2022    HGB 14.3 12/28/2022    HCT 43.1 12/28/2022    MCV 93.7 12/28/2022     12/28/2022       Lab Results   Component Value Date    TSH 2.80 12/28/2022     No results found for this or any previous visit.    A/P:     Chronic Migraine responding to Botox as expected. Continue treatment until patient in true remission, meaning that the patient stays headache free when Botox wears off in 10 to 12 weeks. That will be the time to stop.  Encouraged the patient to comply with with early acute intervention for escalations    BOTOX PROCEDURE    PROCEDURE PERFORMED: Botulinum toxin injection (73810)    CLINICAL INDICATION: G43.719    A time out was conducted just before the start of the  procedure to verify the correct patient and procedure, procedure location, and all relevant critical information.     DESCRIPTION OF PROCEDURE: After obtaining informed consent and under aseptic technique, a total of 155 units of botulinum toxin type A were injected in the following muscles: Procerus 5 units,  5 units   bilaterally, frontalis 20 units, temporalis 20 units bilaterally, occipitalis 15 units, upper cervical paraspinals 10 units bilaterally and trapezius 15 units bilaterally. The patient was given a total of 155 units in 31 sites.The patient tolerated the procedure well. There were no complications.    The patient was scheduled for repeat treatment in 10 to 12 weeks     Unavoidable waste 45 units      August Smiley M.D  Medical Director, Headache and Facial Pain  [facility]

## 2023-01-25 ENCOUNTER — OFFICE VISIT (OUTPATIENT)
Dept: NEUROLOGY | Facility: CLINIC | Age: 58
End: 2023-01-25
Payer: COMMERCIAL

## 2023-01-25 ENCOUNTER — TELEPHONE (OUTPATIENT)
Dept: NEUROLOGY | Facility: CLINIC | Age: 58
End: 2023-01-25

## 2023-01-25 VITALS
BODY MASS INDEX: 20.05 KG/M2 | DIASTOLIC BLOOD PRESSURE: 76 MMHG | WEIGHT: 124.75 LBS | SYSTOLIC BLOOD PRESSURE: 109 MMHG | HEART RATE: 72 BPM | RESPIRATION RATE: 17 BRPM | HEIGHT: 66 IN

## 2023-01-25 DIAGNOSIS — M54.81 BILATERAL OCCIPITAL NEURALGIA: ICD-10-CM

## 2023-01-25 DIAGNOSIS — M79.18 CERVICAL MYOFASCIAL PAIN SYNDROME: ICD-10-CM

## 2023-01-25 DIAGNOSIS — R42 VERTIGO: ICD-10-CM

## 2023-01-25 DIAGNOSIS — G43.719 INTRACTABLE CHRONIC MIGRAINE WITHOUT AURA AND WITHOUT STATUS MIGRAINOSUS: Primary | ICD-10-CM

## 2023-01-25 DIAGNOSIS — G44.309 POST-TRAUMATIC HEADACHE, NOT INTRACTABLE, UNSPECIFIED CHRONICITY PATTERN: ICD-10-CM

## 2023-01-25 PROCEDURE — 3008F BODY MASS INDEX DOCD: CPT | Mod: CPTII,S$GLB,, | Performed by: PSYCHIATRY & NEUROLOGY

## 2023-01-25 PROCEDURE — 3008F PR BODY MASS INDEX (BMI) DOCUMENTED: ICD-10-PCS | Mod: CPTII,S$GLB,, | Performed by: PSYCHIATRY & NEUROLOGY

## 2023-01-25 PROCEDURE — 64405 NJX AA&/STRD GR OCPL NRV: CPT | Mod: 50,51,S$GLB, | Performed by: PSYCHIATRY & NEUROLOGY

## 2023-01-25 PROCEDURE — 3078F DIAST BP <80 MM HG: CPT | Mod: CPTII,S$GLB,, | Performed by: PSYCHIATRY & NEUROLOGY

## 2023-01-25 PROCEDURE — 99999 PR PBB SHADOW E&M-EST. PATIENT-LVL V: ICD-10-PCS | Mod: PBBFAC,,, | Performed by: PSYCHIATRY & NEUROLOGY

## 2023-01-25 PROCEDURE — 64405 PR NERVE BLOCK INJ, ANES/STEROID, OCCIPITAL: ICD-10-PCS | Mod: 50,51,S$GLB, | Performed by: PSYCHIATRY & NEUROLOGY

## 2023-01-25 PROCEDURE — 99499 NO LOS: ICD-10-PCS | Mod: S$GLB,,, | Performed by: PSYCHIATRY & NEUROLOGY

## 2023-01-25 PROCEDURE — 3074F SYST BP LT 130 MM HG: CPT | Mod: CPTII,S$GLB,, | Performed by: PSYCHIATRY & NEUROLOGY

## 2023-01-25 PROCEDURE — 3074F PR MOST RECENT SYSTOLIC BLOOD PRESSURE < 130 MM HG: ICD-10-PCS | Mod: CPTII,S$GLB,, | Performed by: PSYCHIATRY & NEUROLOGY

## 2023-01-25 PROCEDURE — 1159F MED LIST DOCD IN RCRD: CPT | Mod: CPTII,S$GLB,, | Performed by: PSYCHIATRY & NEUROLOGY

## 2023-01-25 PROCEDURE — 64450 PR NERVE BLOCK INJ, ANES/STEROID, OTHER PERIPHERAL: ICD-10-PCS | Mod: 50,S$GLB,, | Performed by: PSYCHIATRY & NEUROLOGY

## 2023-01-25 PROCEDURE — 99999 PR PBB SHADOW E&M-EST. PATIENT-LVL V: CPT | Mod: PBBFAC,,, | Performed by: PSYCHIATRY & NEUROLOGY

## 2023-01-25 PROCEDURE — 99499 UNLISTED E&M SERVICE: CPT | Mod: S$GLB,,, | Performed by: PSYCHIATRY & NEUROLOGY

## 2023-01-25 PROCEDURE — 3078F PR MOST RECENT DIASTOLIC BLOOD PRESSURE < 80 MM HG: ICD-10-PCS | Mod: CPTII,S$GLB,, | Performed by: PSYCHIATRY & NEUROLOGY

## 2023-01-25 PROCEDURE — 64450 NJX AA&/STRD OTHER PN/BRANCH: CPT | Mod: 50,S$GLB,, | Performed by: PSYCHIATRY & NEUROLOGY

## 2023-01-25 PROCEDURE — 1159F PR MEDICATION LIST DOCUMENTED IN MEDICAL RECORD: ICD-10-PCS | Mod: CPTII,S$GLB,, | Performed by: PSYCHIATRY & NEUROLOGY

## 2023-01-25 RX ORDER — LIDOCAINE HYDROCHLORIDE 10 MG/ML
1 INJECTION INFILTRATION; PERINEURAL
Status: COMPLETED | OUTPATIENT
Start: 2023-01-25 | End: 2023-01-25

## 2023-01-25 RX ORDER — MECLIZINE HYDROCHLORIDE 25 MG/1
25 TABLET ORAL 3 TIMES DAILY PRN
Qty: 90 TABLET | Refills: 0 | Status: SHIPPED | OUTPATIENT
Start: 2023-01-25 | End: 2023-04-25 | Stop reason: ALTCHOICE

## 2023-01-25 RX ORDER — TRIAMCINOLONE ACETONIDE 40 MG/ML
40 INJECTION, SUSPENSION INTRA-ARTICULAR; INTRAMUSCULAR
Status: COMPLETED | OUTPATIENT
Start: 2023-01-25 | End: 2023-01-25

## 2023-01-25 RX ORDER — METHOCARBAMOL 500 MG/1
500 TABLET, FILM COATED ORAL 3 TIMES DAILY
Qty: 90 TABLET | Refills: 0 | Status: SHIPPED | OUTPATIENT
Start: 2023-01-25 | End: 2023-02-04

## 2023-01-25 RX ADMIN — LIDOCAINE HYDROCHLORIDE 1 ML: 10 INJECTION INFILTRATION; PERINEURAL at 03:01

## 2023-01-25 RX ADMIN — TRIAMCINOLONE ACETONIDE 40 MG: 40 INJECTION, SUSPENSION INTRA-ARTICULAR; INTRAMUSCULAR at 03:01

## 2023-01-25 NOTE — TELEPHONE ENCOUNTER
----- Message from Corbin Nguyen sent at 1/25/2023 11:31 AM CST -----  Regarding: muscle relaxer  Type: Needs Medical Advice    Who Called:  Prema Barakat Call Back Number: [phone]    Additional Information: pt was just seen and at office visit provider offered different muscle relaxer b/c pt is having issues with current med. No med sent to pharm as of time of this message. Please call to disucss.

## 2023-01-25 NOTE — TELEPHONE ENCOUNTER
Called patient and notified that medication was sent in but it was later this afternoon. Patient inquired on how to transfer to [facility] on [facility]. Explained to her how to do this. Verbalized understanding.
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
## 2023-02-13 ENCOUNTER — OFFICE VISIT (OUTPATIENT)
Dept: OTOLARYNGOLOGY | Facility: CLINIC | Age: 58
End: 2023-02-13
Payer: COMMERCIAL

## 2023-02-13 VITALS
HEIGHT: 66 IN | SYSTOLIC BLOOD PRESSURE: 123 MMHG | BODY MASS INDEX: 19.81 KG/M2 | WEIGHT: 123.25 LBS | HEART RATE: 63 BPM | DIASTOLIC BLOOD PRESSURE: 80 MMHG

## 2023-02-13 DIAGNOSIS — H81.12 BPPV (BENIGN PAROXYSMAL POSITIONAL VERTIGO), LEFT: ICD-10-CM

## 2023-02-13 PROCEDURE — 3074F SYST BP LT 130 MM HG: CPT | Mod: CPTII,S$GLB,, | Performed by: OTOLARYNGOLOGY

## 2023-02-13 PROCEDURE — 3008F PR BODY MASS INDEX (BMI) DOCUMENTED: ICD-10-PCS | Mod: CPTII,S$GLB,, | Performed by: OTOLARYNGOLOGY

## 2023-02-13 PROCEDURE — 1160F RVW MEDS BY RX/DR IN RCRD: CPT | Mod: CPTII,S$GLB,, | Performed by: OTOLARYNGOLOGY

## 2023-02-13 PROCEDURE — 1159F PR MEDICATION LIST DOCUMENTED IN MEDICAL RECORD: ICD-10-PCS | Mod: CPTII,S$GLB,, | Performed by: OTOLARYNGOLOGY

## 2023-02-13 PROCEDURE — 99999 PR PBB SHADOW E&M-EST. PATIENT-LVL V: CPT | Mod: PBBFAC,,, | Performed by: OTOLARYNGOLOGY

## 2023-02-13 PROCEDURE — 99203 OFFICE O/P NEW LOW 30 MIN: CPT | Mod: 25,S$GLB,, | Performed by: OTOLARYNGOLOGY

## 2023-02-13 PROCEDURE — 3079F PR MOST RECENT DIASTOLIC BLOOD PRESSURE 80-89 MM HG: ICD-10-PCS | Mod: CPTII,S$GLB,, | Performed by: OTOLARYNGOLOGY

## 2023-02-13 PROCEDURE — 1160F PR REVIEW ALL MEDS BY PRESCRIBER/CLIN PHARMACIST DOCUMENTED: ICD-10-PCS | Mod: CPTII,S$GLB,, | Performed by: OTOLARYNGOLOGY

## 2023-02-13 PROCEDURE — 3008F BODY MASS INDEX DOCD: CPT | Mod: CPTII,S$GLB,, | Performed by: OTOLARYNGOLOGY

## 2023-02-13 PROCEDURE — 3074F PR MOST RECENT SYSTOLIC BLOOD PRESSURE < 130 MM HG: ICD-10-PCS | Mod: CPTII,S$GLB,, | Performed by: OTOLARYNGOLOGY

## 2023-02-13 PROCEDURE — 95992 PR CANALITH REPOSITIONING PROCEDURE, PER DAY: ICD-10-PCS | Mod: S$GLB,,, | Performed by: OTOLARYNGOLOGY

## 2023-02-13 PROCEDURE — 99203 PR OFFICE/OUTPT VISIT, NEW, LEVL III, 30-44 MIN: ICD-10-PCS | Mod: 25,S$GLB,, | Performed by: OTOLARYNGOLOGY

## 2023-02-13 PROCEDURE — 99999 PR PBB SHADOW E&M-EST. PATIENT-LVL V: ICD-10-PCS | Mod: PBBFAC,,, | Performed by: OTOLARYNGOLOGY

## 2023-02-13 PROCEDURE — 3079F DIAST BP 80-89 MM HG: CPT | Mod: CPTII,S$GLB,, | Performed by: OTOLARYNGOLOGY

## 2023-02-13 PROCEDURE — 95992 CANALITH REPOSITIONING PROC: CPT | Mod: S$GLB,,, | Performed by: OTOLARYNGOLOGY

## 2023-02-13 PROCEDURE — 1159F MED LIST DOCD IN RCRD: CPT | Mod: CPTII,S$GLB,, | Performed by: OTOLARYNGOLOGY

## 2023-02-13 NOTE — PROGRESS NOTES
Subjective:       Patient ID: Brenda C Stargardter is a 57 y.o. female.    Chief Complaint: Dizziness (Since january ) and Headache    Prema is here for dizziness.   Length of symptoms: < 1 mo.  Occurs when laying down and changing positions. Brief in nature.  No hearing changes.  She has coexisting migraine, ADHD and neck pain.   Migraines seem to be worse as a result of this.     Patient validated questionnaires (if applicable):      %       No flowsheet data found.  No flowsheet data found.  No flowsheet data found.         Social History     Tobacco Use   Smoking Status Never   Smokeless Tobacco Never     Social History     Substance and Sexual Activity   Alcohol Use Not Currently    Comment: Social          Objective:        Constitutional:   She is oriented to person, place, and time. She appears well-developed and well-nourished. She appears alert. She is active. Normal speech.      Head:  Normocephalic and atraumatic. Head is without TMJ tenderness. No scars. Salivary glands normal.  Facial strength is normal.      Ears:    Right Ear: No drainage or swelling. No middle ear effusion.   Left Ear: No drainage or swelling.  No middle ear effusion.   Right [place]-Hallpike - No vertigo, No  rotary nystagmus  Left [place]-Hallpike - Yes vertigo, Yes - rotary nystagmus  Horizontal Canal testing negative    Nose:  No mucosal edema, rhinorrhea or sinus tenderness. No turbinate hypertrophy.      Mouth/Throat  Oropharynx clear and moist without lesions or asymmetry, normal uvula midline and mirror exam normal. Normal dentition. No uvula swelling, lacerations or trismus. No oropharyngeal exudate. Tonsillar erythema, tonsillar exudate.      Neck:  Full range of motion with neck supple and no adenopathy. Thyroid tenderness is present. No tracheal deviation, no edema, no erythema, normal range of motion, no stridor, no crepitus and no neck rigidity present. No thyroid mass present.     Cardiovascular:    Intact distal pulses and  normal pulses.              Pulmonary/Chest:   Effort normal and breath sounds normal. No stridor.     Psychiatric:   Her speech is normal and behavior is normal. Her mood appears not anxious. Her affect is not labile.     Neurological:   She is alert and oriented to person, place, and time. No sensory deficit.     Skin:   No abrasions, lacerations, lesions, or rashes. No abrasion and no bruising noted.       Tests / Results:  Patient: Brenda C Stargardter [id number], :1965  Procedure date:2023  Patient's medications, allergies, past medical, surgical, social and family histories were reviewed and updated as appropriate.  Chief Complaint:  Dizziness (Since january ) and Headache    HPI:  Prema is a 57 y.o. female with benign paroxysmal positional vertigo (BPPV) refractory to medical therapy. Hallpike-[place] maneuver demonstrates nystagmus of delayed onset that fatigues, rotary, clockwise, associated with vertigo.    Procedure: Risks, benefits, and alternatives of the procedure were discussed with the patient, and the patient consented to the Epley maneuver or canalith repositioning procedure (CRP).      With the patient sitting upright on the examination table, the head was lowered to the supine position and the neck rotated 45 degrees to the left side; once nystagmus fatigued, the body and head were slowly rotated to the opposite side 90 degrees, and then after 30-60 seconds the rotation continued another 90 degrees (they rolled onto their shoulder and were looking downwards at a 45 degree angle). After the nystagmus resolved, the patient was gently allowed to sit up with neck flexion.    There were no complications and the patient tolerated it well.  Post-procedure instructions were given.    Gabe Philippe performed the entire procedure.      Assessment:       1. BPPV (benign paroxysmal positional vertigo), left            Plan:         L Epley  Discussed BPPV.  Handout given, RTC if persistent

## 2023-02-13 NOTE — PATIENT INSTRUCTIONS
"Benign Paroxysmal Positional Vertigo  Benign paroxysmal positional vertigo (BPPV) is a problem with the inner ear. The inner ear contains the vestibular system (balance organ). BPPV causes a feeling of spinning. It is a common problem of the vestibular system.    Understanding BPPV  The vestibular system (balance organ) of the ear is made up of very tiny parts. They include the utricle, saccule, and semicircular canals. The utricle is a tiny organ that contains calcium crystals. In some people, the crystals can move into the semicircular canals. When this happens, the system no longer works as it should. This causes BPPV.     What causes BPPV?  Causes include injury to your head or neck, other balance disorders; but for most, the cause is unknown.    Symptoms of BPPV  You many have repeated feelings of spinning (vertigo). The vertigo usually lasts less than 1 minute. Some movements, suchas rolling over in bed, can bring on vertigo.    Treatment for BPPV  Your health care provider may try to move the calcium crystals. This is done by having you move your head and neck in certain ways. This treatment is safe and often works well. You may also be told to do these movements at home. You may still have vertigo for a few weeks. Your health care provider will recheck your symptoms, usually in a few weeks. Special physical therapy may also be part of treatment.  In rare cases surgery may be needed for BPPV that does not go away.    INSTRUCTIONS FOR PATIENTS AFTER OFFICE TREATMENTS     1. Wait until you are not feeling dizzy / nauseated before you go home.     2. In general, there are no restrictions following repositioning of the crystals. You will want to avoid rapid movements of your head or extreme position changes in your head for next day or two. Sleep on an extra pillow for the 1 night. You may want to avoid sleeping on the "bad" side for a few days.    3. Following a few days, you can resume normal activities. It is " normal to have some dizziness for a few days - weeks following the procedure, but it should generally be improving / improved by that point.     If you continue to have vertigo, you may be instructed to perform some exercises at home. These can be easily found on YouTube. Be sure to perform the exercise for the SIDE OF THE AFFECTED EAR.     Exercises include:  Epley Maneuver  Semont Maneuver  Suh-Daroff Maneuver

## 2023-02-20 ENCOUNTER — DOCUMENTATION ONLY (OUTPATIENT)
Dept: REHABILITATION | Facility: HOSPITAL | Age: 58
End: 2023-02-20

## 2023-02-20 PROBLEM — Z00.01 ENCOUNTER FOR GENERAL ADULT MEDICAL EXAMINATION WITH ABNORMAL FINDINGS: Status: RESOLVED | Noted: 2022-11-19 | Resolved: 2023-02-20

## 2023-02-23 ENCOUNTER — OFFICE VISIT (OUTPATIENT)
Dept: NEUROLOGY | Facility: CLINIC | Age: 58
End: 2023-02-23
Payer: COMMERCIAL

## 2023-02-23 DIAGNOSIS — M79.18 CERVICAL MYOFASCIAL PAIN SYNDROME: ICD-10-CM

## 2023-02-23 DIAGNOSIS — M54.81 BILATERAL OCCIPITAL NEURALGIA: ICD-10-CM

## 2023-02-23 DIAGNOSIS — H81.10 BENIGN PAROXYSMAL POSITIONAL VERTIGO, UNSPECIFIED LATERALITY: ICD-10-CM

## 2023-02-23 DIAGNOSIS — G43.719 INTRACTABLE CHRONIC MIGRAINE WITHOUT AURA AND WITHOUT STATUS MIGRAINOSUS: Primary | ICD-10-CM

## 2023-02-23 PROCEDURE — 99214 OFFICE O/P EST MOD 30 MIN: CPT | Mod: 95,,, | Performed by: PSYCHIATRY & NEUROLOGY

## 2023-02-23 PROCEDURE — 99214 PR OFFICE/OUTPT VISIT, EST, LEVL IV, 30-39 MIN: ICD-10-PCS | Mod: 95,,, | Performed by: PSYCHIATRY & NEUROLOGY

## 2023-02-23 RX ORDER — GALCANEZUMAB 120 MG/ML
120 INJECTION, SOLUTION SUBCUTANEOUS
Qty: 1 ML | Refills: 11 | Status: SHIPPED | OUTPATIENT
Start: 2023-02-23 | End: 2023-07-28

## 2023-02-23 RX ORDER — CELECOXIB 200 MG/1
CAPSULE ORAL
Qty: 20 CAPSULE | Refills: 5 | Status: SHIPPED | OUTPATIENT
Start: 2023-02-23 | End: 2023-04-25 | Stop reason: ALTCHOICE
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
## 2023-02-27 ENCOUNTER — TELEPHONE (OUTPATIENT)
Dept: PHARMACY | Facility: CLINIC | Age: 58
End: 2023-02-27
Payer: COMMERCIAL

## 2023-02-28 ENCOUNTER — PATIENT MESSAGE (OUTPATIENT)
Dept: NEUROLOGY | Facility: CLINIC | Age: 58
End: 2023-02-28
Payer: COMMERCIAL

## 2023-03-01 DIAGNOSIS — G43.719 INTRACTABLE CHRONIC MIGRAINE WITHOUT AURA AND WITHOUT STATUS MIGRAINOSUS: ICD-10-CM

## 2023-03-01 RX ORDER — RIMEGEPANT SULFATE 75 MG/75MG
75 TABLET, ORALLY DISINTEGRATING ORAL DAILY PRN
Qty: 8 TABLET | Refills: 5 | Status: SHIPPED | OUTPATIENT
Start: 2023-03-01

## 2023-03-01 NOTE — PROGRESS NOTES
Health  Wellness Visit Note    Name: Brenda C Stargardter  Clinic Number: [id number]  Physician: No ref. provider found  Diagnosis: No diagnosis found.  Past Medical History:   Diagnosis Date    Abdominal or pelvic swelling, mass, or lump, right lower quadrant     Anxiety     Arthritis     Asthma, mild persistent     Cervicalgia     COPD (chronic obstructive pulmonary disease)     Enlargement of lymph nodes     Eosinophilic esophagitis     Gastroparesis     Headache(784.0)     Heartburn     Helicobacter pylori (H. pylori)     History of positive PPD, untreated     History of psychiatric hospitalization     after son , hospitalized for eating disorder    Hx of psychiatric care     Hypothyroidism     Migraines     Occipital neuralgia     Other selective immunoglobulin deficiencies     Other syndromes affecting cervical region     Psychiatric problem     Spondylosis of cervical spine     Therapy     Unspecified asthma(493.90)     Unspecified disorder of thyroid     Unspecified viral meningitis        Visit Number: Wellness 12  Precautions: Standard    1st PT visit:  2022  Year of care end date:  2023  6 Month test  performed:[] 2022  12 Month test performed:[] 2023  Mind body plan:Plan A (9 months)  Waiver: [x] Signed   Patient level: C    Time In: 2:35 p  Time Out: 3:45 p  Total Treatment Time: 70 min    Wellness Vision :  Handout on this week's wellness topic Balance provided along with a discussion on what it means, the benefits, and suggestions for practice.  Reviewed last week's topic of Flexibility.    Subjective:   Prema reports with discomfort in her low back and mid-back this morning. She returned back to wellness after her daughter's wedding and caring for her mother after surgery. She has experienced increased pain and discomfort over the last month with the care giving role she has provided to her mother. Prema plans to get back to the level she had achieved before  "the end of 2022.    Objective:   Prema completed therapeutic stretches and the following exercises/ fitness machines:     [x] Open Books x 10  [x] Bridges x 15 w/ GTB (3" hold)  [x] Clams x 10 w/ GTB  [] Supine piriformis stretch 3/30-60"  [] Prone hip flexor stretch 3 x 30-60"   [x] Press Ups x 15  [x] Child's Pose 3 x 20" (VC's-Breathing)  [] SKTC 3 x 20"  [x] Cat/Cow 2 x 10    Fitness Equipment Education Key  (E, I, ) Used this   visit HEP or Alternate Exercise   core lumbar E [x] []    torso rotation E [x] []    leg extension E [x] []    leg curl E [x] []    chest press E [] [x] Incline Push up @ #21   seated row E [] [x] TRX Row   triceps extension E [] []    biceps curl E [] [x] #10 DB bicep curl   hip abduction E [x] []    Hip adduction E [x] []    leg press E [] [x]  #15kb Goblet squat     See exercise log in patient folder for rate of exertion and repetitions completed.     Fitness Machine Education Key:  E=education on equipment initiated and further follow up and education needed  I=independent with  and exercise.  The patient:  Adjusts machines to his/her settings  Uses equipment levers, pins, weights safely  Maintains safe and correct posture while exercising  Moves through exercise with correct pace and control  Gets on and off equipment safely    Assessment:   Prema tolerated exercises, stretches, and all peripheral strengthening equipment listed above, without any increase in symptoms.     Plan:  Continue with established plan of care towards wellness goals set by PT and Prema, including:  - Increasing HEP use daily, mainly after her paddle boarding or physical activity/exercise of that day.  - Review stretches for breaks during her long drive to [place] within the next few weeks.    Health : Trung Lyons  2/20/2023  "

## 2023-03-03 ENCOUNTER — DOCUMENTATION ONLY (OUTPATIENT)
Dept: REHABILITATION | Facility: HOSPITAL | Age: 58
End: 2023-03-03

## 2023-03-03 NOTE — PROGRESS NOTES
Health  Wellness Visit Note    Name: Brenda C Stargardter  Clinic Number: [id number]  Physician: No ref. provider found  Diagnosis: No diagnosis found.  Past Medical History:   Diagnosis Date    Abdominal or pelvic swelling, mass, or lump, right lower quadrant     Anxiety     Arthritis     Asthma, mild persistent     Cervicalgia     COPD (chronic obstructive pulmonary disease)     Enlargement of lymph nodes     Eosinophilic esophagitis     Gastroparesis     Headache(784.0)     Heartburn     Helicobacter pylori (H. pylori)     History of positive PPD, untreated     History of psychiatric hospitalization     after son , hospitalized for eating disorder    Hx of psychiatric care     Hypothyroidism     Migraines     Occipital neuralgia     Other selective immunoglobulin deficiencies     Other syndromes affecting cervical region     Psychiatric problem     Spondylosis of cervical spine     Therapy     Unspecified asthma(493.90)     Unspecified disorder of thyroid     Unspecified viral meningitis        Visit Number: Wellness 15  Precautions: Standard    1st PT visit:  2022  Year of care end date:  2023  6 Month test  performed:[] 2022  12 Month test performed:[] 2023  Mind body plan:Plan A (9 months)  Waiver: [x] Signed   Patient level: C    Time In: 11:10 a  Time Out: 12:30 p  Total Treatment Time: 70 min    Wellness Vision :  Handout on this week's wellness topic Hydration provided along with a discussion on what it means, the benefits, and suggestions for practice.  Reviewed last week's topic of Balance.    Subjective:   Prema reports with discomfort in her low back and mid-back this morning. She returned back to wellness after her daughter's wedding and caring for her mother after surgery. She has experienced increased pain and discomfort over the last month with the care giving role she has provided to her mother. Prema plans to get back to the level she had achieved before  "the end of 2022.    Objective:   Prema completed therapeutic stretches and the following exercises/ fitness machines:     [x] Open Books x 10  [x] Bridges x 15 w/ GTB (3" hold)  [x] Clams x 10 w/ GTB  [x] Supine/Seated piriformis stretch 3/30-60"  [] Prone/Kneeling hip flexor stretch 3 x 30-60"   [x] Press Ups x 15  [x] Child's Pose 3 x 20" (VC's-Breathing)  [] SKTC 3 x 20"  [x] Cat/Cow 2 x 10    Fitness Equipment Education Key  (E, I, ) Used this   visit HEP or Alternate Exercise   core lumbar E [x] []    torso rotation E [x] []    leg extension E [x] []    leg curl E [x] []    chest press E [] [x] Incline Push up @ #21   seated row E [] [x] TRX Row   triceps extension E [x] []    biceps curl E [] [] #10 DB bicep curl   hip abduction E [x] []    Hip adduction E [x] []    leg press E [] []  #15kb Goblet squat     See exercise log in patient folder for rate of exertion and repetitions completed.     Fitness Machine Education Key:  E=education on equipment initiated and further follow up and education needed  I=independent with  and exercise.  The patient:  Adjusts machines to his/her settings  Uses equipment levers, pins, weights safely  Maintains safe and correct posture while exercising  Moves through exercise with correct pace and control  Gets on and off equipment safely    Assessment:   Prema tolerated exercises, stretches, and all peripheral strengthening equipment listed above, without any increase in symptoms.     Plan:  Continue with established plan of care towards wellness goals set by PT and Prema, including:  - Increasing HEP use daily, mainly after her paddle boarding or physical activity/exercise of that day.  - Review stretches for breaks during her long drive to [place] within the next few weeks.    Health : Trung Lyons  3/3/2023  "

## 2023-03-15 ENCOUNTER — PATIENT MESSAGE (OUTPATIENT)
Dept: NEUROLOGY | Facility: CLINIC | Age: 58
End: 2023-03-15
Payer: COMMERCIAL

## 2023-03-16 ENCOUNTER — DOCUMENTATION ONLY (OUTPATIENT)
Dept: REHABILITATION | Facility: HOSPITAL | Age: 58
End: 2023-03-16

## 2023-03-23 ENCOUNTER — PROCEDURE VISIT (OUTPATIENT)
Dept: NEUROLOGY | Facility: CLINIC | Age: 58
End: 2023-03-23
Payer: COMMERCIAL

## 2023-03-23 VITALS
WEIGHT: 123 LBS | DIASTOLIC BLOOD PRESSURE: 67 MMHG | SYSTOLIC BLOOD PRESSURE: 96 MMHG | BODY MASS INDEX: 19.77 KG/M2 | RESPIRATION RATE: 17 BRPM | HEART RATE: 59 BPM | HEIGHT: 66 IN

## 2023-03-23 DIAGNOSIS — G43.719 INTRACTABLE CHRONIC MIGRAINE WITHOUT AURA AND WITHOUT STATUS MIGRAINOSUS: Primary | ICD-10-CM

## 2023-03-23 PROCEDURE — 64615 CHEMODENERV MUSC MIGRAINE: CPT | Mod: S$GLB,,, | Performed by: PSYCHIATRY & NEUROLOGY

## 2023-03-23 PROCEDURE — 64615 PR CHEMODENERVATION OF MUSCLE FOR CHRONIC MIGRAINE: ICD-10-PCS | Mod: S$GLB,,, | Performed by: PSYCHIATRY & NEUROLOGY

## 2023-03-23 NOTE — PROCEDURES
Procedures  3/23/2023  With Botox injections I have achieved well over 50%  improvement in the patient's symptoms. Migraines have been reduced at least 7 days per month and the number of cumulative hours suffering with headaches has been reduced at least 100 total hours per month. Today she  does have a headache indicating that the Botox has worn off. Frequency of treatment is every 3 months unless no response to the treatments, at which time we will discontinue the injections.        ROS: Positive for photophobia, phonophobia, nausea, insomnia     Past Medical History:   Diagnosis Date    Abdominal or pelvic swelling, mass, or lump, right lower quadrant     Anxiety     Arthritis     Asthma, mild persistent     Cervicalgia     COPD (chronic obstructive pulmonary disease)     Enlargement of lymph nodes     Eosinophilic esophagitis     Gastroparesis     Headache(784.0)     Heartburn     Helicobacter pylori (H. pylori)     History of positive PPD, untreated     History of psychiatric hospitalization     after son , hospitalized for eating disorder    Hx of psychiatric care     Hypothyroidism     Migraines     Occipital neuralgia     Other selective immunoglobulin deficiencies     Other syndromes affecting cervical region     Psychiatric problem     Spondylosis of cervical spine     Therapy     Unspecified asthma(493.90)     Unspecified disorder of thyroid     Unspecified viral meningitis          Current Outpatient Medications   Medication Sig    albuterol sulfate (PROAIR RESPICLICK) 90 mcg/actuation inhaler Inhale 1-2 puffs into the lungs every 4 (four) hours as needed for Wheezing. Rescue    celecoxib (CELEBREX) 200 MG capsule Take 1 or 2 daily as needed for headache exacerbations    clobetasol 0.05% (TEMOVATE) 0.05 % Oint     clotrimazole-betamethasone 1-0.05% (LOTRISONE) cream Apply topically as needed.     COLLAGEN MISC by Misc.(Non-Drug; Combo Route) route.    diclofenac (FLECTOR) 1.3 % PT12 APPLY 1 PATCH BY  TRANSDERMAL ROUTE 2 TIMES EVERY DAY TO MOST PAINFUL AREA    estradioL (VIVELLE-DOT) 0.0375 mg/24 hr estradiol 0.0375 mg/24 hr semiweekly transdermal patch    FEXOFENADINE HCL (ALLEGRA ORAL) Take by mouth.    fluticasone-vilanterol (BREO) 100-25 mcg/dose diskus inhaler Inhale 1 puff into the lungs once daily. Controller    galcanezumab-gnlm (EMGALITY PEN) 120 mg/mL PnIj Inject 1 pen (120 mg) into the skin every 28 days.    galcanezumab-gnlm 120 mg/mL PnIj Inject 2 pens (240 mg) subcutaneous at separate sites, once (loading dose). Start maintenance dose 28 days later (Patient taking differently: Inject 2 pens (240 mg) subcutaneous at separate sites, once (loading dose). Start maintenance dose 28 days later)    hydrocodone-acetaminophen 7.5-325mg (NORCO) 7.5-325 mg per tablet Take 1 tablet by mouth nightly as needed.     INTRAROSA 6.5 mg Inst INSERT 1 VAGINAL INSERT EVERY DAY BY VAGINAL ROUTE.    levothyroxine (SYNTHROID) 50 MCG tablet TAKE 1 TABLET BY MOUTH EVERY DAY IN THE MORNING    meclizine (ANTIVERT) 25 mg tablet Take 1 tablet (25 mg total) by mouth 3 (three) times daily as needed for Dizziness.    MG TRISILICATE/ALH/NAHCO3/AA (GAVISCON ORAL) Take 5 mLs by mouth as needed.     onabotulinumtoxina (BOTOX) 200 unit SolR Inject 155 Units into the muscle into the head/neck area every 90 days    ondansetron (ZOFRAN-ODT) 8 MG TbDL Take 1 tablet (8 mg total) by mouth every 6 (six) hours as needed (nausea).    PROCTOSOL HC 2.5 % rectal cream Place 1 Dose rectally as needed.     rimegepant (NURTEC) 75 mg odt Take 1 tablet (75 mg total) by mouth daily as needed for Migraine. Place ODT tablet on the tongue; alternatively the ODT tablet may be placed under the tongue    sumatriptan (IMITREX) 100 MG tablet TAKE 1 BY MOUTH EVERY 2 HRS. AS NEEDED FOR MIGRAINE. UP  MG/DAY. HOLD FOR BP > 150 MM SYSTOLIC    testosterone (ANDROGEL) 1 % (50 mg/5 gram) GlPk APPLY 0.5 ML TO INNER THIGH EVERY DAY AS DIRECTED    zolpidem (AMBIEN) 5  MG Tab TAKE 1 TABLET (5 MG TOTAL) BY MOUTH NIGHTLY AS NEEDED (SLEEPLESSNESS).    fluticasone (FLOVENT HFA) 220 mcg/actuation inhaler Inhale 1 puff into the lungs 2 (two) times daily. Controller     Current Facility-Administered Medications   Medication    onabotulinumtoxina injection 200 Units    triamcinolone acetonide injection 40 mg          Vitals:    03/23/23 0903   BP: 96/67   Pulse: (!) 59   Resp: 17     Body mass index is 19.85 kg/m².    Physical Exam:  Alert and fully oriented   Lungs CTA  Heart RRR  CN II-XII intact  Motor normal bulk and tone, symmetric strength  Coordination intact FTN  Sensory in tact to LT  Gait: normal, pace and base     Data review:    Lab Results   Component Value Date     12/28/2022     09/18/2015    K 4.7 12/28/2022    K 4.1 09/18/2015     12/28/2022     09/18/2015    CO2 30 12/28/2022    BUN 19 12/28/2022    CREATININE 0.91 12/28/2022    CREATININE 0.92 09/18/2015    GLU 77 12/28/2022    HGBA1C 5.0 12/28/2022    AST 18 12/28/2022    ALT 12 12/28/2022    ALBUMIN 4.1 12/28/2022    ALBUMIN 3.6 09/18/2015    PROT 6.5 12/28/2022    BILITOT 0.6 12/28/2022    CHOL 242 (H) 12/28/2022    HDL 83 12/28/2022    LDLCALC 133 (H) 12/28/2022    LDLCALC 77 09/18/2015    TRIG 143 12/28/2022       Lab Results   Component Value Date    WBC 5.5 12/28/2022    HGB 14.3 12/28/2022    HCT 43.1 12/28/2022    MCV 93.7 12/28/2022     12/28/2022       Lab Results   Component Value Date    TSH 2.80 12/28/2022     No results found for this or any previous visit.    A/P:     Chronic Migraine responding to Botox as expected. Continue treatment until patient in true remission, meaning that the patient stays headache free when Botox wears off in 10 to 12 weeks. That will be the time to stop.  Encouraged the patient to comply with with early acute intervention for escalations    BOTOX PROCEDURE    PROCEDURE PERFORMED: Botulinum toxin injection (14087)    CLINICAL INDICATION:  G43.719    A time out was conducted just before the start of the procedure to verify the correct patient and procedure, procedure location, and all relevant critical information.     DESCRIPTION OF PROCEDURE: After obtaining informed consent and under aseptic technique, a total of 155 units of botulinum toxin type A were injected in the following muscles: Procerus 5 units,  5 units   bilaterally, frontalis 20 units, temporalis 20 units bilaterally, occipitalis 15 units, upper cervical paraspinals 10 units bilaterally and trapezius 15 units bilaterally. The patient was given a total of 155 units in 31 sites.The patient tolerated the procedure well. There were no complications.    The patient was scheduled for repeat treatment in 10 to 12 weeks     Unavoidable waste 45 units      August Smiley M.D  Medical Director, Headache and Facial Pain  [facility]

## 2023-03-29 ENCOUNTER — DOCUMENTATION ONLY (OUTPATIENT)
Dept: REHABILITATION | Facility: HOSPITAL | Age: 58
End: 2023-03-29

## 2023-04-01 NOTE — PROGRESS NOTES
Health  Wellness Visit Note    Name: Brenda C Stargardter  Clinic Number: [id number]  Physician: No ref. provider found  Diagnosis: No diagnosis found.  Past Medical History:   Diagnosis Date    Abdominal or pelvic swelling, mass, or lump, right lower quadrant     Anxiety     Arthritis     Asthma, mild persistent     Cervicalgia     COPD (chronic obstructive pulmonary disease)     Enlargement of lymph nodes     Eosinophilic esophagitis     Gastroparesis     Headache(784.0)     Heartburn     Helicobacter pylori (H. pylori)     History of positive PPD, untreated     History of psychiatric hospitalization     after son , hospitalized for eating disorder    Hx of psychiatric care     Hypothyroidism     Migraines     Occipital neuralgia     Other selective immunoglobulin deficiencies     Other syndromes affecting cervical region     Psychiatric problem     Spondylosis of cervical spine     Therapy     Unspecified asthma(493.90)     Unspecified disorder of thyroid     Unspecified viral meningitis        Visit Number: Wellness 15  Precautions: Standard    1st PT visit:  2022  Year of care end date:  2023  6 Month test  performed:[] 2022  12 Month test performed:[] 2023  Mind body plan:Plan A (9 months)  Waiver: [x] Signed   Patient level: C    Time In: 11:10 a  Time Out: 12:30 p  Total Treatment Time: 70 min    Wellness Vision :  Handout on this week's wellness topic Hydration provided along with a discussion on what it means, the benefits, and suggestions for practice.  Reviewed last week's topic of Balance.    Subjective:   Prema reports with discomfort in her low back and mid-back this morning. She returned back to wellness after her daughter's wedding and caring for her mother after surgery. She has experienced increased pain and discomfort over the last month with the care giving role she has provided to her mother. Prema plans to get back to the level she had achieved before  "the end of 2022.    Objective:   Prema completed therapeutic stretches and the following exercises/ fitness machines:     [x] Open Books x 10  [x] Bridges x 15 w/ GTB (3" hold)  [x] Clams x 10 w/ GTB  [x] Supine/Seated piriformis stretch 3/30-60"  [] Prone/Kneeling hip flexor stretch 3 x 30-60"   [x] Press Ups x 15  [x] Child's Pose 3 x 20" (VC's-Breathing)  [] SKTC 3 x 20"  [x] Cat/Cow 2 x 10    Fitness Equipment Education Key  (E, I, ) Used this   visit HEP or Alternate Exercise   core lumbar E [x] []    torso rotation E [x] []    leg extension E [x] []    leg curl E [x] []    chest press E [] [x] Incline Push up @ #21   seated row E [] [x] TRX Row   triceps extension E [x] []    biceps curl E [] [] #10 DB bicep curl   hip abduction E [x] []    Hip adduction E [x] []    leg press E [] []  #15kb Goblet squat     See exercise log in patient folder for rate of exertion and repetitions completed.     Fitness Machine Education Key:  E=education on equipment initiated and further follow up and education needed  I=independent with  and exercise.  The patient:  Adjusts machines to his/her settings  Uses equipment levers, pins, weights safely  Maintains safe and correct posture while exercising  Moves through exercise with correct pace and control  Gets on and off equipment safely    Assessment:   Prema tolerated exercises, stretches, and all peripheral strengthening equipment listed above, without any increase in symptoms.     Plan:  Continue with established plan of care towards wellness goals set by PT and Prema, including:  - Increasing HEP use daily, mainly after her paddle boarding or physical activity/exercise of that day.  - Review stretches for breaks during her long drive to [place] within the next few weeks.    Health : Trung Lyons  3/29/2023  "

## 2023-04-01 NOTE — PROGRESS NOTES
Health  Wellness Visit Note    Name: Brenda C Stargardter  Clinic Number: [id number]  Physician: No ref. provider found  Diagnosis: No diagnosis found.  Past Medical History:   Diagnosis Date    Abdominal or pelvic swelling, mass, or lump, right lower quadrant     Anxiety     Arthritis     Asthma, mild persistent     Cervicalgia     COPD (chronic obstructive pulmonary disease)     Enlargement of lymph nodes     Eosinophilic esophagitis     Gastroparesis     Headache(784.0)     Heartburn     Helicobacter pylori (H. pylori)     History of positive PPD, untreated     History of psychiatric hospitalization     after son , hospitalized for eating disorder    Hx of psychiatric care     Hypothyroidism     Migraines     Occipital neuralgia     Other selective immunoglobulin deficiencies     Other syndromes affecting cervical region     Psychiatric problem     Spondylosis of cervical spine     Therapy     Unspecified asthma(493.90)     Unspecified disorder of thyroid     Unspecified viral meningitis        Visit Number: Wellness 15  Precautions: Standard    1st PT visit:  2022  Year of care end date:  2023  6 Month test  performed:[] 2022  12 Month test performed:[] 2023  Mind body plan:Plan A (9 months)  Waiver: [x] Signed   Patient level: C    Time In: 11:10 a  Time Out: 12:30 p  Total Treatment Time: 70 min    Wellness Vision :  Handout on this week's wellness topic Hydration provided along with a discussion on what it means, the benefits, and suggestions for practice.  Reviewed last week's topic of Balance.    Subjective:   Prema reports with discomfort in her low back and mid-back this morning. She returned back to wellness after her daughter's wedding and caring for her mother after surgery. She has experienced increased pain and discomfort over the last month with the care giving role she has provided to her mother. Prema plans to get back to the level she had achieved before  "the end of 2022.    Objective:   Prema completed therapeutic stretches and the following exercises/ fitness machines:     [x] Open Books x 10  [x] Bridges x 15 w/ GTB (3" hold)  [x] Clams x 10 w/ GTB  [x] Supine/Seated piriformis stretch 3/30-60"  [] Prone/Kneeling hip flexor stretch 3 x 30-60"   [x] Press Ups x 15  [x] Child's Pose 3 x 20" (VC's-Breathing)  [] SKTC 3 x 20"  [x] Cat/Cow 2 x 10    Fitness Equipment Education Key  (E, I, ) Used this   visit HEP or Alternate Exercise   core lumbar E [x] []    torso rotation E [x] []    leg extension E [x] []    leg curl E [x] []    chest press E [] [x] Incline Push up @ #21   seated row E [] [x] TRX Row   triceps extension E [x] []    biceps curl E [] [] #10 DB bicep curl   hip abduction E [x] []    Hip adduction E [x] []    leg press E [] []  #15kb Goblet squat     See exercise log in patient folder for rate of exertion and repetitions completed.     Fitness Machine Education Key:  E=education on equipment initiated and further follow up and education needed  I=independent with  and exercise.  The patient:  Adjusts machines to his/her settings  Uses equipment levers, pins, weights safely  Maintains safe and correct posture while exercising  Moves through exercise with correct pace and control  Gets on and off equipment safely    Assessment:   Prema tolerated exercises, stretches, and all peripheral strengthening equipment listed above, without any increase in symptoms.     Plan:  Continue with established plan of care towards wellness goals set by PT and Prema, including:  - Increasing HEP use daily, mainly after her paddle boarding or physical activity/exercise of that day.  - Review stretches for breaks during her long drive to [place] within the next few weeks.    Health : Trung Lyons  3/16/2023  "

## 2023-05-08 ENCOUNTER — TELEPHONE (OUTPATIENT)
Dept: PHARMACY | Facility: CLINIC | Age: 58
End: 2023-05-08
Payer: COMMERCIAL

## 2023-06-06 ENCOUNTER — TELEPHONE (OUTPATIENT)
Dept: NEUROLOGY | Facility: CLINIC | Age: 58
End: 2023-06-06
Payer: COMMERCIAL

## 2023-06-06 NOTE — TELEPHONE ENCOUNTER
----- Message from Leland Ochoa, Patient Care Assistant sent at 6/6/2023  9:05 AM CDT -----  Contact: Pt  Type: Needs Medical Advice    Who Called: Pt  Best Call Back Number: [phone]  Inquiry/Question: Pt is calling to get a sooner appt for her Botox. Pt also has concerns about her copay for the appt. She is asking about how the office is billing or diagnostic code being used. Please advise. Thank you~

## 2023-06-06 NOTE — TELEPHONE ENCOUNTER
Rescheduled, confirmed date and time.  Advised to call [facility] call Center

## 2023-06-07 ENCOUNTER — PROCEDURE VISIT (OUTPATIENT)
Dept: NEUROLOGY | Facility: CLINIC | Age: 58
End: 2023-06-07
Payer: COMMERCIAL

## 2023-06-07 VITALS
DIASTOLIC BLOOD PRESSURE: 68 MMHG | WEIGHT: 121 LBS | HEART RATE: 74 BPM | BODY MASS INDEX: 19.44 KG/M2 | RESPIRATION RATE: 17 BRPM | SYSTOLIC BLOOD PRESSURE: 106 MMHG | HEIGHT: 66 IN

## 2023-06-07 DIAGNOSIS — G43.719 INTRACTABLE CHRONIC MIGRAINE WITHOUT AURA AND WITHOUT STATUS MIGRAINOSUS: Primary | ICD-10-CM

## 2023-06-07 PROCEDURE — 64615 PR CHEMODENERVATION OF MUSCLE FOR CHRONIC MIGRAINE: ICD-10-PCS | Mod: S$GLB,,, | Performed by: PSYCHIATRY & NEUROLOGY

## 2023-06-07 PROCEDURE — 64615 CHEMODENERV MUSC MIGRAINE: CPT | Mod: S$GLB,,, | Performed by: PSYCHIATRY & NEUROLOGY

## 2023-06-07 NOTE — PROCEDURES
Procedures  3/23/2023  With Botox injections I have achieved well over 50%  improvement in the patient's symptoms. Migraines have been reduced at least 7 days per month and the number of cumulative hours suffering with headaches has been reduced at least 100 total hours per month. Today she  does have a headache indicating that the Botox has worn off. Frequency of treatment is every 3 months unless no response to the treatments, at which time we will discontinue the injections.        ROS: Positive for photophobia, phonophobia, nausea, insomnia     Past Medical History:   Diagnosis Date    Abdominal or pelvic swelling, mass, or lump, right lower quadrant     Anxiety     Arthritis     Asthma, mild persistent     Cervicalgia     COPD (chronic obstructive pulmonary disease)     Enlargement of lymph nodes     Eosinophilic esophagitis     Gastroparesis     Headache(784.0)     Heartburn     Helicobacter pylori (H. pylori)     History of positive PPD, untreated     History of psychiatric hospitalization     after son , hospitalized for eating disorder    Hx of psychiatric care     Hypothyroidism     Migraines     Occipital neuralgia     Other selective immunoglobulin deficiencies     Other syndromes affecting cervical region     Psychiatric problem     Spondylosis of cervical spine     Therapy     Unspecified asthma(493.90)     Unspecified disorder of thyroid     Unspecified viral meningitis          Current Outpatient Medications   Medication Sig    albuterol sulfate (PROAIR RESPICLICK) 90 mcg/actuation inhaler Inhale 1-2 puffs into the lungs every 4 (four) hours as needed for Wheezing. Rescue    clobetasol 0.05% (TEMOVATE) 0.05 % Oint     clotrimazole-betamethasone 1-0.05% (LOTRISONE) cream Apply topically as needed.     COLLAGEN MISC by Misc.(Non-Drug; Combo Route) route.    diclofenac (FLECTOR) 1.3 % PT12 APPLY 1 PATCH BY TRANSDERMAL ROUTE 2 TIMES EVERY DAY TO MOST PAINFUL AREA    estradioL (VIVELLE-DOT) 0.0375  mg/24 hr estradiol 0.0375 mg/24 hr semiweekly transdermal patch    FEXOFENADINE HCL (ALLEGRA ORAL) Take by mouth.    fluticasone (FLOVENT HFA) 220 mcg/actuation inhaler Inhale 1 puff into the lungs 2 (two) times daily. Controller    fluticasone-vilanterol (BREO) 100-25 mcg/dose diskus inhaler Inhale 1 puff into the lungs once daily. Controller (Patient not taking: Reported on 4/25/2023)    galcanezumab-gnlm (EMGALITY PEN) 120 mg/mL PnIj Inject 1 pen (120 mg) into the skin every 28 days.    galcanezumab-gnlm 120 mg/mL PnIj Inject 2 pens (240 mg) subcutaneous at separate sites, once (loading dose). Start maintenance dose 28 days later (Patient taking differently: Inject 2 pens (240 mg) subcutaneous at separate sites, once (loading dose). Start maintenance dose 28 days later)    hydrocodone-acetaminophen 7.5-325mg (NORCO) 7.5-325 mg per tablet Take 1 tablet by mouth nightly as needed.     INTRAROSA 6.5 mg Inst INSERT 1 VAGINAL INSERT EVERY DAY BY VAGINAL ROUTE.    levothyroxine (SYNTHROID) 50 MCG tablet TAKE 1 TABLET BY MOUTH EVERY DAY IN THE MORNING    MG TRISILICATE/ALH/NAHCO3/AA (GAVISCON ORAL) Take 5 mLs by mouth as needed.     onabotulinumtoxina (BOTOX) 200 unit SolR Inject 155 Units into the muscle into the head/neck area every 90 days    ondansetron (ZOFRAN-ODT) 8 MG TbDL Take 1 tablet (8 mg total) by mouth every 6 (six) hours as needed (nausea).    PROCTOSOL HC 2.5 % rectal cream Place 1 Dose rectally as needed.     rimegepant (NURTEC) 75 mg odt Take 1 tablet (75 mg total) by mouth daily as needed for Migraine. Place ODT tablet on the tongue; alternatively the ODT tablet may be placed under the tongue    sumatriptan (IMITREX) 100 MG tablet TAKE 1 BY MOUTH EVERY 2 HRS. AS NEEDED FOR MIGRAINE. UP  MG/DAY. HOLD FOR BP > 150 MM SYSTOLIC    testosterone (ANDROGEL) 1 % (50 mg/5 gram) GlPk APPLY 0.5 ML TO INNER THIGH EVERY DAY AS DIRECTED    zolpidem (AMBIEN) 5 MG Tab TAKE 1 TABLET (5 MG TOTAL) BY MOUTH NIGHTLY  AS NEEDED (SLEEPLESSNESS).     Current Facility-Administered Medications   Medication    onabotulinumtoxina injection 200 Units    onabotulinumtoxina injection 200 Units    triamcinolone acetonide injection 40 mg          Vitals:    06/07/23 0827   BP: 106/68   Pulse: 74   Resp: 17     Body mass index is 19.53 kg/m².    Physical Exam:  Alert and fully oriented   Lungs CTA  Heart RRR  CN II-XII intact  Motor normal bulk and tone, symmetric strength  Coordination intact FTN  Sensory in tact to LT  Gait: normal, pace and base     Data review:    Lab Results   Component Value Date     04/25/2023     09/18/2015    K 4.5 04/25/2023    K 4.1 09/18/2015     04/25/2023     09/18/2015    CO2 29 04/25/2023    BUN 28 (H) 04/25/2023    CREATININE 0.96 04/25/2023    CREATININE 0.92 09/18/2015    GLU 91 04/25/2023    HGBA1C 5.0 12/28/2022    AST 30 04/25/2023    ALT 33 (H) 04/25/2023    ALBUMIN 4.5 04/25/2023    ALBUMIN 3.6 09/18/2015    PROT 6.9 04/25/2023    BILITOT 0.5 04/25/2023    CHOL 209 (H) 03/28/2023    HDL 79 03/28/2023    LDLCALC 113 (H) 03/28/2023    LDLCALC 77 09/18/2015    TRIG 75 03/28/2023       Lab Results   Component Value Date    WBC 6.1 04/25/2023    HGB 12.9 04/25/2023    HCT 37.7 04/25/2023    MCV 95.9 04/25/2023     04/25/2023       Lab Results   Component Value Date    TSH 2.80 12/28/2022     No results found for this or any previous visit.    A/P:     Chronic Migraine responding to Botox as expected. Continue treatment until patient in true remission, meaning that the patient stays headache free when Botox wears off in 10 to 12 weeks. That will be the time to stop.  Encouraged the patient to comply with with early acute intervention for escalations    BOTOX PROCEDURE    PROCEDURE PERFORMED: Botulinum toxin injection (37635)    CLINICAL INDICATION: G43.719    A time out was conducted just before the start of the procedure to verify the correct patient and procedure, procedure  location, and all relevant critical information.     DESCRIPTION OF PROCEDURE: After obtaining informed consent and under aseptic technique, a total of 155 units of botulinum toxin type A were injected in the following muscles: Procerus 5 units,  5 units   bilaterally, frontalis 20 units, temporalis 20 units bilaterally, occipitalis 15 units, upper cervical paraspinals 10 units bilaterally and trapezius 15 units bilaterally. The patient was given a total of 155 units in 31 sites.The patient tolerated the procedure well. There were no complications.    The patient was scheduled for repeat treatment in 10 to 12 weeks     Unavoidable waste 45 units      August Smiley M.D  Medical Director, Headache and Facial Pain  [facility]

## 2023-06-13 DIAGNOSIS — G43.009 MIGRAINE WITHOUT AURA: ICD-10-CM

## 2023-06-13 RX ORDER — SUMATRIPTAN SUCCINATE 100 MG/1
TABLET ORAL
Qty: 9 TABLET | Refills: 11 | Status: SHIPPED | OUTPATIENT
Start: 2023-06-13

## 2023-08-07 ENCOUNTER — OFFICE VISIT (OUTPATIENT)
Dept: FAMILY MEDICINE | Facility: CLINIC | Age: 58
End: 2023-08-07
Payer: COMMERCIAL

## 2023-08-07 VITALS
DIASTOLIC BLOOD PRESSURE: 63 MMHG | HEART RATE: 63 BPM | SYSTOLIC BLOOD PRESSURE: 96 MMHG | OXYGEN SATURATION: 98 % | WEIGHT: 128.19 LBS | BODY MASS INDEX: 20.12 KG/M2 | HEIGHT: 67 IN

## 2023-08-07 DIAGNOSIS — K20.0 EOSINOPHILIC ESOPHAGITIS: Chronic | ICD-10-CM

## 2023-08-07 DIAGNOSIS — K62.1 RECTAL POLYP: ICD-10-CM

## 2023-08-07 DIAGNOSIS — Z79.890 POST-MENOPAUSE ON HRT (HORMONE REPLACEMENT THERAPY): ICD-10-CM

## 2023-08-07 DIAGNOSIS — E03.9 ACQUIRED HYPOTHYROIDISM: Chronic | ICD-10-CM

## 2023-08-07 DIAGNOSIS — F41.1 GAD (GENERALIZED ANXIETY DISORDER): Chronic | ICD-10-CM

## 2023-08-07 DIAGNOSIS — R85.0 ABNORMAL LEVEL OF ENZYMES IN SPECIMENS FROM DIGESTIVE ORGANS AND ABDOMINAL CAVITY: ICD-10-CM

## 2023-08-07 DIAGNOSIS — G47.00 INSOMNIA, UNSPECIFIED TYPE: ICD-10-CM

## 2023-08-07 DIAGNOSIS — E23.6 EMPTY SELLA SYNDROME: ICD-10-CM

## 2023-08-07 DIAGNOSIS — G43.719 INTRACTABLE CHRONIC MIGRAINE WITHOUT AURA AND WITHOUT STATUS MIGRAINOSUS: Primary | Chronic | ICD-10-CM

## 2023-08-07 PROCEDURE — 3074F SYST BP LT 130 MM HG: CPT | Mod: CPTII,S$GLB,, | Performed by: FAMILY MEDICINE

## 2023-08-07 PROCEDURE — 3078F PR MOST RECENT DIASTOLIC BLOOD PRESSURE < 80 MM HG: ICD-10-PCS | Mod: CPTII,S$GLB,, | Performed by: FAMILY MEDICINE

## 2023-08-07 PROCEDURE — 1159F MED LIST DOCD IN RCRD: CPT | Mod: CPTII,S$GLB,, | Performed by: FAMILY MEDICINE

## 2023-08-07 PROCEDURE — 3078F DIAST BP <80 MM HG: CPT | Mod: CPTII,S$GLB,, | Performed by: FAMILY MEDICINE

## 2023-08-07 PROCEDURE — 1159F PR MEDICATION LIST DOCUMENTED IN MEDICAL RECORD: ICD-10-PCS | Mod: CPTII,S$GLB,, | Performed by: FAMILY MEDICINE

## 2023-08-07 PROCEDURE — 99214 PR OFFICE/OUTPT VISIT, EST, LEVL IV, 30-39 MIN: ICD-10-PCS | Mod: S$GLB,,, | Performed by: FAMILY MEDICINE

## 2023-08-07 PROCEDURE — 3074F PR MOST RECENT SYSTOLIC BLOOD PRESSURE < 130 MM HG: ICD-10-PCS | Mod: CPTII,S$GLB,, | Performed by: FAMILY MEDICINE

## 2023-08-07 PROCEDURE — 3008F PR BODY MASS INDEX (BMI) DOCUMENTED: ICD-10-PCS | Mod: CPTII,S$GLB,, | Performed by: FAMILY MEDICINE

## 2023-08-07 PROCEDURE — 99214 OFFICE O/P EST MOD 30 MIN: CPT | Mod: S$GLB,,, | Performed by: FAMILY MEDICINE

## 2023-08-07 PROCEDURE — 99999 PR PBB SHADOW E&M-EST. PATIENT-LVL V: CPT | Mod: PBBFAC,,, | Performed by: FAMILY MEDICINE

## 2023-08-07 PROCEDURE — 99999 PR PBB SHADOW E&M-EST. PATIENT-LVL V: ICD-10-PCS | Mod: PBBFAC,,, | Performed by: FAMILY MEDICINE

## 2023-08-07 PROCEDURE — 3008F BODY MASS INDEX DOCD: CPT | Mod: CPTII,S$GLB,, | Performed by: FAMILY MEDICINE

## 2023-08-07 NOTE — PROGRESS NOTES
[facility] - Clinic Note    Subjective      Ms. Stargardter is a 58 y.o. female who presents to clinic to establish care.    Previous PCP: Dr. Power in [place].     Dr.Steve Cadet-pain management. Takes medical marijuana.     On HRT. Would like a referral to GYN    H/o migraines: on emgality and nurtec prn.   Gets botox injections    Hypothyroidism: on levothyroxine and cytomel     Insomnia: ambein prn    [facility] Prema has a past medical history of Abdominal or pelvic swelling, mass, or lump, right lower quadrant, Anxiety, Arthritis, Asthma, mild persistent, Cervicalgia, Enlargement of lymph nodes, Eosinophilic esophagitis, Gastroparesis, Headache(784.0), Heartburn, Helicobacter pylori (H. pylori), History of positive PPD, untreated, History of psychiatric hospitalization, psychiatric care, Hypothyroidism, Migraines, Occipital neuralgia, Other selective immunoglobulin deficiencies, Other syndromes affecting cervical region, Psychiatric problem, Spondylosis of cervical spine, Therapy, Unspecified asthma(493.90), Unspecified disorder of thyroid, and Unspecified viral meningitis.   [facility] Prema has a past surgical history that includes Cholecystectomy; Varicose vein surgery; Lymph node biopsy (Right, 2014); Axillary abcess irrigation and debridement; Excisional hemorrhoidectomy (12/21/15); Mouth surgery; Hysterectomy (2015); Total abdominal hysterectomy w/ bilateral salpingoophorectomy (2015); and Rotator cuff repair (Right, 09/14/2021).    Prema's family history includes Breast cancer in her paternal grandmother; Cancer in her paternal grandmother; Cerebral aneurysm in her mother; Heart disease in her father; Hypertension in her father and mother; Leukemia in her father; No Known Problems in her brother, brother, and sister; Pneumonia in her sister; Stroke in her mother.   GILSON Lloyd reports that she has never smoked. She has never used smokeless tobacco. She reports that she does not currently use alcohol. She  "reports that she does not use drugs.   GIFTY Lloyd is allergic to reglan [metoclopramide hcl], benadryl [diphenhydramine hcl], strattera [atomoxetine], phenergan [promethazine], prednisone, and sulfa (sulfonamide antibiotics).   MICHAEL Lloyd has a current medication list which includes the following prescription(s): proair respiclick, clobetasol 0.05%, clotrimazole-betamethasone 1-0.05%, collagen, diclofenac, estradiol, fexofenadine hcl, galcanezumab-gnlm, hydrocodone-acetaminophen, intrarosa, levothyroxine, liothyronine, onabotulinumtoxina, ondansetron, proctosol hc, progesterone, nurtec, sumatriptan, testosterone, zolpidem, and mag/aluminum/sod bicarb/alginc, and the following Facility-Administered Medications: onabotulinumtoxina, onabotulinumtoxina, onabotulinumtoxina, and triamcinolone acetonide.     Review of Systems   Constitutional:  Negative for activity change, appetite change, chills, fatigue and fever.   Eyes:  Negative for visual disturbance.   Respiratory:  Negative for cough and shortness of breath.    Cardiovascular:  Negative for chest pain, palpitations and leg swelling.   Gastrointestinal:  Negative for abdominal pain, nausea and vomiting.   Skin:  Negative for wound.   Neurological:  Negative for dizziness and headaches.   Psychiatric/Behavioral:  Negative for confusion.      Objective     BP 96/63 (BP Location: Left arm, Patient Position: Sitting, BP Method: Medium (Automatic))   Pulse 63   Ht 5' 7" (1.702 m)   Wt 58.2 kg (128 lb 3.2 oz)   SpO2 98%   BMI 20.08 kg/m²     Physical Exam   Constitutional: normal appearance. She appears well-developed and well-nourished.  Non-toxic appearance. No distress. She does not appear ill.   HENT:   Head: Normocephalic and atraumatic.   Eyes: Right eye exhibits no discharge. Left eye exhibits no discharge.   Cardiovascular: Normal rate, regular rhythm, normal heart sounds and normal pulses. Exam reveals no gallop and no friction rub.   No murmur " heard.Pulmonary:      Effort: Pulmonary effort is normal. No respiratory distress.      Breath sounds: Normal breath sounds. No wheezing, rhonchi or rales.     Abdominal: Normal appearance.   Musculoskeletal:      Cervical back: Neck supple.   Lymphadenopathy:     She has no cervical adenopathy.   Neurological: She is alert.   Skin: Skin is warm and dry. Capillary refill takes less than 2 seconds. She is not diaphoretic.   Psychiatric: Her behavior is normal. Mood, judgment and thought content normal.   Vitals reviewed.     Assessment/Plan     Prema was seen today for establish care.    Diagnoses and all orders for this visit:  -New patient and new problem to me    Intractable chronic migraine without aura and without status migrainosus  -     Ambulatory referral/consult to Neurology; Future    JAMESON (generalized anxiety disorder)    Eosinophilic esophagitis  -     Ambulatory referral/consult to Gastroenterology; Future    Rectal polyp  -     Ambulatory referral/consult to Gastroenterology; Future    Acquired hypothyroidism  -     TSH; Future  -     T4, free; Future  -     T3; Future    Abnormal level of enzymes in specimens from digestive organs and abdominal cavity  -     Amylase; Future  -     Lipase; Future    Post-menopause on HRT (hormone replacement therapy)  -     Ambulatory referral/consult to Obstetrics / Gynecology; Future    Empty sella syndrome  -stable. Referral to neuro placed    Follow up in about 3 months (around 11/7/2023), or if symptoms worsen or fail to improve.    Future Appointments   Date Time Provider Department Center   11/8/2023 10:40 AM Kristi Mcqueen MD [facility]       Kristi Mcqueen MD  Family Medicine  [facility]

## 2023-08-08 ENCOUNTER — TELEPHONE (OUTPATIENT)
Dept: NEUROLOGY | Facility: CLINIC | Age: 58
End: 2023-08-08
Payer: COMMERCIAL

## 2023-08-08 NOTE — TELEPHONE ENCOUNTER
----- Message from Corbin Nguyen sent at 8/8/2023  8:26 AM CDT -----  Regarding: rescheduel botox  Contact: prema at [phone]  Type:  Reschedule Botox    Caller is requesting a BOTOX appointment.      Name of Caller:  Prema    When is the first available appointment?  Dept Book    Procedure:  Botox    Best Call Back Number:  [phone]    Additional Information:  note pt now lives in [place] and has [employer] [facility] [place] [employer]. Please call to reschedule.

## 2023-08-08 NOTE — TELEPHONE ENCOUNTER
Called, scheduled Botox appointment, confirmed date and time.  Informed insurance approval is handled via the Pre-Service department.  They will sent message with approval or denial.

## 2023-08-09 ENCOUNTER — LAB VISIT (OUTPATIENT)
Dept: LAB | Facility: HOSPITAL | Age: 58
End: 2023-08-09
Attending: FAMILY MEDICINE
Payer: COMMERCIAL

## 2023-08-09 DIAGNOSIS — R85.0 ABNORMAL LEVEL OF ENZYMES IN SPECIMENS FROM DIGESTIVE ORGANS AND ABDOMINAL CAVITY: ICD-10-CM

## 2023-08-09 DIAGNOSIS — E03.9 ACQUIRED HYPOTHYROIDISM: Chronic | ICD-10-CM

## 2023-08-09 LAB
AMYLASE SERPL-CCNC: 225 U/L (ref 20–110)
LIPASE SERPL-CCNC: 43 U/L (ref 4–60)
T3 SERPL-MCNC: 61 NG/DL (ref 60–180)
T4 FREE SERPL-MCNC: 0.97 NG/DL (ref 0.71–1.51)
TSH SERPL DL<=0.005 MIU/L-ACNC: 1.35 UIU/ML (ref 0.4–4)

## 2023-08-09 PROCEDURE — 82150 ASSAY OF AMYLASE: CPT | Performed by: FAMILY MEDICINE

## 2023-08-09 PROCEDURE — 84439 ASSAY OF FREE THYROXINE: CPT | Performed by: FAMILY MEDICINE

## 2023-08-09 PROCEDURE — 36415 COLL VENOUS BLD VENIPUNCTURE: CPT | Performed by: FAMILY MEDICINE

## 2023-08-09 PROCEDURE — 84443 ASSAY THYROID STIM HORMONE: CPT | Performed by: FAMILY MEDICINE

## 2023-08-09 PROCEDURE — 84480 ASSAY TRIIODOTHYRONINE (T3): CPT | Performed by: FAMILY MEDICINE

## 2023-08-09 PROCEDURE — 83690 ASSAY OF LIPASE: CPT | Performed by: FAMILY MEDICINE

## 2023-08-11 ENCOUNTER — TELEPHONE (OUTPATIENT)
Dept: FAMILY MEDICINE | Facility: CLINIC | Age: 58
End: 2023-08-11
Payer: COMMERCIAL

## 2023-08-11 NOTE — TELEPHONE ENCOUNTER
----- Message from Emily Brennan sent at 8/11/2023 11:52 AM CDT -----  Contact: pt    Type:  Patient Requesting Referral- resend to dr Dr. Reyes office said they didn't get a referral as of today     Who Called: pt       [facility]     Reason for Referral: see chart     Does the patient want the referral with a specific physician?:Dr. Abdirizak pierson     Is the specialist an [facility]?:none       Patient Requesting a Response? yes    Would the patient rather a call back or a response via [facility]? call     Best Call Back Number: [phone]    Additional Information:     Thanks Felicia

## 2023-08-14 ENCOUNTER — TELEPHONE (OUTPATIENT)
Dept: FAMILY MEDICINE | Facility: CLINIC | Age: 58
End: 2023-08-14
Payer: COMMERCIAL

## 2023-08-14 NOTE — TELEPHONE ENCOUNTER
----- Message from Sonali Feliz sent at 8/14/2023  9:34 AM CDT -----  Regarding: Patient Requesting Referral- resend to dr  Contact: pt at [phone]  Type:  Patient Requesting Referral- resend to dr Dr. Reyes office said they still didn't get a referral as of today. Please refax to [phone]     Who Called: pt at [phone]       [facility] to [facility]      Reason for Referral: see chart      Does the patient want the referral with a specific physician?:Dr. Abdirizak pierson      Is the specialist an [facility]?:none         Patient Requesting a Response? yes

## 2023-10-05 RX ORDER — ZOLPIDEM TARTRATE 5 MG/1
5 TABLET ORAL NIGHTLY PRN
Qty: 30 TABLET | Refills: 2 | Status: SHIPPED | OUTPATIENT
Start: 2023-10-05 | End: 2024-03-13 | Stop reason: SDUPTHER

## 2023-10-17 ENCOUNTER — PATIENT MESSAGE (OUTPATIENT)
Dept: FAMILY MEDICINE | Facility: CLINIC | Age: 58
End: 2023-10-17
Payer: COMMERCIAL

## 2023-10-17 DIAGNOSIS — R50.9 FEVER, UNSPECIFIED FEVER CAUSE: ICD-10-CM

## 2023-10-17 DIAGNOSIS — R05.8 OTHER COUGH: ICD-10-CM

## 2023-10-17 DIAGNOSIS — R68.89 FLU-LIKE SYMPTOMS: ICD-10-CM

## 2023-10-18 RX ORDER — ALBUTEROL SULFATE 90 UG/1
2 POWDER, METERED RESPIRATORY (INHALATION) EVERY 6 HOURS PRN
Qty: 1 EACH | Refills: 3 | Status: SHIPPED | OUTPATIENT
Start: 2023-10-18

## 2023-11-10 NOTE — TELEPHONE ENCOUNTER
----- Message from Александр Jansen sent at 11/10/2023  8:34 AM CST -----  Type: Needs Medical Advice  Who Called:  pt  Best Call Back Number: [phone]  Additional Information: pt states the script zolpidem (AMBIEN) 5 MG Tab in 5gm is on back order but the pharmacy  has 10mg, wants to know if she can get that instead, pl call bk and advise thanks

## 2023-11-10 NOTE — TELEPHONE ENCOUNTER
Called and spoke with patient.  Informed that since Dr. Leal was no longer with us, I would route to one of the other providers to see if they can help.  States she has an appt scheduled 11/17 with Dr. Cortés to get est.  Request sent to Dr. Cortés.

## 2023-11-13 RX ORDER — ZOLPIDEM TARTRATE 10 MG/1
5 TABLET ORAL NIGHTLY PRN
OUTPATIENT
Start: 2023-11-13 | End: 2024-05-13

## 2023-11-17 ENCOUNTER — OFFICE VISIT (OUTPATIENT)
Dept: FAMILY MEDICINE | Facility: CLINIC | Age: 58
End: 2023-11-17
Payer: COMMERCIAL

## 2023-11-17 VITALS
SYSTOLIC BLOOD PRESSURE: 108 MMHG | OXYGEN SATURATION: 97 % | HEART RATE: 71 BPM | DIASTOLIC BLOOD PRESSURE: 68 MMHG | HEIGHT: 67 IN | BODY MASS INDEX: 20.03 KG/M2 | WEIGHT: 127.63 LBS | RESPIRATION RATE: 20 BRPM

## 2023-11-17 DIAGNOSIS — Z76.89 ENCOUNTER TO ESTABLISH CARE: ICD-10-CM

## 2023-11-17 DIAGNOSIS — Z13.6 ENCOUNTER FOR LIPID SCREENING FOR CARDIOVASCULAR DISEASE: ICD-10-CM

## 2023-11-17 DIAGNOSIS — Z13.220 ENCOUNTER FOR LIPID SCREENING FOR CARDIOVASCULAR DISEASE: ICD-10-CM

## 2023-11-17 DIAGNOSIS — G47.00 INSOMNIA, UNSPECIFIED TYPE: ICD-10-CM

## 2023-11-17 DIAGNOSIS — E03.9 ACQUIRED HYPOTHYROIDISM: Primary | ICD-10-CM

## 2023-11-17 DIAGNOSIS — M51.37 DEGENERATIVE DISC DISEASE AT L5-S1 LEVEL: ICD-10-CM

## 2023-11-17 DIAGNOSIS — Z13.1 DIABETES MELLITUS SCREENING: ICD-10-CM

## 2023-11-17 DIAGNOSIS — F41.1 GAD (GENERALIZED ANXIETY DISORDER): ICD-10-CM

## 2023-11-17 PROCEDURE — 3008F BODY MASS INDEX DOCD: CPT | Mod: CPTII,S$GLB,, | Performed by: FAMILY MEDICINE

## 2023-11-17 PROCEDURE — 3078F PR MOST RECENT DIASTOLIC BLOOD PRESSURE < 80 MM HG: ICD-10-PCS | Mod: CPTII,S$GLB,, | Performed by: FAMILY MEDICINE

## 2023-11-17 PROCEDURE — 99999 PR PBB SHADOW E&M-EST. PATIENT-LVL V: ICD-10-PCS | Mod: PBBFAC,,, | Performed by: FAMILY MEDICINE

## 2023-11-17 PROCEDURE — 99214 OFFICE O/P EST MOD 30 MIN: CPT | Mod: S$GLB,,, | Performed by: FAMILY MEDICINE

## 2023-11-17 PROCEDURE — 1159F PR MEDICATION LIST DOCUMENTED IN MEDICAL RECORD: ICD-10-PCS | Mod: CPTII,S$GLB,, | Performed by: FAMILY MEDICINE

## 2023-11-17 PROCEDURE — 3074F PR MOST RECENT SYSTOLIC BLOOD PRESSURE < 130 MM HG: ICD-10-PCS | Mod: CPTII,S$GLB,, | Performed by: FAMILY MEDICINE

## 2023-11-17 PROCEDURE — 3008F PR BODY MASS INDEX (BMI) DOCUMENTED: ICD-10-PCS | Mod: CPTII,S$GLB,, | Performed by: FAMILY MEDICINE

## 2023-11-17 PROCEDURE — 99214 PR OFFICE/OUTPT VISIT, EST, LEVL IV, 30-39 MIN: ICD-10-PCS | Mod: S$GLB,,, | Performed by: FAMILY MEDICINE

## 2023-11-17 PROCEDURE — 3078F DIAST BP <80 MM HG: CPT | Mod: CPTII,S$GLB,, | Performed by: FAMILY MEDICINE

## 2023-11-17 PROCEDURE — 3074F SYST BP LT 130 MM HG: CPT | Mod: CPTII,S$GLB,, | Performed by: FAMILY MEDICINE

## 2023-11-17 PROCEDURE — 1159F MED LIST DOCD IN RCRD: CPT | Mod: CPTII,S$GLB,, | Performed by: FAMILY MEDICINE

## 2023-11-17 PROCEDURE — 99999 PR PBB SHADOW E&M-EST. PATIENT-LVL V: CPT | Mod: PBBFAC,,, | Performed by: FAMILY MEDICINE

## 2023-11-17 RX ORDER — ZOLPIDEM TARTRATE 5 MG/1
5 TABLET ORAL NIGHTLY PRN
Qty: 30 TABLET | Refills: 2 | Status: CANCELLED | OUTPATIENT
Start: 2023-11-17

## 2023-11-17 RX ORDER — QUETIAPINE FUMARATE 25 MG/1
TABLET, FILM COATED ORAL
Qty: 180 TABLET | Refills: 1 | Status: SHIPPED | OUTPATIENT
Start: 2023-11-17 | End: 2024-03-13

## 2023-11-17 NOTE — PROGRESS NOTES
Subjective:       Patient ID: Brenda C Stargardter is a 58 y.o. female.    Chief Complaint: Establish Care and Medication Refill    Ms Stargardtner os a 57 yo female who is on medical marijuana, hydrocodone and ambien for insomnia and back pain. She has hypothyroidism, migraines, JAMESON, ADHD, lumbar and cervical disc disease, insomnia, malaise and fatigue,     Medication Refill  Associated symptoms include arthralgias, myalgias and neck pain. Pertinent negatives include no abdominal pain, chest pain, chills, congestion, coughing, diaphoresis, fever, nausea, rash or sore throat.     Review of Systems   Constitutional:  Negative for activity change, appetite change, chills, diaphoresis and fever.   HENT:  Negative for congestion, ear pain, postnasal drip, rhinorrhea and sore throat.    Eyes:  Negative for pain, discharge, redness and itching.   Respiratory:  Negative for cough and shortness of breath.    Cardiovascular:  Negative for chest pain, palpitations and leg swelling.   Gastrointestinal:  Negative for abdominal distention, abdominal pain, constipation, diarrhea and nausea.   Genitourinary:  Negative for difficulty urinating, dysuria, frequency and urgency.   Musculoskeletal:  Positive for arthralgias, back pain, myalgias and neck pain.   Skin:  Negative for color change, rash and wound.   Psychiatric/Behavioral:  Positive for sleep disturbance. The patient is nervous/anxious.    All other systems reviewed and are negative.      Patient Active Problem List   Diagnosis    Hypothyroidism    Immunoglobulin deficiency    Gastroparesis    Rectal polyp    HPV test positive    Poor sleep hygiene    Intractable chronic migraine without aura and without status migrainosus    Allergic rhinitis due to pollen    Epigastric abdominal pain    Malaise and fatigue    Diarrhea    Nausea    JAMESON (generalized anxiety disorder)    ADHD (attention deficit hyperactivity disorder), inattentive type    Lumbar radicular pain    Cervical  myofascial pain syndrome    Bilateral occipital neuralgia    Eosinophilic esophagitis    Cervical spondylolysis    BMI less than 19,adult    Constipation due to opioid therapy    Arthralgia of multiple joints    Thumb pain, right    Gastroesophageal reflux disease with esophagitis    Asthma, mild persistent    Migraines    Other selective immunoglobulin deficiencies    Burning mouth syndrome    Right shoulder pain    Decreased range of motion of right shoulder    Decreased strength of upper extremity    Insomnia due to medical condition    Nontraumatic incomplete tear of rotator cuff, right    Postural instability of trunk    Decreased range of motion of intervertebral discs of lumbar spine    Empty sella syndrome       Objective:      Physical Exam  Constitutional:       Appearance: Normal appearance.   HENT:      Head: Normocephalic.      Mouth/Throat:      Mouth: Mucous membranes are moist.   Eyes:      Pupils: Pupils are equal, round, and reactive to light.   Cardiovascular:      Rate and Rhythm: Normal rate and regular rhythm.   Skin:     General: Skin is warm and dry.   Neurological:      General: No focal deficit present.      Mental Status: She is alert and oriented to person, place, and time.   Psychiatric:         Mood and Affect: Mood normal.         Behavior: Behavior normal.         Lab Results   Component Value Date    WBC 7.0 06/21/2023    HGB 13.6 06/21/2023    HCT 39.9 06/21/2023     06/21/2023    CHOL 209 (H) 03/28/2023    TRIG 75 03/28/2023    HDL 79 03/28/2023    ALT 20 06/21/2023    AST 23 06/21/2023     06/21/2023    K 4.5 06/21/2023     06/21/2023    CREATININE 0.95 06/21/2023    BUN 25 06/21/2023    CO2 29 06/21/2023    TSH 1.350 08/09/2023    HGBA1C 5.0 12/28/2022    MICROALBUR 0.2 06/21/2023     The 10-year ASCVD risk score (Bailey CARSON, et al., 2019) is: 1.7%    Values used to calculate the score:      Age: 58 years      Sex: Female      Is Non- :  "No      Diabetic: No      Tobacco smoker: No      Systolic Blood Pressure: 108 mmHg      Is BP treated: No      HDL Cholesterol: 79 mg/dL      Total Cholesterol: 209 mg/dL  Visit Vitals  /68 (BP Location: Left arm, Patient Position: Sitting, BP Method: Medium (Manual))   Pulse 71   Resp 20   Ht 5' 7" (1.702 m)   Wt 57.9 kg (127 lb 9.6 oz)   SpO2 97%   BMI 19.98 kg/m²      Assessment:       1. Acquired hypothyroidism    2. JAMESON (generalized anxiety disorder)    3. Encounter to establish care    4. Encounter for lipid screening for cardiovascular disease    5. Diabetes mellitus screening    6. Insomnia, unspecified type    7. Degenerative disc disease at L5-S1 level        Plan:       1. Acquired hypothyroidism  -     TSH; Future; Expected date: 11/17/2023    2. JAMESON (generalized anxiety disorder)  -     QUEtiapine (SEROQUEL) 25 MG Tab; Take 1-2 po q hs for insomnia  Dispense: 180 tablet; Refill: 1    3. Encounter to establish care  -     Lipid Panel; Future; Expected date: 11/17/2023  -     CBC Auto Differential; Future; Expected date: 11/17/2023  -     Comprehensive Metabolic Panel; Future; Expected date: 11/17/2023  -     Hemoglobin A1C; Future; Expected date: 11/17/2023  -     TSH; Future; Expected date: 11/17/2023  -     Microalbumin/Creatinine Ratio, Urine; Future; Expected date: 11/17/2023    4. Encounter for lipid screening for cardiovascular disease  -     Lipid Panel; Future; Expected date: 11/17/2023    5. Diabetes mellitus screening  -     Hemoglobin A1C; Future; Expected date: 11/17/2023  -     Microalbumin/Creatinine Ratio, Urine; Future; Expected date: 11/17/2023    6. Insomnia, unspecified type  -     QUEtiapine (SEROQUEL) 25 MG Tab; Take 1-2 po q hs for insomnia  Dispense: 180 tablet; Refill: 1    7. Degenerative disc disease at L5-S1 level  -     Ambulatory referral/consult to Pain Clinic; Future; Expected date: 11/24/2023       Follow up in about 6 months (around 5/17/2024), or if symptoms " worsen or fail to improve.

## 2023-11-21 ENCOUNTER — LAB VISIT (OUTPATIENT)
Dept: LAB | Facility: HOSPITAL | Age: 58
End: 2023-11-21
Attending: FAMILY MEDICINE
Payer: COMMERCIAL

## 2023-11-21 DIAGNOSIS — Z76.89 ENCOUNTER TO ESTABLISH CARE: ICD-10-CM

## 2023-11-21 DIAGNOSIS — Z13.1 DIABETES MELLITUS SCREENING: ICD-10-CM

## 2023-11-21 DIAGNOSIS — E03.9 ACQUIRED HYPOTHYROIDISM: ICD-10-CM

## 2023-11-21 DIAGNOSIS — Z13.6 ENCOUNTER FOR LIPID SCREENING FOR CARDIOVASCULAR DISEASE: ICD-10-CM

## 2023-11-21 DIAGNOSIS — Z13.220 ENCOUNTER FOR LIPID SCREENING FOR CARDIOVASCULAR DISEASE: ICD-10-CM

## 2023-11-21 LAB
ALBUMIN SERPL BCP-MCNC: 4 G/DL (ref 3.5–5.2)
ALBUMIN/CREAT UR: 2.8 UG/MG (ref 0–30)
ALP SERPL-CCNC: 50 U/L (ref 55–135)
ALT SERPL W/O P-5'-P-CCNC: 20 U/L (ref 10–44)
ANION GAP SERPL CALC-SCNC: 10 MMOL/L (ref 8–16)
AST SERPL-CCNC: 28 U/L (ref 10–40)
BASOPHILS # BLD AUTO: 0.09 K/UL (ref 0–0.2)
BASOPHILS NFR BLD: 1.9 % (ref 0–1.9)
BILIRUB SERPL-MCNC: 0.7 MG/DL (ref 0.1–1)
BUN SERPL-MCNC: 16 MG/DL (ref 6–20)
CALCIUM SERPL-MCNC: 9.6 MG/DL (ref 8.7–10.5)
CHLORIDE SERPL-SCNC: 100 MMOL/L (ref 95–110)
CHOLEST SERPL-MCNC: 214 MG/DL (ref 120–199)
CHOLEST/HDLC SERPL: 2.3 {RATIO} (ref 2–5)
CO2 SERPL-SCNC: 28 MMOL/L (ref 23–29)
CREAT SERPL-MCNC: 1.1 MG/DL (ref 0.5–1.4)
CREAT UR-MCNC: 216 MG/DL (ref 15–325)
DIFFERENTIAL METHOD: NORMAL
EOSINOPHIL # BLD AUTO: 0.2 K/UL (ref 0–0.5)
EOSINOPHIL NFR BLD: 4.3 % (ref 0–8)
ERYTHROCYTE [DISTWIDTH] IN BLOOD BY AUTOMATED COUNT: 11.6 % (ref 11.5–14.5)
EST. GFR  (NO RACE VARIABLE): 57.9 ML/MIN/1.73 M^2
ESTIMATED AVG GLUCOSE: 97 MG/DL (ref 68–131)
GLUCOSE SERPL-MCNC: 85 MG/DL (ref 70–110)
HBA1C MFR BLD: 5 % (ref 4–5.6)
HCT VFR BLD AUTO: 40.6 % (ref 37–48.5)
HDLC SERPL-MCNC: 94 MG/DL (ref 40–75)
HDLC SERPL: 43.9 % (ref 20–50)
HGB BLD-MCNC: 13.3 G/DL (ref 12–16)
IMM GRANULOCYTES # BLD AUTO: 0 K/UL (ref 0–0.04)
IMM GRANULOCYTES NFR BLD AUTO: 0 % (ref 0–0.5)
LDLC SERPL CALC-MCNC: 103.6 MG/DL (ref 63–159)
LYMPHOCYTES # BLD AUTO: 1.6 K/UL (ref 1–4.8)
LYMPHOCYTES NFR BLD: 35.3 % (ref 18–48)
MCH RBC QN AUTO: 30.8 PG (ref 27–31)
MCHC RBC AUTO-ENTMCNC: 32.8 G/DL (ref 32–36)
MCV RBC AUTO: 94 FL (ref 82–98)
MICROALBUMIN UR DL<=1MG/L-MCNC: 6 UG/ML
MONOCYTES # BLD AUTO: 0.6 K/UL (ref 0.3–1)
MONOCYTES NFR BLD: 13.8 % (ref 4–15)
NEUTROPHILS # BLD AUTO: 2.1 K/UL (ref 1.8–7.7)
NEUTROPHILS NFR BLD: 44.7 % (ref 38–73)
NONHDLC SERPL-MCNC: 120 MG/DL
NRBC BLD-RTO: 0 /100 WBC
PLATELET # BLD AUTO: 216 K/UL (ref 150–450)
PMV BLD AUTO: 10.3 FL (ref 9.2–12.9)
POTASSIUM SERPL-SCNC: 4 MMOL/L (ref 3.5–5.1)
PROT SERPL-MCNC: 7.1 G/DL (ref 6–8.4)
RBC # BLD AUTO: 4.32 M/UL (ref 4–5.4)
SODIUM SERPL-SCNC: 138 MMOL/L (ref 136–145)
TRIGL SERPL-MCNC: 82 MG/DL (ref 30–150)
TSH SERPL DL<=0.005 MIU/L-ACNC: 2.27 UIU/ML (ref 0.4–4)
WBC # BLD AUTO: 4.64 K/UL (ref 3.9–12.7)

## 2023-11-21 PROCEDURE — 80053 COMPREHEN METABOLIC PANEL: CPT | Performed by: FAMILY MEDICINE

## 2023-11-21 PROCEDURE — 82043 UR ALBUMIN QUANTITATIVE: CPT | Performed by: FAMILY MEDICINE

## 2023-11-21 PROCEDURE — 36415 COLL VENOUS BLD VENIPUNCTURE: CPT | Performed by: FAMILY MEDICINE

## 2023-11-21 PROCEDURE — 84443 ASSAY THYROID STIM HORMONE: CPT | Performed by: FAMILY MEDICINE

## 2023-11-21 PROCEDURE — 83036 HEMOGLOBIN GLYCOSYLATED A1C: CPT | Performed by: FAMILY MEDICINE

## 2023-11-21 PROCEDURE — 80061 LIPID PANEL: CPT | Performed by: FAMILY MEDICINE

## 2023-11-21 PROCEDURE — 85025 COMPLETE CBC W/AUTO DIFF WBC: CPT | Performed by: FAMILY MEDICINE

## 2023-12-08 ENCOUNTER — PATIENT MESSAGE (OUTPATIENT)
Dept: FAMILY MEDICINE | Facility: CLINIC | Age: 58
End: 2023-12-08
Payer: COMMERCIAL

## 2023-12-08 RX ORDER — LEVOTHYROXINE SODIUM 50 UG/1
50 TABLET ORAL DAILY
Qty: 90 TABLET | Refills: 3 | Status: SHIPPED | OUTPATIENT
Start: 2023-12-08

## 2024-01-02 ENCOUNTER — OFFICE VISIT (OUTPATIENT)
Dept: OBSTETRICS AND GYNECOLOGY | Facility: CLINIC | Age: 59
End: 2024-01-02
Payer: COMMERCIAL

## 2024-01-02 VITALS
BODY MASS INDEX: 19.68 KG/M2 | HEIGHT: 67 IN | SYSTOLIC BLOOD PRESSURE: 112 MMHG | WEIGHT: 125.38 LBS | DIASTOLIC BLOOD PRESSURE: 76 MMHG

## 2024-01-02 DIAGNOSIS — R23.2 HOT FLASHES: ICD-10-CM

## 2024-01-02 DIAGNOSIS — Z79.890 HORMONE REPLACEMENT THERAPY (HRT): ICD-10-CM

## 2024-01-02 DIAGNOSIS — Z01.419 WOMEN'S ANNUAL ROUTINE GYNECOLOGICAL EXAMINATION: Primary | ICD-10-CM

## 2024-01-02 DIAGNOSIS — Z12.31 BREAST CANCER SCREENING BY MAMMOGRAM: ICD-10-CM

## 2024-01-02 PROCEDURE — 1159F MED LIST DOCD IN RCRD: CPT | Mod: CPTII,S$GLB,, | Performed by: OBSTETRICS & GYNECOLOGY

## 2024-01-02 PROCEDURE — 3078F DIAST BP <80 MM HG: CPT | Mod: CPTII,S$GLB,, | Performed by: OBSTETRICS & GYNECOLOGY

## 2024-01-02 PROCEDURE — 3008F BODY MASS INDEX DOCD: CPT | Mod: CPTII,S$GLB,, | Performed by: OBSTETRICS & GYNECOLOGY

## 2024-01-02 PROCEDURE — 3074F SYST BP LT 130 MM HG: CPT | Mod: CPTII,S$GLB,, | Performed by: OBSTETRICS & GYNECOLOGY

## 2024-01-02 PROCEDURE — 88175 CYTOPATH C/V AUTO FLUID REDO: CPT | Performed by: OBSTETRICS & GYNECOLOGY

## 2024-01-02 PROCEDURE — 87624 HPV HI-RISK TYP POOLED RSLT: CPT | Performed by: OBSTETRICS & GYNECOLOGY

## 2024-01-02 PROCEDURE — 99386 PREV VISIT NEW AGE 40-64: CPT | Mod: S$GLB,,, | Performed by: OBSTETRICS & GYNECOLOGY

## 2024-01-02 RX ORDER — ESTRADIOL 0.04 MG/D
FILM, EXTENDED RELEASE TRANSDERMAL
Qty: 8 PATCH | Refills: 12 | Status: SHIPPED | OUTPATIENT
Start: 2024-01-02

## 2024-01-02 NOTE — PROGRESS NOTES
Annual Well Woman's Exam  History & Physical      SUBJECTIVE:     History of Present Illness:  Patient is a 58 y.o. female presents for her annual exam.  She reports a hysterectomy with BSO in 2015 for cervical dysplasia.  She has been taking hormone replacement since that time.  She reports hot flashes associated with increased cortisol levels early in the morning.  She reports that this feels different than menopausal hot flashes.    Lastly, she is complaining of low libido and is interested in restarting testosterone cream.    Chief Complaint   Patient presents with    Well Woman       Review of patient's allergies indicates:   Allergen Reactions    Reglan [metoclopramide hcl] Other (See Comments)     Weakness could not hold an object in her hands.    Benadryl [diphenhydramine hcl] Other (See Comments)     Restless leg, aggitation    Strattera [atomoxetine] Palpitations    Dilaudid [hydromorphone]     Metoclopramide Other (See Comments)     Muscle weakness and shakes    Phenergan [promethazine] Other (See Comments)     Akathisia      Prednisone Nausea And Vomiting    Sulfa (sulfonamide antibiotics) Other (See Comments)     Muscle weakness    Nsaids (non-steroidal anti-inflammatory drug) Nausea And Vomiting       Current Outpatient Medications   Medication Sig Dispense Refill    albuterol sulfate (PROAIR RESPICLICK) 90 mcg/actuation inhaler Inhale 2 puffs into the lungs every 6 (six) hours as needed for Wheezing or Shortness of Breath. Rescue 1 each 3    clobetasol 0.05% (TEMOVATE) 0.05 % Oint 1 application daily as needed.      clotrimazole-betamethasone 1-0.05% (LOTRISONE) cream Apply topically as needed.       COLLAGEN MISC by Misc.(Non-Drug; Combo Route) route.      diclofenac (FLECTOR) 1.3 % PT12 APPLY 1 PATCH BY TRANSDERMAL ROUTE 2 TIMES EVERY DAY TO MOST PAINFUL AREA      estradioL (VIVELLE-DOT) 0.0375 mg/24 hr estradiol 0.0375 mg/24 hr semiweekly transdermal patch      FEXOFENADINE HCL (ALLEGRA ORAL) Take  by mouth.      INTRAROSA 6.5 mg Inst INSERT 1 VAGINAL INSERT EVERY DAY BY VAGINAL ROUTE.      levothyroxine (SYNTHROID) 50 MCG tablet Take 1 tablet (50 mcg total) by mouth once daily. 90 tablet 3    liothyronine (CYTOMEL) 25 MCG Tab Take 25 mcg by mouth once daily.      MG TRISILICATE/ALH/NAHCO3/AA (GAVISCON ORAL) Take 5 mLs by mouth as needed.       onabotulinumtoxina (BOTOX) 200 unit SolR Inject 155 Units into the muscle into the head/neck area every 90 days 1 each 3    ondansetron (ZOFRAN-ODT) 8 MG TbDL Take 1 tablet (8 mg total) by mouth every 6 (six) hours as needed (nausea). 30 tablet 11    PROCTOSOL HC 2.5 % rectal cream Place 1 Dose rectally as needed.       progesterone (PROMETRIUM) 200 MG capsule Take 200 mg by mouth once daily.      QUEtiapine (SEROQUEL) 25 MG Tab Take 1-2 po q hs for insomnia 180 tablet 1    rimegepant (NURTEC) 75 mg odt Take 1 tablet (75 mg total) by mouth daily as needed for Migraine. Place ODT tablet on the tongue; alternatively the ODT tablet may be placed under the tongue 8 tablet 5    sumatriptan (IMITREX) 100 MG tablet TAKE 1 BY MOUTH EVERY 2 HRS. AS NEEDED FOR MIGRAINE. UP  MG/DAY. HOLD FOR BP > 150 MM SYSTOLIC 9 tablet 11    testosterone (ANDROGEL) 1 % (50 mg/5 gram) GlPk APPLY 0.5 ML TO INNER THIGH EVERY DAY AS DIRECTED      zolpidem (AMBIEN) 5 MG Tab Take 1 tablet (5 mg total) by mouth nightly as needed (insomnia). 30 tablet 2    galcanezumab-gnlm 120 mg/mL PnIj Inject 2 pens (240 mg) subcutaneous at separate sites, once (loading dose). Start maintenance dose 28 days later (Patient taking differently: Inject 2 pens (240 mg) subcutaneous at separate sites, once (loading dose). Start maintenance dose 28 days later) 2 mL 0     No current facility-administered medications for this visit.     OB History          4    Para   4    Term   4            AB        Living             SAB        IAB        Ectopic        Multiple        Live Births                 No  LMP recorded. Patient has had a hysterectomy.      Past Medical History:   Diagnosis Date    Abdominal or pelvic swelling, mass, or lump, right lower quadrant     Anxiety     Arthritis     Asthma, mild persistent     Cervicalgia     Enlargement of lymph nodes     Eosinophilic esophagitis     Gastroparesis     Headache(784.0)     Heartburn     Helicobacter pylori (H. pylori)     History of positive PPD, untreated     History of psychiatric hospitalization     after son , hospitalized for eating disorder    Hx of psychiatric care     Hypothyroidism     Migraines     Occipital neuralgia     Other selective immunoglobulin deficiencies     Other syndromes affecting cervical region     Psychiatric problem     Spondylosis of cervical spine     Therapy     Unspecified asthma(493.90)     Unspecified disorder of thyroid     Unspecified viral meningitis      Past Surgical History:   Procedure Laterality Date    AXILLARY ABCESS IRRIGATION AND DEBRIDEMENT      CERVICAL BIOPSY  W/ LOOP ELECTRODE EXCISION      CHOLECYSTECTOMY      COLPOSCOPY      EXCISIONAL HEMORRHOIDECTOMY  2015    HYSTERECTOMY  2015    LYMPH NODE BIOPSY Right 2014    Right inguinal lymph node biopsy    MOUTH SURGERY      ROTATOR CUFF REPAIR Right 2021    TOTAL ABDOMINAL HYSTERECTOMY W/ BILATERAL SALPINGOOPHORECTOMY  2015    VARICOSE VEIN SURGERY       Family History   Problem Relation Age of Onset    Stroke Mother     Hypertension Mother     Cerebral aneurysm Mother     Osteoarthritis Mother     Asthma Mother     Hypertension Father     Heart disease Father     Leukemia Father     Cancer Father         Leukemia    Diabetes Father     Migraines Father     Thyroid disease Father     Pneumonia Sister     No Known Problems Brother     No Known Problems Brother     No Known Problems Sister     Breast cancer Paternal Grandmother         70's    Cancer Paternal Grandmother      Social History     Tobacco Use    Smoking status: Never    Smokeless  "tobacco: Never   Substance Use Topics    Alcohol use: Not Currently     Alcohol/week: 1.0 - 2.0 standard drink of alcohol     Types: 1 - 2 Cans of beer per week     Comment: Social    Drug use: Yes     Types: Marijuana     Comment: Medical marijuana        OBJECTIVE:     Vital Signs (Most Recent)  BP: 112/76 (01/02/24 1029)  5' 7" (1.702 m)  56.9 kg (125 lb 6.4 oz)     Physical Exam:  Physical Exam  Vitals reviewed.   Constitutional:       Appearance: Normal appearance.   HENT:      Head: Normocephalic.   Neck:      Thyroid: No thyroid mass, thyromegaly or thyroid tenderness.   Pulmonary:      Effort: Pulmonary effort is normal.   Chest:   Breasts:     Right: Normal.      Left: Normal.   Abdominal:      General: Abdomen is flat.      Palpations: Abdomen is soft.   Genitourinary:     General: Normal vulva.      Vagina: Normal.      Uterus: Absent.    Lymphadenopathy:      Upper Body:      Right upper body: No axillary adenopathy.      Left upper body: No axillary adenopathy.   Skin:     General: Skin is warm and dry.   Neurological:      General: No focal deficit present.      Mental Status: She is alert and oriented to person, place, and time.   Psychiatric:         Mood and Affect: Mood normal.         Behavior: Behavior normal.         ASSESSMENT/PLAN:       ICD-10-CM ICD-9-CM   1. Women's annual routine gynecological examination  Z01.419 V72.31       PLAN:    --Vaginal Pap with cotesting today. If negative x 3 consecutive years, will cotest every 3 years.   --Mammogram ordered.  --Colonoscopy is up-to-date.  Last performed in September.  Next due in 2026.  --HRT refill ordered  --Given hot flashes the patient is experiencing with increased cortisol levels in the morning, recommend cardiac workup prior to testosterone prescription.  She reports a family history of heart disease.  Her lipid panel is normal.  --Follow up in 1 year for annual exam or sooner as needed    [place] MD   Gynecology    [address]  [address]  [place] [address] [phone]

## 2024-01-09 LAB
HPV HR 12 DNA SPEC QL NAA+PROBE: NEGATIVE
HPV16 AG SPEC QL: NEGATIVE
HPV18 DNA SPEC QL NAA+PROBE: NEGATIVE

## 2024-01-11 ENCOUNTER — HOSPITAL ENCOUNTER (OUTPATIENT)
Dept: RADIOLOGY | Facility: HOSPITAL | Age: 59
Discharge: HOME | End: 2024-01-11
Attending: OBSTETRICS & GYNECOLOGY
Payer: COMMERCIAL

## 2024-01-11 DIAGNOSIS — Z12.31 BREAST CANCER SCREENING BY MAMMOGRAM: ICD-10-CM

## 2024-01-11 DIAGNOSIS — Z01.419 WOMEN'S ANNUAL ROUTINE GYNECOLOGICAL EXAMINATION: ICD-10-CM

## 2024-01-11 LAB
FINAL PATHOLOGIC DIAGNOSIS: NORMAL
Lab: NORMAL

## 2024-01-11 PROCEDURE — 77067 SCR MAMMO BI INCL CAD: CPT | Mod: 26,,, | Performed by: RADIOLOGY

## 2024-01-11 PROCEDURE — 77063 BREAST TOMOSYNTHESIS BI: CPT | Mod: 26,,, | Performed by: RADIOLOGY

## 2024-01-11 PROCEDURE — 77067 SCR MAMMO BI INCL CAD: CPT | Mod: TC

## 2024-03-13 ENCOUNTER — OFFICE VISIT (OUTPATIENT)
Dept: FAMILY MEDICINE | Facility: CLINIC | Age: 59
End: 2024-03-13
Payer: COMMERCIAL

## 2024-03-13 VITALS
HEART RATE: 68 BPM | SYSTOLIC BLOOD PRESSURE: 100 MMHG | DIASTOLIC BLOOD PRESSURE: 72 MMHG | HEIGHT: 67 IN | RESPIRATION RATE: 18 BRPM | BODY MASS INDEX: 20.28 KG/M2 | WEIGHT: 129.19 LBS | OXYGEN SATURATION: 98 %

## 2024-03-13 DIAGNOSIS — F51.01 PRIMARY INSOMNIA: Primary | ICD-10-CM

## 2024-03-13 DIAGNOSIS — Z51.81 MEDICATION MONITORING ENCOUNTER: ICD-10-CM

## 2024-03-13 PROCEDURE — 99213 OFFICE O/P EST LOW 20 MIN: CPT | Mod: S$GLB,,, | Performed by: FAMILY MEDICINE

## 2024-03-13 PROCEDURE — 1160F RVW MEDS BY RX/DR IN RCRD: CPT | Mod: CPTII,S$GLB,, | Performed by: FAMILY MEDICINE

## 2024-03-13 PROCEDURE — 3078F DIAST BP <80 MM HG: CPT | Mod: CPTII,S$GLB,, | Performed by: FAMILY MEDICINE

## 2024-03-13 PROCEDURE — 80307 DRUG TEST PRSMV CHEM ANLYZR: CPT | Performed by: FAMILY MEDICINE

## 2024-03-13 PROCEDURE — 3074F SYST BP LT 130 MM HG: CPT | Mod: CPTII,S$GLB,, | Performed by: FAMILY MEDICINE

## 2024-03-13 PROCEDURE — 99999 PR PBB SHADOW E&M-EST. PATIENT-LVL III: CPT | Mod: PBBFAC,,, | Performed by: FAMILY MEDICINE

## 2024-03-13 PROCEDURE — 3008F BODY MASS INDEX DOCD: CPT | Mod: CPTII,S$GLB,, | Performed by: FAMILY MEDICINE

## 2024-03-13 PROCEDURE — 1159F MED LIST DOCD IN RCRD: CPT | Mod: CPTII,S$GLB,, | Performed by: FAMILY MEDICINE

## 2024-03-13 RX ORDER — ZOLPIDEM TARTRATE 5 MG/1
5 TABLET ORAL NIGHTLY PRN
Qty: 30 TABLET | Refills: 2 | Status: SHIPPED | OUTPATIENT
Start: 2024-03-13

## 2024-03-13 NOTE — PROGRESS NOTES
"Subjective:       Patient ID: Brenda C Stargardter is a 58 y.o. female.    Chief Complaint: Establish Care (Discuss sleeping issues.)    Brenda C. Stargardter presents today to establish care.   They are new to me but established with [facility].  Former patient of Dr. Mcqueen.     Wants to discuss insomnia today.   She has tried taking   L-thionin, magnesium, melatonin, mireya.   She has not been able to stay asleep.     The ambien that she was taking helped her maintain her sleep. She was taking ambien for "a while". She was once upon a time on ambien 10mg and she has been on 5mg.   Periodically when she is doing better she will cut that in half.     She has tried to go without the ambien. She has made all the lifestyle changes.   She exercises regularly.     She historically took medical marijuana from her pain management doctor. She was going to use this to get off of pain medication. She has mostly been using CBD oil. She went through the healthy back program.     She has been very active- she is paddleboarding.     She has chronic neck and back pain- she does not have to take anything for this during the day- she has occasionally taken delta-9 which is synthetic THC. She is no longer on opioids.  reviewed and reflects.       Review of Systems   Constitutional:  Negative for activity change, appetite change, fatigue and fever.   Respiratory:  Negative for shortness of breath.    Gastrointestinal:  Negative for abdominal pain.   Integumentary:  Negative for rash.   Psychiatric/Behavioral:  Positive for sleep disturbance.          Objective:      Physical Exam  Vitals and nursing note reviewed.   Constitutional:       General: She is not in acute distress.     Appearance: She is not ill-appearing.   Cardiovascular:      Rate and Rhythm: Normal rate and regular rhythm.      Heart sounds: No murmur heard.  Pulmonary:      Effort: Pulmonary effort is normal.      Breath sounds: Normal breath sounds. No " wheezing.   Skin:     General: Skin is warm and dry.      Findings: No rash.   Neurological:      Mental Status: She is alert.   Psychiatric:         Mood and Affect: Mood normal.         Behavior: Behavior normal.         Assessment:       1. Primary insomnia    2. Medication monitoring encounter        Plan:       Problem List Items Addressed This Visit    None  Visit Diagnoses       Primary insomnia    -  Primary    Relevant Medications    zolpidem (AMBIEN) 5 MG Tab    Other Relevant Orders    Drug screen panel, in-house    Medication monitoring encounter        Relevant Orders    Drug screen panel, in-house              Prescription drug monitoring program has been reviewed and is consistent with patient's prescription history. There is no evidence of early fills or obtaining controlled rx's from multiple providers.   Controlled agreement today  UDS today

## 2024-03-14 LAB
AMPHET+METHAMPHET UR QL: NEGATIVE
BARBITURATES UR QL SCN>200 NG/ML: NEGATIVE
BENZODIAZ UR QL SCN>200 NG/ML: NEGATIVE
BZE UR QL SCN: NEGATIVE
CANNABINOIDS UR QL SCN: ABNORMAL
CREAT UR-MCNC: 29.6 MG/DL (ref 15–325)
METHADONE UR QL SCN>300 NG/ML: NEGATIVE
OPIATES UR QL SCN: NEGATIVE
PCP UR QL SCN>25 NG/ML: NEGATIVE
TOXICOLOGY INFORMATION: ABNORMAL

## 2024-05-30 ENCOUNTER — HOSPITAL ENCOUNTER (EMERGENCY)
Facility: HOSPITAL | Age: 59
Discharge: ANOTHER HOSPITAL | End: 2024-05-30
Attending: EMERGENCY MEDICINE
Payer: COMMERCIAL

## 2024-05-30 VITALS
BODY MASS INDEX: 19.93 KG/M2 | TEMPERATURE: 98 F | SYSTOLIC BLOOD PRESSURE: 119 MMHG | HEIGHT: 67 IN | OXYGEN SATURATION: 99 % | RESPIRATION RATE: 21 BRPM | WEIGHT: 127 LBS | HEART RATE: 68 BPM | DIASTOLIC BLOOD PRESSURE: 72 MMHG

## 2024-05-30 DIAGNOSIS — T18.108A ESOPHAGEAL FOREIGN BODY, INITIAL ENCOUNTER: Primary | ICD-10-CM

## 2024-05-30 PROCEDURE — 94760 N-INVAS EAR/PLS OXIMETRY 1: CPT

## 2024-05-30 PROCEDURE — 70490 CT SOFT TISSUE NECK W/O DYE: CPT | Mod: 26,,, | Performed by: STUDENT IN AN ORGANIZED HEALTH CARE EDUCATION/TRAINING PROGRAM

## 2024-05-30 PROCEDURE — 96375 TX/PRO/DX INJ NEW DRUG ADDON: CPT

## 2024-05-30 PROCEDURE — 63600175 PHARM REV CODE 636 W HCPCS: Performed by: FAMILY MEDICINE

## 2024-05-30 PROCEDURE — 99285 EMERGENCY DEPT VISIT HI MDM: CPT | Mod: 25

## 2024-05-30 PROCEDURE — 63600175 PHARM REV CODE 636 W HCPCS: Mod: JZ,JG | Performed by: EMERGENCY MEDICINE

## 2024-05-30 PROCEDURE — 70490 CT SOFT TISSUE NECK W/O DYE: CPT | Mod: TC

## 2024-05-30 PROCEDURE — 96374 THER/PROPH/DIAG INJ IV PUSH: CPT

## 2024-05-30 PROCEDURE — 96376 TX/PRO/DX INJ SAME DRUG ADON: CPT

## 2024-05-30 PROCEDURE — 25000003 PHARM REV CODE 250: Performed by: EMERGENCY MEDICINE

## 2024-05-30 PROCEDURE — 94761 N-INVAS EAR/PLS OXIMETRY MLT: CPT

## 2024-05-30 RX ORDER — ALUMINUM HYDROXIDE, MAGNESIUM HYDROXIDE, AND SIMETHICONE 1200; 120; 1200 MG/30ML; MG/30ML; MG/30ML
30 SUSPENSION ORAL
Status: COMPLETED | OUTPATIENT
Start: 2024-05-30 | End: 2024-05-30

## 2024-05-30 RX ORDER — ONDANSETRON HYDROCHLORIDE 2 MG/ML
4 INJECTION, SOLUTION INTRAVENOUS
Status: COMPLETED | OUTPATIENT
Start: 2024-05-30 | End: 2024-05-30

## 2024-05-30 RX ORDER — LIOTHYRONINE SODIUM 25 UG/1
1 TABLET ORAL DAILY
COMMUNITY

## 2024-05-30 RX ORDER — GLUCAGON 1 MG
1 KIT INJECTION
Status: COMPLETED | OUTPATIENT
Start: 2024-05-30 | End: 2024-05-30

## 2024-05-30 RX ORDER — MORPHINE SULFATE 4 MG/ML
4 INJECTION, SOLUTION INTRAMUSCULAR; INTRAVENOUS
Status: COMPLETED | OUTPATIENT
Start: 2024-05-30 | End: 2024-05-30

## 2024-05-30 RX ORDER — LIDOCAINE HYDROCHLORIDE 20 MG/ML
15 SOLUTION OROPHARYNGEAL
Status: COMPLETED | OUTPATIENT
Start: 2024-05-30 | End: 2024-05-30

## 2024-05-30 RX ORDER — FENTANYL CITRATE 50 UG/ML
50 INJECTION, SOLUTION INTRAMUSCULAR; INTRAVENOUS
Status: COMPLETED | OUTPATIENT
Start: 2024-05-30 | End: 2024-05-30

## 2024-05-30 RX ORDER — OXYCODONE AND ACETAMINOPHEN 7.5; 325 MG/1; MG/1
1 TABLET ORAL EVERY 4 HOURS PRN
COMMUNITY
Start: 2023-12-02

## 2024-05-30 RX ORDER — FLUTICASONE FUROATE AND VILANTEROL 100; 25 UG/1; UG/1
POWDER RESPIRATORY (INHALATION)
COMMUNITY

## 2024-05-30 RX ADMIN — MORPHINE SULFATE 4 MG: 4 INJECTION, SOLUTION INTRAMUSCULAR; INTRAVENOUS at 05:05

## 2024-05-30 RX ADMIN — ALUMINUM HYDROXIDE, MAGNESIUM HYDROXIDE, AND SIMETHICONE 30 ML: 1200; 120; 1200 SUSPENSION ORAL at 03:05

## 2024-05-30 RX ADMIN — FENTANYL CITRATE 50 MCG: 100 INJECTION, SOLUTION INTRAMUSCULAR; INTRAVENOUS at 07:05

## 2024-05-30 RX ADMIN — LIDOCAINE HYDROCHLORIDE 15 ML: 20 SOLUTION ORAL at 03:05

## 2024-05-30 RX ADMIN — ONDANSETRON 4 MG: 2 INJECTION INTRAMUSCULAR; INTRAVENOUS at 05:05

## 2024-05-30 RX ADMIN — ONDANSETRON 4 MG: 2 INJECTION INTRAMUSCULAR; INTRAVENOUS at 07:05

## 2024-05-30 RX ADMIN — GLUCAGON HYDROCHLORIDE 1 MG: KIT at 03:05

## 2024-05-30 NOTE — ED PROVIDER NOTES
History     Chief Complaint   Patient presents with    Foreign Body     Patient eating chicken when sudden sensation of feeling like something is stuck in esophagus.  Swallowed milk after incident with some difficulty, but it did pass.  Patient feels like throat is welling.  RR regular and non labored.  Patient speaking full sentences without difficulty.  No drooling.     HPI:  Brenda C Stargardter is a 59 y.o. female with PMH as below who presents to the [facility] emergency department for evaluation of sensation of sharp food impaction in her throat.  She was eating chicken, and states she could've eaten a bone. She tried olive oil and a banana afterward to try to push things down which she didn't vomit up afterward; tolerating secretions. She has history of EOE, not recent issues. Not on a PPI. She has had no prior episodes.     PCP: Savannah Mcneill MD    Review of patient's allergies indicates:   Allergen Reactions    Reglan [metoclopramide hcl] Other (See Comments)     Weakness could not hold an object in her hands.    Benadryl [diphenhydramine hcl] Other (See Comments)     Restless leg, aggitation    Strattera [atomoxetine] Palpitations    Dilaudid [hydromorphone]     Metoclopramide Other (See Comments)     Muscle weakness and shakes    Phenergan [promethazine] Other (See Comments)     Akathisia      Prednisone Nausea And Vomiting    Sulfa (sulfonamide antibiotics) Other (See Comments)     Muscle weakness    Nsaids (non-steroidal anti-inflammatory drug) Nausea And Vomiting      Past Medical History:   Diagnosis Date    Abdominal or pelvic swelling, mass, or lump, right lower quadrant     Anxiety     Arthritis     Asthma, mild persistent     Cervicalgia     Enlargement of lymph nodes     Eosinophilic esophagitis     Gastroparesis     Headache(784.0)     Heartburn     Helicobacter pylori (H. pylori)     History of positive PPD, untreated     History of psychiatric hospitalization     after son ,  hospitalized for eating disorder    Hx of psychiatric care     Hypothyroidism     Migraines     Occipital neuralgia     Other selective immunoglobulin deficiencies     Other syndromes affecting cervical region     Psychiatric problem     Spondylosis of cervical spine     Therapy     Unspecified asthma(493.90)     Unspecified disorder of thyroid     Unspecified viral meningitis      Past Surgical History:   Procedure Laterality Date    AXILLARY ABCESS IRRIGATION AND DEBRIDEMENT      CERVICAL BIOPSY  W/ LOOP ELECTRODE EXCISION      CHOLECYSTECTOMY      COLPOSCOPY      EXCISIONAL HEMORRHOIDECTOMY  12/21/2015    HYSTERECTOMY  2015    LYMPH NODE BIOPSY Right 2014    Right inguinal lymph node biopsy    MOUTH SURGERY      ROTATOR CUFF REPAIR Right 09/14/2021    TOTAL ABDOMINAL HYSTERECTOMY W/ BILATERAL SALPINGOOPHORECTOMY  2015    VARICOSE VEIN SURGERY         Family History   Problem Relation Name Age of Onset    Stroke Mother Henna Zhou     Hypertension Mother Henna Zhou     Cerebral aneurysm Mother Henna Zhou     Osteoarthritis Mother Henna Zhou     Asthma Mother Henna Zhou     Hypertension Father Joselito Post     Heart disease Father Joselito Post     Leukemia Father Joselito Post     Cancer Father Joselito Post         Leukemia    Diabetes Father Joselito Post     Migraines Father Joselito Post     Thyroid disease Father Joselito Post     Pneumonia Sister      No Known Problems Brother      No Known Problems Brother      No Known Problems Sister      Breast cancer Paternal Grandmother Katalina         70's    Cancer Paternal Grandmother Katalina      Social History     Tobacco Use    Smoking status: Never    Smokeless tobacco: Never   Substance and Sexual Activity    Alcohol use: Not Currently     Alcohol/week: 1.0 - 2.0 standard drink of alcohol     Types: 1 - 2 Cans of beer per week     Comment: Social    Drug use: Yes     Types: Marijuana     Comment: Medical marijuana    Sexual activity: Yes      "Partners: Male     Birth control/protection: See Surgical Hx, None      Review of Systems     Review of Systems   Constitutional: Negative.    HENT: Negative.     Eyes: Negative.    Respiratory: Negative.  Negative for cough and shortness of breath.    Cardiovascular: Negative.    Gastrointestinal: Negative.  Negative for vomiting.   Endocrine: Negative.    Genitourinary: Negative.    Musculoskeletal: Negative.    Skin: Negative.    Allergic/Immunologic: Negative.    Neurological: Negative.    Hematological: Negative.    Psychiatric/Behavioral: Negative.     All other systems reviewed and are negative.       Physical Exam     Initial Vitals [05/30/24 1447]   BP Pulse Resp Temp SpO2   (!) 139/92 99 16 98 °F (36.7 °C) 100 %      MAP       --          Nursing notes and vital signs reviewed.  Constitutional: Patient is in moderate acute distress.   Head: Normocephalic. Atraumatic.   Eyes:  Conjunctivae are not pale. No scleral icterus.   ENT: Mucous membranes moist. OP clear.   Neck: Supple. Anterior neck tenderness.   Cardiovascular: Regular rate. Regular rhythm.   Pulmonary: No respiratory distress. Clear to auscultation bilaterally   Abdominal: Non-distended.   Musculoskeletal: Moves all extremities. No obvious deformities.   Skin: Warm and dry.   Neurological:  Alert, awake, and appropriate. Normal speech. No acute lateralizing neurologic deficits appreciated.   Psychiatric: Normal affect.       ED Course   Procedures  Vitals:    05/30/24 1447 05/30/24 1500 05/30/24 1516 05/30/24 1600   BP: (!) 139/92 119/75  113/71   Pulse: 99 80  73   Resp: 16 15  (!) 23   Temp: 98 °F (36.7 °C)      TempSrc: Oral      SpO2: 100% 100%  100%   Weight: 57.6 kg (127 lb)      Height:   5' 7" (1.702 m)     05/30/24 1750 05/30/24 1802 05/30/24 1902 05/30/24 1932   BP:  131/78 126/77 123/74   Pulse:  70 69 65   Resp: 20 20 18 19   Temp:       TempSrc:       SpO2:  98% 99% 97%   Weight:       Height:        05/30/24 1953 05/30/24 2007 " 05/30/24 2037 05/30/24 2122   BP:  126/75 119/72    Pulse:  70 68 68   Resp: 20 20 (!) 23 (!) 21   Temp:       TempSrc:       SpO2:  97% 98% 99%   Weight:       Height:         Lab Results Interpreted as Abnormal:  Labs Reviewed - No data to display   All Lab Results:  Results for orders placed or performed in visit on 03/13/24   Drug screen panel, in-house   Result Value Ref Range    Benzodiazepines Negative Negative    Methadone metabolites Negative Negative    Cocaine (Metab.) Negative Negative    Opiate Scrn, Ur Negative Negative    Barbiturate Screen, Ur Negative Negative    Amphetamine Screen, Ur Negative Negative    THC Presumptive Positive (A) Negative    Phencyclidine Negative Negative    Creatinine, Urine 29.6 15.0 - 325.0 mg/dL    Toxicology Information SEE COMMENT      Imaging Results               CT Soft Tissue Neck WO Contrast (Final result)  Result time 05/30/24 17:11:30      Final result by Todd Melo MD (05/30/24 17:11:30)                   Impression:      Linear hyperdense structure noted in the proximal esophagus, nonspecific however could represent an ingested bone/foreign body.  Lesion distend the lateral margin of the esophagus without definite evidence of perforation.  Recommend correlation with patient history and further evaluation with EGD.    This report was flagged in Epic as abnormal.      Electronically signed by: Todd Melo  Date:    05/30/2024  Time:    17:11               Narrative:    EXAMINATION:  CT SOFT TISSUE NECK WITHOUT CONTRAST    CLINICAL HISTORY:  food impaction;    TECHNIQUE:  Low dose axial images as well as sagittal and coronal reconstructions were performed from the skull base to the clavicles.  No contrast was administered.    COMPARISON:  None.    FINDINGS:  Skull Base and Brain (limited evaluation): No obvious abnormality.    Sinuses and Mastoid Air Cells: Essentially clear    Salivary Glands: Unremarkable.    Thyroid: Unremarkable.    Cervical Lymph  Nodes: No pathologic enlargement.    Pharynx/Larynx: Normal, with preserved fat planes.    Upper esophagus: Linear hyperdense structure noted in the proximal esophagus oriented transversely (series 5, image 44; series 2 image 48).  Lesion distends the lateral margins of the esophagus without definite evidence of perforation.  Mild surrounding fluid and air in the proximal esophagus.    Vasculature (limited evaluation): Unremarkable, however noting limited evaluation on noncontrast exam.    Upper Airways and Lungs: Mild biapical fibronodular changes.  No focal consolidation.    Bones: Slight reversal of the normal cervical lordosis.  No acute fracture or suspicious lytic or sclerotic lesion.                                     ED Physician's independent review of the above imaging: agree with radiologist.    The emergency physician reviewed the vital signs and test results, which are outlined above.     ED Discussion      Dr. Barajas at [facility] accepts patient for transfer for GI.    Accepting physician: Dr. Barajas  Transfer type: ED to ED       ED Medication(s) Administered:  Medications   glucagon (human recombinant) injection 1 mg (1 mg Intravenous Given 5/30/24 1506)   aluminum-magnesium hydroxide-simethicone 200-200-20 mg/5 mL suspension 30 mL (30 mLs Oral Given 5/30/24 1552)     And   LIDOcaine viscous HCl 2% oral solution 15 mL (15 mLs Oral Given 5/30/24 1552)   morphine injection 4 mg (4 mg Intravenous Given 5/30/24 1750)   ondansetron injection 4 mg (4 mg Intravenous Given 5/30/24 1750)   fentaNYL 50 mcg/mL injection 50 mcg (50 mcg Intravenous Given 5/30/24 1953)   ondansetron injection 4 mg (4 mg Intravenous Given 5/30/24 1953)       Prescription Management: I performed a review of the patient's current Rx medication list as input by nursing staff.    Discharge Medication List as of 5/30/2024  9:49 PM        CONTINUE these medications which have NOT CHANGED    Details   oxyCODONE-acetaminophen  (PERCOCET) 7.5-325 mg per tablet Take 1 tablet by mouth every 4 (four) hours as needed., Starting Sat 12/2/2023, Historical Med      albuterol sulfate (PROAIR RESPICLICK) 90 mcg/actuation inhaler Inhale 2 puffs into the lungs every 6 (six) hours as needed for Wheezing or Shortness of Breath. Rescue, Starting Wed 10/18/2023, Normal      clobetasol 0.05% (TEMOVATE) 0.05 % Oint 1 application daily as needed., Starting Wed 10/20/2021, Historical Med      clotrimazole-betamethasone 1-0.05% (LOTRISONE) cream Apply topically as needed. , Starting Fri 12/4/2015, Historical Med      COLLAGEN MISC by Misc.(Non-Drug; Combo Route) route., Historical Med      diclofenac (FLECTOR) 1.3 % PT12 APPLY 1 PATCH BY TRANSDERMAL ROUTE 2 TIMES EVERY DAY TO MOST PAINFUL AREA, Historical Med      estradioL (VIVELLE-DOT) 0.0375 mg/24 hr estradiol 0.0375 mg/24 hr semiweekly transdermal patch, Normal      FEXOFENADINE HCL (ALLEGRA ORAL) Take by mouth., Historical Med      fluticasone furoate-vilanteroL (BREO) 100-25 mcg/dose diskus inhaler Historical Med      INTRAROSA 6.5 mg Inst INSERT 1 VAGINAL INSERT EVERY DAY BY VAGINAL ROUTE., Historical Med      levothyroxine (SYNTHROID) 50 MCG tablet Take 1 tablet (50 mcg total) by mouth once daily., Starting Fri 12/8/2023, Normal      liothyronine (CYTOMEL) 25 MCG Tab Take 1 tablet by mouth once daily., Historical Med      MG TRISILICATE/ALH/NAHCO3/AA (GAVISCON ORAL) Take 5 mLs by mouth as needed. , Until Discontinued, Historical Med      onabotulinumtoxina (BOTOX) 200 unit SolR Inject 155 Units into the muscle into the head/neck area every 90 days, Starting Thu 11/15/2018, Normal      ondansetron (ZOFRAN-ODT) 8 MG TbDL Take 1 tablet (8 mg total) by mouth every 6 (six) hours as needed (nausea)., Starting Thu 4/2/2020, Normal      PROCTOSOL HC 2.5 % rectal cream Place 1 Dose rectally as needed. , Starting 12/8/2015, Until Discontinued, Historical Med      progesterone (PROMETRIUM) 200 MG capsule Take  200 mg by mouth once daily., Historical Med      rimegepant (NURTEC) 75 mg odt Take 1 tablet (75 mg total) by mouth daily as needed for Migraine. Place ODT tablet on the tongue; alternatively the ODT tablet may be placed under the tongue, Starting Wed 3/1/2023, Normal      sumatriptan (IMITREX) 100 MG tablet TAKE 1 BY MOUTH EVERY 2 HRS. AS NEEDED FOR MIGRAINE. UP  MG/DAY. HOLD FOR BP > 150 MM SYSTOLIC, Normal      testosterone (ANDROGEL) 1 % (50 mg/5 gram) GlPk APPLY 0.5 ML TO INNER THIGH EVERY DAY AS DIRECTED, Historical Med      zolpidem (AMBIEN) 5 MG Tab Take 1 tablet (5 mg total) by mouth nightly as needed (insomnia)., Starting Wed 3/13/2024, Normal                 Clinical Impression       ICD-10-CM ICD-9-CM   1. Esophageal foreign body, initial encounter  T18.108A 935.1     E915      ED Disposition Condition    Transfer to Another Facility Stable             Maco Cuellar MD  05/31/24 2909

## 2024-05-31 NOTE — RESPIRATORY THERAPY
05/30/24 1902   Patient Assessment/Suction   Level of Consciousness (AVPU) alert   Respiratory Effort Normal;Unlabored   Expansion/Accessory Muscles/Retractions no retractions;no use of accessory muscles   All Lung Fields Breath Sounds Anterior:;Posterior:;Lateral:;equal bilaterally;clear   Rhythm/Pattern, Respiratory depth regular;pattern regular;unlabored   Cough Frequency no cough   PRE-TX-O2   Device (Oxygen Therapy) room air   SpO2 99 %   Pulse Oximetry Type Continuous   $ Pulse Oximetry - Multiple Charge Pulse Oximetry - Multiple   Pulse 69   Resp 18   /77

## 2024-05-31 NOTE — ED NOTES
RN spoke with C and they report they are awaiting the House Sup from [facility] to call back. They only update received thus far is that [facility] is at [facility]. [facility] will update when they receive any information.    Detail Level: Zone

## 2024-05-31 NOTE — ED NOTES
House Sup at [facility] called and reports that they are going to accept pt and that he has his sx team coming in. Pt will be going straight to ENDO and house Sup will meet EMS in ER to go up to ENDO.

## 2024-05-31 NOTE — ED NOTES
Pt updated on transfer status of waiting on info from [facility]. Pt reports understanding. Pt denies any other needs at this time.

## 2024-06-24 ENCOUNTER — OFFICE VISIT (OUTPATIENT)
Dept: PODIATRY | Facility: CLINIC | Age: 59
End: 2024-06-24
Payer: COMMERCIAL

## 2024-06-24 ENCOUNTER — HOSPITAL ENCOUNTER (OUTPATIENT)
Dept: RADIOLOGY | Facility: HOSPITAL | Age: 59
Discharge: HOME | End: 2024-06-24
Attending: PODIATRIST
Payer: COMMERCIAL

## 2024-06-24 VITALS
SYSTOLIC BLOOD PRESSURE: 109 MMHG | BODY MASS INDEX: 19.93 KG/M2 | RESPIRATION RATE: 16 BRPM | HEIGHT: 67 IN | DIASTOLIC BLOOD PRESSURE: 69 MMHG | HEART RATE: 71 BPM | WEIGHT: 127 LBS

## 2024-06-24 DIAGNOSIS — M19.079 OSTEOARTHRITIS OF ANKLE AND FOOT, UNSPECIFIED LATERALITY: ICD-10-CM

## 2024-06-24 DIAGNOSIS — M20.22 HALLUX RIGIDUS OF BOTH FEET: Primary | ICD-10-CM

## 2024-06-24 DIAGNOSIS — M20.21 HALLUX RIGIDUS OF BOTH FEET: Primary | ICD-10-CM

## 2024-06-24 DIAGNOSIS — M79.672 LEFT FOOT PAIN: ICD-10-CM

## 2024-06-24 PROCEDURE — 3008F BODY MASS INDEX DOCD: CPT | Mod: CPTII,S$GLB,, | Performed by: PODIATRIST

## 2024-06-24 PROCEDURE — 1159F MED LIST DOCD IN RCRD: CPT | Mod: CPTII,S$GLB,, | Performed by: PODIATRIST

## 2024-06-24 PROCEDURE — 99203 OFFICE O/P NEW LOW 30 MIN: CPT | Mod: S$GLB,,, | Performed by: PODIATRIST

## 2024-06-24 PROCEDURE — 73630 X-RAY EXAM OF FOOT: CPT | Mod: 26,LT,, | Performed by: RADIOLOGY

## 2024-06-24 PROCEDURE — 99999 PR PBB SHADOW E&M-EST. PATIENT-LVL IV: CPT | Mod: PBBFAC,,, | Performed by: PODIATRIST

## 2024-06-24 PROCEDURE — 1160F RVW MEDS BY RX/DR IN RCRD: CPT | Mod: CPTII,S$GLB,, | Performed by: PODIATRIST

## 2024-06-24 PROCEDURE — 3078F DIAST BP <80 MM HG: CPT | Mod: CPTII,S$GLB,, | Performed by: PODIATRIST

## 2024-06-24 PROCEDURE — 3074F SYST BP LT 130 MM HG: CPT | Mod: CPTII,S$GLB,, | Performed by: PODIATRIST

## 2024-06-24 PROCEDURE — 73630 X-RAY EXAM OF FOOT: CPT | Mod: TC,LT

## 2024-06-24 RX ORDER — ZOSTER VACCINE RECOMBINANT, ADJUVANTED 50 MCG/0.5
KIT INTRAMUSCULAR
COMMUNITY
End: 2024-06-25

## 2024-06-24 RX ORDER — CEPHALEXIN 500 MG/1
CAPSULE ORAL
COMMUNITY
End: 2024-06-25

## 2024-06-24 RX ORDER — ONABOTULINUMTOXINA 200 [USP'U]/1
INJECTION, POWDER, LYOPHILIZED, FOR SOLUTION INTRADERMAL; INTRAMUSCULAR
COMMUNITY

## 2024-06-24 RX ORDER — CIPROFLOXACIN 500 MG/1
TABLET ORAL
COMMUNITY
End: 2024-06-25

## 2024-06-24 RX ORDER — ONDANSETRON 8 MG/1
8 TABLET, ORALLY DISINTEGRATING ORAL EVERY 8 HOURS PRN
COMMUNITY

## 2024-06-24 RX ORDER — AMOXICILLIN AND CLAVULANATE POTASSIUM 875; 125 MG/1; MG/1
TABLET, FILM COATED ORAL
COMMUNITY
End: 2024-06-25

## 2024-06-24 RX ORDER — ESTRADIOL 0.04 MG/D
1 PATCH TRANSDERMAL WEEKLY
COMMUNITY

## 2024-06-24 RX ORDER — LEVOTHYROXINE SODIUM 50 UG/1
50 TABLET ORAL EVERY MORNING
COMMUNITY
End: 2024-06-25 | Stop reason: SDUPTHER

## 2024-06-24 RX ORDER — LEVOTHYROXINE SODIUM 50 UG/1
1 TABLET ORAL DAILY
COMMUNITY
End: 2024-06-25 | Stop reason: SDUPTHER

## 2024-06-24 RX ORDER — FLUCONAZOLE 100 MG/1
TABLET ORAL
COMMUNITY

## 2024-06-24 RX ORDER — NYSTATIN 100000 U/G
OINTMENT TOPICAL
COMMUNITY

## 2024-06-24 RX ORDER — PROGESTERONE 100 MG/1
1 CAPSULE ORAL NIGHTLY
COMMUNITY

## 2024-06-24 RX ORDER — ONDANSETRON 4 MG/1
4 TABLET, FILM COATED ORAL EVERY 8 HOURS PRN
COMMUNITY
Start: 2023-12-02 | End: 2024-06-25 | Stop reason: SDUPTHER

## 2024-06-25 PROBLEM — M19.079 OSTEOARTHRITIS OF ANKLE AND FOOT: Status: ACTIVE | Noted: 2024-06-25

## 2024-06-25 PROBLEM — M20.22 HALLUX RIGIDUS OF BOTH FEET: Status: ACTIVE | Noted: 2024-06-25

## 2024-06-25 PROBLEM — M20.21 HALLUX RIGIDUS OF BOTH FEET: Status: ACTIVE | Noted: 2024-06-25

## 2024-06-25 PROBLEM — M79.672 LEFT FOOT PAIN: Status: ACTIVE | Noted: 2024-06-25

## 2024-06-25 NOTE — PROGRESS NOTES
Subjective:       Patient ID: Brenda C Stargardter is a 59 y.o. female.    Chief Complaint: No chief complaint on file.  Patient presents today for a new patient evaluation she is complaining of a lump on the top of her big toe joint left she also has joint pain in her left great toe she states that her right great toe joint has been a problem for a long time but just in the past week she has noticed an area on the left big toe.  Patient states she is very active walks on the beach on a regular basis and [place] and this does not slow her down.  Patient does have an extensive history of back is shoes which she states does affect her activity to some degree.    Past Medical History:   Diagnosis Date    Abdominal or pelvic swelling, mass, or lump, right lower quadrant     Anxiety     Arthritis     Asthma, mild persistent     Cervicalgia     Enlargement of lymph nodes     Eosinophilic esophagitis     Gastroparesis     Headache(784.0)     Heartburn     Helicobacter pylori (H. pylori)     History of positive PPD, untreated     History of psychiatric hospitalization     after son , hospitalized for eating disorder    Hx of psychiatric care     Hypothyroidism     Migraines     Occipital neuralgia     Other selective immunoglobulin deficiencies     Other syndromes affecting cervical region     Psychiatric problem     Spondylosis of cervical spine     Therapy     Unspecified asthma(493.90)     Unspecified disorder of thyroid     Unspecified viral meningitis      Past Surgical History:   Procedure Laterality Date    AXILLARY ABCESS IRRIGATION AND DEBRIDEMENT      CERVICAL BIOPSY  W/ LOOP ELECTRODE EXCISION      CHOLECYSTECTOMY      COLPOSCOPY      EXCISIONAL HEMORRHOIDECTOMY  2015    HYSTERECTOMY  2015    LYMPH NODE BIOPSY Right 2014    Right inguinal lymph node biopsy    MOUTH SURGERY      ROTATOR CUFF REPAIR Right 2021    TOTAL ABDOMINAL HYSTERECTOMY W/ BILATERAL SALPINGOOPHORECTOMY  2015    VARICOSE VEIN  SURGERY       Family History   Problem Relation Name Age of Onset    Stroke Mother Henna Zhou     Hypertension Mother Henna Zhou     Cerebral aneurysm Mother Henna Zhou     Osteoarthritis Mother Henna Zhou     Asthma Mother Henna Zhou     Hypertension Father Joselito Post     Heart disease Father Joselito Post     Leukemia Father Joselito Post     Cancer Father Joselito Post         Leukemia    Diabetes Father Joselito Post     Migraines Father Joselito Saxenado     Thyroid disease Father Joselito Post     Pneumonia Sister      No Known Problems Brother      No Known Problems Brother      No Known Problems Sister      Breast cancer Paternal Grandmother Katalina         70's    Cancer Paternal Grandmother Katalina      Social History     Socioeconomic History    Marital status:      Spouse name: Ren    Number of children: 5    Years of education: 13   Occupational History    Occupation: Self employed   Tobacco Use    Smoking status: Never    Smokeless tobacco: Never   Substance and Sexual Activity    Alcohol use: Not Currently     Alcohol/week: 1.0 - 2.0 standard drink of alcohol     Types: 1 - 2 Cans of beer per week     Comment: Social    Drug use: Yes     Types: Marijuana     Comment: Medical marijuana    Sexual activity: Yes     Partners: Male     Birth control/protection: See Surgical Hx, None     Social Determinants of Health     Financial Resource Strain: Low Risk  (11/17/2023)    Overall Financial Resource Strain (CARDIA)     Difficulty of Paying Living Expenses: Not hard at all   Food Insecurity: No Food Insecurity (11/17/2023)    Hunger Vital Sign     Worried About Running Out of Food in the Last Year: Never true     Ran Out of Food in the Last Year: Never true   Transportation Needs: No Transportation Needs (11/17/2023)    PRAPARE - Transportation     Lack of Transportation (Medical): No     Lack of Transportation (Non-Medical): No   Physical Activity: Sufficiently Active (11/17/2023)     Exercise Vital Sign     Days of Exercise per Week: 6 days     Minutes of Exercise per Session: 90 min   Stress: Stress Concern Present (11/17/2023)    [employer] - Occupational Stress Questionnaire     Feeling of Stress : To some extent   Housing Stability: Low Risk  (11/17/2023)    Housing Stability Vital Sign     Unable to Pay for Housing in the Last Year: No     Number of Places Lived in the Last Year: 1     Unstable Housing in the Last Year: No       Current Outpatient Medications   Medication Sig Dispense Refill    albuterol sulfate (PROAIR RESPICLICK) 90 mcg/actuation inhaler Inhale 2 puffs into the lungs every 6 (six) hours as needed for Wheezing or Shortness of Breath. Rescue 1 each 3    clobetasol 0.05% (TEMOVATE) 0.05 % Oint 1 application daily as needed.      estradioL (CLIMARA) 0.0375 mg/24 hr Place 1 patch onto the skin once a week.      FEXOFENADINE HCL (ALLEGRA ORAL) Take by mouth.      fluconazole (DIFLUCAN) 100 MG tablet       INTRAROSA 6.5 mg Inst INSERT 1 VAGINAL INSERT EVERY DAY BY VAGINAL ROUTE.      levothyroxine (SYNTHROID) 50 MCG tablet Take 1 tablet (50 mcg total) by mouth once daily. 90 tablet 3    MG TRISILICATE/ALH/NAHCO3/AA (GAVISCON ORAL) Take 5 mLs by mouth as needed.       nystatin (MYCOSTATIN) ointment       onabotulinumtoxina (BOTOX) 200 unit SolR Inject into the muscle.      ondansetron (ZOFRAN-ODT) 8 MG TbDL Take 8 mg by mouth every 8 (eight) hours as needed.      progesterone (PROMETRIUM) 100 MG capsule Take 1 capsule by mouth every evening.      rimegepant (NURTEC) 75 mg odt Take 1 tablet (75 mg total) by mouth daily as needed for Migraine. Place ODT tablet on the tongue; alternatively the ODT tablet may be placed under the tongue 8 tablet 5    sumatriptan (IMITREX) 100 MG tablet TAKE 1 BY MOUTH EVERY 2 HRS. AS NEEDED FOR MIGRAINE. UP  MG/DAY. HOLD FOR BP > 150 MM SYSTOLIC 9 tablet 11    zolpidem (AMBIEN) 5 MG Tab Take 1 tablet (5 mg total)  "by mouth nightly as needed (insomnia). 30 tablet 2     No current facility-administered medications for this visit.     Review of patient's allergies indicates:   Allergen Reactions    Reglan [metoclopramide hcl] Other (See Comments)     Weakness could not hold an object in her hands.    Benadryl [diphenhydramine hcl] Other (See Comments)     Restless leg, aggitation    Strattera [atomoxetine] Palpitations    Dilaudid [hydromorphone]     Metoclopramide Other (See Comments)     Muscle weakness and shakes    Phenergan [promethazine] Other (See Comments)     Akathisia      Prednisone Nausea And Vomiting    Sulfa (sulfonamide antibiotics) Other (See Comments)     Muscle weakness    Nsaids (non-steroidal anti-inflammatory drug) Nausea And Vomiting       Review of Systems   Musculoskeletal:  Positive for arthralgias, back pain and joint swelling.   Skin:  Positive for color change.   All other systems reviewed and are negative.      Objective:      Vitals:    06/24/24 1342   BP: 109/69   Pulse: 71   Resp: 16   Weight: 57.6 kg (127 lb)   Height: 5' 7" (1.702 m)     Physical Exam  Vitals and nursing note reviewed.   Constitutional:       Appearance: Normal appearance.   Cardiovascular:      Pulses:           Dorsalis pedis pulses are 1+ on the right side and 1+ on the left side.        Posterior tibial pulses are 1+ on the right side and 1+ on the left side.   Pulmonary:      Effort: Pulmonary effort is normal.   Musculoskeletal:         General: Swelling, tenderness and deformity present.      Right foot: Decreased range of motion. Deformity present.      Left foot: Decreased range of motion. Deformity present.   Feet:      Right foot:      Protective Sensation: 2 sites tested.  2 sites sensed.      Skin integrity: Skin integrity normal.      Left foot:      Protective Sensation: 2 sites tested.  2 sites sensed.      Skin integrity: Skin integrity normal.   Skin:     Capillary Refill: Capillary refill takes 2 to 3 seconds. "      Findings: Erythema present.   Neurological:      General: No focal deficit present.      Mental Status: She is alert.   Psychiatric:         Mood and Affect: Mood normal.         Behavior: Behavior normal.              Assessment:       1. Hallux rigidus of both feet    2. Left foot pain    3. Osteoarthritis of ankle and foot, unspecified laterality        Plan:       Patient presents today for a new patient evaluation she is complaining of a lump on the top of her big toe joint left she also has joint pain in her left great toe she states that her right great toe joint has been a problem for a long time but just in the past week she has noticed an area on the left big toe.  Patient states she is very active walks on the beach on a regular basis and [place] and this does not slow her down.  Patient does have an extensive history of back is shoes which she states does affect her activity to some degree.  A comprehensive new patient evaluation was performed patient does have significant degenerative changes encompassing the 1st MPJ bilateral with associated inflammation and erythema.  No skin breaks no active signs of infection noted patient does have limited range of motion upon dorsiflexion 1st MPJ bilateral.  In discussion with the patient she states the right big toe joint has been an issue for years it is only recently that she has noticed a problem with the left big toe joint and the associated swelling she states it may have been there but it just was not noticeable.  Patient does walk a lot she is very active I advised her she needs to remain very active and needs to continue to use these joints if she babies them or does not use them though become progressively more stiff I did review plain film x-rays with the patient of the left foot she does have significant spurring with joint space narrowing of the 1st MPJ left there is spurring both medial lateral and dorsal aspect of the 1st MPJ with a fractured  spur present on the proximal phalanx and head of the 1st metatarsal.  I did discuss treatment options with the patient I would recommend we proceed if conservatively as possible she is going to use Voltaren gel on the area 3 times per day she has used this in the past I have discussed steroid injections an arthroplasty of the joint if necessary and an arthrodesis as the worst case scenario to fuse the joint again this would be a last resort.  Patient was in understanding and agreement with this if her discomfort becomes progressively worse over time we can always look at a steroid injection of the joint but right now I would recommend she continue her activity use the Voltaren gel and follow-up as needed.This note was created using [employer] voice recognition software that occasionally misinterpreted phrases or words.

## 2024-07-17 ENCOUNTER — OFFICE VISIT (OUTPATIENT)
Dept: FAMILY MEDICINE | Facility: CLINIC | Age: 59
End: 2024-07-17
Payer: COMMERCIAL

## 2024-07-17 VITALS
BODY MASS INDEX: 20.93 KG/M2 | HEART RATE: 67 BPM | HEIGHT: 67 IN | RESPIRATION RATE: 18 BRPM | DIASTOLIC BLOOD PRESSURE: 72 MMHG | WEIGHT: 133.38 LBS | OXYGEN SATURATION: 99 % | SYSTOLIC BLOOD PRESSURE: 106 MMHG

## 2024-07-17 DIAGNOSIS — M54.16 LUMBAR RADICULAR PAIN: Primary | Chronic | ICD-10-CM

## 2024-07-17 DIAGNOSIS — F51.01 PRIMARY INSOMNIA: ICD-10-CM

## 2024-07-17 DIAGNOSIS — Z13.220 ENCOUNTER FOR LIPID SCREENING FOR CARDIOVASCULAR DISEASE: ICD-10-CM

## 2024-07-17 DIAGNOSIS — R21 RASH: ICD-10-CM

## 2024-07-17 DIAGNOSIS — Z13.6 ENCOUNTER FOR LIPID SCREENING FOR CARDIOVASCULAR DISEASE: ICD-10-CM

## 2024-07-17 DIAGNOSIS — E03.9 ACQUIRED HYPOTHYROIDISM: Chronic | ICD-10-CM

## 2024-07-17 DIAGNOSIS — R79.9 ABNORMAL FINDING OF BLOOD CHEMISTRY: ICD-10-CM

## 2024-07-17 PROCEDURE — 1159F MED LIST DOCD IN RCRD: CPT | Mod: CPTII,S$GLB,, | Performed by: FAMILY MEDICINE

## 2024-07-17 PROCEDURE — 3008F BODY MASS INDEX DOCD: CPT | Mod: CPTII,S$GLB,, | Performed by: FAMILY MEDICINE

## 2024-07-17 PROCEDURE — 99999 PR PBB SHADOW E&M-EST. PATIENT-LVL V: CPT | Mod: PBBFAC,,, | Performed by: FAMILY MEDICINE

## 2024-07-17 PROCEDURE — 1160F RVW MEDS BY RX/DR IN RCRD: CPT | Mod: CPTII,S$GLB,, | Performed by: FAMILY MEDICINE

## 2024-07-17 PROCEDURE — 99214 OFFICE O/P EST MOD 30 MIN: CPT | Mod: S$GLB,,, | Performed by: FAMILY MEDICINE

## 2024-07-17 PROCEDURE — 3074F SYST BP LT 130 MM HG: CPT | Mod: CPTII,S$GLB,, | Performed by: FAMILY MEDICINE

## 2024-07-17 PROCEDURE — 3078F DIAST BP <80 MM HG: CPT | Mod: CPTII,S$GLB,, | Performed by: FAMILY MEDICINE

## 2024-07-17 RX ORDER — ZOLPIDEM TARTRATE 5 MG/1
5 TABLET ORAL NIGHTLY PRN
Qty: 30 TABLET | Refills: 2 | Status: SHIPPED | OUTPATIENT
Start: 2024-07-17

## 2024-07-17 RX ORDER — LEVOTHYROXINE SODIUM 50 UG/1
50 TABLET ORAL DAILY
Qty: 90 TABLET | Refills: 3 | Status: SHIPPED | OUTPATIENT
Start: 2024-07-17

## 2024-07-17 NOTE — PATIENT INSTRUCTIONS
[phone]  - [facility]  [phone] - [address]    Thank you for allowing me to participate in your care today. It is an honor to be a part of your healthcare team at [facility]. If you had labs ordered today, you will receive notification via [email or web address], phone call or mailed letter regarding your results within 7 days. If you have any questions or concerns regarding your visit today, please do not hesitate to contact us.  Sincerely,   Savannah Mcneill M.D.

## 2024-07-17 NOTE — PROGRESS NOTES
Subjective:       Patient ID: Brenda C Stargardter is a 59 y.o. female.    Chief Complaint: Follow-up (Sleep study scheduled for poss. Sleep apnea/Referral to pain management. /Referral to dermatology. )    Presents today for follow up.     Wants a pain management doctor for management of injections or RFIs if needed.    Has a rash in the right antecubital fossa that has been unresponsive to treatment and would like to see dermatology.     Needs refills on medication. Ambien works well.     No longer wants pain medication. Still doing well with delta 8 prn.     She has upcoming sleep apnea study scheduled.         .  Patient Active Problem List   Diagnosis    Hypothyroidism    Immunoglobulin deficiency    Gastroparesis    Rectal polyp    HPV test positive    Poor sleep hygiene    Intractable chronic migraine without aura and without status migrainosus    Allergic rhinitis due to pollen    Epigastric abdominal pain    Malaise and fatigue    Diarrhea    Nausea    JAMESON (generalized anxiety disorder)    ADHD (attention deficit hyperactivity disorder), inattentive type    Lumbar radicular pain    Cervical myofascial pain syndrome    Bilateral occipital neuralgia    Eosinophilic esophagitis    Cervical spondylolysis    BMI less than 19,adult    Constipation due to opioid therapy    Arthralgia of multiple joints    Thumb pain, right    Gastroesophageal reflux disease with esophagitis    Asthma, mild persistent    Migraines    Other selective immunoglobulin deficiencies    Burning mouth syndrome    Right shoulder pain    Decreased range of motion of right shoulder    Decreased strength of upper extremity    Insomnia due to medical condition    Nontraumatic incomplete tear of rotator cuff, right    Postural instability of trunk    Decreased range of motion of intervertebral discs of lumbar spine    Empty sella syndrome    Osteoarthritis of ankle and foot    Hallux rigidus of both feet    Left foot pain     [facility] has a  current medication list which includes the following prescription(s): proair respiclick, clobetasol 0.05%, estradiol, fexofenadine hcl, fluconazole, intrarosa, mag/aluminum/sod bicarb/alginc, nystatin, botox, ondansetron, progesterone, nurtec, sumatriptan, levothyroxine, and zolpidem.    Review of Systems   Constitutional:  Negative for activity change, appetite change, fatigue and fever.   Respiratory:  Negative for shortness of breath.    Gastrointestinal:  Negative for abdominal pain.   Integumentary:  Positive for rash.         Health Maintenance Due   Topic Date Due    COVID-19 Vaccine (4 - 2023-24 season) 09/01/2023    RSV Vaccine (Age 60+ and Pregnant patients) (1 - 1-dose 60+ series) Never done      Health Maintenance reviewed and discussed- NO vaccines desired today.   Objective:      Physical Exam  Vitals and nursing note reviewed.   Constitutional:       General: She is not in acute distress.     Appearance: She is not ill-appearing.   Cardiovascular:      Rate and Rhythm: Normal rate and regular rhythm.      Heart sounds: No murmur heard.  Pulmonary:      Effort: Pulmonary effort is normal.      Breath sounds: Normal breath sounds. No wheezing.   Skin:     General: Skin is warm and dry.      Findings: Rash (scaling erythematous rash right antecubital fossa) present.   Neurological:      Mental Status: She is alert.   Psychiatric:         Mood and Affect: Mood normal.         Behavior: Behavior normal.         Assessment:       1. Lumbar radicular pain    2. Primary insomnia    3. Rash    4. Acquired hypothyroidism    5. Encounter for lipid screening for cardiovascular disease    6. Abnormal finding of blood chemistry        Plan:       1. Lumbar radicular pain  -     Ambulatory referral/consult to Pain Clinic; Future; Expected date: 07/24/2024    2. Primary insomnia  -     zolpidem (AMBIEN) 5 MG Tab; Take 1 tablet (5 mg total) by mouth nightly as needed (insomnia).  Dispense: 30 tablet; Refill: 2    3.  Rash  -     Ambulatory referral/consult to Dermatology; Future; Expected date: 07/24/2024    4. Acquired hypothyroidism  -     levothyroxine (SYNTHROID) 50 MCG tablet; Take 1 tablet (50 mcg total) by mouth once daily.  Dispense: 90 tablet; Refill: 3  -     CBC Auto Differential; Future; Expected date: 11/17/2024  -     Comprehensive Metabolic Panel; Future; Expected date: 11/17/2024  -     TSH; Future; Expected date: 11/17/2024    5. Encounter for lipid screening for cardiovascular disease  -     Lipid Panel; Future; Expected date: 11/17/2024    6. Abnormal finding of blood chemistry  -     Hemoglobin A1C; Future; Expected date: 11/17/2024

## 2024-11-18 ENCOUNTER — LAB VISIT (OUTPATIENT)
Dept: LAB | Facility: HOSPITAL | Age: 59
End: 2024-11-18
Attending: FAMILY MEDICINE
Payer: COMMERCIAL

## 2024-11-18 DIAGNOSIS — E03.9 ACQUIRED HYPOTHYROIDISM: Chronic | ICD-10-CM

## 2024-11-18 DIAGNOSIS — Z13.220 ENCOUNTER FOR LIPID SCREENING FOR CARDIOVASCULAR DISEASE: ICD-10-CM

## 2024-11-18 DIAGNOSIS — R79.9 ABNORMAL FINDING OF BLOOD CHEMISTRY: ICD-10-CM

## 2024-11-18 DIAGNOSIS — Z13.6 ENCOUNTER FOR LIPID SCREENING FOR CARDIOVASCULAR DISEASE: ICD-10-CM

## 2024-11-18 LAB
ALBUMIN SERPL BCP-MCNC: 3.8 G/DL (ref 3.5–5.2)
ALP SERPL-CCNC: 58 U/L (ref 40–150)
ALT SERPL W/O P-5'-P-CCNC: 16 U/L (ref 10–44)
ANION GAP SERPL CALC-SCNC: 11 MMOL/L (ref 8–16)
AST SERPL-CCNC: 24 U/L (ref 10–40)
BASOPHILS # BLD AUTO: 0.07 K/UL (ref 0–0.2)
BASOPHILS NFR BLD: 1.6 % (ref 0–1.9)
BILIRUB SERPL-MCNC: 0.7 MG/DL (ref 0.1–1)
BUN SERPL-MCNC: 21 MG/DL (ref 6–20)
CALCIUM SERPL-MCNC: 9.3 MG/DL (ref 8.7–10.5)
CHLORIDE SERPL-SCNC: 105 MMOL/L (ref 95–110)
CHOLEST SERPL-MCNC: 233 MG/DL (ref 120–199)
CHOLEST/HDLC SERPL: 3.1 {RATIO} (ref 2–5)
CO2 SERPL-SCNC: 22 MMOL/L (ref 23–29)
CREAT SERPL-MCNC: 1 MG/DL (ref 0.5–1.4)
DIFFERENTIAL METHOD BLD: NORMAL
EOSINOPHIL # BLD AUTO: 0.2 K/UL (ref 0–0.5)
EOSINOPHIL NFR BLD: 3.7 % (ref 0–8)
ERYTHROCYTE [DISTWIDTH] IN BLOOD BY AUTOMATED COUNT: 11.9 % (ref 11.5–14.5)
EST. GFR  (NO RACE VARIABLE): >60 ML/MIN/1.73 M^2
ESTIMATED AVG GLUCOSE: 94 MG/DL (ref 68–131)
GLUCOSE SERPL-MCNC: 86 MG/DL (ref 70–110)
HBA1C MFR BLD: 4.9 % (ref 4–5.6)
HCT VFR BLD AUTO: 39.3 % (ref 37–48.5)
HDLC SERPL-MCNC: 75 MG/DL (ref 40–75)
HDLC SERPL: 32.2 % (ref 20–50)
HGB BLD-MCNC: 12.6 G/DL (ref 12–16)
IMM GRANULOCYTES # BLD AUTO: 0.01 K/UL (ref 0–0.04)
IMM GRANULOCYTES NFR BLD AUTO: 0.2 % (ref 0–0.5)
LDLC SERPL CALC-MCNC: 139 MG/DL (ref 63–159)
LYMPHOCYTES # BLD AUTO: 1.7 K/UL (ref 1–4.8)
LYMPHOCYTES NFR BLD: 38.9 % (ref 18–48)
MCH RBC QN AUTO: 30.1 PG (ref 27–31)
MCHC RBC AUTO-ENTMCNC: 32.1 G/DL (ref 32–36)
MCV RBC AUTO: 94 FL (ref 82–98)
MONOCYTES # BLD AUTO: 0.5 K/UL (ref 0.3–1)
MONOCYTES NFR BLD: 10.5 % (ref 4–15)
NEUTROPHILS # BLD AUTO: 1.9 K/UL (ref 1.8–7.7)
NEUTROPHILS NFR BLD: 45.1 % (ref 38–73)
NONHDLC SERPL-MCNC: 158 MG/DL
NRBC BLD-RTO: 0 /100 WBC
PLATELET # BLD AUTO: 216 K/UL (ref 150–450)
PMV BLD AUTO: 9.9 FL (ref 9.2–12.9)
POTASSIUM SERPL-SCNC: 4.3 MMOL/L (ref 3.5–5.1)
PROT SERPL-MCNC: 6.5 G/DL (ref 6–8.4)
RBC # BLD AUTO: 4.18 M/UL (ref 4–5.4)
SODIUM SERPL-SCNC: 138 MMOL/L (ref 136–145)
TRIGL SERPL-MCNC: 95 MG/DL (ref 30–150)
TSH SERPL DL<=0.005 MIU/L-ACNC: 1.45 UIU/ML (ref 0.4–4)
WBC # BLD AUTO: 4.29 K/UL (ref 3.9–12.7)

## 2024-11-18 PROCEDURE — 80053 COMPREHEN METABOLIC PANEL: CPT | Performed by: FAMILY MEDICINE

## 2024-11-18 PROCEDURE — 85025 COMPLETE CBC W/AUTO DIFF WBC: CPT | Performed by: FAMILY MEDICINE

## 2024-11-18 PROCEDURE — 84443 ASSAY THYROID STIM HORMONE: CPT | Performed by: FAMILY MEDICINE

## 2024-11-18 PROCEDURE — 83036 HEMOGLOBIN GLYCOSYLATED A1C: CPT | Performed by: FAMILY MEDICINE

## 2024-11-18 PROCEDURE — 80061 LIPID PANEL: CPT | Performed by: FAMILY MEDICINE

## 2024-11-22 ENCOUNTER — OFFICE VISIT (OUTPATIENT)
Dept: FAMILY MEDICINE | Facility: CLINIC | Age: 59
End: 2024-11-22
Payer: COMMERCIAL

## 2024-11-22 VITALS
BODY MASS INDEX: 20.88 KG/M2 | RESPIRATION RATE: 18 BRPM | WEIGHT: 133 LBS | DIASTOLIC BLOOD PRESSURE: 78 MMHG | SYSTOLIC BLOOD PRESSURE: 110 MMHG | OXYGEN SATURATION: 99 % | HEIGHT: 67 IN | HEART RATE: 62 BPM

## 2024-11-22 DIAGNOSIS — F51.01 PRIMARY INSOMNIA: ICD-10-CM

## 2024-11-22 DIAGNOSIS — L20.82 FLEXURAL ECZEMA: Primary | ICD-10-CM

## 2024-11-22 DIAGNOSIS — G43.009 MIGRAINE WITHOUT AURA AND WITHOUT STATUS MIGRAINOSUS, NOT INTRACTABLE: Chronic | ICD-10-CM

## 2024-11-22 DIAGNOSIS — G47.01 INSOMNIA DUE TO MEDICAL CONDITION: Chronic | ICD-10-CM

## 2024-11-22 DIAGNOSIS — E03.9 ACQUIRED HYPOTHYROIDISM: Chronic | ICD-10-CM

## 2024-11-22 PROCEDURE — 99999 PR PBB SHADOW E&M-EST. PATIENT-LVL IV: CPT | Mod: PBBFAC,,, | Performed by: FAMILY MEDICINE

## 2024-11-22 RX ORDER — TRIAMCINOLONE ACETONIDE 5 MG/G
CREAM TOPICAL 2 TIMES DAILY
Qty: 15 G | Refills: 1 | Status: SHIPPED | OUTPATIENT
Start: 2024-11-22

## 2024-11-22 RX ORDER — LEVOTHYROXINE SODIUM 50 UG/1
50 TABLET ORAL DAILY
Qty: 90 TABLET | Refills: 3 | Status: SHIPPED | OUTPATIENT
Start: 2024-11-22

## 2024-11-22 RX ORDER — ZOLPIDEM TARTRATE 5 MG/1
5 TABLET ORAL NIGHTLY PRN
Qty: 30 TABLET | Refills: 2 | Status: SHIPPED | OUTPATIENT
Start: 2024-11-22

## 2024-11-22 NOTE — PROGRESS NOTES
Subjective:       Patient ID: Brenda C Stargardter is a 59 y.o. female.    Chief Complaint: Follow-up    History of Present Illness    CHIEF COMPLAINT:  Ms. Stargardter presents today for follow-up.    LAB RESULTS:  She reports normal thyroid function and metabolic panel. A1c has decreased to 4.9, no longer in pre-diabetic or diabetic range. Kidney function has improved with GFR now >60, up from 57.9 a year ago. CO2 level is slightly low, which she notes is unusual as she typically has higher levels. BUN is slightly elevated at 21. Previous anemia has resolved with normal blood counts. Cholesterol panel is slightly elevated, though she reports favorable ratios with a good protective HDL level.    SLEEP CONCERNS:  She reports possible sleep apnea, with her  noting occasional gasping during sleep. She has been referred to a sleep specialist, Dr. Brandon Leblanc, and is scheduled for a sleep study. She describes experiencing nocturnal cortisol spikes, characterized by sudden rushes of electrical sensations throughout the night. She expresses interest in eventually weaning off sleep medication and finding natural alternatives to improve sleep quality.    DERMATOLOGICAL ISSUES:  She reports a persistent, very itchy rash present for several months. She has been treating it with moisturizer without resolution.    ALLERGIES:  She experienced allergy symptoms approximately two weeks ago, coinciding with ragweed season onset. She had an itchy throat, specifically between her sinuses and throat, which was difficult to relieve. These symptoms have since improved.    MEDICATIONS:  She requests refills for Ambien and Synthroid.      ROS:  Skin: +rash  Neurological: +tingling  Psychiatric: +sleep difficulty           Patient Active Problem List   Diagnosis    Hypothyroidism    Immunoglobulin deficiency    Gastroparesis    Rectal polyp    HPV test positive    Poor sleep hygiene    Intractable chronic migraine without aura and  "without status migrainosus    Allergic rhinitis due to pollen    Epigastric abdominal pain    Malaise and fatigue    Diarrhea    Nausea    JAMESON (generalized anxiety disorder)    ADHD (attention deficit hyperactivity disorder), inattentive type    Lumbar radicular pain    Cervical myofascial pain syndrome    Bilateral occipital neuralgia    Eosinophilic esophagitis    Cervical spondylolysis    BMI less than 19,adult    Constipation due to opioid therapy    Arthralgia of multiple joints    Thumb pain, right    Gastroesophageal reflux disease with esophagitis    Asthma, mild persistent    Migraines    Other selective immunoglobulin deficiencies    Burning mouth syndrome    Right shoulder pain    Decreased range of motion of right shoulder    Decreased strength of upper extremity    Insomnia due to medical condition    Nontraumatic incomplete tear of rotator cuff, right    Postural instability of trunk    Decreased range of motion of intervertebral discs of lumbar spine    Empty sella syndrome    Osteoarthritis of ankle and foot    Hallux rigidus of both feet    Left foot pain     [facility] has a current medication list which includes the following prescription(s): proair respiclick, clobetasol 0.05%, estradiol, intrarosa, botox, ondansetron, nurtec, sumatriptan, levothyroxine, triamcinolone acetonide 0.5%, and zolpidem.        There are no preventive care reminders to display for this patient.   Health Maintenance reviewed and discussed- Nothing needed.   Objective:      Vitals:    11/22/24 0725   BP: 110/78   Pulse: 62   Resp: 18   SpO2: 99%   Weight: 60.3 kg (133 lb)   Height: 5' 7" (1.702 m)   PainSc: 0-No pain     Body mass index is 20.83 kg/m².  Physical Exam  Vitals and nursing note reviewed.   Constitutional:       General: She is not in acute distress.     Appearance: She is not ill-appearing.   Cardiovascular:      Rate and Rhythm: Normal rate and regular rhythm.      Heart sounds: No murmur heard.  Pulmonary:     "  Effort: Pulmonary effort is normal.      Breath sounds: Normal breath sounds. No wheezing.   Skin:     General: Skin is warm and dry.      Findings: No rash.   Neurological:      Mental Status: She is alert.   Psychiatric:         Mood and Affect: Mood normal.         Behavior: Behavior normal.       Physical Exam    Cardiovascular: Carotid arteries normal.  Respiratory: Clear to auscultation bilaterally.  Mouth: Mouth clear.         Assessment:       Assessment & Plan    1. Reviewed lab results: thyroid normal, A1c decreased to 4.9, metabolic panel good, kidney function improved (GFR >60), anemia resolved, blood counts normal  2. Noted slightly elevated total cholesterol, but ratios favorable with good protective HDL  3. Calculated 10-year cardiovascular risk at 2.2%, not warranting medication intervention at this time  4. Considered potential sleep apnea as contributing factor to patient's sleep issues and nocturnal cortisol spikes           Plan:       1. Flexural eczema  -     triamcinolone acetonide 0.5% (KENALOG) 0.5 % Crea; Apply topically 2 (two) times daily.  Dispense: 15 g; Refill: 1    2. Acquired hypothyroidism  -     levothyroxine (SYNTHROID) 50 MCG tablet; Take 1 tablet (50 mcg total) by mouth once daily.  Dispense: 90 tablet; Refill: 3    3. Primary insomnia  -     zolpidem (AMBIEN) 5 MG Tab; Take 1 tablet (5 mg total) by mouth nightly as needed (insomnia).  Dispense: 30 tablet; Refill: 2    4. Migraine without aura and without status migrainosus, not intractable    5. Insomnia due to medical condition         This note was generated with the assistance of ambient listening technology. Verbal consent was obtained by the patient and accompanying visitor(s) for the recording of patient appointment to facilitate this note. I attest to having reviewed and edited the generated note for accuracy, though some syntax or spelling errors may persist. Please contact the author of this note for any  clarification.

## 2024-12-12 ENCOUNTER — NURSE TRIAGE (OUTPATIENT)
Dept: ADMINISTRATIVE | Facility: CLINIC | Age: 59
End: 2024-12-12
Payer: COMMERCIAL

## 2024-12-12 NOTE — TELEPHONE ENCOUNTER
"Spoke with pt. Pt not reporting to the ER as advised by Triage.   Pt keeping track of O2 sat, current sat level  95% @ 0850.   Pt states, " I am with my mom at the hospital, if something goes wrong I will be checked out"   Encouraged pt to contact office if she has further questions or concerns  "

## 2024-12-12 NOTE — TELEPHONE ENCOUNTER
"Pt checked her O2 reading this AM and received a reading of 78%, recheck was a 100%.  Currently experiencing lightheadedness that began yesterday afternoon. Intermittent blurriness to vision.   Pt is assisting in care of her mother, who is currently in the hospital following surgery.   Care advice provided per protocol, with recommendation to go to ED at this time. Pt VU.     Reason for Disposition   Loss of vision or double vision  (Exception: Similar to previous migraines.)    Additional Information   Negative: SEVERE difficulty breathing (e.g., struggling for each breath, speaks in single words)   Negative: [1] Difficulty breathing or swallowing AND [2] started suddenly after medicine, an allergic food or bee sting   Negative: Shock suspected (e.g., cold/pale/clammy skin, too weak to stand, low BP, rapid pulse)   Negative: Difficult to awaken or acting confused (e.g., disoriented, slurred speech)   Negative: [1] Weakness (i.e., paralysis, loss of muscle strength) of the face, arm or leg on one side of the body AND [2] sudden onset AND [3] present now   Negative: [1] Numbness (i.e., loss of sensation) of the face, arm or leg on one side of the body AND [2] sudden onset AND [3] present now   Negative: [1] Loss of speech or garbled speech AND [2] sudden onset AND [3] present now   Negative: Overdose (accidental or intentional) of medications   Negative: [1] Fainted > 15 minutes ago AND [2] still feels too weak or dizzy to stand   Negative: Heart beating < 50 beats per minute OR > 140 beats per minute   Negative: Sounds like a life-threatening emergency to the triager   Negative: Difficulty breathing   Negative: SEVERE dizziness (e.g., unable to stand, requires support to walk, feels like passing out now)   Negative: Extra heartbeats, irregular heart beating, or heart is beating very fast  (i.e., "palpitations")   Negative: [1] Weakness (i.e., paralysis, loss of muscle strength) of the face, arm / hand, or leg / foot " on one side of the body AND [2] sudden onset AND [3] brief (now gone)   Negative: [1] Numbness (i.e., loss of sensation) of the face, arm / hand, or leg / foot on one side of the body AND [2] sudden onset AND [3] brief (now gone)   Negative: [1] Loss of speech or garbled speech AND [2] sudden onset AND [3] brief (now gone)    Protocols used: Dizziness - [id number]

## 2025-01-07 ENCOUNTER — OFFICE VISIT (OUTPATIENT)
Dept: OBSTETRICS AND GYNECOLOGY | Facility: CLINIC | Age: 60
End: 2025-01-07
Payer: COMMERCIAL

## 2025-01-07 VITALS
WEIGHT: 134 LBS | SYSTOLIC BLOOD PRESSURE: 114 MMHG | DIASTOLIC BLOOD PRESSURE: 76 MMHG | BODY MASS INDEX: 21.03 KG/M2 | HEIGHT: 67 IN

## 2025-01-07 DIAGNOSIS — Z12.31 BREAST CANCER SCREENING BY MAMMOGRAM: ICD-10-CM

## 2025-01-07 DIAGNOSIS — D06.9 SEVERE DYSPLASIA OF CERVIX: ICD-10-CM

## 2025-01-07 DIAGNOSIS — Z01.419 WOMEN'S ANNUAL ROUTINE GYNECOLOGICAL EXAMINATION: Primary | ICD-10-CM

## 2025-01-07 PROCEDURE — 99396 PREV VISIT EST AGE 40-64: CPT | Mod: S$GLB,,, | Performed by: OBSTETRICS & GYNECOLOGY

## 2025-01-07 PROCEDURE — 3008F BODY MASS INDEX DOCD: CPT | Mod: CPTII,S$GLB,, | Performed by: OBSTETRICS & GYNECOLOGY

## 2025-01-07 PROCEDURE — 3074F SYST BP LT 130 MM HG: CPT | Mod: CPTII,S$GLB,, | Performed by: OBSTETRICS & GYNECOLOGY

## 2025-01-07 PROCEDURE — 1159F MED LIST DOCD IN RCRD: CPT | Mod: CPTII,S$GLB,, | Performed by: OBSTETRICS & GYNECOLOGY

## 2025-01-07 PROCEDURE — 3078F DIAST BP <80 MM HG: CPT | Mod: CPTII,S$GLB,, | Performed by: OBSTETRICS & GYNECOLOGY

## 2025-01-07 PROCEDURE — 99999 PR PBB SHADOW E&M-EST. PATIENT-LVL III: CPT | Mod: PBBFAC,,, | Performed by: OBSTETRICS & GYNECOLOGY

## 2025-01-07 NOTE — PROGRESS NOTES
Annual Well Woman's Exam  History & Physical      SUBJECTIVE:     History of Present Illness:  Patient is a 59 y.o. female presents for her annual exam. She has a h/o severe cervial dysplasia in her 50s and is s/p hysterectomy (with BSO) for this.    Chief Complaint   Patient presents with    Well Woman       Review of patient's allergies indicates:   Allergen Reactions    Reglan [metoclopramide hcl] Other (See Comments)     Weakness could not hold an object in her hands.    Benadryl [diphenhydramine hcl] Other (See Comments)     Restless leg, aggitation    Strattera [atomoxetine] Palpitations    Dilaudid [hydromorphone]     Metoclopramide Other (See Comments)     Muscle weakness and shakes    Phenergan [promethazine] Other (See Comments)     Akathisia      Prednisone Nausea And Vomiting    Sulfa (sulfonamide antibiotics) Other (See Comments)     Muscle weakness    Nsaids (non-steroidal anti-inflammatory drug) Nausea And Vomiting       Current Outpatient Medications   Medication Sig Dispense Refill    albuterol sulfate (PROAIR RESPICLICK) 90 mcg/actuation inhaler Inhale 2 puffs into the lungs every 6 (six) hours as needed for Wheezing or Shortness of Breath. Rescue 1 each 3    clobetasol 0.05% (TEMOVATE) 0.05 % Oint 1 application daily as needed.      estradioL (CLIMARA) 0.0375 mg/24 hr Place 1 patch onto the skin once a week.      INTRAROSA 6.5 mg Inst INSERT 1 VAGINAL INSERT EVERY DAY BY VAGINAL ROUTE.      levothyroxine (SYNTHROID) 50 MCG tablet Take 1 tablet (50 mcg total) by mouth once daily. 90 tablet 3    onabotulinumtoxina (BOTOX) 200 unit SolR Inject into the muscle.      ondansetron (ZOFRAN-ODT) 8 MG TbDL Take 8 mg by mouth every 8 (eight) hours as needed.      rimegepant (NURTEC) 75 mg odt Take 1 tablet (75 mg total) by mouth daily as needed for Migraine. Place ODT tablet on the tongue; alternatively the ODT tablet may be placed under the tongue 8 tablet 5    sumatriptan (IMITREX) 100 MG tablet TAKE 1 BY  MOUTH EVERY 2 HRS. AS NEEDED FOR MIGRAINE. UP  MG/DAY. HOLD FOR BP > 150 MM SYSTOLIC 9 tablet 11    triamcinolone acetonide 0.5% (KENALOG) 0.5 % Crea Apply topically 2 (two) times daily. 15 g 1    zolpidem (AMBIEN) 5 MG Tab Take 1 tablet (5 mg total) by mouth nightly as needed (insomnia). 30 tablet 2     No current facility-administered medications for this visit.     OB History          4    Para   4    Term   4            AB        Living             SAB        IAB        Ectopic        Multiple        Live Births                 No LMP recorded. Patient has had a hysterectomy.      Past Medical History:   Diagnosis Date    Abdominal or pelvic swelling, mass, or lump, right lower quadrant     Anxiety     Arthritis     Asthma, mild persistent     Cervicalgia     Enlargement of lymph nodes     Eosinophilic esophagitis     Gastroparesis     Headache(784.0)     Heartburn     Helicobacter pylori (H. pylori)     History of positive PPD, untreated     History of psychiatric hospitalization     after son , hospitalized for eating disorder    Hx of psychiatric care     Hypothyroidism     Migraines     Occipital neuralgia     Other selective immunoglobulin deficiencies     Other syndromes affecting cervical region     Psychiatric problem     Spondylosis of cervical spine     Therapy     Unspecified asthma(493.90)     Unspecified disorder of thyroid     Unspecified viral meningitis      Past Surgical History:   Procedure Laterality Date    AXILLARY ABCESS IRRIGATION AND DEBRIDEMENT      CERVICAL BIOPSY  W/ LOOP ELECTRODE EXCISION      CHOLECYSTECTOMY      COLPOSCOPY      EXCISIONAL HEMORRHOIDECTOMY  2015    HYSTERECTOMY  2015    LYMPH NODE BIOPSY Right     Right inguinal lymph node biopsy    MOUTH SURGERY      ROTATOR CUFF REPAIR Right 2021    TOTAL ABDOMINAL HYSTERECTOMY W/ BILATERAL SALPINGOOPHORECTOMY  2015    VARICOSE VEIN SURGERY       Family History   Problem Relation Name Age of  "Onset    Stroke Mother Henna Zhou     Hypertension Mother Henna Zhou     Cerebral aneurysm Mother Henna Zhou     Osteoarthritis Mother Henna Zhou     Asthma Mother Henna Zhou     Hypertension Father Joselito Post     Heart disease Father Joselito Post     Leukemia Father Joselito Post     Cancer Father Joselito Post         Leukemia    Diabetes Father Joselito Saxenado     Migraines Father Joselito Post     Thyroid disease Father Joselito Post     Pneumonia Sister      No Known Problems Brother      No Known Problems Brother      No Known Problems Sister      Breast cancer Paternal Grandmother Katalina         70's    Cancer Paternal Grandmother Katalina      Social History     Tobacco Use    Smoking status: Never    Smokeless tobacco: Never   Substance Use Topics    Alcohol use: Not Currently     Alcohol/week: 1.0 - 2.0 standard drink of alcohol     Types: 1 - 2 Cans of beer per week     Comment: Social    Drug use: Yes     Types: Marijuana     Comment: Medical marijuana        OBJECTIVE:     Vital Signs (Most Recent)  BP: 114/76 (01/07/25 1406)  5' 7" (1.702 m)  60.8 kg (134 lb)     Physical Exam:  Physical Exam  Vitals reviewed.   Constitutional:       Appearance: Normal appearance.   HENT:      Head: Normocephalic.   Neck:      Thyroid: No thyroid mass, thyromegaly or thyroid tenderness.   Pulmonary:      Effort: Pulmonary effort is normal.   Chest:   Breasts:     Right: Normal.      Left: Normal.   Abdominal:      General: Abdomen is flat.      Palpations: Abdomen is soft.   Genitourinary:     General: Normal vulva.      Vagina: Normal.      Uterus: Absent.    Lymphadenopathy:      Upper Body:      Right upper body: No axillary adenopathy.      Left upper body: No axillary adenopathy.   Skin:     General: Skin is warm and dry.   Neurological:      General: No focal deficit present.      Mental Status: She is alert and oriented to person, place, and time.   Psychiatric:         Mood and Affect: Mood " normal.         Behavior: Behavior normal.           ASSESSMENT/PLAN:       ICD-10-CM ICD-9-CM   1. Women's annual routine gynecological examination  Z01.419 V72.31   2. Breast cancer screening by mammogram  Z12.31 V76.12   3. Severe dysplasia of cervix  D06.9 233.1       PLAN:    Vaginal pap with HPV cotesting today. If negative, will continue routine screening of annual Pap, HPV Q3 years and then Paps Q2 years at 65.  Mammogram ordered.  Screening colonoscopy is up-to-date.  Due in 2028.  Follow up in 1 year for annual exam or sooner as needed      [place] MD, FACOG   Gynecology    [address] [address] [phone]

## 2025-01-07 NOTE — PATIENT INSTRUCTIONS
WORKUP:  -- MRI lumbar spine     PREVENTION (use daily regardless of headache / pain):  -- start Cymbalta 20mg in Am   -- physical therapy (cervical myofascial release)  -- return to clinic for trigger point injections     ACUTE TREATMENT of headache / pain  -- start tizanidine half tablet to full tablet at night    None

## 2025-01-18 ENCOUNTER — HOSPITAL ENCOUNTER (OUTPATIENT)
Dept: RADIOLOGY | Facility: HOSPITAL | Age: 60
Discharge: HOME | End: 2025-01-18
Attending: OBSTETRICS & GYNECOLOGY
Payer: COMMERCIAL

## 2025-01-18 DIAGNOSIS — Z01.419 WOMEN'S ANNUAL ROUTINE GYNECOLOGICAL EXAMINATION: ICD-10-CM

## 2025-01-18 DIAGNOSIS — Z12.31 BREAST CANCER SCREENING BY MAMMOGRAM: ICD-10-CM

## 2025-01-18 PROCEDURE — 77063 BREAST TOMOSYNTHESIS BI: CPT | Mod: TC

## 2025-01-18 PROCEDURE — 77067 SCR MAMMO BI INCL CAD: CPT | Mod: 26,,, | Performed by: RADIOLOGY

## 2025-01-18 PROCEDURE — 77063 BREAST TOMOSYNTHESIS BI: CPT | Mod: 26,,, | Performed by: RADIOLOGY

## 2025-02-20 ENCOUNTER — OFFICE VISIT (OUTPATIENT)
Dept: FAMILY MEDICINE | Facility: CLINIC | Age: 60
End: 2025-02-20
Payer: COMMERCIAL

## 2025-02-20 VITALS
DIASTOLIC BLOOD PRESSURE: 68 MMHG | HEIGHT: 67 IN | BODY MASS INDEX: 21.13 KG/M2 | OXYGEN SATURATION: 99 % | RESPIRATION RATE: 16 BRPM | WEIGHT: 134.63 LBS | SYSTOLIC BLOOD PRESSURE: 102 MMHG

## 2025-02-20 DIAGNOSIS — R07.9 CHEST PAIN, UNSPECIFIED TYPE: ICD-10-CM

## 2025-02-20 DIAGNOSIS — R06.02 SHORTNESS OF BREATH: Primary | ICD-10-CM

## 2025-02-20 PROCEDURE — 99999 PR PBB SHADOW E&M-EST. PATIENT-LVL IV: CPT | Mod: PBBFAC,,,

## 2025-02-20 NOTE — ASSESSMENT & PLAN NOTE
Breath sounds/heart sounds normal on exam  Vital signs stable.   EKG completed in office-no acute changes noted when compared to EKG 2017  No active chest pain during exam    With symptoms and description of patients episodes, advise and sent patient to emergency room for further work up. Patient chose to take personal vehicle to emergency room, as she is going to [facility].   Discussion regarding can not rule out certain diagnosis in clinic setting such as heart attack/ischemia/PE and advise with symptoms persistent for four days need to get evaluated by ER. Patient agree with plan of care.

## 2025-02-20 NOTE — PROGRESS NOTES
I have reviewed the notes, assessments, and/or procedures performed by Kavitha Macdonald NP, I concur with her/his documentation of Brenda C Stargardter.    Agree with ED evaluation for active chest pain

## 2025-02-20 NOTE — PROGRESS NOTES
Subjective:        Chief Complaint: Shortness of Breath (Patient states about 4 days ago she started experiencing shortness of breath. She has been getting a tightness in her chest and some pain that shoots down left arm and up the left side of her neck.)    Brenda C Stargardter is a 59 y.o. female, presents to clinic For chest pain and shortness of breath x4 days. She describes the pain as tightness in her chest, that radiates to left arm, and upper left side of neck. She states started with shortness of breath x4 days ago at night. States episodes last a few minutes then subside and go away, 2-3x day for the last 4 days. She does state lightheadedness, pressure on chest, and shortness of breath when the episodes occur.   She mentions it happen in the car and her o2 sats did drop to 87 but then return to normal, almost immediately.        She has seen a cardiologist in the past but was in 2017.     Shortness of Breath  Associated symptoms include chest pain. Pertinent negatives include no leg swelling or wheezing.       Patient Active Problem List   Diagnosis    Hypothyroidism    Immunoglobulin deficiency    Gastroparesis    Rectal polyp    HPV test positive    Poor sleep hygiene    Intractable chronic migraine without aura and without status migrainosus    Allergic rhinitis due to pollen    Epigastric abdominal pain    Malaise and fatigue    Diarrhea    Nausea    JAMESON (generalized anxiety disorder)    ADHD (attention deficit hyperactivity disorder), inattentive type    Lumbar radicular pain    Cervical myofascial pain syndrome    Bilateral occipital neuralgia    Eosinophilic esophagitis    Cervical spondylolysis    BMI less than 19,adult    Constipation due to opioid therapy    Arthralgia of multiple joints    Thumb pain, right    Gastroesophageal reflux disease with esophagitis    Asthma, mild persistent    Migraines    Other selective immunoglobulin deficiencies    Burning mouth syndrome    Right shoulder pain     Decreased range of motion of right shoulder    Decreased strength of upper extremity    Insomnia due to medical condition    Nontraumatic incomplete tear of rotator cuff, right    Postural instability of trunk    Decreased range of motion of intervertebral discs of lumbar spine    Empty sella syndrome    Osteoarthritis of ankle and foot    Hallux rigidus of both feet    Left foot pain    Shortness of breath    Chest pain     Prema has a current medication list which includes the following prescription(s): proair respiclick, clobetasol 0.05%, estradiol, intrarosa, levothyroxine, botox, ondansetron, nurtec, sumatriptan, triamcinolone acetonide 0.5%, and zolpidem.    Review of Systems   Constitutional: Negative.    HENT: Negative.     Respiratory:  Positive for chest tightness and shortness of breath. Negative for wheezing.    Cardiovascular:  Positive for chest pain. Negative for palpitations and leg swelling.   Gastrointestinal: Negative.    Genitourinary: Negative.    Musculoskeletal: Negative.    Integumentary:  Negative.   Neurological:  Positive for light-headedness. Negative for syncope, weakness and numbness.   Psychiatric/Behavioral: Negative.           Health Maintenance Due   Topic Date Due    RSV Vaccine (Age 60+ and Pregnant patients) (1 - Risk 60-74 years 1-dose series) Never done      Health Maintenance reviewed and discussed- up to date   Objective:      Physical Exam  Constitutional:       Appearance: Normal appearance. She is normal weight.   HENT:      Head: Normocephalic.      Nose: Nose normal.   Eyes:      Extraocular Movements: Extraocular movements intact.   Cardiovascular:      Rate and Rhythm: Normal rate and regular rhythm.      Pulses: Normal pulses.      Heart sounds: Normal heart sounds. No murmur heard.  Pulmonary:      Effort: Pulmonary effort is normal. No respiratory distress.      Breath sounds: Normal breath sounds. No wheezing or rhonchi.   Chest:      Chest wall: No tenderness  (no active chest pain during exam, pt states comes and goes).   Abdominal:      General: Abdomen is flat.      Palpations: Abdomen is soft.   Musculoskeletal:         General: Normal range of motion.      Cervical back: Normal range of motion.   Skin:     General: Skin is warm and dry.   Neurological:      General: No focal deficit present.      Mental Status: She is alert.   Psychiatric:         Mood and Affect: Mood normal.         Behavior: Behavior normal.         Thought Content: Thought content normal.         Judgment: Judgment normal.         Assessment:       1. Shortness of breath    2. Chest pain, unspecified type        Plan:       1. Shortness of breath  Assessment & Plan:  Breath sounds/heart sounds normal on exam  Vital signs stable.   EKG completed in office-no acute changes noted when compared to EKG 2017  No active chest pain during exam    With symptoms and description of patients episodes, advise and sent patient to emergency room for further work up. Patient chose to take personal vehicle to emergency room, as she is going to [facility].   Discussion regarding can not rule out certain diagnosis in clinic setting such as heart attack/ischemia/PE and advise with symptoms persistent for four days need to get evaluated by ER. Patient agree with plan of care.     Orders:  -     IN OFFICE EKG 12-LEAD (to Muse)    2. Chest pain, unspecified type  Assessment & Plan:  Breath sounds/heart sounds normal on exam  Vital signs stable.   EKG completed in office-no acute changes noted when compared to EKG 2017  No active chest pain during exam    With symptoms and description of patients episodes, advise and sent patient to emergency room for further work up. Patient chose to take personal vehicle to emergency room, as she is going to [facility].   Discussion regarding can not rule out certain diagnosis in clinic setting such as heart attack/ischemia/PE and advise with symptoms  persistent for four days need to get evaluated by ER. Patient agree with plan of care.

## 2025-02-20 NOTE — ASSESSMENT & PLAN NOTE
Breath sounds/heart sounds normal on exam  Vital signs stable.   EKG completed in office-no acute changes noted when compared to EKG 2017  No active chest pain during exam    With symptoms and description of patients episodes, advise and sent patient to emergency room for further work up. Patient chose to take personal vehicle to emergency room, as she is going to [facility].   Discussion regarding can not rule out certain diagnosis in clinic setting such as heart attack/ischemia/PE and advise with symptoms persistent for four days need to get evaluated by ER. Patient agree with plan of care.

## 2025-02-21 LAB
OHS QRS DURATION: 76 MS
OHS QTC CALCULATION: 442 MS

## 2025-02-24 ENCOUNTER — PATIENT MESSAGE (OUTPATIENT)
Dept: FAMILY MEDICINE | Facility: CLINIC | Age: 60
End: 2025-02-24
Payer: COMMERCIAL

## 2025-02-24 DIAGNOSIS — E53.8 LOW SERUM VITAMIN B12: ICD-10-CM

## 2025-02-24 DIAGNOSIS — R79.89 LOW VITAMIN D LEVEL: Primary | ICD-10-CM

## 2025-02-25 DIAGNOSIS — E03.9 ACQUIRED HYPOTHYROIDISM: ICD-10-CM

## 2025-02-25 DIAGNOSIS — R50.9 FEVER, UNSPECIFIED FEVER CAUSE: ICD-10-CM

## 2025-02-25 DIAGNOSIS — R68.89 FLU-LIKE SYMPTOMS: ICD-10-CM

## 2025-02-25 DIAGNOSIS — R05.8 OTHER COUGH: ICD-10-CM

## 2025-02-25 DIAGNOSIS — K31.84 GASTROPARESIS: Chronic | ICD-10-CM

## 2025-02-25 DIAGNOSIS — R79.9 ABNORMAL FINDING OF BLOOD CHEMISTRY: ICD-10-CM

## 2025-02-25 DIAGNOSIS — E78.2 MIXED HYPERLIPIDEMIA: Primary | ICD-10-CM

## 2025-02-25 RX ORDER — ALBUTEROL SULFATE 90 UG/1
2 INHALANT RESPIRATORY (INHALATION) EVERY 6 HOURS PRN
Qty: 8.5 G | Refills: 3 | Status: SHIPPED | OUTPATIENT
Start: 2025-02-25 | End: 2026-02-25

## 2025-02-25 NOTE — TELEPHONE ENCOUNTER
Spoke w/ pt   Pt requesting blood work prior to upcoming appt.   Cbc, cmp, A1c, tsh, lipid panel  Pt completed blood work 11/18/24, informed pt insurance typically only pays once yearly for lipid panel. Pt would like to move forward and will pay out of pocket if needed.   Unable to pend vit d and vit B

## 2025-02-25 NOTE — TELEPHONE ENCOUNTER
No care due was identified.  Health [facility] Embedded Care Due Messages. Reference number: [id number].   2/25/2025 7:51:15 AM CST

## 2025-02-25 NOTE — TELEPHONE ENCOUNTER
----- Message from Gail sent at 2/25/2025  7:30 AM CST -----   Type:  Needs Medical AdviceWho Called:  PTWould the patient rather a call back or a response via [facility]? callBest Call Back Number:  [phone] Information:  Pt asked for call back..  Please call back to advise. Thank you!

## 2025-02-26 NOTE — TELEPHONE ENCOUNTER
See message from patient concerning getting her Vitamin B, and D level checked. Please review and advise.

## 2025-02-27 ENCOUNTER — LAB VISIT (OUTPATIENT)
Dept: LAB | Facility: HOSPITAL | Age: 60
End: 2025-02-27
Attending: FAMILY MEDICINE
Payer: COMMERCIAL

## 2025-02-27 DIAGNOSIS — R79.9 ABNORMAL FINDING OF BLOOD CHEMISTRY: ICD-10-CM

## 2025-02-27 DIAGNOSIS — E03.9 ACQUIRED HYPOTHYROIDISM: ICD-10-CM

## 2025-02-27 DIAGNOSIS — K31.84 GASTROPARESIS: Chronic | ICD-10-CM

## 2025-02-27 DIAGNOSIS — E78.2 MIXED HYPERLIPIDEMIA: ICD-10-CM

## 2025-02-27 LAB
ALBUMIN SERPL BCP-MCNC: 4 G/DL (ref 3.5–5.2)
ALP SERPL-CCNC: 56 U/L (ref 40–150)
ALT SERPL W/O P-5'-P-CCNC: 13 U/L (ref 10–44)
ANION GAP SERPL CALC-SCNC: 14 MMOL/L (ref 8–16)
AST SERPL-CCNC: 21 U/L (ref 10–40)
BASOPHILS # BLD AUTO: 0.05 K/UL (ref 0–0.2)
BASOPHILS NFR BLD: 1.2 % (ref 0–1.9)
BILIRUB SERPL-MCNC: 0.6 MG/DL (ref 0.1–1)
BUN SERPL-MCNC: 27 MG/DL (ref 6–20)
CALCIUM SERPL-MCNC: 9.4 MG/DL (ref 8.7–10.5)
CHLORIDE SERPL-SCNC: 108 MMOL/L (ref 95–110)
CHOLEST SERPL-MCNC: 228 MG/DL (ref 120–199)
CHOLEST/HDLC SERPL: 3.1 {RATIO} (ref 2–5)
CO2 SERPL-SCNC: 20 MMOL/L (ref 23–29)
CREAT SERPL-MCNC: 1 MG/DL (ref 0.5–1.4)
DIFFERENTIAL METHOD BLD: NORMAL
EOSINOPHIL # BLD AUTO: 0.1 K/UL (ref 0–0.5)
EOSINOPHIL NFR BLD: 3.3 % (ref 0–8)
ERYTHROCYTE [DISTWIDTH] IN BLOOD BY AUTOMATED COUNT: 11.8 % (ref 11.5–14.5)
EST. GFR  (NO RACE VARIABLE): >60 ML/MIN/1.73 M^2
ESTIMATED AVG GLUCOSE: 97 MG/DL (ref 68–131)
GLUCOSE SERPL-MCNC: 81 MG/DL (ref 70–110)
HBA1C MFR BLD: 5 % (ref 4–5.6)
HCT VFR BLD AUTO: 42.5 % (ref 37–48.5)
HDLC SERPL-MCNC: 73 MG/DL (ref 40–75)
HDLC SERPL: 32 % (ref 20–50)
HGB BLD-MCNC: 13.9 G/DL (ref 12–16)
IMM GRANULOCYTES # BLD AUTO: 0.01 K/UL (ref 0–0.04)
IMM GRANULOCYTES NFR BLD AUTO: 0.2 % (ref 0–0.5)
LDLC SERPL CALC-MCNC: 139.4 MG/DL (ref 63–159)
LYMPHOCYTES # BLD AUTO: 1.8 K/UL (ref 1–4.8)
LYMPHOCYTES NFR BLD: 41.6 % (ref 18–48)
MCH RBC QN AUTO: 30 PG (ref 27–31)
MCHC RBC AUTO-ENTMCNC: 32.7 G/DL (ref 32–36)
MCV RBC AUTO: 92 FL (ref 82–98)
MONOCYTES # BLD AUTO: 0.4 K/UL (ref 0.3–1)
MONOCYTES NFR BLD: 10.4 % (ref 4–15)
NEUTROPHILS # BLD AUTO: 1.8 K/UL (ref 1.8–7.7)
NEUTROPHILS NFR BLD: 43.3 % (ref 38–73)
NONHDLC SERPL-MCNC: 155 MG/DL
NRBC BLD-RTO: 0 /100 WBC
PLATELET # BLD AUTO: 241 K/UL (ref 150–450)
PMV BLD AUTO: 10.1 FL (ref 9.2–12.9)
POTASSIUM SERPL-SCNC: 4.2 MMOL/L (ref 3.5–5.1)
PROT SERPL-MCNC: 7 G/DL (ref 6–8.4)
RBC # BLD AUTO: 4.63 M/UL (ref 4–5.4)
SODIUM SERPL-SCNC: 142 MMOL/L (ref 136–145)
TRIGL SERPL-MCNC: 78 MG/DL (ref 30–150)
TSH SERPL DL<=0.005 MIU/L-ACNC: 1.47 UIU/ML (ref 0.4–4)
WBC # BLD AUTO: 4.25 K/UL (ref 3.9–12.7)

## 2025-02-27 PROCEDURE — 84443 ASSAY THYROID STIM HORMONE: CPT | Performed by: FAMILY MEDICINE

## 2025-02-27 PROCEDURE — 85025 COMPLETE CBC W/AUTO DIFF WBC: CPT | Performed by: FAMILY MEDICINE

## 2025-02-27 PROCEDURE — 83036 HEMOGLOBIN GLYCOSYLATED A1C: CPT | Performed by: FAMILY MEDICINE

## 2025-02-27 PROCEDURE — 80053 COMPREHEN METABOLIC PANEL: CPT | Performed by: FAMILY MEDICINE

## 2025-02-27 PROCEDURE — 36415 COLL VENOUS BLD VENIPUNCTURE: CPT | Performed by: FAMILY MEDICINE

## 2025-02-27 PROCEDURE — 80061 LIPID PANEL: CPT | Performed by: FAMILY MEDICINE

## 2025-03-07 ENCOUNTER — RESULTS FOLLOW-UP (OUTPATIENT)
Dept: FAMILY MEDICINE | Facility: CLINIC | Age: 60
End: 2025-03-07

## 2025-03-12 ENCOUNTER — LAB VISIT (OUTPATIENT)
Dept: LAB | Facility: HOSPITAL | Age: 60
End: 2025-03-12
Attending: FAMILY MEDICINE
Payer: COMMERCIAL

## 2025-03-12 DIAGNOSIS — R79.89 LOW VITAMIN D LEVEL: ICD-10-CM

## 2025-03-12 DIAGNOSIS — E53.8 LOW SERUM VITAMIN B12: ICD-10-CM

## 2025-03-12 LAB
25(OH)D3+25(OH)D2 SERPL-MCNC: 44 NG/ML (ref 30–96)
VIT B12 SERPL-MCNC: 922 PG/ML (ref 210–950)

## 2025-03-12 PROCEDURE — 36415 COLL VENOUS BLD VENIPUNCTURE: CPT | Performed by: FAMILY MEDICINE

## 2025-03-12 PROCEDURE — 82306 VITAMIN D 25 HYDROXY: CPT | Performed by: FAMILY MEDICINE

## 2025-03-12 PROCEDURE — 82607 VITAMIN B-12: CPT | Performed by: FAMILY MEDICINE

## 2025-03-16 ENCOUNTER — RESULTS FOLLOW-UP (OUTPATIENT)
Dept: FAMILY MEDICINE | Facility: CLINIC | Age: 60
End: 2025-03-16

## 2025-03-17 ENCOUNTER — OFFICE VISIT (OUTPATIENT)
Dept: FAMILY MEDICINE | Facility: CLINIC | Age: 60
End: 2025-03-17
Payer: COMMERCIAL

## 2025-03-17 VITALS
BODY MASS INDEX: 21.01 KG/M2 | SYSTOLIC BLOOD PRESSURE: 112 MMHG | OXYGEN SATURATION: 98 % | HEIGHT: 67 IN | DIASTOLIC BLOOD PRESSURE: 76 MMHG | HEART RATE: 60 BPM | RESPIRATION RATE: 18 BRPM | WEIGHT: 133.88 LBS

## 2025-03-17 DIAGNOSIS — K20.0 EOSINOPHILIC ESOPHAGITIS: Chronic | ICD-10-CM

## 2025-03-17 DIAGNOSIS — F51.01 PRIMARY INSOMNIA: ICD-10-CM

## 2025-03-17 DIAGNOSIS — Z00.00 ROUTINE GENERAL MEDICAL EXAMINATION AT A HEALTH CARE FACILITY: Primary | ICD-10-CM

## 2025-03-17 DIAGNOSIS — G47.01 INSOMNIA DUE TO MEDICAL CONDITION: Chronic | ICD-10-CM

## 2025-03-17 DIAGNOSIS — E03.9 ACQUIRED HYPOTHYROIDISM: Chronic | ICD-10-CM

## 2025-03-17 PROCEDURE — 1159F MED LIST DOCD IN RCRD: CPT | Mod: CPTII,S$GLB,, | Performed by: FAMILY MEDICINE

## 2025-03-17 PROCEDURE — 3008F BODY MASS INDEX DOCD: CPT | Mod: CPTII,S$GLB,, | Performed by: FAMILY MEDICINE

## 2025-03-17 PROCEDURE — 3044F HG A1C LEVEL LT 7.0%: CPT | Mod: CPTII,S$GLB,, | Performed by: FAMILY MEDICINE

## 2025-03-17 PROCEDURE — 1160F RVW MEDS BY RX/DR IN RCRD: CPT | Mod: CPTII,S$GLB,, | Performed by: FAMILY MEDICINE

## 2025-03-17 PROCEDURE — 3078F DIAST BP <80 MM HG: CPT | Mod: CPTII,S$GLB,, | Performed by: FAMILY MEDICINE

## 2025-03-17 PROCEDURE — 3074F SYST BP LT 130 MM HG: CPT | Mod: CPTII,S$GLB,, | Performed by: FAMILY MEDICINE

## 2025-03-17 PROCEDURE — 99396 PREV VISIT EST AGE 40-64: CPT | Mod: S$GLB,,, | Performed by: FAMILY MEDICINE

## 2025-03-17 PROCEDURE — 99999 PR PBB SHADOW E&M-EST. PATIENT-LVL IV: CPT | Mod: PBBFAC,,, | Performed by: FAMILY MEDICINE

## 2025-03-17 RX ORDER — ZOLPIDEM TARTRATE 5 MG/1
5 TABLET ORAL NIGHTLY PRN
Qty: 30 TABLET | Refills: 2 | Status: SHIPPED | OUTPATIENT
Start: 2025-03-17

## 2025-03-17 RX ORDER — BUDESONIDE AND FORMOTEROL FUMARATE DIHYDRATE 160; 4.5 UG/1; UG/1
2 AEROSOL RESPIRATORY (INHALATION)
COMMUNITY
Start: 2025-02-28

## 2025-03-17 RX ORDER — ESOMEPRAZOLE MAGNESIUM 40 MG/1
40 CAPSULE, DELAYED RELEASE ORAL
COMMUNITY
Start: 2025-03-13

## 2025-03-17 NOTE — PROGRESS NOTES
Subjective:       Patient ID: Brenda C Stargardter is a 59 y.o. female.    Chief Complaint: Annual Exam    History of Present Illness    CHIEF COMPLAINT:  Ms. Stargardter presents today for annual visit with concerns of recent chest pain, shortness of breath, and esophageal issues.    RESPIRATORY AND CARDIAC:  She reports a recent ER visit for chest pain and shortness of breath, which showed no suspicious cardiac findings. She has been referred to a cardiologist with an appointment scheduled for April 1st. Pulmonology evaluation diagnosed possible uncontrolled asthma. Treatment included a steroid pack and Symbicort inhaler, which has improved her symptoms.    EOSINOPHILIC ESOPHAGITIS:  She reports esophageal swelling described as mushroom-like with associated pain and significant dysphagia. Symptoms may have been triggered by recent dietary changes, specifically increased protein intake including daily protein shakes started approximately three weeks prior to onset.    MEDICAL HISTORY:  She has a history of latent tuberculosis confirmed by positive gold standard testing on two separate occasions, not due to prior TB vaccination. She has not received treatment for this condition.    LABS:  Vitamin D, B12, and thyroid levels were within normal range. A1C has remained stable between 4.9-5 for the past two years. Cholesterol panel shows elevated total cholesterol with good HDL levels, offsetting borderline LDL.      ROS:  Eyes: +spots, specks or flashing lights  Cardiovascular: +chest pain  Respiratory: +shortness of breath, +difficulty breathing  Gastrointestinal: +difficulty swallowing  Allergic: +allergic reactions           Problem List[1]  Prema has a current medication list which includes the following prescription(s): albuterol, budesonide-formoterol 160-4.5 mcg, clobetasol 0.05%, esomeprazole, estradiol, intrarosa, levothyroxine, botox, ondansetron, nurtec, sumatriptan, triamcinolone acetonide 0.5%, and  "zolpidem.        There are no preventive care reminders to display for this patient.   Health Maintenance reviewed and discussed  Objective:      Vitals:    03/17/25 0712   BP: 112/76   Pulse: 60   Resp: 18   SpO2: 98%   Weight: 60.7 kg (133 lb 14.4 oz)   Height: 5' 7" (1.702 m)   PainSc: 0-No pain     Body mass index is 20.97 kg/m².    Physical Exam    Constitutional: No acute distress. Well-appearing.  Cardiovascular: Regular rate. Regular rhythm. No murmurs.  Pulmonary: Normal respiratory effort. No wheezing. Normal breath sounds.  Skin: Warm. Dry. No rash.  Neurological: Alert.  Psychiatric: Normal mood. Normal behavior.         Assessment:       Assessment & Plan    1. Reviewed recent ER visit for chest pain and shortness of breath; no suspicious cardiac findings noted.  2. Considered family history and hormone replacement therapy as factors warranting cardiology follow-up.  3. Evaluated recent pulmonology consult suggesting uncontrolled asthma; treatment with steroids and inhalers improved symptoms.  4. Assessed recent esophageal swelling episode, likely related to eosinophilic esophagitis (EOE) triggered by dietary changes.  5. Analyzed lab results: vitamin D, B12, and thyroid hormone levels WNLs; slightly low CO2 levels, potentially related to recent breathing issues; elevated BUN, likely due to increased protein intake and morning fasting; stable A1C and lipid panel results.  6. Acknowledged latent TB positive status and decision to avoid biologics for immune suppression concerns.           Plan:       1. Routine general medical examination at a health care facility    2. Primary insomnia  -     zolpidem (AMBIEN) 5 MG Tab; Take 1 tablet (5 mg total) by mouth nightly as needed (insomnia).  Dispense: 30 tablet; Refill: 2    3. Acquired hypothyroidism    4. Eosinophilic esophagitis    5. Insomnia due to medical condition         This note was generated with the assistance of ambient listening technology. " Verbal consent was obtained by the patient and accompanying visitor(s) for the recording of patient appointment to facilitate this note. I attest to having reviewed and edited the generated note for accuracy, though some syntax or spelling errors may persist. Please contact the author of this note for any clarification.         [1]   Patient Active Problem List  Diagnosis    Hypothyroidism    Immunoglobulin deficiency    Gastroparesis    Rectal polyp    HPV test positive    Poor sleep hygiene    Intractable chronic migraine without aura and without status migrainosus    Allergic rhinitis due to pollen    Epigastric abdominal pain    Malaise and fatigue    Diarrhea    Nausea    JAMESON (generalized anxiety disorder)    ADHD (attention deficit hyperactivity disorder), inattentive type    Lumbar radicular pain    Cervical myofascial pain syndrome    Bilateral occipital neuralgia    Eosinophilic esophagitis    Cervical spondylolysis    BMI less than 19,adult    Constipation due to opioid therapy    Arthralgia of multiple joints    Thumb pain, right    Gastroesophageal reflux disease with esophagitis    Asthma, mild persistent    Migraines    Other selective immunoglobulin deficiencies    Burning mouth syndrome    Right shoulder pain    Decreased range of motion of right shoulder    Decreased strength of upper extremity    Insomnia due to medical condition    Nontraumatic incomplete tear of rotator cuff, right    Postural instability of trunk    Decreased range of motion of intervertebral discs of lumbar spine    Empty sella syndrome    Osteoarthritis of ankle and foot    Hallux rigidus of both feet    Left foot pain    Shortness of breath    Chest pain

## 2025-05-15 ENCOUNTER — PATIENT MESSAGE (OUTPATIENT)
Dept: OBSTETRICS AND GYNECOLOGY | Facility: CLINIC | Age: 60
End: 2025-05-15
Payer: COMMERCIAL

## 2025-05-19 RX ORDER — ESTRADIOL 0.04 MG/D
1 PATCH TRANSDERMAL WEEKLY
Qty: 12 PATCH | Refills: 12 | Status: SHIPPED | OUTPATIENT
Start: 2025-05-19

## 2025-06-11 NOTE — TELEPHONE ENCOUNTER
"Patient requesting a return call from Patricia Thao.   Has questions about coding/billing.     Offered billing dept contact info.   Declined stated "ultimatley it's the way my visit was coded for March" the coding needs to change."   Please advice [phone]  Shandra    "
Length (cm) 1.3 cm 06/10/25 1430   Post-Procedure Width (cm) 1.5 cm 06/10/25 1430   Post-Procedure Depth (cm) 0.2 cm 06/10/25 1430   Post-Procedure Surface Area (cm^2) 1.95 cm^2 06/10/25 1430   Post-Procedure Volume (cm^3) 0.39 cm^3 06/10/25 1430   Wound Assessment Pink/red;Slough 06/10/25 1430   Drainage Amount Small (< 25%) 06/10/25 1430   Drainage Description Serosanguinous 06/10/25 1430   Odor None 06/10/25 1430   Larisa-wound Assessment Hyperkeratosis (callous);Edematous 06/10/25 1430   Margins Attached edges 06/10/25 1430   Wound Thickness Description not for Pressure Injury Full thickness 06/10/25 1430   Number of days: 28     Incision 09/12/22 Abdomen Mid;Upper (Active)   Number of days: 1003       Incision 09/12/22 Abdomen Right;Upper;Lateral (Active)   Number of days: 1003       Incision 09/12/22 Abdomen Right;Lateral (Active)   Number of days: 1003       Incision 09/12/22 Umbilicus (Active)   Number of days: 1003       Incision 09/12/22 Abdomen Left (Active)   Number of days: 1003      This dictation may have been generated in part by voice recognition computer software. Although all attempts are made to edit the dictation for accuracy, there may be errors in the transcription that are not intended.